# Patient Record
Sex: MALE | Race: WHITE | HISPANIC OR LATINO
[De-identification: names, ages, dates, MRNs, and addresses within clinical notes are randomized per-mention and may not be internally consistent; named-entity substitution may affect disease eponyms.]

---

## 2017-04-26 ENCOUNTER — APPOINTMENT (OUTPATIENT)
Dept: RADIOLOGY | Facility: HOSPITAL | Age: 82
End: 2017-04-26

## 2017-04-26 ENCOUNTER — OUTPATIENT (OUTPATIENT)
Dept: OUTPATIENT SERVICES | Facility: HOSPITAL | Age: 82
LOS: 1 days | Discharge: HOME | End: 2017-04-26
Payer: MEDICARE

## 2017-04-26 DIAGNOSIS — Z98.89 OTHER SPECIFIED POSTPROCEDURAL STATES: Chronic | ICD-10-CM

## 2017-04-26 DIAGNOSIS — R06.02 SHORTNESS OF BREATH: ICD-10-CM

## 2017-04-26 PROCEDURE — 71020: CPT | Mod: 26

## 2017-08-07 ENCOUNTER — APPOINTMENT (OUTPATIENT)
Dept: RADIOLOGY | Facility: HOSPITAL | Age: 82
End: 2017-08-07

## 2017-08-07 ENCOUNTER — OUTPATIENT (OUTPATIENT)
Dept: OUTPATIENT SERVICES | Facility: HOSPITAL | Age: 82
LOS: 1 days | Discharge: HOME | End: 2017-08-07
Payer: MEDICARE

## 2017-08-07 DIAGNOSIS — R06.02 SHORTNESS OF BREATH: ICD-10-CM

## 2017-08-07 DIAGNOSIS — Z98.89 OTHER SPECIFIED POSTPROCEDURAL STATES: Chronic | ICD-10-CM

## 2017-08-07 PROCEDURE — 71020: CPT | Mod: 26

## 2019-04-15 ENCOUNTER — MEDICATION RENEWAL (OUTPATIENT)
Age: 84
End: 2019-04-15

## 2019-05-13 ENCOUNTER — MEDICATION RENEWAL (OUTPATIENT)
Age: 84
End: 2019-05-13

## 2019-05-14 ENCOUNTER — MEDICATION RENEWAL (OUTPATIENT)
Age: 84
End: 2019-05-14

## 2019-05-14 ENCOUNTER — APPOINTMENT (OUTPATIENT)
Dept: INTERNAL MEDICINE | Facility: CLINIC | Age: 84
End: 2019-05-14
Payer: MEDICARE

## 2019-05-14 ENCOUNTER — RECORD ABSTRACTING (OUTPATIENT)
Age: 84
End: 2019-05-14

## 2019-05-14 VITALS
HEART RATE: 76 BPM | DIASTOLIC BLOOD PRESSURE: 73 MMHG | HEIGHT: 66 IN | WEIGHT: 133 LBS | SYSTOLIC BLOOD PRESSURE: 109 MMHG | BODY MASS INDEX: 21.38 KG/M2

## 2019-05-14 DIAGNOSIS — E11.9 TYPE 2 DIABETES MELLITUS W/OUT COMPLICATIONS: ICD-10-CM

## 2019-05-14 DIAGNOSIS — R09.89 OTHER SPECIFIED SYMPTOMS AND SIGNS INVOLVING THE CIRCULATORY AND RESPIRATORY SYSTEMS: ICD-10-CM

## 2019-05-14 DIAGNOSIS — H91.90 UNSPECIFIED HEARING LOSS, UNSPECIFIED EAR: ICD-10-CM

## 2019-05-14 DIAGNOSIS — Z86.69 PERSONAL HISTORY OF OTHER DISEASES OF THE NERVOUS SYSTEM AND SENSE ORGANS: ICD-10-CM

## 2019-05-14 DIAGNOSIS — J06.9 ACUTE UPPER RESPIRATORY INFECTION, UNSPECIFIED: ICD-10-CM

## 2019-05-14 DIAGNOSIS — M25.569 PAIN IN UNSPECIFIED KNEE: ICD-10-CM

## 2019-05-14 DIAGNOSIS — R06.02 SHORTNESS OF BREATH: ICD-10-CM

## 2019-05-14 DIAGNOSIS — R53.83 OTHER FATIGUE: ICD-10-CM

## 2019-05-14 DIAGNOSIS — K21.9 GASTRO-ESOPHAGEAL REFLUX DISEASE W/OUT ESOPHAGITIS: ICD-10-CM

## 2019-05-14 DIAGNOSIS — J18.9 PNEUMONIA, UNSPECIFIED ORGANISM: ICD-10-CM

## 2019-05-14 DIAGNOSIS — I25.10 ATHEROSCLEROTIC HEART DISEASE OF NATIVE CORONARY ARTERY W/OUT ANGINA PECTORIS: ICD-10-CM

## 2019-05-14 DIAGNOSIS — Z86.73 PERSONAL HISTORY OF TRANSIENT ISCHEMIC ATTACK (TIA), AND CEREBRAL INFARCTION W/OUT RESIDUAL DEFICITS: ICD-10-CM

## 2019-05-14 DIAGNOSIS — H92.09 OTALGIA, UNSPECIFIED EAR: ICD-10-CM

## 2019-05-14 DIAGNOSIS — Z86.19 PERSONAL HISTORY OF OTHER INFECTIOUS AND PARASITIC DISEASES: ICD-10-CM

## 2019-05-14 DIAGNOSIS — N52.9 MALE ERECTILE DYSFUNCTION, UNSPECIFIED: ICD-10-CM

## 2019-05-14 DIAGNOSIS — Z87.01 PERSONAL HISTORY OF PNEUMONIA (RECURRENT): ICD-10-CM

## 2019-05-14 DIAGNOSIS — Z87.898 PERSONAL HISTORY OF OTHER SPECIFIED CONDITIONS: ICD-10-CM

## 2019-05-14 DIAGNOSIS — Z87.828 PERSONAL HISTORY OF OTHER (HEALED) PHYSICAL INJURY AND TRAUMA: ICD-10-CM

## 2019-05-14 PROCEDURE — 99214 OFFICE O/P EST MOD 30 MIN: CPT

## 2019-05-14 RX ORDER — NEOMYCIN SULFATE, POLYMYXIN B SULFATE, HYDROCORTISONE 3.5; 10000; 1 MG/ML; [USP'U]/ML; MG/ML
1 SOLUTION/ DROPS AURICULAR (OTIC) 4 TIMES DAILY
Refills: 0 | Status: DISCONTINUED | COMMUNITY
End: 2019-05-14

## 2019-05-14 RX ORDER — BUMETANIDE 1 MG/1
1 TABLET ORAL DAILY
Refills: 0 | Status: DISCONTINUED | COMMUNITY
End: 2019-05-14

## 2019-05-14 RX ORDER — APIXABAN 5 MG/1
5 TABLET, FILM COATED ORAL
Qty: 180 | Refills: 2 | Status: DISCONTINUED | COMMUNITY
End: 2019-05-14

## 2019-05-14 NOTE — PHYSICAL EXAM
[No Acute Distress] : no acute distress [Well Nourished] : well nourished [Well Developed] : well developed [Well-Appearing] : well-appearing [Normal Sclera/Conjunctiva] : normal sclera/conjunctiva [PERRL] : pupils equal round and reactive to light [EOMI] : extraocular movements intact [Normal Outer Ear/Nose] : the outer ears and nose were normal in appearance [Normal Oropharynx] : the oropharynx was normal [No JVD] : no jugular venous distention [Supple] : supple [No Lymphadenopathy] : no lymphadenopathy [No Respiratory Distress] : no respiratory distress  [Clear to Auscultation] : lungs were clear to auscultation bilaterally [No Accessory Muscle Use] : no accessory muscle use [Normal Rate] : normal rate  [Regular Rhythm] : with a regular rhythm [Normal S1, S2] : normal S1 and S2 [No Murmur] : no murmur heard [No Carotid Bruits] : no carotid bruits [No Abdominal Bruit] : a ~M bruit was not heard ~T in the abdomen [Pedal Pulses Present] : the pedal pulses are present [No Varicosities] : no varicosities [No Edema] : there was no peripheral edema [No Extremity Clubbing/Cyanosis] : no extremity clubbing/cyanosis [Soft] : abdomen soft [No Palpable Aorta] : no palpable aorta [Non-distended] : non-distended [Non Tender] : non-tender [No Masses] : no abdominal mass palpated [No HSM] : no HSM [Normal Bowel Sounds] : normal bowel sounds [Normal Posterior Cervical Nodes] : no posterior cervical lymphadenopathy [Normal Anterior Cervical Nodes] : no anterior cervical lymphadenopathy [No CVA Tenderness] : no CVA  tenderness [No Spinal Tenderness] : no spinal tenderness [No Joint Swelling] : no joint swelling [Grossly Normal Strength/Tone] : grossly normal strength/tone [No Rash] : no rash [Coordination Grossly Intact] : coordination grossly intact [Normal Gait] : normal gait [No Focal Deficits] : no focal deficits [Deep Tendon Reflexes (DTR)] : deep tendon reflexes were 2+ and symmetric [Normal Insight/Judgement] : insight and judgment were intact [Normal Affect] : the affect was normal

## 2019-05-14 NOTE — HISTORY OF PRESENT ILLNESS
[de-identified] : for f/u dm cad [FreeTextEntry1] : f/u  dm   remains on  metformin  he says his sob is generally alona and he is not sign the pulmicort though it times still occurs  says  3 per week diuretic keepng any  fluid retention under control  remains on oral rx for dm

## 2019-05-14 NOTE — REVIEW OF SYSTEMS
[FreeTextEntry6] : as above [Negative] : Heme/Lymph [FreeTextEntry8] : occ loss opf urine [de-identified] : occ sundowning as per daughter

## 2019-06-03 PROBLEM — N52.9 MALE ERECTILE DISORDER: Status: ACTIVE | Noted: 2019-05-14

## 2019-06-05 ENCOUNTER — APPOINTMENT (OUTPATIENT)
Dept: INTERNAL MEDICINE | Facility: CLINIC | Age: 84
End: 2019-06-05
Payer: MEDICARE

## 2019-06-05 VITALS
BODY MASS INDEX: 22.02 KG/M2 | SYSTOLIC BLOOD PRESSURE: 136 MMHG | DIASTOLIC BLOOD PRESSURE: 74 MMHG | HEART RATE: 78 BPM | WEIGHT: 137 LBS | HEIGHT: 66 IN

## 2019-06-05 DIAGNOSIS — H91.93 UNSPECIFIED HEARING LOSS, BILATERAL: ICD-10-CM

## 2019-06-05 DIAGNOSIS — H61.20 IMPACTED CERUMEN, UNSPECIFIED EAR: ICD-10-CM

## 2019-06-05 DIAGNOSIS — H61.23 IMPACTED CERUMEN, BILATERAL: ICD-10-CM

## 2019-06-05 PROCEDURE — 99213 OFFICE O/P EST LOW 20 MIN: CPT | Mod: 25

## 2019-06-05 PROCEDURE — 69210 REMOVE IMPACTED EAR WAX UNI: CPT

## 2019-06-05 NOTE — PHYSICAL EXAM
[No Acute Distress] : no acute distress [Well Nourished] : well nourished [Well Developed] : well developed [Normal Sclera/Conjunctiva] : normal sclera/conjunctiva [Normal Outer Ear/Nose] : the outer ears and nose were normal in appearance [No JVD] : no jugular venous distention [Normal Oropharynx] : the oropharynx was normal [Supple] : supple [No Respiratory Distress] : no respiratory distress  [Clear to Auscultation] : lungs were clear to auscultation bilaterally [No Accessory Muscle Use] : no accessory muscle use [de-identified] : cerumin impaction bilaterally

## 2019-06-30 ENCOUNTER — EMERGENCY (EMERGENCY)
Facility: HOSPITAL | Age: 84
LOS: 1 days | Discharge: HOME | End: 2019-06-30
Attending: EMERGENCY MEDICINE
Payer: MEDICARE

## 2019-06-30 VITALS
HEIGHT: 66 IN | SYSTOLIC BLOOD PRESSURE: 136 MMHG | TEMPERATURE: 98 F | RESPIRATION RATE: 18 BRPM | HEART RATE: 85 BPM | OXYGEN SATURATION: 96 % | DIASTOLIC BLOOD PRESSURE: 70 MMHG | WEIGHT: 164.91 LBS

## 2019-06-30 VITALS
SYSTOLIC BLOOD PRESSURE: 136 MMHG | DIASTOLIC BLOOD PRESSURE: 86 MMHG | TEMPERATURE: 98 F | RESPIRATION RATE: 17 BRPM | HEART RATE: 96 BPM | OXYGEN SATURATION: 96 %

## 2019-06-30 DIAGNOSIS — Z98.89 OTHER SPECIFIED POSTPROCEDURAL STATES: Chronic | ICD-10-CM

## 2019-06-30 PROCEDURE — 12013 RPR F/E/E/N/L/M 2.6-5.0 CM: CPT

## 2019-06-30 PROCEDURE — 72125 CT NECK SPINE W/O DYE: CPT | Mod: 26

## 2019-06-30 PROCEDURE — 72125 CT NECK SPINE W/O DYE: CPT

## 2019-06-30 PROCEDURE — 71046 X-RAY EXAM CHEST 2 VIEWS: CPT

## 2019-06-30 PROCEDURE — 72170 X-RAY EXAM OF PELVIS: CPT | Mod: 26

## 2019-06-30 PROCEDURE — 90471 IMMUNIZATION ADMIN: CPT

## 2019-06-30 PROCEDURE — 70450 CT HEAD/BRAIN W/O DYE: CPT | Mod: 26

## 2019-06-30 PROCEDURE — 99284 EMERGENCY DEPT VISIT MOD MDM: CPT | Mod: 25

## 2019-06-30 PROCEDURE — 71046 X-RAY EXAM CHEST 2 VIEWS: CPT | Mod: 26

## 2019-06-30 PROCEDURE — 90715 TDAP VACCINE 7 YRS/> IM: CPT

## 2019-06-30 PROCEDURE — 99284 EMERGENCY DEPT VISIT MOD MDM: CPT | Mod: 25,GC

## 2019-06-30 PROCEDURE — 72170 X-RAY EXAM OF PELVIS: CPT

## 2019-06-30 PROCEDURE — 70450 CT HEAD/BRAIN W/O DYE: CPT

## 2019-06-30 RX ORDER — ACETAMINOPHEN 500 MG
650 TABLET ORAL ONCE
Refills: 0 | Status: COMPLETED | OUTPATIENT
Start: 2019-06-30 | End: 2019-06-30

## 2019-06-30 RX ORDER — ACETAMINOPHEN 500 MG
2 TABLET ORAL
Qty: 30 | Refills: 0
Start: 2019-06-30

## 2019-06-30 RX ORDER — TETANUS TOXOID, REDUCED DIPHTHERIA TOXOID AND ACELLULAR PERTUSSIS VACCINE, ADSORBED 5; 2.5; 8; 8; 2.5 [IU]/.5ML; [IU]/.5ML; UG/.5ML; UG/.5ML; UG/.5ML
0.5 SUSPENSION INTRAMUSCULAR ONCE
Refills: 0 | Status: COMPLETED | OUTPATIENT
Start: 2019-06-30 | End: 2019-06-30

## 2019-06-30 RX ADMIN — TETANUS TOXOID, REDUCED DIPHTHERIA TOXOID AND ACELLULAR PERTUSSIS VACCINE, ADSORBED 0.5 MILLILITER(S): 5; 2.5; 8; 8; 2.5 SUSPENSION INTRAMUSCULAR at 18:38

## 2019-06-30 RX ADMIN — Medication 650 MILLIGRAM(S): at 16:48

## 2019-06-30 NOTE — ED PROVIDER NOTE - PHYSICAL EXAMINATION
GEN: Well appearing, well nourished, in no apparent distress.  HEAD: R forehead lac   HEENT: PERRL, EOMI, no nystagmus, no facial droop, Airway patent, uvula midline, MMM, neck supple, no LAD, no JVD, no raccoon sign, no andrew sign   LUNG: CTAB, no adventitious sounds, no retractions, no nasal flaring  CV: RRR, no murmurs,   Abd: soft, NTND, no rebound or guarding, BS+ in all quadrants, no CVAT  MSK: WWP, Pulses 2+ in extremities, No edema, no visible deformities, strength 5/5 in all extremities, no midspine TTP  Neuro:  CN II-XII in tact. AAOx3, Ambulatory with stable gait. sensations in tact in all extremities, neg romberg, neg pronator drift, neg finger to nose,   Skin: Warm and dry, no evidence of rash  Psych: normal mood and affect

## 2019-06-30 NOTE — ED PROVIDER NOTE - PROGRESS NOTE DETAILS
ED sign out, eval for fall, pending  imaging and close reassessments and likely d/c w/ family if all wnl and continued close outpt fu -- Geoffrey Carter MD Dr Lemus: CT and xrays without bleeding or fractures. pt was able to eat and drink, stand and walk without issues.

## 2019-06-30 NOTE — ED PROVIDER NOTE - CLINICAL SUMMARY MEDICAL DECISION MAKING FREE TEXT BOX
Mechanical fall in eldely male on aspirin, check ct head, ct cspine, cxr, xray pelvis to r/o bleed and fractures. Tylenol for pain and reassess.

## 2019-06-30 NOTE — ED PROVIDER NOTE - OBJECTIVE STATEMENT
89M PMH HTN, HLD, DM, pacemaker, TAVR, on aspirin presents with trip and fall this afternoon in front of his [facility] living home. Pt hit his head and has a lac R forehead. Also has R hip pain. Patient denies LOC, chest pain, SOB, f/c, cough, abd pain, N/V/D/C, weakness, HA, dizziness, urinary symptoms, extremity pain or swelling or other complaints.

## 2019-06-30 NOTE — ED PROVIDER NOTE - PMH
BPH (benign prostatic hypertrophy)    DM (diabetes mellitus)    HLD (hyperlipidemia)    HTN (hypertension)    Insomnia    RBBB

## 2019-06-30 NOTE — ED ADULT NURSE NOTE - NSIMPLEMENTINTERV_GEN_ALL_ED
Implemented All Fall with Harm Risk Interventions:  [place] to call system. Call bell, personal items and telephone within reach. Instruct patient to call for assistance. Room bathroom lighting operational. Non-slip footwear when patient is off stretcher. Physically safe environment: no spills, clutter or unnecessary equipment. Stretcher in lowest position, wheels locked, appropriate side rails in place. Provide visual cue, wrist band, yellow gown, etc. Monitor gait and stability. Monitor for mental status changes and reorient to person, place, and time. Review medications for side effects contributing to fall risk. Reinforce activity limits and safety measures with patient and family. Provide visual clues: red socks.

## 2019-06-30 NOTE — ED PROVIDER NOTE - NS ED ROS FT
Constitutional: no fevers, no chills.  Eyes: no visual changes.  Ears: no ear drainage, no ear pain.  Nose: no nasal congestion.  Mouth/Throat: no sore throat.  Cardiovascular: no chest pain.  Respiratory: no shortness of breath, no wheezing, no cough  Gastrointestinal: no nausea, no vomiting, no diarrhea, no abdominal pain.  MSK: no flank pain, no back pain. +r hip pain and R forehead laceration.   Genitourinary: no dysuria, no hematuria.  Skin: no rashes.  Neuro: no headache,   Psychiatric: no known mental health issues.

## 2019-06-30 NOTE — ED PROVIDER NOTE - NSFOLLOWUPINSTRUCTIONS_ED_ALL_ED_FT
1) You were seen in the ER for fall. The patient has been informed of all concerning signs and symptoms to return to Emergency Department, the necessity to follow up with PMD/Clinic/follow up provided within 2-3 days was explained, and the patient reports understanding of above with capacity and insight. You can look at the discharge papers for some examples of specific signs and symptoms to look out for.   2) PLEASE CALL DR SCHNEIDER'S OFFICE TOMORROW MORNING TO TELL HIM YOU WERE IN THE HOSPITAL. Please go to Dr Schneider's office or any emergency or urgent care in about 7 DAYS to remove stitches.   3) Take tylenol 650 mg by mouth every 8 hours as needed for pain.

## 2019-06-30 NOTE — ED PROVIDER NOTE - ATTENDING CONTRIBUTION TO CARE
I have seen and evaluated this patient with the resident.   I agree with the findings  unless other wise stated.  I have made appropriate changes in documentations where needed, After my face to face bedside evaluation, I am further  notinM PMH HTN, HLD, DM, pacemaker, TAVR, on aspirin presents with trip and fall this afternoon in front of his [facility] living home. Pt hit his head and has a lac R forehead. Also has R hip pain. Patient denies LOC, chest pain, SOB, f/c, cough, abd pain, N/V/D/C, weakness, HA, dizziness, urinary symptoms, extremity pain or swelling or other complaints. Pt alert No distress A laceration right forehead irregular no active bleeding detailed neuro exam non focal OA+ ambulatory CT head and cx spines no acute traumatic complication PMD f/u --Juares

## 2019-07-10 ENCOUNTER — TRANSCRIPTION ENCOUNTER (OUTPATIENT)
Age: 84
End: 2019-07-10

## 2019-08-12 ENCOUNTER — RX RENEWAL (OUTPATIENT)
Age: 84
End: 2019-08-12

## 2019-08-26 ENCOUNTER — EMERGENCY (EMERGENCY)
Facility: HOSPITAL | Age: 84
LOS: 1 days | Discharge: HOME | End: 2019-08-26
Payer: MEDICARE

## 2019-08-26 VITALS
OXYGEN SATURATION: 95 % | HEART RATE: 80 BPM | WEIGHT: 145.06 LBS | HEIGHT: 68 IN | RESPIRATION RATE: 16 BRPM | SYSTOLIC BLOOD PRESSURE: 147 MMHG | DIASTOLIC BLOOD PRESSURE: 100 MMHG

## 2019-08-26 DIAGNOSIS — Z98.89 OTHER SPECIFIED POSTPROCEDURAL STATES: Chronic | ICD-10-CM

## 2019-08-26 LAB
ALBUMIN SERPL ELPH-MCNC: 4.7 G/DL — SIGNIFICANT CHANGE UP (ref 3.3–5)
ALP SERPL-CCNC: 60 U/L — SIGNIFICANT CHANGE UP (ref 40–120)
ALT FLD-CCNC: 39 U/L — SIGNIFICANT CHANGE UP (ref 10–45)
ANION GAP SERPL CALC-SCNC: 13 MMOL/L — SIGNIFICANT CHANGE UP (ref 5–17)
APPEARANCE UR: CLEAR — SIGNIFICANT CHANGE UP
APTT BLD: 30.8 SEC — SIGNIFICANT CHANGE UP (ref 27.5–36.3)
AST SERPL-CCNC: 67 U/L — HIGH (ref 10–40)
BACTERIA # UR AUTO: NEGATIVE — SIGNIFICANT CHANGE UP
BASOPHILS # BLD AUTO: 0 K/UL — SIGNIFICANT CHANGE UP (ref 0–0.2)
BASOPHILS NFR BLD AUTO: 0.5 % — SIGNIFICANT CHANGE UP (ref 0–2)
BILIRUB SERPL-MCNC: 1.4 MG/DL — HIGH (ref 0.2–1.2)
BILIRUB UR-MCNC: NEGATIVE — SIGNIFICANT CHANGE UP
BUN SERPL-MCNC: 13 MG/DL — SIGNIFICANT CHANGE UP (ref 7–23)
CALCIUM SERPL-MCNC: 9.5 MG/DL — SIGNIFICANT CHANGE UP (ref 8.4–10.5)
CHLORIDE SERPL-SCNC: 97 MMOL/L — SIGNIFICANT CHANGE UP (ref 96–108)
CO2 SERPL-SCNC: 22 MMOL/L — SIGNIFICANT CHANGE UP (ref 22–31)
COLOR SPEC: SIGNIFICANT CHANGE UP
CREAT SERPL-MCNC: 0.71 MG/DL — SIGNIFICANT CHANGE UP (ref 0.5–1.3)
DIFF PNL FLD: ABNORMAL
EOSINOPHIL # BLD AUTO: 0.1 K/UL — SIGNIFICANT CHANGE UP (ref 0–0.5)
EOSINOPHIL NFR BLD AUTO: 1 % — SIGNIFICANT CHANGE UP (ref 0–6)
EPI CELLS # UR: 0 /HPF — SIGNIFICANT CHANGE UP
GLUCOSE SERPL-MCNC: 156 MG/DL — HIGH (ref 70–99)
GLUCOSE UR QL: NEGATIVE — SIGNIFICANT CHANGE UP
HCT VFR BLD CALC: 47.8 % — SIGNIFICANT CHANGE UP (ref 39–50)
HGB BLD-MCNC: 15.1 G/DL — SIGNIFICANT CHANGE UP (ref 13–17)
HYALINE CASTS # UR AUTO: 0 /LPF — SIGNIFICANT CHANGE UP (ref 0–2)
INR BLD: 1.14 RATIO — SIGNIFICANT CHANGE UP (ref 0.88–1.16)
KETONES UR-MCNC: NEGATIVE — SIGNIFICANT CHANGE UP
LEUKOCYTE ESTERASE UR-ACNC: NEGATIVE — SIGNIFICANT CHANGE UP
LYMPHOCYTES # BLD AUTO: 0.7 K/UL — LOW (ref 1–3.3)
LYMPHOCYTES # BLD AUTO: 11.3 % — LOW (ref 13–44)
MCHC RBC-ENTMCNC: 29.6 PG — SIGNIFICANT CHANGE UP (ref 27–34)
MCHC RBC-ENTMCNC: 31.6 GM/DL — LOW (ref 32–36)
MCV RBC AUTO: 93.9 FL — SIGNIFICANT CHANGE UP (ref 80–100)
MONOCYTES # BLD AUTO: 0.7 K/UL — SIGNIFICANT CHANGE UP (ref 0–0.9)
MONOCYTES NFR BLD AUTO: 11.5 % — SIGNIFICANT CHANGE UP (ref 2–14)
NEUTROPHILS # BLD AUTO: 4.4 K/UL — SIGNIFICANT CHANGE UP (ref 1.8–7.4)
NEUTROPHILS NFR BLD AUTO: 75.7 % — SIGNIFICANT CHANGE UP (ref 43–77)
NITRITE UR-MCNC: NEGATIVE — SIGNIFICANT CHANGE UP
PH UR: 6 — SIGNIFICANT CHANGE UP (ref 5–8)
PLATELET # BLD AUTO: 128 K/UL — LOW (ref 150–400)
POTASSIUM SERPL-MCNC: 5.8 MMOL/L — HIGH (ref 3.5–5.3)
POTASSIUM SERPL-SCNC: 5.8 MMOL/L — HIGH (ref 3.5–5.3)
PROT SERPL-MCNC: 7.7 G/DL — SIGNIFICANT CHANGE UP (ref 6–8.3)
PROT UR-MCNC: SIGNIFICANT CHANGE UP
PROTHROM AB SERPL-ACNC: 13.2 SEC — HIGH (ref 10–12.9)
RBC # BLD: 5.1 M/UL — SIGNIFICANT CHANGE UP (ref 4.2–5.8)
RBC # FLD: 12.4 % — SIGNIFICANT CHANGE UP (ref 10.3–14.5)
RBC CASTS # UR COMP ASSIST: 4 /HPF — SIGNIFICANT CHANGE UP (ref 0–4)
SODIUM SERPL-SCNC: 132 MMOL/L — LOW (ref 135–145)
SP GR SPEC: 1.01 — SIGNIFICANT CHANGE UP (ref 1.01–1.02)
UROBILINOGEN FLD QL: NEGATIVE — SIGNIFICANT CHANGE UP
WBC # BLD: 5.9 K/UL — SIGNIFICANT CHANGE UP (ref 3.8–10.5)
WBC # FLD AUTO: 5.9 K/UL — SIGNIFICANT CHANGE UP (ref 3.8–10.5)
WBC UR QL: 0 /HPF — SIGNIFICANT CHANGE UP (ref 0–5)

## 2019-08-26 PROCEDURE — 85027 COMPLETE CBC AUTOMATED: CPT

## 2019-08-26 PROCEDURE — 85730 THROMBOPLASTIN TIME PARTIAL: CPT

## 2019-08-26 PROCEDURE — 93005 ELECTROCARDIOGRAM TRACING: CPT

## 2019-08-26 PROCEDURE — 70450 CT HEAD/BRAIN W/O DYE: CPT

## 2019-08-26 PROCEDURE — 99284 EMERGENCY DEPT VISIT MOD MDM: CPT | Mod: 25

## 2019-08-26 PROCEDURE — 93010 ELECTROCARDIOGRAM REPORT: CPT

## 2019-08-26 PROCEDURE — 99284 EMERGENCY DEPT VISIT MOD MDM: CPT | Mod: GC

## 2019-08-26 PROCEDURE — 85610 PROTHROMBIN TIME: CPT

## 2019-08-26 PROCEDURE — 80053 COMPREHEN METABOLIC PANEL: CPT

## 2019-08-26 PROCEDURE — 81001 URINALYSIS AUTO W/SCOPE: CPT

## 2019-08-26 PROCEDURE — 70450 CT HEAD/BRAIN W/O DYE: CPT | Mod: 26

## 2019-08-26 NOTE — ED PROVIDER NOTE - CLINICAL SUMMARY MEDICAL DECISION MAKING FREE TEXT BOX
Impression:  strong suspicion for mechanical fall in elderly male with good social support, clinically well at this time.  labs, UA, and CT are unremarkable.   results d/w family, who understand indications to come back to the ED.  Plan:  d/c home, f/u PMD, return precautions.

## 2019-08-26 NOTE — ED PROVIDER NOTE - PMH
BPH (benign prostatic hypertrophy)    DM (diabetes mellitus)    HLD (hyperlipidemia)    HTN (hypertension)    Insomnia    RBBB BPH (benign prostatic hypertrophy)    Chronic atrial fibrillation    DM (diabetes mellitus)    HLD (hyperlipidemia)    HTN (hypertension)    Insomnia    RBBB

## 2019-08-26 NOTE — ED PROVIDER NOTE - NEURO NEGATIVE STATEMENT, MLM
no loss of consciousness, no gait abnormality, no headache, no sensory deficits, and no weakness.  Chronically unsteady on his feet.

## 2019-08-26 NOTE — ED PROVIDER NOTE - OBJECTIVE STATEMENT
89yo m c/o unwitnessed fall.  Pt was at home alone when he fell forward striking his forehead and left shin. Unsure of duration of downtime. Denies LOC, CP, palpitation, dizziness.   Denies fever This is 2nd fall within last few months. Uses walker to ambulate. 89yo m c/o unwitnessed fall.  Pt was at home alone when he fell forward striking his forehead and left shin. Unsure of duration of downtime. Denies LOC, CP, palpitation, dizziness, fevers,  This is 2nd fall within last few months. Uses walker to ambulate. 89yo m c/o unwitnessed fall.  Pt was at home alone when he fell forward striking his forehead and left shin.  Denies LOC, CP, palpitation, dizziness, fevers,  This is 2nd fall within last few months. Uses walker to ambulate.

## 2019-08-26 NOTE — ED ADULT NURSE NOTE - OBJECTIVE STATEMENT
Pt presents to ED following a fall, AXOX2, oriented to person and place, disoriented to time, pt is unreliable historian, family at bedside aiding with history, pt denies pain, pt is [place] and does not have hearing aids, pt lives at assisted living, staff at facility reported an unwitnessed fall, pt struck head but denies LOC, pt struck left shin causing abrasion, no active bleeding at this time, pt not sure how long he was down, pt has hx of falls, one occurring earlier this month, uses walker at baseline. Pt breathing unlabored, symmetrical, no shortness of breath, no chest pain, no n/v/d, no fever or chills, no urine sx, pt has rash to upper trunk, being treated outpt for 1  month with topical creams, partial relief.

## 2019-08-26 NOTE — ED PROVIDER NOTE - NSFOLLOWUPINSTRUCTIONS_ED_ALL_ED_FT
1. FOLLOW UP WITH YOUR PRIMARY MD.  2. YOU MAY TAKE IBUPROFEN (MOTRIN, ADVIL) OR ACETAMINOPHEN (TYLENOL) AS NEEDED, AS DIRECTED ON PACKAGING FOR PAIN OR FEVER.  3.  RETURN TO THE ER FOR ANY WORSENING SYMPTOMS OR CONCERNS (FEVER, CHILLS, WORSENING, PAIN, INABILITY TO EAT/DRINK, ETC...)

## 2019-08-26 NOTE — ED PROVIDER NOTE - ATTENDING CONTRIBUTION TO CARE
MD Arthur:  patient seen and evaluated with the resident.  I was present for key portions of the History & Physical, and I agree with the Impression & Plan.  MD Arthur:  89 yo M, c/o fall at home.  2nd fall in last 2 mos.  Patient reports that he lost his balance and fell forward.  No LOC.  No neck pain.  No back pain.  No Cp/SOB.  NO abd pain.  Better/worse: no modifiers.    VS: wnl. elderly M, NAD, no frontal hematoma, PERRL, EOMI, neck supple, CTA B, RRR, Abd: s/nd/nt, Ext: no edema.    Impression:  strong suspicion for mechanical fall in elderly male with good social support, clinically well at this time.  labs, UA, and CT are unremarkable.   results d/w family, who understand indications to come back to the ED.  Plan:  d/c home, f/u PMD, return precautions. MD Arthur:  patient seen and evaluated with the resident.  I was present for key portions of the History & Physical, and I agree with the Impression & Plan.  MD Arthur:  89 yo M, c/o fall at home.  2nd fall in last 2 mos.  Patient reports that he lost his balance and fell forward.  No LOC.  No neck pain.  No back pain.  No Cp/SOB.  NO abd pain.  Better/worse: no modifiers.    VS: wnl. elderly M, NAD, no frontal hematoma, PERRL, EOMI, neck supple, CTA B, RRR, Abd: s/nd/nt, Ext: no edema.    Impression:  ECG = afib (old). no RVR.    Impression:  strong suspicion for mechanical fall in elderly male with good social support, clinically well at this time.  labs, UA, and CT are unremarkable.   results d/w family, who understand indications to come back to the ED.  Plan:  d/c home, f/u PMD, return precautions. MD Arthur:  patient seen and evaluated with the resident.  I was present for key portions of the History & Physical, and I agree with the Impression & Plan.  MD Arthur:  89 yo M, c/o fall at home.  2nd fall in last 2 mos.  Patient reports that he lost his balance and fell forward.  No LOC.  No neck pain.  No back pain.  No Cp/SOB.  NO abd pain.  Better/worse: no modifiers.    VS: wnl. elderly M, NAD, no frontal hematoma, PERRL, EOMI, neck supple, CTA B, RRR, Abd: s/nd/nt, Ext: no edema.    ECG = afib (old). no RVR.    Impression:  strong suspicion for mechanical fall in elderly male with good social support, clinically well at this time.  No AC, given fall risk.  labs, UA, and CT are unremarkable.   results d/w family, who understand indications to come back to the ED.  Plan:  d/c home, f/u PMD, return precautions.

## 2019-08-27 VITALS
SYSTOLIC BLOOD PRESSURE: 129 MMHG | TEMPERATURE: 98 F | OXYGEN SATURATION: 98 % | DIASTOLIC BLOOD PRESSURE: 81 MMHG | RESPIRATION RATE: 16 BRPM | HEART RATE: 73 BPM

## 2019-09-03 PROBLEM — I48.2 CHRONIC ATRIAL FIBRILLATION: Chronic | Status: ACTIVE | Noted: 2019-08-26

## 2019-09-09 ENCOUNTER — APPOINTMENT (OUTPATIENT)
Dept: NEUROLOGY | Facility: CLINIC | Age: 84
End: 2019-09-09
Payer: MEDICARE

## 2019-09-09 VITALS
WEIGHT: 145 LBS | SYSTOLIC BLOOD PRESSURE: 131 MMHG | DIASTOLIC BLOOD PRESSURE: 76 MMHG | HEART RATE: 85 BPM | HEIGHT: 66 IN | BODY MASS INDEX: 23.3 KG/M2

## 2019-09-09 PROCEDURE — 99215 OFFICE O/P EST HI 40 MIN: CPT

## 2019-09-09 PROCEDURE — 99204 OFFICE O/P NEW MOD 45 MIN: CPT

## 2019-09-09 RX ORDER — PRAVASTATIN SODIUM 20 MG/1
20 TABLET ORAL DAILY
Qty: 90 | Refills: 1 | Status: DISCONTINUED | OUTPATIENT
Start: 2019-05-14 | End: 2019-09-09

## 2019-09-09 RX ORDER — SPIRONOLACTONE 25 MG/1
25 TABLET ORAL DAILY
Qty: 45 | Refills: 0 | Status: DISCONTINUED | OUTPATIENT
Start: 2019-05-14 | End: 2019-09-09

## 2019-09-09 RX ORDER — METOPROLOL TARTRATE 25 MG/1
25 TABLET, FILM COATED ORAL TWICE DAILY
Qty: 180 | Refills: 1 | Status: DISCONTINUED | COMMUNITY
Start: 2019-05-14 | End: 2019-09-09

## 2019-09-09 RX ORDER — PRAVASTATIN SODIUM 20 MG/1
20 TABLET ORAL DAILY
Qty: 90 | Refills: 1 | Status: DISCONTINUED | COMMUNITY
Start: 2019-05-14 | End: 2019-09-09

## 2019-09-09 RX ORDER — BUMETANIDE 0.5 MG/1
0.5 TABLET ORAL DAILY
Qty: 90 | Refills: 0 | Status: DISCONTINUED | OUTPATIENT
Start: 2019-05-14 | End: 2019-09-09

## 2019-09-09 RX ORDER — METOPROLOL TARTRATE 25 MG/1
25 TABLET, FILM COATED ORAL TWICE DAILY
Qty: 180 | Refills: 0 | Status: DISCONTINUED | COMMUNITY
Start: 2019-05-14 | End: 2019-09-09

## 2019-09-09 NOTE — DISCUSSION/SUMMARY
[FreeTextEntry1] : 89 M who is here for initial consultation of falls. He currently has pt via his assisted living. He should continue working on his balance and judging distances for turns. His daughter inquired about a cognitive testing MPR? I will have him see my cognitive neurology colleagues for an opinion. \par \par He is not able to obtain mri b/c of pacemaker.\par \par Patient was advised to continue to monitor for neurologic symptoms and to notify my office or go to the nearest emergency room if there are any changes. Neurologic issues were discussed with the patient. All questions were answered to complete satisfaction. Education was provided to the patient during this encounter.\par Side effects of the above medications were discussed in detail including but not limited to applicable black box warning and teratogenicity as appropriate. \par Patient was advised to bring previous records to my office. \par \par

## 2019-09-09 NOTE — CONSULT LETTER
[Dear  ___] : Dear  [unfilled], [FreeTextEntry1] : \par \par Thank you very much for allowing me to participate in the care of your patient. Please don't hesitate to contact me with any questions or concerns. \par \par Sincerely,\par Skyler Caceres MD\par Neurology\par [facility]\par [address]\[place] [address]\par Tel: [phone]\par Email: [email or web address]\par \par

## 2019-09-09 NOTE — REASON FOR VISIT
[Initial Evaluation] : an initial evaluation [Family Member] : family member [FreeTextEntry1] : Cognitive decline

## 2019-09-09 NOTE — ASSESSMENT
[FreeTextEntry1] : Assessment:\par 89yo RH WM, with vascular risk factors, here with progressive cognitive decline over a few months. \par ADL and IADL are poor and he needs constant supervision.\par MMSE and SPMSQ show moderate dementia. \par Gait issues are from a combination of brain vascular disease (might have had a R IC stroke), arthritis and spinal stenosis.\par \par Diagnostic Impression:\par Vascular dementia\par Gait instability - multifactorial.\par \par Plan:\par -Aricept 5mg\par -Zoloft 25mg\par -encourage social and physical activity\par -B vitamins, TFT, RPR\par -basic inflammatory labs\par -Lyme serology.\par -continue with PT. \par \par A thorough discussion was entertained with the patient/caregiver regarding the use of psychoactive medications, their possible benefits and AE profile, including the risk of cardiovascular complications.\par We discussed the benefits of being active, physically and mentally, and the need to to establish a routine in this respect.\par Driving abilities and firearms possession and use were discussed, in relation to progression of the cognitive decline, and the need to assess them periodically.\par Patient/caregiver understand and agree with the plan.\par

## 2019-09-09 NOTE — PHYSICAL EXAM
[General Appearance - Alert] : alert [General Appearance - In No Acute Distress] : in no acute distress [Affect] : the affect was normal [Impaired Insight] : insight and judgment were intact [Person] : oriented to person [Place] : oriented to place [Time] : oriented to time [Concentration Intact] : normal concentrating ability [Visual Intact] : visual attention was ~T not ~L decreased [Naming Objects] : no difficulty naming common objects [Writing A Sentence] : no difficulty writing a sentence [Repeating Phrases] : no difficulty repeating a phrase [Fluency] : fluency intact [Comprehension] : comprehension intact [Reading] : reading intact [Past History] : adequate knowledge of personal past history [Total Score ___ / 30] : the patient achieved a score of [unfilled] /30 [Date / Time ___ / 5] : date / time [unfilled] / 5 [Place ___ / 5] : place [unfilled] / 5 [Registration ___ / 3] : registration [unfilled] / 3 [Serial Sevens ___/5] : serial sevens [unfilled] / 5 [Naming 2 Objects ___ / 2] : naming two objects [unfilled] / 2 [Repeating a Sentence ___ / 1] : repeating a sentence [unfilled] / 1 [Writing a Sentence ___ / 1] : write sentence [unfilled] / 1 [3-stage Verbal Command ___ / 3] : three-stage verbal command [unfilled] / 3 [Written Command ___ / 1] : written command [unfilled] / 1 [Copy a Design ___ / 1] : copy a design [unfilled] / 1 [Recall ___ / 3] : recall [unfilled] / 3 [Cranial Nerves Optic (II)] : visual acuity intact bilaterally,  visual fields full to confrontation, pupils equal round and reactive to light [Cranial Nerves Oculomotor (III)] : extraocular motion intact [Cranial Nerves Trigeminal (V)] : facial sensation intact symmetrically [Cranial Nerves Facial (VII)] : face symmetrical [Cranial Nerves Vestibulocochlear (VIII)] : hearing was intact bilaterally [Cranial Nerves Glossopharyngeal (IX)] : tongue and palate midline [Cranial Nerves Accessory (XI - Cranial And Spinal)] : head turning and shoulder shrug symmetric [Cranial Nerves Hypoglossal (XII)] : there was no tongue deviation with protrusion [Motor Strength] : muscle strength was normal in all four extremities [Involuntary Movements] : no involuntary movements were seen [No Muscle Atrophy] : normal bulk in all four extremities [Motor Handedness Right-Handed] : the patient is right hand dominant [Sensation Tactile Decrease] : light touch was intact [Sensation Pain / Temperature Decrease] : pain and temperature was intact [Proprioception] : proprioception was intact [Sensation Vibration Decrease] : vibration was intact [Balance] : balance was intact [Limited Balance] : the patient's balance was impaired [2+] : Ankle jerk left 2+ [Sclera] : the sclera and conjunctiva were normal [PERRL With Normal Accommodation] : pupils were equal in size, round, reactive to light, with normal accommodation [Extraocular Movements] : extraocular movements were intact [Optic Disc Abnormality] : the optic disc were normal in size and color [No JERMAINE] : no internuclear ophthalmoplegia [No APD] : no afferent pupillary defect [Full Visual Field] : full visual field [Outer Ear] : the ears and nose were normal in appearance [Oropharynx] : the oropharynx was normal [Neck Appearance] : the appearance of the neck was normal [Neck Cervical Mass (___cm)] : no neck mass was observed [Jugular Venous Distention Increased] : there was no jugular-venous distention [Thyroid Diffuse Enlargement] : the thyroid was not enlarged [Thyroid Nodule] : there were no palpable thyroid nodules [Auscultation Breath Sounds / Voice Sounds] : lungs were clear to auscultation bilaterally [Heart Rate And Rhythm] : heart rate was normal and rhythm regular [Heart Sounds] : normal S1 and S2 [Heart Sounds Gallop] : no gallops [Murmurs] : no murmurs [Heart Sounds Pericardial Friction Rub] : no pericardial rub [Arterial Pulses Carotid] : carotid pulses were normal with no bruits [Full Pulse] : the pedal pulses are present [Edema] : there was no peripheral edema [Bowel Sounds] : normal bowel sounds [Abdomen Soft] : soft [Abdomen Tenderness] : non-tender [Abdomen Mass (___ Cm)] : no abdominal mass palpated [No CVA Tenderness] : no ~M costovertebral angle tenderness [No Spinal Tenderness] : no spinal tenderness [Nail Clubbing] : no clubbing  or cyanosis of the fingernails [Motor Tone] : muscle strength and tone were normal [Musculoskeletal - Swelling] : no joint swelling seen [Skin Color & Pigmentation] : normal skin color and pigmentation [] : no rash [Skin Turgor] : normal skin turgor [Motor Strength Upper Extremities Bilaterally] : strength was normal in both upper extremities [Romberg's Sign] : Romberg's sign was negtive [Motor Strength Lower Extremities Bilaterally] : strength was normal in both lower extremities [Allodynia] : no ~T allodynia present [Hyperesthesia] : no hyperesthesia [Dysesthesia] : no dysesthesia [Past-pointing] : there was no past-pointing [Tremor] : no tremor present [Coordination - Dysmetria Impaired Finger-to-Nose Bilateral] : not present [Dysdiadochokinesia Bilaterally] : not present [Coordination - Dysmetria Impaired Heel-to-Shin Bilateral] : not present [Plantar Reflex Left Only] : normal on the left [Plantar Reflex Right Only] : normal on the right [FreeTextEntry4] : Limited by [place].\par SPMSQ: 5.\par Mental Status Exam\par Presidents: 1/5\par Alternating Pattern: ok, limited\par Spiral: ok\par Clock: draws numbers BW; can read clocks well; 1/3.\par Repetition: ok\par R/L discrimination on self and examiner: ok\par Cross-line commands: difficulty, inverts sides\par Praxis:\par -Motor: ok\par -Dynamic/Luria: forgets pattern\par -Ideomotor/Imitation: ok\par -Ideational/writing/closing-in: ok\par -Dressing: ok. Needs \par  [FreeTextEntry6] : Satellites around LUE; no focal motor deficits; mild paratonia in UE.  [FreeTextEntry8] : cautioned gait, limps on L side (L knee pain and ? old stroke) [FreeTextEntry1] : PPM IN L CHEST

## 2019-09-09 NOTE — PHYSICAL EXAM
[General Appearance - Alert] : alert [Affect] : the affect was normal [Place] : oriented to place [Person] : oriented to person [Time] : disoriented to time [Cranial Nerves Optic (II)] : visual acuity intact bilaterally,  visual fields full to confrontation, pupils equal round and reactive to light [Current Events] : inadequate knowledge of current events [Short Term Intact] : short term memory impaired [Cranial Nerves Trigeminal (V)] : facial sensation intact symmetrically [Cranial Nerves Oculomotor (III)] : extraocular motion intact [Cranial Nerves Glossopharyngeal (IX)] : tongue and palate midline [Cranial Nerves Facial (VII)] : face symmetrical [Cranial Nerves Hypoglossal (XII)] : there was no tongue deviation with protrusion [Cranial Nerves Accessory (XI - Cranial And Spinal)] : head turning and shoulder shrug symmetric [Motor Tone] : muscle tone was normal in all four extremities [Motor Strength] : muscle strength was normal in all four extremities [Sensation Tactile Decrease] : light touch was intact [Coordination - Dysmetria Impaired Finger-to-Nose Bilateral] : not present [FreeTextEntry5] : hearing impaired [1+] : Patella left 1+ [Extraocular Movements] : extraocular movements were intact [FreeTextEntry8] : walks steadily with rolling walker. Difficulty with turns  [Full Pulse] : the pedal pulses are present [FreeTextEntry1] : see above [] : no rash

## 2019-09-09 NOTE — HISTORY OF PRESENT ILLNESS
[FreeTextEntry1] : 89 M who is here for initial consultation of recent falls. He states he will lose his balance and fall backward. His right eyebrow has a scar and he was asked if he sometimes falls forward. He declined. IN June pt moved into an assisted living facility ([facility]) because he was not able to manage on his own anymore. His girlfriend didn't want to take care of him anymore. He had HCT that doesn't show enlarged ventricles. He has no magnetic gait. Review of his medications only reveals possible medication side effects. He's on metoprolol ER 25mg once a day and another order for bid. He states he takes it only once a day. Daughter didn't have the [facility] medication list with her. She was interested in having him get tested for MPR?
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
## 2019-10-07 ENCOUNTER — INPATIENT (INPATIENT)
Facility: HOSPITAL | Age: 84
LOS: 2 days | Discharge: NURSING, REHAB OR LONG-TERM CARE | DRG: 643 | End: 2019-10-10
Attending: INTERNAL MEDICINE | Admitting: INTERNAL MEDICINE
Payer: MEDICARE

## 2019-10-07 VITALS
DIASTOLIC BLOOD PRESSURE: 104 MMHG | TEMPERATURE: 98 F | HEIGHT: 65 IN | OXYGEN SATURATION: 96 % | SYSTOLIC BLOOD PRESSURE: 174 MMHG | RESPIRATION RATE: 18 BRPM | WEIGHT: 145.06 LBS | HEART RATE: 79 BPM
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
## 2019-10-07 DIAGNOSIS — Z98.89 OTHER SPECIFIED POSTPROCEDURAL STATES: Chronic | ICD-10-CM

## 2019-10-07 LAB
ALBUMIN SERPL ELPH-MCNC: 4 G/DL — SIGNIFICANT CHANGE UP (ref 3.3–5)
ALP SERPL-CCNC: 56 U/L — SIGNIFICANT CHANGE UP (ref 40–120)
ALT FLD-CCNC: 29 U/L — SIGNIFICANT CHANGE UP (ref 10–45)
ANION GAP SERPL CALC-SCNC: 12 MMOL/L — SIGNIFICANT CHANGE UP (ref 5–17)
APPEARANCE UR: CLEAR — SIGNIFICANT CHANGE UP
APTT BLD: 28.3 SEC — SIGNIFICANT CHANGE UP (ref 27.5–36.3)
AST SERPL-CCNC: 39 U/L — SIGNIFICANT CHANGE UP (ref 10–40)
BACTERIA # UR AUTO: NEGATIVE — SIGNIFICANT CHANGE UP
BASOPHILS # BLD AUTO: 0.02 K/UL — SIGNIFICANT CHANGE UP (ref 0–0.2)
BASOPHILS NFR BLD AUTO: 0.4 % — SIGNIFICANT CHANGE UP (ref 0–2)
BILIRUB SERPL-MCNC: 0.7 MG/DL — SIGNIFICANT CHANGE UP (ref 0.2–1.2)
BILIRUB UR-MCNC: NEGATIVE — SIGNIFICANT CHANGE UP
BUN SERPL-MCNC: 14 MG/DL — SIGNIFICANT CHANGE UP (ref 7–23)
CALCIUM SERPL-MCNC: 8.3 MG/DL — LOW (ref 8.4–10.5)
CHLORIDE SERPL-SCNC: 95 MMOL/L — LOW (ref 96–108)
CO2 SERPL-SCNC: 20 MMOL/L — LOW (ref 22–31)
COLOR SPEC: SIGNIFICANT CHANGE UP
CREAT SERPL-MCNC: 0.66 MG/DL — SIGNIFICANT CHANGE UP (ref 0.5–1.3)
DIFF PNL FLD: ABNORMAL
EOSINOPHIL # BLD AUTO: 0.06 K/UL — SIGNIFICANT CHANGE UP (ref 0–0.5)
EOSINOPHIL NFR BLD AUTO: 1.3 % — SIGNIFICANT CHANGE UP (ref 0–6)
EPI CELLS # UR: 0 /HPF — SIGNIFICANT CHANGE UP
GLUCOSE BLDC GLUCOMTR-MCNC: 153 MG/DL — HIGH (ref 70–99)
GLUCOSE SERPL-MCNC: 119 MG/DL — HIGH (ref 70–99)
GLUCOSE UR QL: NEGATIVE — SIGNIFICANT CHANGE UP
HCT VFR BLD CALC: 40 % — SIGNIFICANT CHANGE UP (ref 39–50)
HGB BLD-MCNC: 13.6 G/DL — SIGNIFICANT CHANGE UP (ref 13–17)
HYALINE CASTS # UR AUTO: 0 /LPF — SIGNIFICANT CHANGE UP (ref 0–2)
IMM GRANULOCYTES NFR BLD AUTO: 0.4 % — SIGNIFICANT CHANGE UP (ref 0–1.5)
INR BLD: 1.04 RATIO — SIGNIFICANT CHANGE UP (ref 0.88–1.16)
KETONES UR-MCNC: NEGATIVE — SIGNIFICANT CHANGE UP
LEUKOCYTE ESTERASE UR-ACNC: NEGATIVE — SIGNIFICANT CHANGE UP
LYMPHOCYTES # BLD AUTO: 0.51 K/UL — LOW (ref 1–3.3)
LYMPHOCYTES # BLD AUTO: 11.2 % — LOW (ref 13–44)
MCHC RBC-ENTMCNC: 30 PG — SIGNIFICANT CHANGE UP (ref 27–34)
MCHC RBC-ENTMCNC: 34 GM/DL — SIGNIFICANT CHANGE UP (ref 32–36)
MCV RBC AUTO: 88.3 FL — SIGNIFICANT CHANGE UP (ref 80–100)
MONOCYTES # BLD AUTO: 0.55 K/UL — SIGNIFICANT CHANGE UP (ref 0–0.9)
MONOCYTES NFR BLD AUTO: 12.1 % — SIGNIFICANT CHANGE UP (ref 2–14)
NEUTROPHILS # BLD AUTO: 3.4 K/UL — SIGNIFICANT CHANGE UP (ref 1.8–7.4)
NEUTROPHILS NFR BLD AUTO: 74.6 % — SIGNIFICANT CHANGE UP (ref 43–77)
NITRITE UR-MCNC: NEGATIVE — SIGNIFICANT CHANGE UP
NRBC # BLD: 0 /100 WBCS — SIGNIFICANT CHANGE UP (ref 0–0)
PH UR: 6 — SIGNIFICANT CHANGE UP (ref 5–8)
PLATELET # BLD AUTO: 114 K/UL — LOW (ref 150–400)
POTASSIUM SERPL-MCNC: 4.4 MMOL/L — SIGNIFICANT CHANGE UP (ref 3.5–5.3)
POTASSIUM SERPL-SCNC: 4.4 MMOL/L — SIGNIFICANT CHANGE UP (ref 3.5–5.3)
PROT SERPL-MCNC: 6.8 G/DL — SIGNIFICANT CHANGE UP (ref 6–8.3)
PROT UR-MCNC: SIGNIFICANT CHANGE UP
PROTHROM AB SERPL-ACNC: 12 SEC — SIGNIFICANT CHANGE UP (ref 10–12.9)
RBC # BLD: 4.53 M/UL — SIGNIFICANT CHANGE UP (ref 4.2–5.8)
RBC # FLD: 13.3 % — SIGNIFICANT CHANGE UP (ref 10.3–14.5)
RBC CASTS # UR COMP ASSIST: 9 /HPF — HIGH (ref 0–4)
SODIUM SERPL-SCNC: 127 MMOL/L — LOW (ref 135–145)
SP GR SPEC: 1.02 — SIGNIFICANT CHANGE UP (ref 1.01–1.02)
TROPONIN T, HIGH SENSITIVITY RESULT: 72 NG/L — HIGH (ref 0–51)
TROPONIN T, HIGH SENSITIVITY RESULT: 82 NG/L — HIGH (ref 0–51)
UROBILINOGEN FLD QL: NEGATIVE — SIGNIFICANT CHANGE UP
WBC # BLD: 4.56 K/UL — SIGNIFICANT CHANGE UP (ref 3.8–10.5)
WBC # FLD AUTO: 4.56 K/UL — SIGNIFICANT CHANGE UP (ref 3.8–10.5)
WBC UR QL: 1 /HPF — SIGNIFICANT CHANGE UP (ref 0–5)

## 2019-10-07 PROCEDURE — 99285 EMERGENCY DEPT VISIT HI MDM: CPT | Mod: GC

## 2019-10-07 PROCEDURE — 76377 3D RENDER W/INTRP POSTPROCES: CPT | Mod: 26

## 2019-10-07 PROCEDURE — 72170 X-RAY EXAM OF PELVIS: CPT | Mod: 26

## 2019-10-07 PROCEDURE — 73030 X-RAY EXAM OF SHOULDER: CPT | Mod: 26,LT

## 2019-10-07 PROCEDURE — 72192 CT PELVIS W/O DYE: CPT | Mod: 26

## 2019-10-07 PROCEDURE — 72125 CT NECK SPINE W/O DYE: CPT | Mod: 26

## 2019-10-07 PROCEDURE — 70450 CT HEAD/BRAIN W/O DYE: CPT | Mod: 26

## 2019-10-07 PROCEDURE — 71045 X-RAY EXAM CHEST 1 VIEW: CPT | Mod: 26

## 2019-10-07 RX ORDER — GLUCAGON INJECTION, SOLUTION 0.5 MG/.1ML
1 INJECTION, SOLUTION SUBCUTANEOUS ONCE
Refills: 0 | Status: DISCONTINUED | OUTPATIENT
Start: 2019-10-07 | End: 2019-10-10

## 2019-10-07 RX ORDER — SODIUM CHLORIDE 9 MG/ML
1000 INJECTION INTRAMUSCULAR; INTRAVENOUS; SUBCUTANEOUS
Refills: 0 | Status: DISCONTINUED | OUTPATIENT
Start: 2019-10-07 | End: 2019-10-08

## 2019-10-07 RX ORDER — TETANUS TOXOID, REDUCED DIPHTHERIA TOXOID AND ACELLULAR PERTUSSIS VACCINE, ADSORBED 5; 2.5; 8; 8; 2.5 [IU]/.5ML; [IU]/.5ML; UG/.5ML; UG/.5ML; UG/.5ML
0.5 SUSPENSION INTRAMUSCULAR ONCE
Refills: 0 | Status: COMPLETED | OUTPATIENT
Start: 2019-10-07 | End: 2019-10-07

## 2019-10-07 RX ORDER — INSULIN LISPRO 100/ML
VIAL (ML) SUBCUTANEOUS
Refills: 0 | Status: DISCONTINUED | OUTPATIENT
Start: 2019-10-07 | End: 2019-10-10

## 2019-10-07 RX ORDER — DEXTROSE 50 % IN WATER 50 %
12.5 SYRINGE (ML) INTRAVENOUS ONCE
Refills: 0 | Status: DISCONTINUED | OUTPATIENT
Start: 2019-10-07 | End: 2019-10-10

## 2019-10-07 RX ORDER — DONEPEZIL HYDROCHLORIDE 10 MG/1
5 TABLET, FILM COATED ORAL AT BEDTIME
Refills: 0 | Status: DISCONTINUED | OUTPATIENT
Start: 2019-10-07 | End: 2019-10-10

## 2019-10-07 RX ORDER — ATORVASTATIN CALCIUM 80 MG/1
10 TABLET, FILM COATED ORAL AT BEDTIME
Refills: 0 | Status: DISCONTINUED | OUTPATIENT
Start: 2019-10-07 | End: 2019-10-10

## 2019-10-07 RX ORDER — DEXTROSE 50 % IN WATER 50 %
25 SYRINGE (ML) INTRAVENOUS ONCE
Refills: 0 | Status: DISCONTINUED | OUTPATIENT
Start: 2019-10-07 | End: 2019-10-10

## 2019-10-07 RX ORDER — ACETAMINOPHEN 500 MG
650 TABLET ORAL EVERY 6 HOURS
Refills: 0 | Status: DISCONTINUED | OUTPATIENT
Start: 2019-10-07 | End: 2019-10-10

## 2019-10-07 RX ORDER — METOPROLOL TARTRATE 50 MG
25 TABLET ORAL DAILY
Refills: 0 | Status: DISCONTINUED | OUTPATIENT
Start: 2019-10-07 | End: 2019-10-10

## 2019-10-07 RX ORDER — DEXTROSE 50 % IN WATER 50 %
15 SYRINGE (ML) INTRAVENOUS ONCE
Refills: 0 | Status: DISCONTINUED | OUTPATIENT
Start: 2019-10-07 | End: 2019-10-10

## 2019-10-07 RX ORDER — ENOXAPARIN SODIUM 100 MG/ML
30 INJECTION SUBCUTANEOUS DAILY
Refills: 0 | Status: DISCONTINUED | OUTPATIENT
Start: 2019-10-07 | End: 2019-10-10

## 2019-10-07 RX ORDER — SODIUM CHLORIDE 9 MG/ML
1000 INJECTION, SOLUTION INTRAVENOUS
Refills: 0 | Status: DISCONTINUED | OUTPATIENT
Start: 2019-10-07 | End: 2019-10-10

## 2019-10-07 RX ORDER — SERTRALINE 25 MG/1
25 TABLET, FILM COATED ORAL DAILY
Refills: 0 | Status: DISCONTINUED | OUTPATIENT
Start: 2019-10-07 | End: 2019-10-07

## 2019-10-07 RX ORDER — ASPIRIN/CALCIUM CARB/MAGNESIUM 324 MG
81 TABLET ORAL DAILY
Refills: 0 | Status: DISCONTINUED | OUTPATIENT
Start: 2019-10-07 | End: 2019-10-10

## 2019-10-07 RX ORDER — FINASTERIDE 5 MG/1
5 TABLET, FILM COATED ORAL DAILY
Refills: 0 | Status: DISCONTINUED | OUTPATIENT
Start: 2019-10-07 | End: 2019-10-10

## 2019-10-07 RX ADMIN — FINASTERIDE 5 MILLIGRAM(S): 5 TABLET, FILM COATED ORAL at 17:16

## 2019-10-07 RX ADMIN — Medication 81 MILLIGRAM(S): at 17:16

## 2019-10-07 RX ADMIN — TETANUS TOXOID, REDUCED DIPHTHERIA TOXOID AND ACELLULAR PERTUSSIS VACCINE, ADSORBED 0.5 MILLILITER(S): 5; 2.5; 8; 8; 2.5 SUSPENSION INTRAMUSCULAR at 06:56

## 2019-10-07 RX ADMIN — SODIUM CHLORIDE 50 MILLILITER(S): 9 INJECTION INTRAMUSCULAR; INTRAVENOUS; SUBCUTANEOUS at 18:05

## 2019-10-07 RX ADMIN — Medication 1: at 18:06

## 2019-10-07 RX ADMIN — DONEPEZIL HYDROCHLORIDE 5 MILLIGRAM(S): 10 TABLET, FILM COATED ORAL at 21:29

## 2019-10-07 RX ADMIN — ATORVASTATIN CALCIUM 10 MILLIGRAM(S): 80 TABLET, FILM COATED ORAL at 21:29

## 2019-10-07 RX ADMIN — Medication 25 MILLIGRAM(S): at 17:16

## 2019-10-07 NOTE — ED ADULT NURSE NOTE - OBJECTIVE STATEMENT
89 yr old male arrived to the ED via EMS from the [facility] s/p fall out of bed. per pt he got out of bed and hit the ground. pt does not think he felt unwell or dizzy prior to falling but states "he stood up and next thing, boom". pt on ASA. HX of dementia and DM. upon assessment pt is A&ox2, aware of self, president and location -unaware of time. speech is clear. lungs clear andria, respirations even and unlabored. abd is soft, non tender. pt is c/o mild headache. no hematoma or wound noted to the back of the head. small abrasion noted on L shoulder. pt has full ROM of all extremities and denies pain on flexion and extension. pt denies fever, chills, nausea, vomiting, chest pain or sob. 20G placed in left forearm. red socks applied to pt 89 yr old male arrived to the ED via EMS from the [facility] s/p fall out of bed. per pt he got out of bed and hit the ground. pt does not think he felt unwell or dizzy prior to falling but states "he stood up and next thing, boom". pt on ASA. HX of dementia and DM. upon assessment pt is A&ox2, aware of self, president and location -unaware of time. speech is clear. lungs clear andria, respirations even and unlabored. abd is soft, non tender. pt is c/o mild headache. no hematoma or wound noted to the back of the head. small abrasion noted on L shoulder. pt has full ROM of all extremities and denies pain on flexion and extension. pt denies fever, chills, nausea, vomiting, chest pain or sob. 20G placed in left forearm. red socks applied to pt. per EMS C-spine cleared at scene

## 2019-10-07 NOTE — ED PROVIDER NOTE - CLINICAL SUMMARY MEDICAL DECISION MAKING FREE TEXT BOX
89 yo M on aspirin presents after mechanical fall 4 hours ago, unwitnessed. Vitals WNL. No obvious fractures on physical. Will get head ct and xrays to evaluate for fractures. Will get ekg and troponin. Will reevaluate.
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
## 2019-10-07 NOTE — ED PROVIDER NOTE - ATTENDING CONTRIBUTION TO CARE
MD Grove:  patient seen and evaluated personally.   I agree with the History & Physical,  Impression & Plan other than what was detailed in my note.  MD Grove    89 y/o on asa, fall. demented currently at baseline axox1, answers questions approp. denies any pain or discomfort. in for medical workup given unwitnessed. ct scan head/c spine. appears to have chronic shoulder deformity (mild) on left side w/ n ottp. plan to x ray. no other visible/palpable signs of trauma

## 2019-10-07 NOTE — ED ADULT NURSE NOTE - NSIMPLEMENTINTERV_GEN_ALL_ED
Implemented All Universal Safety Interventions:  [place] to call system. Call bell, personal items and telephone within reach. Instruct patient to call for assistance. Room bathroom lighting operational. Non-slip footwear when patient is off stretcher. Physically safe environment: no spills, clutter or unnecessary equipment. Stretcher in lowest position, wheels locked, appropriate side rails in place.

## 2019-10-07 NOTE — ED PROVIDER NOTE - PMH
BPH (benign prostatic hypertrophy)    Chronic atrial fibrillation    DM (diabetes mellitus)    HLD (hyperlipidemia)    HTN (hypertension)    Insomnia    RBBB

## 2019-10-07 NOTE — H&P ADULT - ASSESSMENT
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 Not sure how ptn would benefit from Imaging his brain further w MRI, would opt to maximize medical managment and DC back to [facility] w outptn PT at the facility. Son is at bedside and in agreement. neuro eval pending. cont outptn meds, check HA1C, check TSH, DVT ppx w sc Lovenox 89 yo male presents post a mecahnical fall w pelvic sprain, no Fx on CT of pelvis, ptn has Afib, rate controlled, ptn appears to have progressive vascular dementia he is on aspirin, not on full AC 2/2 frequent falls. Not sure how ptn would benefit from Imaging his brain further w MRI, would opt to maximize medical management and DC back to [facility] w outptn PT at the facility. Son is at bedside and in agreement. neuro eval pending. cont outptn meds, check HA1C, check TSH, Noted to have hyponatremia, possible SIADH 2/2 ZOLOFT, which was started on 9/19, but could also be 2/2 BUMEX, which is now held. check orthostatics and check urine lytes in am, gentle hydration w NS at 50 cc/hr   DVT ppx w sc Lovenox

## 2019-10-07 NOTE — ED PROVIDER NOTE - OBJECTIVE STATEMENT
89 yo M on aspirin presents after mechanical fall 4 hours ago, unwitnessed. Reports standing form bed when suddenly lost control and fell to the ground hitting the posterior aspect of his head. Denies bleeding or bruising. Denies neck pain. Denies chest pain or SOB prior to fall.
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
## 2019-10-07 NOTE — H&P ADULT - NSHPPHYSICALEXAM_GEN_ALL_CORE
Vital Signs Last 24 Hrs  T(F): 97.4 (10-07-19 @ 10:30)  HR: 89 (10-07-19 @ 10:30) (70 - 89)  BP: 157/84 (10-07-19 @ 10:30) (128/71 - 174/104)  RR: 18 (10-07-19 @ 10:30) (15 - 18)  SpO2: 96% (10-07-19 @ 10:30) (95% - 99%)    GENERAL: NAD, well-developed  HEAD:  Atraumatic, Normocephalic  EYES: EOMI, PERRLA, conjunctiva and sclera clear  NECK: Supple, No JVD  CHEST/LUNG: Clear to auscultation bilaterally; No wheeze  HEART: Regular rate and rhythm; No murmurs, rubs, or gallops  ABDOMEN: Soft, Nontender, Nondistended; Bowel sounds present  EXTREMITIES:  2+ Peripheral Pulses, No clubbing, cyanosis, or edema  PSYCH: AAOx3  NEUROLOGY: non-focal  SKIN: No rashes or lesions

## 2019-10-07 NOTE — ED PROVIDER NOTE - PROGRESS NOTE DETAILS
Liam: patient endorsed to me in sign-out. XR pelvis showing widening of pubic symphysis. Asked ortho resident if further imaging was necessary, and he recommended a CT of pelvis. Order placed. Tong: troponin 82-->72 (delta trop < 20%). EKG unchanged from last. Patient denies chest pain or shortness of breath. Low concern for ACS at this time. Awaiting CT pelvis. Liam: patient's PMD Dr. Trevor Schneider. Per son in the room, patient has been falling a lot (3 falls over past few months) with increased reliance on walker. CT pelvis negative for fracture. Left message with answering service of Dr. Schneider [phone]. Liam: patient's PMD Dr. Trevor Schneider. Per son in the room, patient has been falling a lot (3 falls over past few months) with increased reliance on walker. CT pelvis negative for fracture. Spoke with PMD Dr. Schneider [phone] who is recommending admission to Dr. Christel Mercado. Liam: patient admitted to [facility]. Stable for the floors.

## 2019-10-07 NOTE — ED PROVIDER NOTE - PHYSICAL EXAMINATION
Gen: AAOx3, non-toxic  Head: NCAT. Occipital TTP  HEENT: EOMI, oral mucosa moist, normal conjunctiva  Lung: CTAB, no respiratory distress, no wheezes/rhonchi/rales B/L, speaking in full sentences  CV: RRR, no murmurs, rubs or gallops  Abd: soft, NTND, no guarding, no CVA tenderness  MSK: no visible deformities. Full ROM  Neuro: No focal sensory or motor deficits, normal CN exam   Skin: Warm, well perfused, no rash. left shoulder abrasion.   Psych: normal affect.

## 2019-10-07 NOTE — ED PROVIDER NOTE - NS ED ROS FT
GENERAL: No fever or chills  EYES: no change in vision  HEENT: see hpi  CARDIAC: no chest pain or palpiations   PULMONARY: no cough or SOB  GI: no abdominal pain, nausea, vomiting, diarrhea, or constipation   : No changes in urination  SKIN: no rashes  NEURO: no headache, numbness, or weakness.  MSK: No joint pain

## 2019-10-08 LAB
ANION GAP SERPL CALC-SCNC: 9 MMOL/L — SIGNIFICANT CHANGE UP (ref 5–17)
BUN SERPL-MCNC: 14 MG/DL — SIGNIFICANT CHANGE UP (ref 7–23)
CALCIUM SERPL-MCNC: 8.4 MG/DL — SIGNIFICANT CHANGE UP (ref 8.4–10.5)
CHLORIDE SERPL-SCNC: 94 MMOL/L — LOW (ref 96–108)
CHLORIDE UR-SCNC: 147 MMOL/L — SIGNIFICANT CHANGE UP
CHOLEST SERPL-MCNC: 151 MG/DL — SIGNIFICANT CHANGE UP (ref 10–199)
CO2 SERPL-SCNC: 23 MMOL/L — SIGNIFICANT CHANGE UP (ref 22–31)
CREAT ?TM UR-MCNC: 54 MG/DL — SIGNIFICANT CHANGE UP
CREAT SERPL-MCNC: 0.78 MG/DL — SIGNIFICANT CHANGE UP (ref 0.5–1.3)
CULTURE RESULTS: SIGNIFICANT CHANGE UP
FOLATE SERPL-MCNC: 17.6 NG/ML — SIGNIFICANT CHANGE UP
GLUCOSE BLDC GLUCOMTR-MCNC: 102 MG/DL — HIGH (ref 70–99)
GLUCOSE BLDC GLUCOMTR-MCNC: 105 MG/DL — HIGH (ref 70–99)
GLUCOSE BLDC GLUCOMTR-MCNC: 106 MG/DL — HIGH (ref 70–99)
GLUCOSE SERPL-MCNC: 113 MG/DL — HIGH (ref 70–99)
HBA1C BLD-MCNC: 6.6 % — HIGH (ref 4–5.6)
HDLC SERPL-MCNC: 66 MG/DL — SIGNIFICANT CHANGE UP
LIPID PNL WITH DIRECT LDL SERPL: 75 MG/DL — SIGNIFICANT CHANGE UP
OSMOLALITY UR: 557 MOSM/KG — SIGNIFICANT CHANGE UP (ref 50–1200)
POTASSIUM SERPL-MCNC: 4.1 MMOL/L — SIGNIFICANT CHANGE UP (ref 3.5–5.3)
POTASSIUM SERPL-SCNC: 4.1 MMOL/L — SIGNIFICANT CHANGE UP (ref 3.5–5.3)
POTASSIUM UR-SCNC: 30 MMOL/L — SIGNIFICANT CHANGE UP
SODIUM SERPL-SCNC: 126 MMOL/L — LOW (ref 135–145)
SODIUM UR-SCNC: 159 MMOL/L — SIGNIFICANT CHANGE UP
SPECIMEN SOURCE: SIGNIFICANT CHANGE UP
TOTAL CHOLESTEROL/HDL RATIO MEASUREMENT: 2.3 RATIO — LOW (ref 3.4–9.6)
TRIGL SERPL-MCNC: 49 MG/DL — SIGNIFICANT CHANGE UP (ref 10–149)
TSH SERPL-MCNC: 2.8 UIU/ML — SIGNIFICANT CHANGE UP (ref 0.27–4.2)
VIT B12 SERPL-MCNC: 695 PG/ML — SIGNIFICANT CHANGE UP (ref 232–1245)

## 2019-10-08 RX ORDER — QUETIAPINE FUMARATE 200 MG/1
12.5 TABLET, FILM COATED ORAL AT BEDTIME
Refills: 0 | Status: DISCONTINUED | OUTPATIENT
Start: 2019-10-08 | End: 2019-10-10

## 2019-10-08 RX ORDER — MIRTAZAPINE 45 MG/1
7.5 TABLET, ORALLY DISINTEGRATING ORAL AT BEDTIME
Refills: 0 | Status: DISCONTINUED | OUTPATIENT
Start: 2019-10-08 | End: 2019-10-10

## 2019-10-08 RX ORDER — SODIUM CHLORIDE 9 MG/ML
1 INJECTION INTRAMUSCULAR; INTRAVENOUS; SUBCUTANEOUS
Refills: 0 | Status: DISCONTINUED | OUTPATIENT
Start: 2019-10-08 | End: 2019-10-09

## 2019-10-08 RX ORDER — THIAMINE MONONITRATE (VIT B1) 100 MG
100 TABLET ORAL DAILY
Refills: 0 | Status: DISCONTINUED | OUTPATIENT
Start: 2019-10-08 | End: 2019-10-10

## 2019-10-08 RX ADMIN — Medication 650 MILLIGRAM(S): at 13:10

## 2019-10-08 RX ADMIN — Medication 81 MILLIGRAM(S): at 12:05

## 2019-10-08 RX ADMIN — QUETIAPINE FUMARATE 12.5 MILLIGRAM(S): 200 TABLET, FILM COATED ORAL at 21:47

## 2019-10-08 RX ADMIN — Medication 25 MILLIGRAM(S): at 05:10

## 2019-10-08 RX ADMIN — Medication 100 MILLIGRAM(S): at 17:11

## 2019-10-08 RX ADMIN — FINASTERIDE 5 MILLIGRAM(S): 5 TABLET, FILM COATED ORAL at 12:05

## 2019-10-08 RX ADMIN — ENOXAPARIN SODIUM 30 MILLIGRAM(S): 100 INJECTION SUBCUTANEOUS at 12:05

## 2019-10-08 RX ADMIN — SODIUM CHLORIDE 1 GRAM(S): 9 INJECTION INTRAMUSCULAR; INTRAVENOUS; SUBCUTANEOUS at 17:11

## 2019-10-08 RX ADMIN — DONEPEZIL HYDROCHLORIDE 5 MILLIGRAM(S): 10 TABLET, FILM COATED ORAL at 21:47

## 2019-10-08 RX ADMIN — Medication 650 MILLIGRAM(S): at 13:40

## 2019-10-08 RX ADMIN — ATORVASTATIN CALCIUM 10 MILLIGRAM(S): 80 TABLET, FILM COATED ORAL at 21:47

## 2019-10-08 RX ADMIN — MIRTAZAPINE 7.5 MILLIGRAM(S): 45 TABLET, ORALLY DISINTEGRATING ORAL at 21:47

## 2019-10-08 NOTE — CONSULT NOTE ADULT - SUBJECTIVE AND OBJECTIVE BOX
HPI:  Mr. Julien is a 89 year-old man with history of multiple medical issues including hypertension, type 2 diabetes mellitus, atrial fibrillation, BPH, and dementia. He presented yesterday to the [facility] ER s/p mechanical fall. He was noted on admission to have hyponatremia with a serum sodium of 127meq/L; therefore a renal consultation was requested. Of note, he was taking Torsemide at home.    PAST MEDICAL & SURGICAL HISTORY:  Dementia  Chronic atrial fibrillation  RBBB  Insomnia  BPH (benign prostatic hypertrophy)  DM (diabetes mellitus)  HLD (hyperlipidemia)  HTN (hypertension)  S/P knee surgery  S/P shoulder surgery    Allergies  No Known Allergies    SOCIAL HISTORY:  Denies ETOh,Smoking,     FAMILY HISTORY:  Family history of diabetes mellitus    REVIEW OF SYSTEMS:  CONSTITUTIONAL: No weakness, fevers or chills  EYES/ENT: No visual changes;  No vertigo or throat pain   NECK: No pain or stiffness  RESPIRATORY: No cough, wheezing, hemoptysis; No shortness of breath  CARDIOVASCULAR: No chest pain or palpitations  GASTROINTESTINAL: No abdominal or epigastric pain. No nausea, vomiting, or hematemesis; No diarrhea or constipation. No melena or hematochezia.  GENITOURINARY: No dysuria, frequency or hematuria  NEUROLOGICAL: No numbness or weakness  SKIN: No itching, burning, rashes, or lesions   All other review of systems is negative unless indicated above.    VITAL:  T(C): , Max: 36.9 (10-07-19 @ 16:16)  T(F): , Max: 98.4 (10-07-19 @ 16:16)  HR: 80 (10-08-19 @ 12:08)  BP: 160/95 (10-08-19 @ 05:02)  RR: 20 (10-08-19 @ 12:08)  SpO2: 93% (10-08-19 @ 05:02)    PHYSICAL EXAM:  Constitutional: NAD, Alert  HEENT: NCAT, MMM  Neck: Supple, No JVD  Respiratory: CTA-b/l  Cardiovascular: RRR s1s2, no m/r/g  Gastrointestinal: BS+, soft, NT/ND  Extremities: No peripheral edema b/l  Neurological: no focal deficits; strength grossly intact  Back: no CVAT b/l  Skin: No rashes, no nevi    LABS:                        13.6   4.56  )-----------( 114      ( 07 Oct 2019 07:17 )             40.0     Na(126)/K(4.1)/Cl(94)/HCO3(23)/BUN(14)/Cr(0.78)Glu(113)/Ca(8.4)/Mg(--)/PO4(--)    10-08 @ 06:39  Na(127)/K(4.4)/Cl(95)/HCO3(20)/BUN(14)/Cr(0.66)Glu(119)/Ca(8.3)/Mg(--)/PO4(--)    10-07 @ 07:17    Urinalysis Basic - ( 07 Oct 2019 09:28 )  Color: Light Yellow / Appearance: Clear / S.017 / pH: x  Gluc: x / Ketone: Negative  / Bili: Negative / Urobili: Negative   Blood: x / Protein: Trace / Nitrite: Negative   Leuk Esterase: Negative / RBC: 9 /hpf / WBC 1 /HPF   Sq Epi: x / Non Sq Epi: 0 /hpf / Bacteria: Negative  Osmolality, Random Urine: 557 mosm/Kg (10-08 @ 09:41)  Sodium, Random Urine: 159 mmol/L (10-08 @ 07:22)  Creatinine, Random Urine: 54 mg/dL (10-08 @ 07:22)  Chloride, Random Urine: 147 mmol/L (10-08 @ 07:22)  Potassium, Random Urine: 30 mmol/L (10-08 @ 07:22)    TSH 2.80    IMAGING:  < from: CT 3D Reconstruct w/ Workstation (10.07.19 @ 10:54) >  IMPRESSION: Nonspecific widening of the pubic symphysis with associated   moderate-sized joint effusion. No acute fracture is demonstrated.  Mild bilateral hip arthrosis with suspected small to moderate-sized right   hip joint effusion.      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 SSRI-induced?      RECOMMEND:  (1)Psych f/u - can we try discontinuing the SSRI?  (2)1.2L free water restriction  (3)NaCl 1gm po bid  (4)No diuretics for now  (5)BMP daily  (6)Uric acid level with am labs  (7)repeat urine lytes+osm with am labs    Thank you for involving [facility] in this patient's care.    With warm regards,    Anders Engle MD   [facility]  [phone] HPI:  Mr. Julien is a 89 year-old man with history of multiple medical issues including hypertension, type 2 diabetes mellitus, atrial fibrillation, BPH, and dementia. He presented yesterday to the [facility] ER s/p mechanical fall. He was noted on admission to have hyponatremia with a serum sodium of 127meq/L; therefore a renal consultation was requested. Of note, he was taking Bumex at home.    Daughter is at bedside and provides history on the patient's behalf. She does not recall hearing in the past that the patient had hyponatremia. He does not eat or drink much. He was admitted fairly recently for decompensated CHF; it was during that admission that he was placed on Bumex. She states that he was only recently placed on Zoloft. She shares that he has been having hallucinations.    PAST MEDICAL & SURGICAL HISTORY:  Dementia  Chronic atrial fibrillation  CHF  Insomnia  BPH (benign prostatic hypertrophy)  DM (diabetes mellitus)  HLD (hyperlipidemia)  HTN (hypertension)  S/P knee surgery  S/P shoulder surgery    Allergies  No Known Allergies    SOCIAL HISTORY:  Denies ETOh,Smoking,     FAMILY HISTORY:  Family history of diabetes mellitus    REVIEW OF SYSTEMS:  Unable to obtain from patient due to dementia/delirium (not answering simple questions appropriately)  VITAL:  T(C): , Max: 36.9 (10-07-19 @ 16:16)  T(F): , Max: 98.4 (10-07-19 @ 16:16)  HR: 80 (10-08-19 @ 12:08)  BP: 160/95 (10-08-19 @ 05:02)  RR: 20 (10-08-19 @ 12:08)  SpO2: 93% (10-08-19 @ 05:02)    PHYSICAL EXAM:  Constitutional: frail, lethargic, confused  HEENT: NCAT, DMM  Neck: Supple, (+) JVD  Respiratory: CTA-b/l  Cardiovascular: Irreg s1s2  Gastrointestinal: BS+, soft, NT/ND  Extremities: 1+ b/l LE edema  Neurological: tone WNL  Back: no CVAT b/l  Skin: No rashes, no nevi    LABS:                        13.6   4.56  )-----------( 114      ( 07 Oct 2019 07:17 )             40.0     Na(126)/K(4.1)/Cl(94)/HCO3(23)/BUN(14)/Cr(0.78)Glu(113)/Ca(8.4)/Mg(--)/PO4(--)    10-08 @ 06:39  Na(127)/K(4.4)/Cl(95)/HCO3(20)/BUN(14)/Cr(0.66)Glu(119)/Ca(8.3)/Mg(--)/PO4(--)    10-07 @ 07:17    Urinalysis Basic - ( 07 Oct 2019 09:28 )  Color: Light Yellow / Appearance: Clear / S.017 / pH: x  Gluc: x / Ketone: Negative  / Bili: Negative / Urobili: Negative   Blood: x / Protein: Trace / Nitrite: Negative   Leuk Esterase: Negative / RBC: 9 /hpf / WBC 1 /HPF   Sq Epi: x / Non Sq Epi: 0 /hpf / Bacteria: Negative  Osmolality, Random Urine: 557 mosm/Kg (10-08 @ 09:41)  Sodium, Random Urine: 159 mmol/L (10-08 @ 07:22)  Creatinine, Random Urine: 54 mg/dL (10-08 @ 07:22)  Chloride, Random Urine: 147 mmol/L (10-08 @ 07:22)  Potassium, Random Urine: 30 mmol/L (10-08 @ 07:22)    TSH 2.80    IMAGING:  < from: CT 3D Reconstruct w/ Workstation (10.07.19 @ 10:54) >  IMPRESSION: Nonspecific widening of the pubic symphysis with associated   moderate-sized joint effusion. No acute fracture is demonstrated.  Mild bilateral hip arthrosis with suspected small to moderate-sized right   hip joint effusion.      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 SSRI-induced?      RECOMMEND:  (1)Psych f/u - can we try discontinuing the SSRI?  (2)1.2L free water restriction  (3)NaCl 1gm po bid  (4)No diuretics for now  (5)BMP daily  (6)Uric acid level with am labs; serum osm as well  (7)repeat urine lytes+osm with am labs    Thank you for involving [facility] in this patient's care.    With warm regards,    Anders Engle MD   [facility]  [phone] HPI:  Mr. Julien is a 89 year-old man with history of multiple medical issues including hypertension, type 2 diabetes mellitus, atrial fibrillation, BPH, and dementia. He presented yesterday to the [facility] ER s/p mechanical fall. He was noted on admission to have hyponatremia with a serum sodium of 127meq/L; therefore a renal consultation was requested. Of note, he was taking Bumex at home.    Daughter is at bedside and provides history on the patient's behalf. She does not recall hearing in the past that the patient had hyponatremia. He does not eat or drink much. He was admitted fairly recently for decompensated CHF; it was during that admission that he was placed on Bumex. She states that he was only recently placed on Zoloft. She shares that he has been having hallucinations.    PAST MEDICAL & SURGICAL HISTORY:  Dementia  Chronic atrial fibrillation  CHF  Insomnia  BPH (benign prostatic hypertrophy)  DM (diabetes mellitus)  HLD (hyperlipidemia)  HTN (hypertension)  S/P knee surgery  S/P shoulder surgery    Allergies  No Known Allergies    SOCIAL HISTORY:  Denies ETOh,Smoking,     FAMILY HISTORY:  Family history of diabetes mellitus    REVIEW OF SYSTEMS:  Unable to obtain from patient due to dementia/delirium (not answering simple questions appropriately)  VITAL:  T(C): , Max: 36.9 (10-07-19 @ 16:16)  T(F): , Max: 98.4 (10-07-19 @ 16:16)  HR: 80 (10-08-19 @ 12:08)  BP: 160/95 (10-08-19 @ 05:02)  RR: 20 (10-08-19 @ 12:08)  SpO2: 93% (10-08-19 @ 05:02)    PHYSICAL EXAM:  Constitutional: frail, lethargic, confused  HEENT: NCAT, DMM  Neck: Supple, (+) JVD  Respiratory: CTA-b/l  Cardiovascular: Irreg s1s2  Gastrointestinal: BS+, soft, NT/ND  Extremities: 1+ b/l LE edema  Neurological: tone WNL  Back: no CVAT b/l  Skin: No rashes, no nevi    LABS:                        13.6   4.56  )-----------( 114      ( 07 Oct 2019 07:17 )             40.0     Na(126)/K(4.1)/Cl(94)/HCO3(23)/BUN(14)/Cr(0.78)Glu(113)/Ca(8.4)/Mg(--)/PO4(--)    10-08 @ 06:39  Na(127)/K(4.4)/Cl(95)/HCO3(20)/BUN(14)/Cr(0.66)Glu(119)/Ca(8.3)/Mg(--)/PO4(--)    10-07 @ 07:17    Urinalysis Basic - ( 07 Oct 2019 09:28 )  Color: Light Yellow / Appearance: Clear / S.017 / pH: x  Gluc: x / Ketone: Negative  / Bili: Negative / Urobili: Negative   Blood: x / Protein: Trace / Nitrite: Negative   Leuk Esterase: Negative / RBC: 9 /hpf / WBC 1 /HPF   Sq Epi: x / Non Sq Epi: 0 /hpf / Bacteria: Negative  Osmolality, Random Urine: 557 mosm/Kg (10-08 @ 09:41)  Sodium, Random Urine: 159 mmol/L (10-08 @ 07:22)  Creatinine, Random Urine: 54 mg/dL (10-08 @ 07:22)  Chloride, Random Urine: 147 mmol/L (10-08 @ 07:22)  Potassium, Random Urine: 30 mmol/L (10-08 @ 07:22)    TSH 2.80    IMAGING:  < from: CT 3D Reconstruct w/ Workstation (10.07.19 @ 10:54) >  IMPRESSION: Nonspecific widening of the pubic symphysis with associated   moderate-sized joint effusion. No acute fracture is demonstrated.  Mild bilateral hip arthrosis with suspected small to moderate-sized right   hip joint effusion.      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 SSRI-induced?      RECOMMEND:  (1)D/C SSRI  (2)1.2L free water restriction  (3)NaCl 1gm po bid  (4)No diuretics for now  (5)BMP daily  (6)Uric acid level with am labs; serum osm as well  (7)repeat urine lytes+osm with am labs    Thank you for involving [facility] in this patient's care.    With warm regards,    Anders Engle MD   [facility]  [phone]

## 2019-10-08 NOTE — PROGRESS NOTE ADULT - SUBJECTIVE AND OBJECTIVE BOX
Patient is a 89y old  Male who presents with a chief complaint of frequent falls, altered mental status (08 Oct 2019 14:11)      SUBJECTIVE / OVERNIGHT EVENTS: children at bedside, eating well, hallucinating visually and auditory, not agitated, not paranoid, Bumex and Zoloft on hold, will need to resume BUMEX when cleared by renal    MEDICATIONS  (STANDING):  aspirin enteric coated 81 milliGRAM(s) Oral daily  atorvastatin 10 milliGRAM(s) Oral at bedtime  dextrose 5%. 1000 milliLiter(s) (50 mL/Hr) IV Continuous <Continuous>  dextrose 50% Injectable 12.5 Gram(s) IV Push once  dextrose 50% Injectable 25 Gram(s) IV Push once  dextrose 50% Injectable 25 Gram(s) IV Push once  donepezil 5 milliGRAM(s) Oral at bedtime  enoxaparin Injectable 30 milliGRAM(s) SubCutaneous daily  finasteride 5 milliGRAM(s) Oral daily  insulin lispro (HumaLOG) corrective regimen sliding scale   SubCutaneous three times a day before meals  metoprolol succinate ER 25 milliGRAM(s) Oral daily  QUEtiapine 12.5 milliGRAM(s) Oral at bedtime  sodium chloride 1 Gram(s) Oral two times a day  thiamine 100 milliGRAM(s) Oral daily    MEDICATIONS  (PRN):  acetaminophen    Suspension .. 650 milliGRAM(s) Oral every 6 hours PRN Temp greater or equal to 38C (100.4F), Mild Pain (1 - 3)  dextrose 40% Gel 15 Gram(s) Oral once PRN Blood Glucose LESS THAN 70 milliGRAM(s)/deciliter  glucagon  Injectable 1 milliGRAM(s) IntraMuscular once PRN Glucose LESS THAN 70 milligrams/deciliter      Vital Signs Last 24 Hrs  T(F): 98.2 (10-08-19 @ 12:08), Max: 98.4 (10-07-19 @ 16:16)  HR: 80 (10-08-19 @ 12:08) (74 - 89)  BP: 160/95 (10-08-19 @ 05:02) (128/71 - 160/95)  RR: 20 (10-08-19 @ 12:08) (18 - 20)  SpO2: 93% (10-08-19 @ 05:02) (93% - 99%)  Telemetry:   CAPILLARY BLOOD GLUCOSE      POCT Blood Glucose.: 106 mg/dL (08 Oct 2019 12:31)  POCT Blood Glucose.: 105 mg/dL (08 Oct 2019 07:56)  POCT Blood Glucose.: 153 mg/dL (07 Oct 2019 17:18)    I&O's Summary    07 Oct 2019 07:  -  08 Oct 2019 07:00  --------------------------------------------------------  IN: 640 mL / OUT: 350 mL / NET: 290 mL    08 Oct 2019 07:  -  08 Oct 2019 15:04  --------------------------------------------------------  IN: 240 mL / OUT: 0 mL / NET: 240 mL        PHYSICAL EXAM:  GENERAL: NAD, well-developed  HEAD:  Atraumatic, Normocephalic  EYES: EOMI, PERRLA, conjunctiva and sclera clear  NECK: Supple, No JVD  CHEST/LUNG: Clear to auscultation bilaterally; No wheeze  HEART: Regular rate and rhythm; No murmurs, rubs, or gallops  ABDOMEN: Soft, Nontender, Nondistended; Bowel sounds present  EXTREMITIES:  2+ Peripheral Pulses, No clubbing, cyanosis, or edema  PSYCH: AAOx3  NEUROLOGY: non-focal  SKIN: No rashes or lesions    LABS:                        13.6   4.56  )-----------( 114      ( 07 Oct 2019 07:17 )             40.0     10-08    126<L>  |  94<L>  |  14  ----------------------------<  113<H>  4.1   |  23  |  0.78    Ca    8.4      08 Oct 2019 06:39    TPro  6.8  /  Alb  4.0  /  TBili  0.7  /  DBili  x   /  AST  39  /  ALT  29  /  AlkPhos  56  10-07    PT/INR - ( 07 Oct 2019 07:17 )   PT: 12.0 sec;   INR: 1.04 ratio         PTT - ( 07 Oct 2019 07:17 )  PTT:28.3 sec      Urinalysis Basic - ( 07 Oct 2019 09:28 )    Color: Light Yellow / Appearance: Clear / S.017 / pH: x  Gluc: x / Ketone: Negative  / Bili: Negative / Urobili: Negative   Blood: x / Protein: Trace / Nitrite: Negative   Leuk Esterase: Negative / RBC: 9 /hpf / WBC 1 /HPF   Sq Epi: x / Non Sq Epi: 0 /hpf / Bacteria: Negative        RADIOLOGY & ADDITIONAL TESTS:    Imaging Personally Reviewed:    Consultant(s) Notes Reviewed:      Care Discussed with Consultants/Other Providers:

## 2019-10-08 NOTE — PROGRESS NOTE ADULT - ASSESSMENT
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   Not sure how ptn would benefit from Imaging his brain further w MRI, would opt to maximize medical management and DC back to [facility] w outptn PT at the facility.   Son is at bedside and in agreement. neuro eval appreciated  BUMEX and ZOLOFT on hold, ptn is depressed, has dementia w psychotic features, will start low dose Seroquel at hs and Remeron  Ha1C and TSH in range  Hyponatremia ongoing, zoloft and Bumex on hold, , possible SIADH 2/2 ZOLOFT, which was started on 9/19, but could also be 2/2 BUMEX, which is now held. check orthostatics and re-check urine lytes in am, pt appears euvolemic  DVT ppx w sc Lovenox
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
## 2019-10-09 LAB
ANION GAP SERPL CALC-SCNC: 13 MMOL/L — SIGNIFICANT CHANGE UP (ref 5–17)
BUN SERPL-MCNC: 18 MG/DL — SIGNIFICANT CHANGE UP (ref 7–23)
CALCIUM SERPL-MCNC: 8.8 MG/DL — SIGNIFICANT CHANGE UP (ref 8.4–10.5)
CHLORIDE SERPL-SCNC: 97 MMOL/L — SIGNIFICANT CHANGE UP (ref 96–108)
CHLORIDE UR-SCNC: <35 MMOL/L — SIGNIFICANT CHANGE UP
CO2 SERPL-SCNC: 22 MMOL/L — SIGNIFICANT CHANGE UP (ref 22–31)
CREAT SERPL-MCNC: 0.97 MG/DL — SIGNIFICANT CHANGE UP (ref 0.5–1.3)
GLUCOSE BLDC GLUCOMTR-MCNC: 105 MG/DL — HIGH (ref 70–99)
GLUCOSE BLDC GLUCOMTR-MCNC: 113 MG/DL — HIGH (ref 70–99)
GLUCOSE BLDC GLUCOMTR-MCNC: 153 MG/DL — HIGH (ref 70–99)
GLUCOSE SERPL-MCNC: 108 MG/DL — HIGH (ref 70–99)
HCT VFR BLD CALC: 42.5 % — SIGNIFICANT CHANGE UP (ref 39–50)
HGB BLD-MCNC: 14 G/DL — SIGNIFICANT CHANGE UP (ref 13–17)
MCHC RBC-ENTMCNC: 29.7 PG — SIGNIFICANT CHANGE UP (ref 27–34)
MCHC RBC-ENTMCNC: 32.9 GM/DL — SIGNIFICANT CHANGE UP (ref 32–36)
MCV RBC AUTO: 90 FL — SIGNIFICANT CHANGE UP (ref 80–100)
OSMOLALITY SERPL: 280 MOSMOL/KG — SIGNIFICANT CHANGE UP (ref 280–301)
OSMOLALITY UR: 402 MOSM/KG — SIGNIFICANT CHANGE UP (ref 50–1200)
PLATELET # BLD AUTO: 119 K/UL — LOW (ref 150–400)
POTASSIUM SERPL-MCNC: 4.2 MMOL/L — SIGNIFICANT CHANGE UP (ref 3.5–5.3)
POTASSIUM SERPL-SCNC: 4.2 MMOL/L — SIGNIFICANT CHANGE UP (ref 3.5–5.3)
POTASSIUM UR-SCNC: 20 MMOL/L — SIGNIFICANT CHANGE UP
RBC # BLD: 4.72 M/UL — SIGNIFICANT CHANGE UP (ref 4.2–5.8)
RBC # FLD: 13.3 % — SIGNIFICANT CHANGE UP (ref 10.3–14.5)
SODIUM SERPL-SCNC: 132 MMOL/L — LOW (ref 135–145)
SODIUM UR-SCNC: 27 MMOL/L — SIGNIFICANT CHANGE UP
URATE SERPL-MCNC: 4.2 MG/DL — SIGNIFICANT CHANGE UP (ref 3.4–8.8)
WBC # BLD: 3.8 K/UL — SIGNIFICANT CHANGE UP (ref 3.8–10.5)
WBC # FLD AUTO: 3.8 K/UL — SIGNIFICANT CHANGE UP (ref 3.8–10.5)

## 2019-10-09 RX ORDER — BUMETANIDE 0.25 MG/ML
0.5 INJECTION INTRAMUSCULAR; INTRAVENOUS
Refills: 0 | Status: DISCONTINUED | OUTPATIENT
Start: 2019-10-09 | End: 2019-10-10

## 2019-10-09 RX ORDER — SODIUM CHLORIDE 9 MG/ML
1 INJECTION INTRAMUSCULAR; INTRAVENOUS; SUBCUTANEOUS DAILY
Refills: 0 | Status: DISCONTINUED | OUTPATIENT
Start: 2019-10-09 | End: 2019-10-10

## 2019-10-09 RX ADMIN — Medication 81 MILLIGRAM(S): at 11:21

## 2019-10-09 RX ADMIN — Medication 1: at 12:02

## 2019-10-09 RX ADMIN — SODIUM CHLORIDE 1 GRAM(S): 9 INJECTION INTRAMUSCULAR; INTRAVENOUS; SUBCUTANEOUS at 06:01

## 2019-10-09 RX ADMIN — Medication 100 MILLIGRAM(S): at 11:21

## 2019-10-09 RX ADMIN — ENOXAPARIN SODIUM 30 MILLIGRAM(S): 100 INJECTION SUBCUTANEOUS at 11:21

## 2019-10-09 RX ADMIN — ATORVASTATIN CALCIUM 10 MILLIGRAM(S): 80 TABLET, FILM COATED ORAL at 21:41

## 2019-10-09 RX ADMIN — Medication 1 DROP(S): at 08:16

## 2019-10-09 RX ADMIN — FINASTERIDE 5 MILLIGRAM(S): 5 TABLET, FILM COATED ORAL at 11:21

## 2019-10-09 RX ADMIN — MIRTAZAPINE 7.5 MILLIGRAM(S): 45 TABLET, ORALLY DISINTEGRATING ORAL at 21:40

## 2019-10-09 RX ADMIN — Medication 25 MILLIGRAM(S): at 06:01

## 2019-10-09 RX ADMIN — QUETIAPINE FUMARATE 12.5 MILLIGRAM(S): 200 TABLET, FILM COATED ORAL at 21:41

## 2019-10-09 RX ADMIN — DONEPEZIL HYDROCHLORIDE 5 MILLIGRAM(S): 10 TABLET, FILM COATED ORAL at 21:41

## 2019-10-09 NOTE — PROGRESS NOTE ADULT - SUBJECTIVE AND OBJECTIVE BOX
Patient is a 89y old  Male who presents with a chief complaint of frequent falls, altered mental status (09 Oct 2019 13:44)      SUBJECTIVE / OVERNIGHT EVENTS: less confused and less hallucinating today, using the urinal, no longer incontinent as per nursing staff    MEDICATIONS  (STANDING):  aspirin enteric coated 81 milliGRAM(s) Oral daily  atorvastatin 10 milliGRAM(s) Oral at bedtime  dextrose 5%. 1000 milliLiter(s) (50 mL/Hr) IV Continuous <Continuous>  dextrose 50% Injectable 12.5 Gram(s) IV Push once  dextrose 50% Injectable 25 Gram(s) IV Push once  dextrose 50% Injectable 25 Gram(s) IV Push once  donepezil 5 milliGRAM(s) Oral at bedtime  enoxaparin Injectable 30 milliGRAM(s) SubCutaneous daily  finasteride 5 milliGRAM(s) Oral daily  insulin lispro (HumaLOG) corrective regimen sliding scale   SubCutaneous three times a day before meals  metoprolol succinate ER 25 milliGRAM(s) Oral daily  mirtazapine 7.5 milliGRAM(s) Oral at bedtime  QUEtiapine 12.5 milliGRAM(s) Oral at bedtime  sodium chloride 1 Gram(s) Oral daily  thiamine 100 milliGRAM(s) Oral daily    MEDICATIONS  (PRN):  acetaminophen    Suspension .. 650 milliGRAM(s) Oral every 6 hours PRN Temp greater or equal to 38C (100.4F), Mild Pain (1 - 3)  dextrose 40% Gel 15 Gram(s) Oral once PRN Blood Glucose LESS THAN 70 milliGRAM(s)/deciliter  glucagon  Injectable 1 milliGRAM(s) IntraMuscular once PRN Glucose LESS THAN 70 milligrams/deciliter      Vital Signs Last 24 Hrs  T(F): 97.8 (10-09-19 @ 14:20), Max: 98.6 (10-08-19 @ 21:04)  HR: 78 (10-09-19 @ 14:20) (71 - 81)  BP: 112/66 (10-09-19 @ 14:20) (112/66 - 165/90)  RR: 20 (10-09-19 @ 14:20) (18 - 20)  SpO2: 96% (10-09-19 @ 14:20) (94% - 96%)  Telemetry:   CAPILLARY BLOOD GLUCOSE      POCT Blood Glucose.: 113 mg/dL (09 Oct 2019 17:06)  POCT Blood Glucose.: 153 mg/dL (09 Oct 2019 11:54)  POCT Blood Glucose.: 105 mg/dL (09 Oct 2019 08:42)    I&O's Summary    08 Oct 2019 07:01  -  09 Oct 2019 07:00  --------------------------------------------------------  IN: 1120 mL / OUT: 600 mL / NET: 520 mL    09 Oct 2019 07:01  -  09 Oct 2019 20:45  --------------------------------------------------------  IN: 0 mL / OUT: 950 mL / NET: -950 mL        PHYSICAL EXAM:  GENERAL: NAD, well-developed  HEAD:  Atraumatic, Normocephalic  EYES: EOMI, PERRLA, conjunctiva and sclera clear  NECK: Supple, No JVD  CHEST/LUNG: Clear to auscultation bilaterally; No wheeze  HEART: Regular rate and rhythm; No murmurs, rubs, or gallops  ABDOMEN: Soft, Nontender, Nondistended; Bowel sounds present  EXTREMITIES:  2+ Peripheral Pulses, No clubbing, cyanosis, or edema  PSYCH: AAOx3  NEUROLOGY: non-focal  SKIN: No rashes or lesions    LABS:                        14.0   3.80  )-----------( 119      ( 09 Oct 2019 09:46 )             42.5     10-09    132<L>  |  97  |  18  ----------------------------<  108<H>  4.2   |  22  |  0.97    Ca    8.8      09 Oct 2019 07:18                RADIOLOGY & ADDITIONAL TESTS:    Imaging Personally Reviewed:    Consultant(s) Notes Reviewed:      Care Discussed with Consultants/Other Providers:
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
## 2019-10-09 NOTE — PHYSICAL THERAPY INITIAL EVALUATION ADULT - LIVES WITH, PROFILE
Pt resides in [facility], prior to admission required assist for functional mobility & ADL's.  Has rollator at home for ambulation./alone

## 2019-10-09 NOTE — PHYSICAL THERAPY INITIAL EVALUATION ADULT - CRITERIA FOR SKILLED THERAPEUTIC INTERVENTIONS
rehab potential/risk reduction/prevention/therapy frequency/anticipated discharge recommendation/impairments found/functional limitations in following categories/predicted duration of therapy intervention

## 2019-10-09 NOTE — PHYSICAL THERAPY INITIAL EVALUATION ADULT - TRANSFER SAFETY CONCERNS NOTED: SIT/STAND, REHAB EVAL
decreased weight-shifting ability/decreased balance during turns/decreased safety awareness/losing balance/decreased step length

## 2019-10-09 NOTE — PHYSICAL THERAPY INITIAL EVALUATION ADULT - PERTINENT HX OF CURRENT PROBLEM, REHAB EVAL
89 yo M w h/o atrial Fibrillation, HTN, BPH, DM2, depression, Dementia on aspirin presents after mechanical fall with pelvic sprain, CT plevis-no fx. Reports standing from bed when suddenly lost control and fell to the ground hitting the posterior aspect of his head. Denies bleeding or bruising. Denies neck pain. Denies chest pain or SOB prior to fall.

## 2019-10-09 NOTE — PROGRESS NOTE ADULT - SUBJECTIVE AND OBJECTIVE BOX
No pain, no shortness of breath      VITAL:  T(C): , Max: 37 (10-08-19 @ 21:04)  T(F): , Max: 98.6 (10-08-19 @ 21:04)  HR: 71 (10-09-19 @ 05:35)  BP: 165/90 (10-09-19 @ 05:35)  RR: 18 (10-09-19 @ 05:35)  SpO2: 96% (10-09-19 @ 05:35)      PHYSICAL EXAM:  Constitutional: frail, lethargic, confused  HEENT: NCAT, DMM  Neck: Supple, (+) JVD  Respiratory: CTA-b/l  Cardiovascular: Irreg s1s2  Gastrointestinal: BS+, soft, NT/ND  Extremities: 1+ b/l LE edema  Neurological: tone WNL  Back: no CVAT b/l  Skin: No rashes, no nevi      LABS:                        14.0   3.80  )-----------( 119      ( 09 Oct 2019 09:46 )             42.5     Na(132)/K(4.2)/Cl(97)/HCO3(22)/BUN(18)/Cr(0.97)Glu(108)/Ca(8.8)/Mg(--)/PO4(--)    10-09 @ 07:18  Na(126)/K(4.1)/Cl(94)/HCO3(23)/BUN(14)/Cr(0.78)Glu(113)/Ca(8.4)/Mg(--)/PO4(--)    10-08 @ 06:39  Na(127)/K(4.4)/Cl(95)/HCO3(20)/BUN(14)/Cr(0.66)Glu(119)/Ca(8.3)/Mg(--)/PO4(--)    10-07 @ 07:17      Osmolality, Random Urine: 402 mosm/Kg (10-09 @ 09:26)  Sodium, Random Urine: 27 mmol/L (10-09 @ 07:40)  Chloride, Random Urine: <35 mmol/L (10-09 @ 07:40)  Potassium, Random Urine: 20 mmol/L (10-09 @ 07:40)  Osmolality, Random Urine: 557 mosm/Kg (10-08 @ 09:41)  Sodium, Random Urine: 159 mmol/L (10-08 @ 07:22)  Creatinine, Random Urine: 54 mg/dL (10-08 @ 07:22)  Chloride, Random Urine: 147 mmol/L (10-08 @ 07:22)  Potassium, Random Urine: 30 mmol/L (10-08 @ 07:22)    Serum osm 280  Serum uric 4.2      IMPRESSION: 89M w/ HTN, DM2, AFib, BPH, and dementia, 10/7/19 a/w fall, c/b appreciation of hyponatremia    (1)Renal - stable/intact function    (2)Hyponatremia - urine studies from this a.m. are not as suggestive of SIADH as the studies from 10/8; the urine sodium on 10/9 is substantially lower. The studies from 10/8 may have been more suggestive of SIADH due to the  persistence of the effect of previous doses of Bumex on his system. 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 The numbers are better today. The salt tabs and fluid restriction are likely helping here. There has not been enough time for the discontinuation of the SSRI to play a role here.      RECOMMEND:  (1)NaCl tabs as ordered  (2)Fluid limitation as ordered  (3)I would not be averse to reintroducing the SSRI  (4)Workup of fall per primary team      Anders Engle MD  [facility]  [phone] Complains about being limited with his milk intake with breakfast.      VITAL:  T(C): , Max: 37 (10-08-19 @ 21:04)  T(F): , Max: 98.6 (10-08-19 @ 21:04)  HR: 71 (10-09-19 @ 05:35)  BP: 165/90 (10-09-19 @ 05:35)  RR: 18 (10-09-19 @ 05:35)  SpO2: 96% (10-09-19 @ 05:35)      PHYSICAL EXAM:  Constitutional: pleasantly demented; more alert than yesterday  HEENT: NCAT, DMM  Neck: Supple, (+) JVD  Respiratory: CTA-b/l  Cardiovascular: Irreg s1s2  Gastrointestinal: BS+, soft, NT/ND  Extremities: 1+ b/l LE edema  Neurological: tone WNL  Back: no CVAT b/l  Skin: No rashes, no nevi      LABS:                        14.0   3.80  )-----------( 119      ( 09 Oct 2019 09:46 )             42.5     Na(132)/K(4.2)/Cl(97)/HCO3(22)/BUN(18)/Cr(0.97)Glu(108)/Ca(8.8)/Mg(--)/PO4(--)    10-09 @ 07:18  Na(126)/K(4.1)/Cl(94)/HCO3(23)/BUN(14)/Cr(0.78)Glu(113)/Ca(8.4)/Mg(--)/PO4(--)    10-08 @ 06:39  Na(127)/K(4.4)/Cl(95)/HCO3(20)/BUN(14)/Cr(0.66)Glu(119)/Ca(8.3)/Mg(--)/PO4(--)    10-07 @ 07:17      Osmolality, Random Urine: 402 mosm/Kg (10-09 @ 09:26)  Sodium, Random Urine: 27 mmol/L (10-09 @ 07:40)  Chloride, Random Urine: <35 mmol/L (10-09 @ 07:40)  Potassium, Random Urine: 20 mmol/L (10-09 @ 07:40)  Osmolality, Random Urine: 557 mosm/Kg (10-08 @ 09:41)  Sodium, Random Urine: 159 mmol/L (10-08 @ 07:22)  Creatinine, Random Urine: 54 mg/dL (10-08 @ 07:22)  Chloride, Random Urine: 147 mmol/L (10-08 @ 07:22)  Potassium, Random Urine: 30 mmol/L (10-08 @ 07:22)    Serum osm 280  Serum uric 4.2      IMPRESSION: 89M w/ HTN, DM2, AFib, BPH, and dementia, 10/7/19 a/w fall, c/b appreciation of hyponatremia    (1)Renal - stable/intact function    (2)Hyponatremia - urine studies from this a.m. are not as suggestive of SIADH as the studies from 10/8; the urine sodium on 10/9 is substantially lower. The studies from 10/8 may have been more suggestive of SIADH due to the  persistence of the effect of previous doses of Bumex on his system. 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 The numbers are better today. The salt tabs and fluid restriction are likely helping here. There has not been enough time for the discontinuation of the SSRI to play a role here.      RECOMMEND:  (1)Reduce NaCl to 1gm qd  (2)1.5L limitation in all fluid besides milk; he does not need to limit milk, however (milk has a relatively high osmolarity) - discussed with patient and daughter  (3)I would not be averse to reintroducing the SSRI  (4)Workup of fall per primary team      Anders Engle MD  [facility]  [phone] Complains about being limited with his milk intake with breakfast.      VITAL:  T(C): , Max: 37 (10-08-19 @ 21:04)  T(F): , Max: 98.6 (10-08-19 @ 21:04)  HR: 71 (10-09-19 @ 05:35)  BP: 165/90 (10-09-19 @ 05:35)  RR: 18 (10-09-19 @ 05:35)  SpO2: 96% (10-09-19 @ 05:35)      PHYSICAL EXAM:  Constitutional: pleasantly demented; more alert than yesterday  HEENT: NCAT, DMM  Neck: Supple, (+) JVD  Respiratory: CTA-b/l  Cardiovascular: Irreg s1s2  Gastrointestinal: BS+, soft, NT/ND  Extremities: 1+ b/l LE edema  Neurological: tone WNL  Back: no CVAT b/l  Skin: No rashes, no nevi      LABS:                        14.0   3.80  )-----------( 119      ( 09 Oct 2019 09:46 )             42.5     Na(132)/K(4.2)/Cl(97)/HCO3(22)/BUN(18)/Cr(0.97)Glu(108)/Ca(8.8)/Mg(--)/PO4(--)    10-09 @ 07:18  Na(126)/K(4.1)/Cl(94)/HCO3(23)/BUN(14)/Cr(0.78)Glu(113)/Ca(8.4)/Mg(--)/PO4(--)    10-08 @ 06:39  Na(127)/K(4.4)/Cl(95)/HCO3(20)/BUN(14)/Cr(0.66)Glu(119)/Ca(8.3)/Mg(--)/PO4(--)    10-07 @ 07:17      Osmolality, Random Urine: 402 mosm/Kg (10-09 @ 09:26)  Sodium, Random Urine: 27 mmol/L (10-09 @ 07:40)  Chloride, Random Urine: <35 mmol/L (10-09 @ 07:40)  Potassium, Random Urine: 20 mmol/L (10-09 @ 07:40)  Osmolality, Random Urine: 557 mosm/Kg (10-08 @ 09:41)  Sodium, Random Urine: 159 mmol/L (10-08 @ 07:22)  Creatinine, Random Urine: 54 mg/dL (10-08 @ 07:22)  Chloride, Random Urine: 147 mmol/L (10-08 @ 07:22)  Potassium, Random Urine: 30 mmol/L (10-08 @ 07:22)    Serum osm 280  Serum uric 4.2      IMPRESSION: 89M w/ HTN, DM2, AFib, BPH, and dementia, 10/7/19 a/w fall, c/b appreciation of hyponatremia    (1)Renal - stable/intact function    (2)Hyponatremia - urine studies from this a.m. are not as suggestive of SIADH as the studies from 10/8; the urine sodium on 10/9 is substantially lower. The studies from 10/8 may have been more suggestive of SIADH due to the  persistence of the effect of previous doses of Bumex on his system. 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 The numbers are better today. The salt tabs and fluid restriction are likely helping here. There has not been enough time for the discontinuation of the SSRI to play a role here.      RECOMMEND:  (1)Reduce NaCl to 1gm qd  (2)1.2L limitation in all fluid besides milk; he does not need to limit milk, however (milk has a relatively high osmolarity) - discussed with patient and daughter  (3)I would not be averse to reintroducing the SSRI  (4)Workup of fall per primary team      Anders Engle MD  [facility]  [phone]

## 2019-10-09 NOTE — PHYSICAL THERAPY INITIAL EVALUATION ADULT - GAIT DEVIATIONS NOTED, PT EVAL
decreased step length/decreased camryn/forward flexed trunk/decreased stride length/decreased weight-shifting ability

## 2019-10-09 NOTE — PROGRESS NOTE ADULT - ASSESSMENT
89 yo male presents post a mecahnical fall w pelvic sprain, no Fx on CT of pelvis, ptn has Afib, rate controlled, ptn appears to have progressive vascular dementia he is on aspirin, not on full AC 2/2 frequent falls.   Not sure how ptn would benefit from Imaging his brain further w MRI, would opt to maximize medical management and DC back to [facility] w outptn PT at the facility.   Son is at bedside and in agreement. neuro eval appreciated  BUMEX and ZOLOFT on hold, ptn is depressed, has dementia w psychotic features, doing well on low dose Seroquel at hs and Remeron  Ha1C and TSH in range  Hyponatremia improved, on fluid restriction and on NACL tabs, will resume Bumex , possible SIADH 2/2 ZOLOFT, hopefully will respond favorably to REMERON,  pt appears euvolemic but has h/o diastolic CHF  DVT ppx w sc Lovenox

## 2019-10-10 ENCOUNTER — TRANSCRIPTION ENCOUNTER (OUTPATIENT)
Age: 84
End: 2019-10-10

## 2019-10-10 VITALS
OXYGEN SATURATION: 96 % | SYSTOLIC BLOOD PRESSURE: 128 MMHG | RESPIRATION RATE: 18 BRPM | DIASTOLIC BLOOD PRESSURE: 86 MMHG | HEART RATE: 77 BPM | TEMPERATURE: 97 F

## 2019-10-10 LAB
ANION GAP SERPL CALC-SCNC: 12 MMOL/L — SIGNIFICANT CHANGE UP (ref 5–17)
BUN SERPL-MCNC: 19 MG/DL — SIGNIFICANT CHANGE UP (ref 7–23)
CALCIUM SERPL-MCNC: 8.9 MG/DL — SIGNIFICANT CHANGE UP (ref 8.4–10.5)
CHLORIDE SERPL-SCNC: 100 MMOL/L — SIGNIFICANT CHANGE UP (ref 96–108)
CO2 SERPL-SCNC: 24 MMOL/L — SIGNIFICANT CHANGE UP (ref 22–31)
CREAT SERPL-MCNC: 0.94 MG/DL — SIGNIFICANT CHANGE UP (ref 0.5–1.3)
GLUCOSE BLDC GLUCOMTR-MCNC: 131 MG/DL — HIGH (ref 70–99)
GLUCOSE BLDC GLUCOMTR-MCNC: 164 MG/DL — HIGH (ref 70–99)
GLUCOSE SERPL-MCNC: 111 MG/DL — HIGH (ref 70–99)
POTASSIUM SERPL-MCNC: 4.2 MMOL/L — SIGNIFICANT CHANGE UP (ref 3.5–5.3)
POTASSIUM SERPL-SCNC: 4.2 MMOL/L — SIGNIFICANT CHANGE UP (ref 3.5–5.3)
SODIUM SERPL-SCNC: 136 MMOL/L — SIGNIFICANT CHANGE UP (ref 135–145)

## 2019-10-10 RX ORDER — MIRTAZAPINE 45 MG/1
1 TABLET, ORALLY DISINTEGRATING ORAL
Qty: 30 | Refills: 0 | DISCHARGE
Start: 2019-10-10

## 2019-10-10 RX ORDER — MIRTAZAPINE 45 MG/1
1 TABLET, ORALLY DISINTEGRATING ORAL
Qty: 30 | Refills: 0
Start: 2019-10-10

## 2019-10-10 RX ORDER — SERTRALINE 25 MG/1
1 TABLET, FILM COATED ORAL
Qty: 0 | Refills: 0 | DISCHARGE

## 2019-10-10 RX ORDER — QUETIAPINE FUMARATE 200 MG/1
0.5 TABLET, FILM COATED ORAL
Qty: 0 | Refills: 0 | DISCHARGE
Start: 2019-10-10

## 2019-10-10 RX ORDER — QUETIAPINE FUMARATE 200 MG/1
0.5 TABLET, FILM COATED ORAL
Qty: 15 | Refills: 0
Start: 2019-10-10

## 2019-10-10 RX ORDER — INFLUENZA VIRUS VACCINE 15; 15; 15; 15 UG/.5ML; UG/.5ML; UG/.5ML; UG/.5ML
0.5 SUSPENSION INTRAMUSCULAR ONCE
Refills: 0 | Status: COMPLETED | OUTPATIENT
Start: 2019-10-10 | End: 2019-10-10

## 2019-10-10 RX ORDER — THIAMINE MONONITRATE (VIT B1) 100 MG
1 TABLET ORAL
Qty: 30 | Refills: 0
Start: 2019-10-10 | End: 2019-11-08

## 2019-10-10 RX ORDER — THIAMINE MONONITRATE (VIT B1) 100 MG
1 TABLET ORAL
Qty: 30 | Refills: 0
Start: 2019-10-10

## 2019-10-10 RX ADMIN — FINASTERIDE 5 MILLIGRAM(S): 5 TABLET, FILM COATED ORAL at 12:29

## 2019-10-10 RX ADMIN — Medication 25 MILLIGRAM(S): at 05:20

## 2019-10-10 RX ADMIN — Medication 100 MILLIGRAM(S): at 12:29

## 2019-10-10 RX ADMIN — INFLUENZA VIRUS VACCINE 0.5 MILLILITER(S): 15; 15; 15; 15 SUSPENSION INTRAMUSCULAR at 12:36

## 2019-10-10 RX ADMIN — Medication 1: at 12:38

## 2019-10-10 RX ADMIN — Medication 81 MILLIGRAM(S): at 12:30

## 2019-10-10 RX ADMIN — ENOXAPARIN SODIUM 30 MILLIGRAM(S): 100 INJECTION SUBCUTANEOUS at 12:29

## 2019-10-10 NOTE — PROGRESS NOTE ADULT - SUBJECTIVE AND OBJECTIVE BOX
Patient is a 89y old  Male who presents with a chief complaint of frequent falls, altered mental status (10 Oct 2019 11:59)      SUBJECTIVE / OVERNIGHT EVENTS: awake, alert, calm, no hallucinations in good spirits    MEDICATIONS  (STANDING):  aspirin enteric coated 81 milliGRAM(s) Oral daily  atorvastatin 10 milliGRAM(s) Oral at bedtime  buMETAnide 0.5 milliGRAM(s) Oral <User Schedule>  dextrose 5%. 1000 milliLiter(s) (50 mL/Hr) IV Continuous <Continuous>  dextrose 50% Injectable 12.5 Gram(s) IV Push once  dextrose 50% Injectable 25 Gram(s) IV Push once  dextrose 50% Injectable 25 Gram(s) IV Push once  donepezil 5 milliGRAM(s) Oral at bedtime  enoxaparin Injectable 30 milliGRAM(s) SubCutaneous daily  finasteride 5 milliGRAM(s) Oral daily  insulin lispro (HumaLOG) corrective regimen sliding scale   SubCutaneous three times a day before meals  metoprolol succinate ER 25 milliGRAM(s) Oral daily  mirtazapine 7.5 milliGRAM(s) Oral at bedtime  QUEtiapine 12.5 milliGRAM(s) Oral at bedtime  thiamine 100 milliGRAM(s) Oral daily    MEDICATIONS  (PRN):  acetaminophen    Suspension .. 650 milliGRAM(s) Oral every 6 hours PRN Temp greater or equal to 38C (100.4F), Mild Pain (1 - 3)  dextrose 40% Gel 15 Gram(s) Oral once PRN Blood Glucose LESS THAN 70 milliGRAM(s)/deciliter  glucagon  Injectable 1 milliGRAM(s) IntraMuscular once PRN Glucose LESS THAN 70 milligrams/deciliter      Vital Signs Last 24 Hrs  T(F): 97.3 (10-10-19 @ 13:38), Max: 98.2 (10-09-19 @ 21:38)  HR: 77 (10-10-19 @ 13:38) (70 - 79)  BP: 128/86 (10-10-19 @ 13:38) (119/68 - 128/86)  RR: 18 (10-10-19 @ 13:38) (18 - 18)  SpO2: 96% (10-10-19 @ 13:38) (94% - 96%)  Telemetry:   CAPILLARY BLOOD GLUCOSE      POCT Blood Glucose.: 164 mg/dL (10 Oct 2019 12:34)  POCT Blood Glucose.: 131 mg/dL (10 Oct 2019 09:04)  POCT Blood Glucose.: 113 mg/dL (09 Oct 2019 17:06)    I&O's Summary    09 Oct 2019 07:01  -  10 Oct 2019 07:00  --------------------------------------------------------  IN: 120 mL / OUT: 950 mL / NET: -830 mL        PHYSICAL EXAM:  GENERAL: NAD, well-developed  HEAD:  Atraumatic, Normocephalic  EYES: EOMI, PERRLA, conjunctiva and sclera clear  NECK: Supple, No JVD  CHEST/LUNG: Clear to auscultation bilaterally; No wheeze  HEART: Regular rate and rhythm; No murmurs, rubs, or gallops  ABDOMEN: Soft, Nontender, Nondistended; Bowel sounds present  EXTREMITIES:  2+ Peripheral Pulses, No clubbing, cyanosis, or edema  PSYCH: AAOx3  NEUROLOGY: non-focal  SKIN: No rashes or lesions    LABS:                        14.0   3.80  )-----------( 119      ( 09 Oct 2019 09:46 )             42.5     10-10    136  |  100  |  19  ----------------------------<  111<H>  4.2   |  24  |  0.94    Ca    8.9      10 Oct 2019 05:58                RADIOLOGY & ADDITIONAL TESTS:    Imaging Personally Reviewed:    Consultant(s) Notes Reviewed:      Care Discussed with Consultants/Other Providers:

## 2019-10-10 NOTE — DISCHARGE NOTE PROVIDER - CARE PROVIDER_API CALL
Michael Lawton)  Internal Medicine; Pulmonary Disease  [address], [address]rd [place] [address]  Phone: [phone]  Fax: [phone]  Follow Up Time: 1 week    Anders Engle)  Internal Medicine; Nephrology  [address]  [place] [address]  Phone: [phone]  Fax: [phone]  Follow Up Time:     Nissenbaum, Michael A (MD)  Neurology  [address]  [place] [address]  Phone: [phone]  Fax: [phone]  Follow Up Time:

## 2019-10-10 NOTE — DISCHARGE NOTE PROVIDER - NSDCCPCAREPLAN_GEN_ALL_CORE_FT
PRINCIPAL DISCHARGE DIAGNOSIS  Diagnosis: Fall  Assessment and Plan of Treatment: no Fx; f/u at assissted living      SECONDARY DISCHARGE DIAGNOSES  Diagnosis: Dementia  Assessment and Plan of Treatment: take prescribed medication; f/u with PCP PRINCIPAL DISCHARGE DIAGNOSIS  Diagnosis: Fall  Assessment and Plan of Treatment: no Fx; f/u at assissted living      SECONDARY DISCHARGE DIAGNOSES  Diagnosis: Dementia  Assessment and Plan of Treatment: take prescribed medication; f/u with PCP    Diagnosis: Diabetes  Assessment and Plan of Treatment: monitor BS; take prescribed medication    Diagnosis: Hyponatremia  Assessment and Plan of Treatment: resolved - f/u with renal    Diagnosis: Dementia  Assessment and Plan of Treatment: take prescribed medication; f/u with PCP PRINCIPAL DISCHARGE DIAGNOSIS  Diagnosis: Fall  Assessment and Plan of Treatment: no Fx; f/u at assissted living      SECONDARY DISCHARGE DIAGNOSES  Diagnosis: Dementia  Assessment and Plan of Treatment: take prescribed medication; f/u with PCP    Diagnosis: Diabetes  Assessment and Plan of Treatment: monitor BS; take prescribed medication    Diagnosis: Hyponatremia  Assessment and Plan of Treatment: resolved - f/u with renal; repeat BMP 1 week    Diagnosis: Dementia  Assessment and Plan of Treatment: take prescribed medication; f/u with PCP

## 2019-10-10 NOTE — PROGRESS NOTE ADULT - ASSESSMENT
89 yo male presents post a mecahnical fall w pelvic sprain, no Fx on CT of pelvis, ptn has Afib, rate controlled, ptn appears to have progressive vascular dementia he is on aspirin, not on full AC 2/2 frequent falls.   Not sure how ptn would benefit from Imaging his brain further w MRI, would opt to maximize medical management and DC back to [facility] w outptn PT at the facility.   Son is at bedside and in agreement. neuro eval appreciated  BUMEX restarted ,off ZOLOFT, doesnt appear depressed, improved on Remeron,  has dementia w psychotic features, which seem to have resolved on Seroquel thor  Ha1C and TSH in range  Hyponatremia improved, on fluid restriction and on NACL tabs, cont  Bumex , possible SIADH 2/2 ZOLOFT,  pt appears euvolemic but has h/o diastolic CHF  DVT ppx w sc Lovenox  DC to Assisted Living today

## 2019-10-10 NOTE — PROGRESS NOTE ADULT - SUBJECTIVE AND OBJECTIVE BOX
No pain, no shortness of breath      VITAL:  T(C): , Max: 36.8 (10-09-19 @ 21:38)  T(F): , Max: 98.2 (10-09-19 @ 21:38)  HR: 79 (10-10-19 @ 04:41)  BP: 127/89 (10-10-19 @ 04:41)  RR: 18 (10-10-19 @ 04:41)  SpO2: 94% (10-10-19 @ 04:41)      PHYSICAL EXAM:  Constitutional: pleasantly demented; more alert than yesterday  HEENT: NCAT, DMM  Neck: Supple, (+) JVD  Respiratory: CTA-b/l  Cardiovascular: Irreg s1s2  Gastrointestinal: BS+, soft, NT/ND  Extremities: 1+ b/l LE edema  Neurological: tone WNL  Back: no CVAT b/l  Skin: No rashes, no nevi      LABS:                        14.0   3.80  )-----------( 119      ( 09 Oct 2019 09:46 )             42.5     Na(136)/K(4.2)/Cl(100)/HCO3(24)/BUN(19)/Cr(0.94)Glu(111)/Ca(8.9)/Mg(--)/PO4(--)    10-10 @ 05:58  Na(132)/K(4.2)/Cl(97)/HCO3(22)/BUN(18)/Cr(0.97)Glu(108)/Ca(8.8)/Mg(--)/PO4(--)    10-09 @ 07:18  Na(126)/K(4.1)/Cl(94)/HCO3(23)/BUN(14)/Cr(0.78)Glu(113)/Ca(8.4)/Mg(--)/PO4(--)    10-08 @ 06:39  Na(--)/K(4.5)/Cl(--)/HCO3(--)/BUN(--)/Cr(--)Glu(--)/Ca(--)/Mg(--)/PO4(--)    10-07 @ 09:28      Osmolality, Random Urine: 402 mosm/Kg (10-09 @ 09:26)  Sodium, Random Urine: 27 mmol/L (10-09 @ 07:40)  Chloride, Random Urine: <35 mmol/L (10-09 @ 07:40)  Potassium, Random Urine: 20 mmol/L (10-09 @ 07:40)  Osmolality, Random Urine: 557 mosm/Kg (10-08 @ 09:41)      IMPRESSION: 89M w/ HTN, DM2, AFib, BPH, and dementia, 10/7/19 a/w fall, c/b appreciation of hyponatremia    (1)Renal - stable/intact function    (2)Hyponatremia - much improved - we can try stopping the salt tabs at this point      RECOMMEND:  (1)D/C NaCl tabs  (2)TIW Bumex as ordered  (3)Continue 1.2L limitation in all fluid besides milk  (4)If for discharge, would recheck BMP within 1 week; could f/u at my office on a prn basis      Anders Engle MD  [facility]  [phone] No pain, no shortness of breath      VITAL:  T(C): , Max: 36.8 (10-09-19 @ 21:38)  T(F): , Max: 98.2 (10-09-19 @ 21:38)  HR: 79 (10-10-19 @ 04:41)  BP: 127/89 (10-10-19 @ 04:41)  RR: 18 (10-10-19 @ 04:41)  SpO2: 94% (10-10-19 @ 04:41)      PHYSICAL EXAM:  Constitutional: pleasantly demented  HEENT: NCAT, DMM  Neck: Supple, (+) JVD  Respiratory: CTA-b/l  Cardiovascular: Irreg s1s2  Gastrointestinal: BS+, soft, NT/ND  Extremities: 1+ b/l LE edema  Neurological: tone WNL  Back: no CVAT b/l  Skin: No rashes, no nevi      LABS:                        14.0   3.80  )-----------( 119      ( 09 Oct 2019 09:46 )             42.5     Na(136)/K(4.2)/Cl(100)/HCO3(24)/BUN(19)/Cr(0.94)Glu(111)/Ca(8.9)/Mg(--)/PO4(--)    10-10 @ 05:58  Na(132)/K(4.2)/Cl(97)/HCO3(22)/BUN(18)/Cr(0.97)Glu(108)/Ca(8.8)/Mg(--)/PO4(--)    10-09 @ 07:18  Na(126)/K(4.1)/Cl(94)/HCO3(23)/BUN(14)/Cr(0.78)Glu(113)/Ca(8.4)/Mg(--)/PO4(--)    10-08 @ 06:39  Na(--)/K(4.5)/Cl(--)/HCO3(--)/BUN(--)/Cr(--)Glu(--)/Ca(--)/Mg(--)/PO4(--)    10-07 @ 09:28      Osmolality, Random Urine: 402 mosm/Kg (10-09 @ 09:26)  Sodium, Random Urine: 27 mmol/L (10-09 @ 07:40)  Chloride, Random Urine: <35 mmol/L (10-09 @ 07:40)  Potassium, Random Urine: 20 mmol/L (10-09 @ 07:40)  Osmolality, Random Urine: 557 mosm/Kg (10-08 @ 09:41)      IMPRESSION: 89M w/ HTN, DM2, AFib, BPH, and dementia, 10/7/19 a/w fall, c/b appreciation of hyponatremia    (1)Renal - stable/intact function    (2)Hyponatremia - much improved - we can try stopping the salt tabs at this point      RECOMMEND:  (1)D/C NaCl tabs  (2)TIW Bumex as ordered  (3)Continue 1.2L limitation in all fluid besides milk  (4)If for discharge, would recheck BMP within 1 week; could f/u at my office on a prn basis      Anders Engle MD  [facility]  [phone]

## 2019-10-10 NOTE — DISCHARGE NOTE NURSING/CASE MANAGEMENT/SOCIAL WORK - NSDCVIVACCINE_GEN_ALL_CORE_FT
Influenza , 2019/10/10 12:36 , Ashley Figueroa (RN)  Tdap , 2019/6/30 18:38 , Edith Almaraz (RN)  Tdap , 2019/10/7 06:56 , Negar Burns (RN)

## 2019-10-10 NOTE — DISCHARGE NOTE PROVIDER - HOSPITAL COURSE
Patient is a 89y old  Male who presents with a chief complaint of frequent falls, altered mental status Patient is a 89y old  Male who presents with a chief complaint of frequent falls, altered mental status; diagnosed with pelvic sprain - CT neg for fracture; followed by renal for hyponatremia - resolved Patient is a 89y old  Male who presents with a chief complaint of frequent falls, altered mental status; diagnosed with pelvic sprain - CT neg for fracture; followed by renal for hyponatremia - medications adjusted - resolved; followed by neurology - EEG recommended - gabby done as OP; stable for d/c to assisted living as per attending
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
## 2019-10-10 NOTE — DISCHARGE NOTE PROVIDER - PROVIDER TOKENS
PROVIDER:[TOKEN:[[id number]],FOLLOWUP:[1 week]],PROVIDER:[TOKEN:[[id number]]],PROVIDER:[TOKEN:[[id number]]]

## 2019-10-10 NOTE — DISCHARGE NOTE NURSING/CASE MANAGEMENT/SOCIAL WORK - PATIENT PORTAL LINK FT
You can access the FollowMyHealth Patient Portal offered by [facility] by registering at the following website: [email or web address]. By joining [facility]’s FollowMyHealth portal, you will also be able to view your health information using other applications (apps) compatible with our system.

## 2019-10-10 NOTE — PROGRESS NOTE ADULT - REASON FOR ADMISSION
frequent falls, altered mental status

## 2019-11-12 PROCEDURE — 81001 URINALYSIS AUTO W/SCOPE: CPT

## 2019-11-12 PROCEDURE — 90715 TDAP VACCINE 7 YRS/> IM: CPT

## 2019-11-12 PROCEDURE — 82570 ASSAY OF URINE CREATININE: CPT

## 2019-11-12 PROCEDURE — 83935 ASSAY OF URINE OSMOLALITY: CPT

## 2019-11-12 PROCEDURE — 80053 COMPREHEN METABOLIC PANEL: CPT

## 2019-11-12 PROCEDURE — 85027 COMPLETE CBC AUTOMATED: CPT

## 2019-11-12 PROCEDURE — 83036 HEMOGLOBIN GLYCOSYLATED A1C: CPT

## 2019-11-12 PROCEDURE — 80061 LIPID PANEL: CPT

## 2019-11-12 PROCEDURE — 80048 BASIC METABOLIC PNL TOTAL CA: CPT

## 2019-11-12 PROCEDURE — 82607 VITAMIN B-12: CPT

## 2019-11-12 PROCEDURE — 72170 X-RAY EXAM OF PELVIS: CPT

## 2019-11-12 PROCEDURE — 84300 ASSAY OF URINE SODIUM: CPT

## 2019-11-12 PROCEDURE — 90471 IMMUNIZATION ADMIN: CPT

## 2019-11-12 PROCEDURE — 84133 ASSAY OF URINE POTASSIUM: CPT

## 2019-11-12 PROCEDURE — 85610 PROTHROMBIN TIME: CPT

## 2019-11-12 PROCEDURE — 90686 IIV4 VACC NO PRSV 0.5 ML IM: CPT

## 2019-11-12 PROCEDURE — 76377 3D RENDER W/INTRP POSTPROCES: CPT

## 2019-11-12 PROCEDURE — 82436 ASSAY OF URINE CHLORIDE: CPT

## 2019-11-12 PROCEDURE — 83930 ASSAY OF BLOOD OSMOLALITY: CPT

## 2019-11-12 PROCEDURE — 84443 ASSAY THYROID STIM HORMONE: CPT

## 2019-11-12 PROCEDURE — 84132 ASSAY OF SERUM POTASSIUM: CPT

## 2019-11-12 PROCEDURE — 97162 PT EVAL MOD COMPLEX 30 MIN: CPT

## 2019-11-12 PROCEDURE — 84550 ASSAY OF BLOOD/URIC ACID: CPT

## 2019-11-12 PROCEDURE — 85730 THROMBOPLASTIN TIME PARTIAL: CPT

## 2019-11-12 PROCEDURE — 87086 URINE CULTURE/COLONY COUNT: CPT

## 2019-11-12 PROCEDURE — 70450 CT HEAD/BRAIN W/O DYE: CPT

## 2019-11-12 PROCEDURE — 82746 ASSAY OF FOLIC ACID SERUM: CPT

## 2019-11-12 PROCEDURE — 72192 CT PELVIS W/O DYE: CPT

## 2019-11-12 PROCEDURE — 72125 CT NECK SPINE W/O DYE: CPT

## 2019-11-12 PROCEDURE — 93005 ELECTROCARDIOGRAM TRACING: CPT

## 2019-11-12 PROCEDURE — 99285 EMERGENCY DEPT VISIT HI MDM: CPT | Mod: 25

## 2019-11-12 PROCEDURE — 84484 ASSAY OF TROPONIN QUANT: CPT

## 2019-11-12 PROCEDURE — 82962 GLUCOSE BLOOD TEST: CPT

## 2019-11-12 PROCEDURE — 71045 X-RAY EXAM CHEST 1 VIEW: CPT

## 2019-11-12 PROCEDURE — 73030 X-RAY EXAM OF SHOULDER: CPT

## 2019-11-29 ENCOUNTER — APPOINTMENT (OUTPATIENT)
Dept: NEUROLOGY | Facility: CLINIC | Age: 84
End: 2019-11-29
Payer: MEDICARE

## 2019-11-29 VITALS — SYSTOLIC BLOOD PRESSURE: 127 MMHG | DIASTOLIC BLOOD PRESSURE: 78 MMHG | HEART RATE: 75 BPM

## 2019-11-29 DIAGNOSIS — G47.00 INSOMNIA, UNSPECIFIED: ICD-10-CM

## 2019-11-29 DIAGNOSIS — R41.89 OTHER SYMPTOMS AND SIGNS INVOLVING COGNITIVE FUNCTIONS AND AWARENESS: ICD-10-CM

## 2019-11-29 DIAGNOSIS — R45.1 RESTLESSNESS AND AGITATION: ICD-10-CM

## 2019-11-29 PROBLEM — R26.81 UNSTEADINESS ON FEET: Chronic | Status: ACTIVE | Noted: 2019-10-07

## 2019-11-29 PROBLEM — F03.90 UNSPECIFIED DEMENTIA WITHOUT BEHAVIORAL DISTURBANCE: Chronic | Status: ACTIVE | Noted: 2019-10-07

## 2019-11-29 PROCEDURE — 99214 OFFICE O/P EST MOD 30 MIN: CPT

## 2019-11-29 RX ORDER — DONEPEZIL HYDROCHLORIDE 5 MG/1
5 TABLET ORAL
Qty: 30 | Refills: 2 | Status: DISCONTINUED | COMMUNITY
Start: 2019-09-09 | End: 2019-11-29

## 2019-11-29 RX ORDER — SPIRONOLACTONE 25 MG/1
25 TABLET ORAL DAILY
Qty: 45 | Refills: 3 | Status: DISCONTINUED | COMMUNITY
Start: 2019-05-14 | End: 2019-11-29

## 2019-11-29 RX ORDER — SERTRALINE 25 MG/1
25 TABLET, FILM COATED ORAL DAILY
Qty: 30 | Refills: 2 | Status: DISCONTINUED | COMMUNITY
Start: 2019-09-09 | End: 2019-11-29

## 2019-11-29 NOTE — ASSESSMENT
[FreeTextEntry1] : Assessment:\par 89yo RH WM, with vascular risk factors, here with progressive cognitive decline over a few months. \par ADL and IADL are poor and he needs constant supervision.\par MMSE and SPMSQ show moderate dementia. \par Gait issues are from a combination of brain vascular disease (might have had a R IC stroke), arthritis and spinal stenosis.\par Off Zoloft, no objections. \par Seems well controlled on Quetiapine and Mirtazapine.\par \par Diagnostic Impression:\par Vascular dementia\par Gait instability - multifactorial.\par \par Plan:\par -Continue with Seroquel 25mg0.5 tabs QHS and Mirtazapine-if pt too somnolent during the day, can dc Mirtazapine and continue with Seroquel alone\par -encourage social and physical activity\par -continue with PT\par -can be off Aricept, which can lead to diarrhea, though atypical at low dose. Would follow closely to figure out what exactly he means by bowel issues. He has been taking metformin for many years, unlikely to sudden be responsible for bowel changes. If deemed possible, I would try to resume Aricept 5mg and raise to 10mg daily, if his bowel issues cease.\par \par A thorough discussion was entertained with the patient/caregiver regarding the use of psychoactive medications, their possible benefits and AE profile, including the risk of cardiovascular complications.\par We discussed the benefits of being active, physically and mentally, and the need to to establish a routine in this respect.\par Driving abilities and firearms possession and use were discussed, in relation to progression of the cognitive decline, and the need to assess them periodically.\par Patient/caregiver understand and agree with the plan.\par
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
## 2019-11-29 NOTE — PHYSICAL EXAM
[Affect] : the affect was normal [Impaired Insight] : insight and judgment were intact [General Appearance - In No Acute Distress] : in no acute distress [General Appearance - Alert] : alert [Person] : oriented to person [Concentration Intact] : normal concentrating ability [Time] : oriented to time [Place] : oriented to place [Visual Intact] : visual attention was ~T not ~L decreased [Writing A Sentence] : no difficulty writing a sentence [Naming Objects] : no difficulty naming common objects [Repeating Phrases] : no difficulty repeating a phrase [Comprehension] : comprehension intact [Fluency] : fluency intact [Reading] : reading intact [Date / Time ___ / 5] : date / time [unfilled] / 5 [Past History] : adequate knowledge of personal past history [Total Score ___ / 30] : the patient achieved a score of [unfilled] /30 [Place ___ / 5] : place [unfilled] / 5 [Registration ___ / 3] : registration [unfilled] / 3 [Serial Sevens ___/5] : serial sevens [unfilled] / 5 [Naming 2 Objects ___ / 2] : naming two objects [unfilled] / 2 [Repeating a Sentence ___ / 1] : repeating a sentence [unfilled] / 1 [Writing a Sentence ___ / 1] : write sentence [unfilled] / 1 [3-stage Verbal Command ___ / 3] : three-stage verbal command [unfilled] / 3 [Written Command ___ / 1] : written command [unfilled] / 1 [Copy a Design ___ / 1] : copy a design [unfilled] / 1 [Cranial Nerves Trigeminal (V)] : facial sensation intact symmetrically [Cranial Nerves Optic (II)] : visual acuity intact bilaterally,  visual fields full to confrontation, pupils equal round and reactive to light [Recall ___ / 3] : recall [unfilled] / 3 [Cranial Nerves Oculomotor (III)] : extraocular motion intact [Cranial Nerves Glossopharyngeal (IX)] : tongue and palate midline [Cranial Nerves Accessory (XI - Cranial And Spinal)] : head turning and shoulder shrug symmetric [Cranial Nerves Vestibulocochlear (VIII)] : hearing was intact bilaterally [Cranial Nerves Facial (VII)] : face symmetrical [Cranial Nerves Hypoglossal (XII)] : there was no tongue deviation with protrusion [Motor Strength] : muscle strength was normal in all four extremities [Involuntary Movements] : no involuntary movements were seen [Motor Handedness Right-Handed] : the patient is right hand dominant [No Muscle Atrophy] : normal bulk in all four extremities [Proprioception] : proprioception was intact [Sensation Vibration Decrease] : vibration was intact [Sensation Pain / Temperature Decrease] : pain and temperature was intact [Sensation Tactile Decrease] : light touch was intact [Balance] : balance was intact [Limited Balance] : the patient's balance was impaired [2+] : Ankle jerk right 2+ [Sclera] : the sclera and conjunctiva were normal [Extraocular Movements] : extraocular movements were intact [PERRL With Normal Accommodation] : pupils were equal in size, round, reactive to light, with normal accommodation [Full Visual Field] : full visual field [Optic Disc Abnormality] : the optic disc were normal in size and color [No APD] : no afferent pupillary defect [No JERMAINE] : no internuclear ophthalmoplegia [Outer Ear] : the ears and nose were normal in appearance [Oropharynx] : the oropharynx was normal [Thyroid Diffuse Enlargement] : the thyroid was not enlarged [Neck Appearance] : the appearance of the neck was normal [Thyroid Nodule] : there were no palpable thyroid nodules [Neck Cervical Mass (___cm)] : no neck mass was observed [Jugular Venous Distention Increased] : there was no jugular-venous distention [Auscultation Breath Sounds / Voice Sounds] : lungs were clear to auscultation bilaterally [Heart Rate And Rhythm] : heart rate was normal and rhythm regular [Heart Sounds Pericardial Friction Rub] : no pericardial rub [Heart Sounds] : normal S1 and S2 [Heart Sounds Gallop] : no gallops [Murmurs] : no murmurs [Full Pulse] : the pedal pulses are present [Arterial Pulses Carotid] : carotid pulses were normal with no bruits [Edema] : there was no peripheral edema [Bowel Sounds] : normal bowel sounds [Abdomen Soft] : soft [Abdomen Tenderness] : non-tender [No CVA Tenderness] : no ~M costovertebral angle tenderness [Abdomen Mass (___ Cm)] : no abdominal mass palpated [No Spinal Tenderness] : no spinal tenderness [Nail Clubbing] : no clubbing  or cyanosis of the fingernails [Musculoskeletal - Swelling] : no joint swelling seen [Motor Tone] : muscle strength and tone were normal [Skin Color & Pigmentation] : normal skin color and pigmentation [Skin Turgor] : normal skin turgor [] : no rash [Allodynia] : no ~T allodynia present [Romberg's Sign] : Romberg's sign was negtive [Motor Strength Lower Extremities Bilaterally] : strength was normal in both lower extremities [Motor Strength Upper Extremities Bilaterally] : strength was normal in both upper extremities [Past-pointing] : there was no past-pointing [Dysesthesia] : no dysesthesia [Hyperesthesia] : no hyperesthesia [Tremor] : no tremor present [Plantar Reflex Right Only] : normal on the right [Coordination - Dysmetria Impaired Finger-to-Nose Bilateral] : not present [Coordination - Dysmetria Impaired Heel-to-Shin Bilateral] : not present [Dysdiadochokinesia Bilaterally] : not present [FreeTextEntry4] : Limited by [place].\par SPMSQ: 5.\par Mental Status Exam\par Presidents: 1/5\par Alternating Pattern: ok, limited\par Spiral: ok\par Clock: draws numbers BW; can read clocks well; 1/3.\par Repetition: ok\par R/L discrimination on self and examiner: ok\par Cross-line commands: difficulty, inverts sides\par Praxis:\par -Motor: ok\par -Dynamic/Luria: forgets pattern\par -Ideomotor/Imitation: ok\par -Ideational/writing/closing-in: ok\par -Dressing: ok. Needs \par  [Plantar Reflex Left Only] : normal on the left [FreeTextEntry6] : Satellites around LUE; no focal motor deficits; mild paratonia in UE.  [FreeTextEntry8] : cautioned gait, limps on L side (L knee pain and ? old stroke) [FreeTextEntry1] : PPM IN L CHEST

## 2019-11-29 NOTE — REASON FOR VISIT
[Procedure: _________] : a [unfilled] procedure visit [Family Member] : family member [FreeTextEntry1] : Vascular dementia

## 2019-11-29 NOTE — DATA REVIEWED
[de-identified] : EXAM: CT BRAIN \par \par \par PROCEDURE DATE: 08/26/2019 \par \par \par \par \par INTERPRETATION: CLINICAL INFORMATION: Fall with head injury \par \par TECHNIQUE: Noncontrast axial CT images were acquired through the head. \par Two-dimensional sagittal and coronal reformats were generated. \par \par COMPARISON STUDY: CT head 6/30/2019 \par \par FINDINGS: \par \par There is no acute intra-axial or extra-axial hemorrhage. There is no mass \par effect or shift of the midline. The basal cisterns are not effaced. There is \par cerebral and cerebellar volume loss with prominence of the ventricles, \par sulci, and cerebellar folia. There are extensive chronic small vessel \par ischemic changes in the frontoparietal white matter. There are small old \par bilateral frontal cortical infarcts, right basal ganglia infarcts, and left \par cerebellar infarcts. There are atherosclerotic calcifications of the \par intracranial carotid arteries. \par \par There is no significant scalp soft tissue swelling or scalp hematoma. The \par skull base and bony calvarium are intact. The visualized paranasal sinuses \par and tympanic/mastoid cavities are clear. There is evidence of bilateral lens \par surgery. \par \par \par IMPRESSION: \par \par No acute intracranial hemorrhage, mass effect, or acute osseous fracture. \par \par Chronic ischemic changes, as described. \par \par \par EXAM: CT CERVICAL SPINE \par \par EXAM: CT BRAIN \par \par \par PROCEDURE DATE: 06/30/2019 \par \par \par \par \par INTERPRETATION: Noncontrast CT of the brain and cervical spine \par \par CLINICAL INDICATION: Head trauma, cervical spine trauma, status post fall \par \par TECHNIQUE: Axial CT scanning of the brain and cervical spine were obtained \par without the administration of intravenous contrast. Images were reformatted \par in the sagittal and coronal planes to evaluate osseous alignment. \par \par COMPARISON: CT brain 8/14/2014. \par \par FINDINGS: \par \par Brain CT: \par \par There is no hydrocephalus, mass effect, vasogenic edema, midline shift, or \par intracranial hemorrhage. There is no CT evidence of acute territorial \par infarct. \par \par There is no displaced calvarial fracture. Right supraorbital and \par extracalvarial frontal soft tissue swelling/hematoma. Bilateral maxillary \par sinus mucosal thickening. Mastoid air cells clear. \par \par Cervical spine CT: \par \par No acute fracture or traumatic subluxation. No prevertebral soft tissue \par swelling. Multilevel disc space narrowing. Grade 1 anterolisthesis of C7 on \par T1 which is degenerative in nature. Alignment craniocervical junction \par unremarkable. \par \par There are multilevel degenerative changes characterized by disc osteophyte \par complexes and facet and uncinate hypertrophy. This results in mild \par multilevel spinal canal stenosis and multilevel neural foraminal narrowing. \par \par Visualized lung apices clear. \par \par IMPRESSION: \par \par Brain CT: \par \par No acute intracranial hemorrhage or mass effect. No displaced calvarial \par fracture. Right supraorbital and extracalvarial frontal soft tissue \par swelling/hematoma. \par \par Cervical spine CT: \par \par No acute fracture or traumatic subluxation. No prevertebral soft tissue \par swelling. Degenerative changes. \par \par \par \par \par \par \par \par \par \par DIANE PETERSON M.D., ATTENDING RADIOLOGIST \par This document has been electronically signed. Jun 30 2019 6:01PM \par \par \par EXAM: CT BRAIN \par \par EXAM: CT CERVICAL SPINE \par \par \par PROCEDURE DATE: 10/07/2019 \par \par \par \par \par INTERPRETATION: CLINICAL INFORMATION: Unwitnessed fall. Head trauma. \par \par TECHNIQUE: Noncontrast CT scan of the head and cervical spine was performed. \par Axial images with coronal and sagittal reformatted series were obtained. \par \par COMPARISON: CT head 8/26/2019, CT cervical spine 6/30/2019. \par \par HEAD: \par \par Redemonstration enlarged ventricles and sulci consistent with volume loss \par unchanged from prior with small 5 mm bifrontal subdural hygromas. There is \par redemonstration of extensive nonspecific white matter decreased attenuation, \par likely extensive microvascular disease with multiple areas of remote lacunar \par infarction throughout the centrum semiovale, lentiform nuclei, thalami, \par midbrain and martha as well as small areas remote lacunar infarcts, with \par insufflation gliosis again noted in the left cerebellar hemisphere, \par unchanged from prior. There is no new acute intra or extra hemorrhage or \par midline shift. Basal cisterns remain patent. There is a partially empty \par sella. There is unchanged marked atherosclerotic vascular calcification of \par the carotid siphons and dolichoectatic posterior circulation, correlate with \par underlying hypertension. \par \par There are absent orbital lenses with previous cataract surgery. Paranasal \par sinuses remain unremarkable with minimal mucosal thickening, there is \par decreased pneumatization with sclerosis and opacification of the mastoid \par tips unchanged calvarium remains intact. \par \par CERVICAL SPINE: \par \par Lateral masses of C1 remain intact on C2. Unchanged mild lower cervical \par dextrocurvature, 2 mm anterolisthesis of C7 on T1, multilevel disc space \par narrowing, osteophytosis, and facet hypertrophic changes again causing \par multilevel canal and foraminal narrowing without significant interval change \par from prior. Canal contents are suboptimally evaluated on CT. MRI is more \par sensitive for soft tissue, cord or ligamentous injury and may be obtained \par for improved assessment if there is persistent cervical spine pain as \par clinically warranted. There is no significant new prevertebral soft tissue \par swelling. \par \par Unchanged carotid bulb atherosclerotic calcification and a right vocal cord \par palsy with medial deviated right true cord, ipsilateral enlargement of right \par piriform sinus and right laryngeal ventricle. 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 Unchanged right larger than left \par pleural effusions, please see dedicated reports of the chest for chest \par findings, partially seen right-sided venous catheter with tip below the edge \par of study. \par \par IMPRESSION: \par \par HEAD CT: \par \par Unchanged volume loss and microvascular disease without hemorrhage or \par midline shift. If symptoms persist consider follow-up head CT or MR if no \par contraindications. \par \par CERVICAL SPINE CT: \par \par No new obvious acute fracture pr dislocation, no prevertebral soft tissue \par swelling, redemonstration of multilevel degenerative change, MR is more \par sensitive for soft tissue tissue, disc or ligament injury and may be \par obtained if persistent cervical spine pain as clinically warranted. \par \par Right vocal cord palsy, bilateral right larger left pleural effusion, please \par see chest reports for thoracic findings. \par \par \par \par \par \par \par \par CATRACHITA COPPOLA M.D., RADIOLOGY RESIDENT \par This document has been electronically signed. \par JASSON VALLES M.D., ATTENDING RADIOLOGIST \par This document has been electronically signed. Oct 7 2019 11:41AM

## 2020-01-01 NOTE — DISCHARGE NOTE PROVIDER - NSDCCONDITION_GEN_ALL_CORE
"Patient is a 3 day old male infant, delivered at Gestational Age: 39w0d Vaginal, Spontaneous at 2:15 PM on 2019.      is currently being fed Breast milk and formula. I am aware that mother's feeding choice upon admission was the following:    Information for the patient's mother:  Bety Lentz [[id number]]   Breast milk feeding with formula supplementation  There are no medical contraindications to exclusive breast milk feeding, and the benefits of exclusively breastfeeding were discussed/reinforced with the mother. Feeding Tolerance: Tolerates well (20 0510)    Urine Occurrence: 1 (19 1445)  Stool Occurrence: 1 (19 2130)    Hearing Exam:  [place] Hearing Test Machine: Auditory Brainstem Response (Algo) (19 1600)  [place] Hearing Test Results: Pass R;Pass L (19 1600)    Congenital Heart Disease Screening:  Congenital Heart Disease Screening Result: Normal (19 1600)    State screen done, results pending. Immunizations   Most Recent Immunizations   Administered Date(s) Administered   â¢ Hep B, adolescent or pediatric 2019   Pended Date(s) Pended   â¢ Hepatitis B Immune Globulin 2019       Transcutaneous Bilirubin Result:  6.6 (19 1600)  Hours of age-Transcutaneous Biliribin: 24 Hrs    PHYSICAL EXAM    VITALS:    Visit Vitals  Pulse 148   Temp 98.9 Â°F (37.2 Â°C) (Axillary)   Resp 40   Ht 19.5"" (49.5 cm) Comment: Filed from Delivery Summary   Wt 2.97 kg Comment: 6-9   HC 33.5 cm (13.19\"") Comment: Filed from Delivery Summary   BMI 12.11 kg/mÂ²       Weight change since birth:  -5%    General:  Patient is an alert, vigorous male with appropriate behavior. He is in no acute distress. Skin:  His skin is warm with normal turgor. The color of the skin is pink. There is no rash. There are no bruises or other signs of injury. No significant jaundice. Head:  The head is atraumatic and normocephalic.   The anterior fontanel is open and " flat; the posterior fontanel is open. Eyes:  The conjunctivae appear normal with neither icterus nor subconjunctival hemorrhage. Red reflexes are seen bilaterally. Ears:  Pinnae and external ear canals normal.  Nose:  There is no nasal flaring, nares patent bilaterally. Throat:  The oropharynx is normal.  There is no cleft of the palate. Neck:  Clavicles without crepitus. Trunk & Thorax: There are no lesions on the trunk; no sacral dimples. There are no retractions. Lungs: The lung fields are clear to auscultation. Heart:  The precordium is quiet. The heart rhythm is grossly regular. S1 and S2 are normal.  There are no murmurs. The femoral pulses are normal.  Abdomen: The umbilical cord stump is normal.  There is not an umbilical hernia. The abdomen is flat and soft. Genitalia:  normal male genitalia with bilateral descended testes   Rectal:  Anus patent. Extremities:  Moving all 4 extremities. The hips are stable. Neurologic:  He displays normal tone throughout. He is not jittery and he has normal  reflexes. Long reflex present. Lab:  Cord Blood:  No results found  No results found for: BILIRUBIN      ASSESSMENT:  Well 2 day old male infant. Normal growth and development. Procedures:  Circumcision    Consults:  None. PLAN:  Routine  care. Follow up: With pediatrician in 2 days. Instructions:  Discharge teaching done on sleep position, fever, feedings and jaundice. Baby will be discharged home with mom.          Signed by:  Ann-Marie Duggan MD   2020 Stable

## 2020-05-04 NOTE — DATA REVIEWED
Confirmed that Dr Garcia will follow for home care while Dr Kyle will follow for IV.    [de-identified] : EXAM: CT BRAIN \par \par \par PROCEDURE DATE: 08/26/2019 \par \par \par \par \par INTERPRETATION: CLINICAL INFORMATION: Fall with head injury \par \par TECHNIQUE: Noncontrast axial CT images were acquired through the head. \par Two-dimensional sagittal and coronal reformats were generated. \par \par COMPARISON STUDY: CT head 6/30/2019 \par \par FINDINGS: \par \par There is no acute intra-axial or extra-axial hemorrhage. There is no mass \par effect or shift of the midline. The basal cisterns are not effaced. There is \par cerebral and cerebellar volume loss with prominence of the ventricles, \par sulci, and cerebellar folia. There are extensive chronic small vessel \par ischemic changes in the frontoparietal white matter. There are small old \par bilateral frontal cortical infarcts, right basal ganglia infarcts, and left \par cerebellar infarcts. There are atherosclerotic calcifications of the \par intracranial carotid arteries. \par \par There is no significant scalp soft tissue swelling or scalp hematoma. The \par skull base and bony calvarium are intact. The visualized paranasal sinuses \par and tympanic/mastoid cavities are clear. There is evidence of bilateral lens \par surgery. \par \par \par IMPRESSION: \par \par No acute intracranial hemorrhage, mass effect, or acute osseous fracture. \par \par Chronic ischemic changes, as described. \par \par \par EXAM: CT CERVICAL SPINE \par \par EXAM: CT BRAIN \par \par \par PROCEDURE DATE: 06/30/2019 \par \par \par \par \par INTERPRETATION: Noncontrast CT of the brain and cervical spine \par \par CLINICAL INDICATION: Head trauma, cervical spine trauma, status post fall \par \par TECHNIQUE: Axial CT scanning of the brain and cervical spine were obtained \par without the administration of intravenous contrast. Images were reformatted \par in the sagittal and coronal planes to evaluate osseous alignment. \par \par COMPARISON: CT brain 8/14/2014. \par \par FINDINGS: \par \par Brain CT: \par \par There is no hydrocephalus, mass effect, vasogenic edema, midline shift, or \par intracranial hemorrhage. There is no CT evidence of acute territorial \par infarct. \par \par There is no displaced calvarial fracture. Right supraorbital and \par extracalvarial frontal soft tissue swelling/hematoma. Bilateral maxillary \par sinus mucosal thickening. Mastoid air cells clear. \par \par Cervical spine CT: \par \par No acute fracture or traumatic subluxation. No prevertebral soft tissue \par swelling. Multilevel disc space narrowing. Grade 1 anterolisthesis of C7 on \par T1 which is degenerative in nature. Alignment craniocervical junction \par unremarkable. \par \par There are multilevel degenerative changes characterized by disc osteophyte \par complexes and facet and uncinate hypertrophy. This results in mild \par multilevel spinal canal stenosis and multilevel neural foraminal narrowing. \par \par Visualized lung apices clear. \par \par IMPRESSION: \par \par Brain CT: \par \par No acute intracranial hemorrhage or mass effect. No displaced calvarial \par fracture. Right supraorbital and extracalvarial frontal soft tissue \par swelling/hematoma. \par \par Cervical spine CT: \par \par No acute fracture or traumatic subluxation. No prevertebral soft tissue \par swelling. Degenerative changes. \par \par \par \par \par \par \par \par \par \par DIANE PETERSON M.D., ATTENDING RADIOLOGIST \par This document has been electronically signed. Jun 30 2019 6:01PM

## 2020-07-07 ENCOUNTER — APPOINTMENT (OUTPATIENT)
Dept: NEUROLOGY | Facility: CLINIC | Age: 85
End: 2020-07-07
Payer: MEDICARE

## 2020-07-07 PROCEDURE — 99443: CPT | Mod: 95

## 2020-07-07 NOTE — DATA REVIEWED
[de-identified] : EXAM: CT BRAIN \par \par \par PROCEDURE DATE: 08/26/2019 \par \par \par \par \par INTERPRETATION: CLINICAL INFORMATION: Fall with head injury \par \par TECHNIQUE: Noncontrast axial CT images were acquired through the head. \par Two-dimensional sagittal and coronal reformats were generated. \par \par COMPARISON STUDY: CT head 6/30/2019 \par \par FINDINGS: \par \par There is no acute intra-axial or extra-axial hemorrhage. There is no mass \par effect or shift of the midline. The basal cisterns are not effaced. There is \par cerebral and cerebellar volume loss with prominence of the ventricles, \par sulci, and cerebellar folia. There are extensive chronic small vessel \par ischemic changes in the frontoparietal white matter. There are small old \par bilateral frontal cortical infarcts, right basal ganglia infarcts, and left \par cerebellar infarcts. There are atherosclerotic calcifications of the \par intracranial carotid arteries. \par \par There is no significant scalp soft tissue swelling or scalp hematoma. The \par skull base and bony calvarium are intact. The visualized paranasal sinuses \par and tympanic/mastoid cavities are clear. There is evidence of bilateral lens \par surgery. \par \par \par IMPRESSION: \par \par No acute intracranial hemorrhage, mass effect, or acute osseous fracture. \par \par Chronic ischemic changes, as described. \par \par \par EXAM: CT CERVICAL SPINE \par \par EXAM: CT BRAIN \par \par \par PROCEDURE DATE: 06/30/2019 \par \par \par \par \par INTERPRETATION: Noncontrast CT of the brain and cervical spine \par \par CLINICAL INDICATION: Head trauma, cervical spine trauma, status post fall \par \par TECHNIQUE: Axial CT scanning of the brain and cervical spine were obtained \par without the administration of intravenous contrast. Images were reformatted \par in the sagittal and coronal planes to evaluate osseous alignment. \par \par COMPARISON: CT brain 8/14/2014. \par \par FINDINGS: \par \par Brain CT: \par \par There is no hydrocephalus, mass effect, vasogenic edema, midline shift, or \par intracranial hemorrhage. There is no CT evidence of acute territorial \par infarct. \par \par There is no displaced calvarial fracture. Right supraorbital and \par extracalvarial frontal soft tissue swelling/hematoma. Bilateral maxillary \par sinus mucosal thickening. Mastoid air cells clear. \par \par Cervical spine CT: \par \par No acute fracture or traumatic subluxation. No prevertebral soft tissue \par swelling. Multilevel disc space narrowing. Grade 1 anterolisthesis of C7 on \par T1 which is degenerative in nature. Alignment craniocervical junction \par unremarkable. \par \par There are multilevel degenerative changes characterized by disc osteophyte \par complexes and facet and uncinate hypertrophy. This results in mild \par multilevel spinal canal stenosis and multilevel neural foraminal narrowing. \par \par Visualized lung apices clear. \par \par IMPRESSION: \par \par Brain CT: \par \par No acute intracranial hemorrhage or mass effect. No displaced calvarial \par fracture. Right supraorbital and extracalvarial frontal soft tissue \par swelling/hematoma. \par \par Cervical spine CT: \par \par No acute fracture or traumatic subluxation. No prevertebral soft tissue \par swelling. Degenerative changes. \par \par \par \par \par \par \par \par \par \par DIANE PETERSON M.D., ATTENDING RADIOLOGIST \par This document has been electronically signed. Jun 30 2019 6:01PM \par \par \par EXAM: CT BRAIN \par \par EXAM: CT CERVICAL SPINE \par \par \par PROCEDURE DATE: 10/07/2019 \par \par \par \par \par INTERPRETATION: CLINICAL INFORMATION: Unwitnessed fall. Head trauma. \par \par TECHNIQUE: Noncontrast CT scan of the head and cervical spine was performed. \par Axial images with coronal and sagittal reformatted series were obtained. \par \par COMPARISON: CT head 8/26/2019, CT cervical spine 6/30/2019. \par \par HEAD: \par \par Redemonstration enlarged ventricles and sulci consistent with volume loss \par unchanged from prior with small 5 mm bifrontal subdural hygromas. There is \par redemonstration of extensive nonspecific white matter decreased attenuation, \par likely extensive microvascular disease with multiple areas of remote lacunar \par infarction throughout the centrum semiovale, lentiform nuclei, thalami, \par midbrain and martha as well as small areas remote lacunar infarcts, with \par insufflation gliosis again noted in the left cerebellar hemisphere, \par unchanged from prior. There is no new acute intra or extra hemorrhage or \par midline shift. Basal cisterns remain patent. There is a partially empty \par sella. There is unchanged marked atherosclerotic vascular calcification of \par the carotid siphons and dolichoectatic posterior circulation, correlate with \par underlying hypertension. \par \par There are absent orbital lenses with previous cataract surgery. Paranasal \par sinuses remain unremarkable with minimal mucosal thickening, there is \par decreased pneumatization with sclerosis and opacification of the mastoid \par tips unchanged calvarium remains intact. \par \par CERVICAL SPINE: \par \par Lateral masses of C1 remain intact on C2. Unchanged mild lower cervical \par dextrocurvature, 2 mm anterolisthesis of C7 on T1, multilevel disc space \par narrowing, osteophytosis, and facet hypertrophic changes again causing \par multilevel canal and foraminal narrowing without significant interval change \par from prior. Canal contents are suboptimally evaluated on CT. MRI is more \par sensitive for soft tissue, cord or ligamentous injury and may be obtained \par for improved assessment if there is persistent cervical spine pain as \par clinically warranted. There is no significant new prevertebral soft tissue \par swelling. \par \par Unchanged carotid bulb atherosclerotic calcification and a right vocal cord \par palsy with medial deviated right true cord, ipsilateral enlargement of right \par piriform sinus and right laryngeal ventricle. 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 Unchanged right larger than left \par pleural effusions, please see dedicated reports of the chest for chest \par findings, partially seen right-sided venous catheter with tip below the edge \par of study. \par \par IMPRESSION: \par \par HEAD CT: \par \par Unchanged volume loss and microvascular disease without hemorrhage or \par midline shift. If symptoms persist consider follow-up head CT or MR if no \par contraindications. \par \par CERVICAL SPINE CT: \par \par No new obvious acute fracture pr dislocation, no prevertebral soft tissue \par swelling, redemonstration of multilevel degenerative change, MR is more \par sensitive for soft tissue tissue, disc or ligament injury and may be \par obtained if persistent cervical spine pain as clinically warranted. \par \par Right vocal cord palsy, bilateral right larger left pleural effusion, please \par see chest reports for thoracic findings. \par \par \par \par \par \par \par \par CATRACHITA COPPOLA M.D., RADIOLOGY RESIDENT \par This document has been electronically signed. \par JASSON VALLES M.D., ATTENDING RADIOLOGIST \par This document has been electronically signed. Oct 7 2019 11:41AM

## 2020-07-07 NOTE — HISTORY OF PRESENT ILLNESS
[Other:____] : [unfilled] [Other Location: e.g. School (Enter Location, City,State)___] : at [unfilled], at the time of the visit. [Other Location: e.g. Home (Enter Location, City,State)___] : at [unfilled] [FreeTextEntry1] : HPI-Interval Hx [id number]: AWT nor Doximity possible, connection poor on their end. Will do telephone visit.\par Speaking with his RN.\par Since 11/2019, STM and awareness have declined. \par His ADL are minimal, needs attention for all tasks, and needs to be guided in all.\par IADL poor. \par \par Sleep: ok, no issues. \par Appetite: intact, no changes.\par Motor: ok, stable, deconditioned, restarting some activity.\par \par He tends to forget his meals, and asks for food again, may get a bit upset when he is told he ate already, but calms down if given a little snack.\par \par \par \par HPI-Interval Hx [id number]:\par pt recently in the [facility] [place] for fall and AMS. Hyponatremia felt to be possibly related to Zoloft (SIADH-sic!?), but rather due to spironolactone, also DC. Now better. Changed to mirtazapine and Seroquel. Low dose.\par Sundowning is an issues, and has been better controlled by Seroquel.\par Pt has c/o lose bowel movements, unclear if related to Aricept. Metformin was dc.\par Rest stable.\par \par PPM, unclear if MRI compatible.\par HPI: 89yo RH WM, with HT, DM, HLD, CAD, AFib, s/p Stents, Watchman and TAVR procedures, not on AC anymore due to GI bleeding, PPM, Ao stenosis, [place], arthrosis/spinal stenosis, here for concerns of cognitive changes with memory loss. \par \par PMH:\par Pt was seen this am by Dr. Caceres for repeated falls. There was also a concern for cognitive decline and STM loss, and pt was referred to me.\par Over the last 3 months, the family has noted more STM issues, forgetting times, dates, if he ate or not. \par He tends to be anxious about events and being alone.\par Pt has been a resident of the [facility] for several months now, due to be very lonely at home.\par He has used a cane and then a walker for several years, due to ongoing LE weakness, joint pain, spinal stenosis, and deconditioning.\par \par Sleep: per daughter, he has insomnia for a long time, lately this has improved, in part due to more regular pattern at the [facility], and they keep him active in the day.\par \par Appetite: intact, well fed at [facility], he has gained a bit of weight since he is there.\par \par Motor symptoms: limited gait, as above. \par \par B/B: he has had a few accidents.\par \par Psychiatric symptoms: might still be partly depressed due to loss of his wife 9y ago.\par \par ADL: limited, can eat by himself, rest needs assistance\par IADL: poor; can use the phone with help\par CDR: 2.0.\par \par Professional status: retired; owned a gas station.\par \par PCP and other physicians:\par -PCP: Lenora\par -Neuro: Nicki\par -Cardio: Jennie.\par \par Workup done: \par \par

## 2020-07-07 NOTE — PHYSICAL EXAM
[General Appearance - Alert] : alert [General Appearance - In No Acute Distress] : in no acute distress [Impaired Insight] : insight and judgment were intact [Affect] : the affect was normal [Person] : oriented to person [Concentration Intact] : normal concentrating ability [Naming Objects] : no difficulty naming common objects [Visual Intact] : visual attention was ~T not ~L decreased [Fluency] : fluency intact [Comprehension] : comprehension intact [Past History] : adequate knowledge of personal past history [Reading] : reading intact [Repeating a Sentence ___ / 1] : repeating a sentence [unfilled] / 1 [Motor Handedness Right-Handed] : the patient is right hand dominant [Mood] : the mood was normal [Place] : oriented to place [Registration Intact] : recent registration memory intact [Remote Intact] : remote memory intact [Repeating Phrases] : no difficulty repeating a phrase [Current Events] : adequate knowledge of current events [Vocabulary] : adequate range of vocabulary [Total Score ___ / 30] : the patient achieved a score of [unfilled] /30 [Date / Time ___ / 5] : date / time [unfilled] / 5 [Place ___ / 5] : place [unfilled] / 5 [Serial Sevens ___/5] : serial sevens [unfilled] / 5 [Registration ___ / 3] : registration [unfilled] / 3 [Naming 2 Objects ___ / 2] : naming two objects [unfilled] / 2 [Recall ___ / 3] : recall [unfilled] / 3 [FreeTextEntry1] : Exam limited, I was only able to speak with patient. [Time] : disoriented to time [Short Term Intact] : short term memory impaired [Span Intact] : the attention span was decreased [Motor Strength Upper Extremities Bilaterally] : strength was normal in both upper extremities [Motor Strength Lower Extremities Bilaterally] : strength was normal in both lower extremities [Romberg's Sign] : Romberg's sign was negtive [Allodynia] : no ~T allodynia present [Dysesthesia] : no dysesthesia [Hyperesthesia] : no hyperesthesia [Dysdiadochokinesia Bilaterally] : not present [Coordination - Dysmetria Impaired Finger-to-Nose Bilateral] : not present [Coordination - Dysmetria Impaired Heel-to-Shin Bilateral] : not present [Plantar Reflex Right Only] : normal on the right [Plantar Reflex Left Only] : normal on the left [FreeTextEntry4] : Limited by [place].\par Mental Status Exam\par Presidents: 1/5\par Repetition: ok.

## 2020-07-07 NOTE — ASSESSMENT
[FreeTextEntry1] : Assessment:\par 89yo RH WM, with vascular risk factors, with progressive cognitive decline.\par ADL and IADL are poor and he needs constant supervision. in AL now.\par MMSE and SPMSQ show moderate dementia. \par Gait issues are from a combination of brain vascular disease (might have had a R IC stroke), arthritis and spinal stenosis.\par Seems well controlled on Quetiapine and Mirtazapine.\par May be deconditioned, due to restrictions of movements.\par Sleep and appetite are stable.\par \par Diagnostic Impression:\par -Vascular dementia\par -Gait instability - multifactorial.\par \par Plan:\par -Continue with Seroquel 25mg 0.5 tabs QHS and Mirtazapine QHS-if pt too somnolent during the day, can dc Mirtazapine and continue with Seroquel alone\par -encourage social and physical activity\par -continue with PT when possible\par -Aricept 5mg and raise to 10mg daily if well tolerated. \par \par RTC in person in 2 months.\par \par FAX: [phone].\par \par A thorough discussion was entertained with the patient/caregiver regarding the use of psychoactive medications, their possible benefits and AE profile, including the risk of cardiovascular complications.\par We discussed the benefits of being active, physically and mentally, and the need to to establish a routine in this respect.\par Driving abilities and firearms possession and use were discussed, in relation to progression of the cognitive decline, and the need to assess them periodically.\par Patient/caregiver understand and agree with the plan.\par

## 2020-12-21 PROBLEM — Z86.69 HISTORY OF ACUTE OTITIS MEDIA: Status: RESOLVED | Noted: 2019-05-14 | Resolved: 2020-12-21

## 2020-12-21 PROBLEM — J06.9 URTI (ACUTE UPPER RESPIRATORY INFECTION): Status: RESOLVED | Noted: 2019-05-14 | Resolved: 2020-12-21

## 2021-03-10 NOTE — PHYSICAL THERAPY INITIAL EVALUATION ADULT - BALANCE DISTURBANCE, SYSTEM IMPAIRMENT CONTRIBUTE, REHAB EVAL
Patient Instructions   Problem List Items Addressed This Visit        Cardiac and Vasculature    Benign essential hypertension - Primary    Overview     3/9/2021 Monserrat Reina MD    Start losartan 50 mg tablet daily.  Continue hydrochlorothiazide daily.    She will check blood pressures at home at different times of the day and record the values to bring to the next appointment.    Decrease salt in the diet.  Avoid sodas.  Increase regular exercise.         Relevant Medications    hydroCHLOROthiazide (HYDRODIURIL) 25 MG tablet    losartan (COZAAR) 50 MG tablet       Genitourinary and Reproductive     Breast changes, fibrocystic, right (Chronic)    Overview     3/10/2021 Monserrat Reina MD    Mild right greater than left breast tenderness.  Exam reveals fibrocystic breast changes that are rather superficial on the right upper outer quadrant.    Patient was reassured.  Patient was advised to avoid excessive caffeine and chocolate.            Musculoskeletal and Injuries    Cervicalgia (Chronic)    Overview     3/9/2021 Monserrat Reina MD    Past C-spine x-ray revealed degenerative disc disease at C5-6.    Patient was advised on doing neck stretches and exercises throughout the day.  Practice good posture.  Use moist heat to relax tight muscles.    She has a therapeutic massage scheduled.    Consider referral to chiropractor and/or physical therapy.         Arthralgia of multiple sites    Overview     3/9/2021 Monserrat Reina MD    Extensive work-up by previous doctor was unrevealing.    Additional labs done here were all negative, including  rheumatoid factor, MONTRELL, Lyme disease titers, sed rate except for positive Parvovirus B19 IgG (normal IgM).  This would be a possible etiology of her diffuse pain which is now improving.    Fibromyalgia would also be a possible etiology although it usually presents more with diffuse muscular pain than joint pain.    May continue Flexeril and ibuprofen as needed.  Take  "ibuprofen with food to prevent GI upset.    We discussed increasing regular exercise and physical activity.  We recommended decreasing inflammatory foods like sugars, glutens, dairy, processed foods.            Neuro    Coccydynia    Overview     3/10/2021 Monserrat Reina MD    Acute tailbone pain that resolved after rest and ibuprofen.            Symptoms and Signs    Lymphadenopathy    Overview     Lymphadenopathy noted on exam and on ultrasound December 2020.  Benign appearance.  CTA of the neck and chest revealed no lymphadenopathy January 2021.      No lymphadenopathy on exam currently.            Other    Overweight with body mass index (BMI) of 29 to 29.9 in adult    Overview     3/10/2021 Monserrat Reina MD    Decrease sugars and fats and snack foods in the diet.  Decrease other inflammatory foods including dairy, gluten, and processed foods.  Eat smaller portions at mealtime.    Increase regular exercise and physical activity.    Weigh once a week  to monitor progress.                  Hypertension, Adult  High blood pressure (hypertension) is when the force of blood pumping through the arteries is too strong. The arteries are the blood vessels that carry blood from the heart throughout the body. Hypertension forces the heart to work harder to pump blood and may cause arteries to become narrow or stiff. Untreated or uncontrolled hypertension can cause a heart attack, heart failure, a stroke, kidney disease, and other problems.  A blood pressure reading consists of a higher number over a lower number. Ideally, your blood pressure should be below 120/80. The first (\"top\") number is called the systolic pressure. It is a measure of the pressure in your arteries as your heart beats. The second (\"bottom\") number is called the diastolic pressure. It is a measure of the pressure in your arteries as the heart relaxes.  What are the causes?  The exact cause of this condition is not known. There are some " conditions that result in or are related to high blood pressure.  What increases the risk?  Some risk factors for high blood pressure are under your control. The following factors may make you more likely to develop this condition:  · Smoking.  · Having type 2 diabetes mellitus, high cholesterol, or both.  · Not getting enough exercise or physical activity.  · Being overweight.  · Having too much fat, sugar, calories, or salt (sodium) in your diet.  · Drinking too much alcohol.  Some risk factors for high blood pressure may be difficult or impossible to change. Some of these factors include:  · Having chronic kidney disease.  · Having a family history of high blood pressure.  · Age. Risk increases with age.  · Race. You may be at higher risk if you are .  · Gender. Men are at higher risk than women before age 45. After age 65, women are at higher risk than men.  · Having obstructive sleep apnea.  · Stress.  What are the signs or symptoms?  High blood pressure may not cause symptoms. Very high blood pressure (hypertensive crisis) may cause:  · Headache.  · Anxiety.  · Shortness of breath.  · Nosebleed.  · Nausea and vomiting.  · Vision changes.  · Severe chest pain.  · Seizures.  How is this diagnosed?  This condition is diagnosed by measuring your blood pressure while you are seated, with your arm resting on a flat surface, your legs uncrossed, and your feet flat on the floor. The cuff of the blood pressure monitor will be placed directly against the skin of your upper arm at the level of your heart. It should be measured at least twice using the same arm. Certain conditions can cause a difference in blood pressure between your right and left arms.  Certain factors can cause blood pressure readings to be lower or higher than normal for a short period of time:  · When your blood pressure is higher when you are in a health care provider's office than when you are at home, this is called white coat  hypertension. Most people with this condition do not need medicines.  · When your blood pressure is higher at home than when you are in a health care provider's office, this is called masked hypertension. Most people with this condition may need medicines to control blood pressure.  If you have a high blood pressure reading during one visit or you have normal blood pressure with other risk factors, you may be asked to:  · Return on a different day to have your blood pressure checked again.  · Monitor your blood pressure at home for 1 week or longer.  If you are diagnosed with hypertension, you may have other blood or imaging tests to help your health care provider understand your overall risk for other conditions.  How is this treated?  This condition is treated by making healthy lifestyle changes, such as eating healthy foods, exercising more, and reducing your alcohol intake. Your health care provider may prescribe medicine if lifestyle changes are not enough to get your blood pressure under control, and if:  · Your systolic blood pressure is above 130.  · Your diastolic blood pressure is above 80.  Your personal target blood pressure may vary depending on your medical conditions, your age, and other factors.  Follow these instructions at home:  Eating and drinking    · Eat a diet that is high in fiber and potassium, and low in sodium, added sugar, and fat. An example eating plan is called the DASH (Dietary Approaches to Stop Hypertension) diet. To eat this way:  ? Eat plenty of fresh fruits and vegetables. Try to fill one half of your plate at each meal with fruits and vegetables.  ? Eat whole grains, such as whole-wheat pasta, brown rice, or whole-grain bread. Fill about one fourth of your plate with whole grains.  ? Eat or drink low-fat dairy products, such as skim milk or low-fat yogurt.  ? Avoid fatty cuts of meat, processed or cured meats, and poultry with skin. Fill about one fourth of your plate with lean  proteins, such as fish, chicken without skin, beans, eggs, or tofu.  ? Avoid pre-made and processed foods. These tend to be higher in sodium, added sugar, and fat.  · Reduce your daily sodium intake. Most people with hypertension should eat less than 1,500 mg of sodium a day.  · Do not drink alcohol if:  ? Your health care provider tells you not to drink.  ? You are pregnant, may be pregnant, or are planning to become pregnant.  · If you drink alcohol:  ? Limit how much you use to:  § 0-1 drink a day for women.  § 0-2 drinks a day for men.  ? Be aware of how much alcohol is in your drink. In the [place] one drink equals one 12 oz bottle of beer (355 mL), one 5 oz glass of wine (148 mL), or one 1½ oz glass of hard liquor (44 mL).  Lifestyle    · Work with your health care provider to maintain a healthy body weight or to lose weight. Ask what an ideal weight is for you.  · Get at least 30 minutes of exercise most days of the week. Activities may include walking, swimming, or biking.  · Include exercise to strengthen your muscles (resistance exercise), such as Pilates or lifting weights, as part of your weekly exercise routine. Try to do these types of exercises for 30 minutes at least 3 days a week.  · Do not use any products that contain nicotine or tobacco, such as cigarettes, e-cigarettes, and chewing tobacco. If you need help quitting, ask your health care provider.  · Monitor your blood pressure at home as told by your health care provider.  · Keep all follow-up visits as told by your health care provider. This is important.  Medicines  · Take over-the-counter and prescription medicines only as told by your health care provider. Follow directions carefully. Blood pressure medicines must be taken as prescribed.  · Do not skip doses of blood pressure medicine. Doing this puts you at risk for problems and can make the medicine less effective.  · Ask your health care provider about side effects or reactions to  medicines that you should watch for.  Contact a health care provider if you:  · Think you are having a reaction to a medicine you are taking.  · Have headaches that keep coming back (recurring).  · Feel dizzy.  · Have swelling in your ankles.  · Have trouble with your vision.  Get help right away if you:  · Develop a severe headache or confusion.  · Have unusual weakness or numbness.  · Feel faint.  · Have severe pain in your chest or abdomen.  · Vomit repeatedly.  · Have trouble breathing.  Summary  · Hypertension is when the force of blood pumping through your arteries is too strong. If this condition is not controlled, it may put you at risk for serious complications.  · Your personal target blood pressure may vary depending on your medical conditions, your age, and other factors. For most people, a normal blood pressure is less than 120/80.  · Hypertension is treated with lifestyle changes, medicines, or a combination of both. Lifestyle changes include losing weight, eating a healthy, low-sodium diet, exercising more, and limiting alcohol.  This information is not intended to replace advice given to you by your health care provider. Make sure you discuss any questions you have with your health care provider.  Document Revised: 08/28/2019 Document Reviewed: 08/28/2019  Elsevier Patient Education © 2020 [employer]     cognitive/musculoskeletal

## 2021-06-14 ENCOUNTER — EMERGENCY (EMERGENCY)
Facility: HOSPITAL | Age: 86
LOS: 1 days | Discharge: HOME | End: 2021-06-14
Attending: EMERGENCY MEDICINE
Payer: MEDICARE

## 2021-06-14 VITALS
HEART RATE: 72 BPM | WEIGHT: 162.04 LBS | DIASTOLIC BLOOD PRESSURE: 71 MMHG | SYSTOLIC BLOOD PRESSURE: 109 MMHG | HEIGHT: 65 IN | RESPIRATION RATE: 18 BRPM | TEMPERATURE: 98 F | OXYGEN SATURATION: 94 %

## 2021-06-14 VITALS
OXYGEN SATURATION: 98 % | DIASTOLIC BLOOD PRESSURE: 79 MMHG | HEART RATE: 80 BPM | SYSTOLIC BLOOD PRESSURE: 100 MMHG | RESPIRATION RATE: 18 BRPM

## 2021-06-14 DIAGNOSIS — Z98.89 OTHER SPECIFIED POSTPROCEDURAL STATES: Chronic | ICD-10-CM

## 2021-06-14 LAB
ALBUMIN SERPL ELPH-MCNC: 4 G/DL — SIGNIFICANT CHANGE UP (ref 3.3–5)
ALP SERPL-CCNC: 66 U/L — SIGNIFICANT CHANGE UP (ref 40–120)
ALT FLD-CCNC: 21 U/L — SIGNIFICANT CHANGE UP (ref 10–45)
ANION GAP SERPL CALC-SCNC: 13 MMOL/L — SIGNIFICANT CHANGE UP (ref 5–17)
AST SERPL-CCNC: 29 U/L — SIGNIFICANT CHANGE UP (ref 10–40)
BASOPHILS # BLD AUTO: 0.02 K/UL — SIGNIFICANT CHANGE UP (ref 0–0.2)
BASOPHILS NFR BLD AUTO: 0.3 % — SIGNIFICANT CHANGE UP (ref 0–2)
BILIRUB SERPL-MCNC: 0.6 MG/DL — SIGNIFICANT CHANGE UP (ref 0.2–1.2)
BUN SERPL-MCNC: 16 MG/DL — SIGNIFICANT CHANGE UP (ref 7–23)
CALCIUM SERPL-MCNC: 9.1 MG/DL — SIGNIFICANT CHANGE UP (ref 8.4–10.5)
CHLORIDE SERPL-SCNC: 103 MMOL/L — SIGNIFICANT CHANGE UP (ref 96–108)
CO2 SERPL-SCNC: 21 MMOL/L — LOW (ref 22–31)
CREAT SERPL-MCNC: 1.16 MG/DL — SIGNIFICANT CHANGE UP (ref 0.5–1.3)
EOSINOPHIL # BLD AUTO: 0.1 K/UL — SIGNIFICANT CHANGE UP (ref 0–0.5)
EOSINOPHIL NFR BLD AUTO: 1.5 % — SIGNIFICANT CHANGE UP (ref 0–6)
GLUCOSE SERPL-MCNC: 116 MG/DL — HIGH (ref 70–99)
HCT VFR BLD CALC: 39.8 % — SIGNIFICANT CHANGE UP (ref 39–50)
HGB BLD-MCNC: 13 G/DL — SIGNIFICANT CHANGE UP (ref 13–17)
IMM GRANULOCYTES NFR BLD AUTO: 0.8 % — SIGNIFICANT CHANGE UP (ref 0–1.5)
LYMPHOCYTES # BLD AUTO: 0.52 K/UL — LOW (ref 1–3.3)
LYMPHOCYTES # BLD AUTO: 8 % — LOW (ref 13–44)
MCHC RBC-ENTMCNC: 30.2 PG — SIGNIFICANT CHANGE UP (ref 27–34)
MCHC RBC-ENTMCNC: 32.7 GM/DL — SIGNIFICANT CHANGE UP (ref 32–36)
MCV RBC AUTO: 92.3 FL — SIGNIFICANT CHANGE UP (ref 80–100)
MONOCYTES # BLD AUTO: 0.35 K/UL — SIGNIFICANT CHANGE UP (ref 0–0.9)
MONOCYTES NFR BLD AUTO: 5.4 % — SIGNIFICANT CHANGE UP (ref 2–14)
NEUTROPHILS # BLD AUTO: 5.45 K/UL — SIGNIFICANT CHANGE UP (ref 1.8–7.4)
NEUTROPHILS NFR BLD AUTO: 84 % — HIGH (ref 43–77)
NRBC # BLD: 0 /100 WBCS — SIGNIFICANT CHANGE UP (ref 0–0)
PLATELET # BLD AUTO: 125 K/UL — LOW (ref 150–400)
POTASSIUM SERPL-MCNC: 4.1 MMOL/L — SIGNIFICANT CHANGE UP (ref 3.5–5.3)
POTASSIUM SERPL-SCNC: 4.1 MMOL/L — SIGNIFICANT CHANGE UP (ref 3.5–5.3)
PROT SERPL-MCNC: 6.9 G/DL — SIGNIFICANT CHANGE UP (ref 6–8.3)
RBC # BLD: 4.31 M/UL — SIGNIFICANT CHANGE UP (ref 4.2–5.8)
RBC # FLD: 15.9 % — HIGH (ref 10.3–14.5)
SODIUM SERPL-SCNC: 137 MMOL/L — SIGNIFICANT CHANGE UP (ref 135–145)
WBC # BLD: 6.49 K/UL — SIGNIFICANT CHANGE UP (ref 3.8–10.5)
WBC # FLD AUTO: 6.49 K/UL — SIGNIFICANT CHANGE UP (ref 3.8–10.5)

## 2021-06-14 PROCEDURE — 90471 IMMUNIZATION ADMIN: CPT

## 2021-06-14 PROCEDURE — 80053 COMPREHEN METABOLIC PANEL: CPT

## 2021-06-14 PROCEDURE — 85025 COMPLETE CBC W/AUTO DIFF WBC: CPT

## 2021-06-14 PROCEDURE — 83735 ASSAY OF MAGNESIUM: CPT

## 2021-06-14 PROCEDURE — 84100 ASSAY OF PHOSPHORUS: CPT

## 2021-06-14 PROCEDURE — 70450 CT HEAD/BRAIN W/O DYE: CPT | Mod: 26,MA

## 2021-06-14 PROCEDURE — 12001 RPR S/N/AX/GEN/TRNK 2.5CM/<: CPT

## 2021-06-14 PROCEDURE — 90715 TDAP VACCINE 7 YRS/> IM: CPT

## 2021-06-14 PROCEDURE — 93005 ELECTROCARDIOGRAM TRACING: CPT | Mod: XU

## 2021-06-14 PROCEDURE — 99284 EMERGENCY DEPT VISIT MOD MDM: CPT | Mod: 25

## 2021-06-14 PROCEDURE — 71045 X-RAY EXAM CHEST 1 VIEW: CPT | Mod: 26

## 2021-06-14 PROCEDURE — 99284 EMERGENCY DEPT VISIT MOD MDM: CPT | Mod: GC

## 2021-06-14 PROCEDURE — 70450 CT HEAD/BRAIN W/O DYE: CPT

## 2021-06-14 PROCEDURE — 71045 X-RAY EXAM CHEST 1 VIEW: CPT

## 2021-06-14 RX ORDER — TETANUS TOXOID, REDUCED DIPHTHERIA TOXOID AND ACELLULAR PERTUSSIS VACCINE, ADSORBED 5; 2.5; 8; 8; 2.5 [IU]/.5ML; [IU]/.5ML; UG/.5ML; UG/.5ML; UG/.5ML
0.5 SUSPENSION INTRAMUSCULAR ONCE
Refills: 0 | Status: COMPLETED | OUTPATIENT
Start: 2021-06-14 | End: 2021-06-14

## 2021-06-14 RX ADMIN — TETANUS TOXOID, REDUCED DIPHTHERIA TOXOID AND ACELLULAR PERTUSSIS VACCINE, ADSORBED 0.5 MILLILITER(S): 5; 2.5; 8; 8; 2.5 SUSPENSION INTRAMUSCULAR at 20:02

## 2021-06-14 NOTE — ED ADULT NURSE NOTE - OBJECTIVE STATEMENT
89 yo M with hx of  HTN, DM, HLD, Afib, Dementia, is a&ox4 BIBA from [facility] s/p mechanical fall unwitnessed, pt reports slipping on the floor landing on the R side, small laceration noted to R ear and small skin tear to R forearm. Pt denies LOC, Neck or back pain, No other injuries noted. Pt ambulated to stretcher. VSS on arrival. BEN without difficulty.

## 2021-06-14 NOTE — ED PROVIDER NOTE - IV ALTEPASE ADMIN HIDDEN
HPI:  Right LE edema is continuing to improve.   Neg venous duplex study.   He has been going to [facility] physical therapy for overall strength and balance.   No cardiopulmonary.   No GI or  symptoms.   Blood sugars are stable. No hypos  Meds unchanged.   Pulm follow up 8/23    Patient's medications, allergies, past medical, surgical, social and family histories were reviewed and updated as noted in the chart.    Objective:  Physical Exam   Cardiovascular: Normal rate, regular rhythm, normal heart sounds and intact distal pulses.   No murmur heard.  Pulmonary/Chest: Breath sounds normal. He has no wheezes. He has no rales.   Musculoskeletal: He exhibits edema (  Decreasing right lower extremity edema.  No calf tenderness.).   Vitals reviewed.      Assessment/Plan:  Problem List Items Addressed This Visit     Benign essential hypertension     Acceptable blood pressure control.  No medication changes necessary at this time.         Chronic obstructive airway disease (CMS/HCC)     Symptoms are well controlled.  Pulmonary Med follow-up upcoming.           Gait instability     Continue physical therapy at [facility].         Type 2 diabetes mellitus (CMS/HCC) - Primary     Blood sugars are overall stable.  Recheck A1c in 3 months.         Edema of right lower extremity     Improving.  No DVT.                 Keaton Randall,    show

## 2021-06-14 NOTE — ED PROVIDER NOTE - NSFOLLOWUPINSTRUCTIONS_ED_ALL_ED_FT
You were seen in the Emergency Department for: ear laceration    For pain/fever, you may take Tylenol (acetaminophen) 650 mg every 6 hours    Please apply Bacitracin or Neosporin 2-3 times a day and keep it covered with gauze dressing.    Please follow up with your primary physician in 5-7 days to have your sutures removed.     If you do not have a primary physician or specialist of your needs, please call [phone] to find one convenient for you. At this number you will be able to locate a provider who accepts your insurance, as well as locate the right specialist for your needs.    You should return to the Emergency Department if you feel any new/worsening/persistent symptoms including but not limited to: chest pain, difficulty breathing, loss of consciousness, bleeding, uncontrolled pain, numbness/weakness of a body part

## 2021-06-14 NOTE — ED ADULT NURSE NOTE - PMH
BPH (benign prostatic hypertrophy)    Chronic atrial fibrillation    Dementia    DM (diabetes mellitus)    HLD (hyperlipidemia)    HTN (hypertension)    Insomnia    RBBB    Unsteady gait

## 2021-06-14 NOTE — ED PROVIDER NOTE - NS ED ROS FT
Gen: No fever, no weight loss  CV: No chest pain, no palpitations  HEENT: No sore throat, no hoarseness  Skin: No rash, no color changes  Resp: No SOB, no cough  GI: No constipation, no diarrhea  Msk: No back pain, no LE swelling  : No dysuria, no increased frequency  Neuro: No LOC, no weakness

## 2021-06-14 NOTE — ED ADULT NURSE NOTE - NSIMPLEMENTINTERV_GEN_ALL_ED
Implemented All Fall with Harm Risk Interventions:  [place] to call system. Call bell, personal items and telephone within reach. Instruct patient to call for assistance. Room bathroom lighting operational. Non-slip footwear when patient is off stretcher. Physically safe environment: no spills, clutter or unnecessary equipment. Stretcher in lowest position, wheels locked, appropriate side rails in place. Provide visual cue, wrist band, yellow gown, etc. Monitor gait and stability. Monitor for mental status changes and reorient to person, place, and time. Review medications for side effects contributing to fall risk. Reinforce activity limits and safety measures with patient and family. Provide visual clues: red socks.

## 2021-06-14 NOTE — ED PROVIDER NOTE - OBJECTIVE STATEMENT
89 year old male with history of Afib (?not on AC due to frequency falls), HTN, BPH, DM2, dementia, BIBEMS from [facility] for R ear pain s/p fall. States that he was trying to sit on the bed when he slipped and hit the dresser with his ear, denies hitting his head/LOC. Also complains of mild R arm pain, denies difficulty in moving arm, chest pain, palpitations, N/V, SOB, f/c, dysuria, difficulty ambulating.

## 2021-06-14 NOTE — ED ADULT NURSE NOTE - DISTAL EXTREMITY CAPILLARY REFILL
Impression: Nonexudative macular degeneration, early dry stage, bilateral Plan: Dry Age Related Macular Degeneration - Patient educated regarding findings. Recommend patient continue AREDS2 formula multiple vitamin daily. Observation is all that is indicated at this time. 
RTC in 1 year for CE and MAC OCT 2 seconds or less

## 2021-06-14 NOTE — ED PROCEDURE NOTE - CPROC ED INFORMED CONSENT1
Benefits, risks, and possible complications of procedure explained to patient/caregiver who verbalized understanding and gave verbal consent.
normal...

## 2021-06-14 NOTE — ED PROVIDER NOTE - PHYSICAL EXAMINATION
Gen - NAD; well appearing; A+Ox3   HEENT - NCAT, EOMI, PERRL, ~1cm laceration to R helix, no active bleeding  Neck - supple, no c-spine tenderness, FROM  Resp - CTAB, symmetric breath sounds  CV -  RRR  Abd - soft, NT, ND; no guarding or rebound  Back - no midline tenderness  MSK - 5/5 strength and FROM b/l UE and LE  Extrem - 3+ distal pulses b/L UE and LE  Skin - no rash or bruising, warm and well perfused  Neuro - no focal motor or sensation deficits

## 2021-06-14 NOTE — ED PROVIDER NOTE - PATIENT PORTAL LINK FT
You can access the FollowMyHealth Patient Portal offered by [facility] by registering at the following website: [email or web address]. By joining [employer]’s FollowMyHealth portal, you will also be able to view your health information using other applications (apps) compatible with our system.

## 2021-06-14 NOTE — ED PROVIDER NOTE - CLINICAL SUMMARY MEDICAL DECISION MAKING FREE TEXT BOX
89 year old male with history of Afib (?not on AC due to frequency falls), HTN, BPH, DM2, dementia, BIBEMS from [facility] for R ear pain s/p fall. 89 year old male with history of Afib (?not on AC due to frequency falls), HTN, BPH, DM2, dementia, BIBEMS from [facility] for R ear pain s/p fall.    Ananda: 89 year old male with history of Afib with watchman device, on baby aspirin, was on edge of bed and fell forward and hit rigth ear on ground. history of dementia. history of hyponatremia in the past causing falls. will get labs, ct head, imaging, repair lacreation of right ear, reassess

## 2021-09-26 ENCOUNTER — INPATIENT (INPATIENT)
Facility: HOSPITAL | Age: 86
LOS: 4 days | Discharge: NURSING, REHAB OR LONG-TERM CARE | DRG: 871 | End: 2021-10-01
Attending: INTERNAL MEDICINE | Admitting: INTERNAL MEDICINE
Payer: MEDICARE

## 2021-09-26 VITALS
RESPIRATION RATE: 26 BRPM | WEIGHT: 139.99 LBS | SYSTOLIC BLOOD PRESSURE: 152 MMHG | TEMPERATURE: 98 F | HEART RATE: 105 BPM | HEIGHT: 65 IN | OXYGEN SATURATION: 91 % | DIASTOLIC BLOOD PRESSURE: 82 MMHG

## 2021-09-26 DIAGNOSIS — Z98.89 OTHER SPECIFIED POSTPROCEDURAL STATES: Chronic | ICD-10-CM

## 2021-09-26 DIAGNOSIS — I48.20 CHRONIC ATRIAL FIBRILLATION, UNSPECIFIED: ICD-10-CM

## 2021-09-26 DIAGNOSIS — I10 ESSENTIAL (PRIMARY) HYPERTENSION: ICD-10-CM

## 2021-09-26 DIAGNOSIS — J18.9 PNEUMONIA, UNSPECIFIED ORGANISM: ICD-10-CM

## 2021-09-26 DIAGNOSIS — F03.90 UNSPECIFIED DEMENTIA, UNSPECIFIED SEVERITY, WITHOUT BEHAVIORAL DISTURBANCE, PSYCHOTIC DISTURBANCE, MOOD DISTURBANCE, AND ANXIETY: ICD-10-CM

## 2021-09-26 DIAGNOSIS — E11.9 TYPE 2 DIABETES MELLITUS WITHOUT COMPLICATIONS: ICD-10-CM

## 2021-09-26 DIAGNOSIS — A41.9 SEPSIS, UNSPECIFIED ORGANISM: ICD-10-CM

## 2021-09-26 DIAGNOSIS — N40.0 BENIGN PROSTATIC HYPERPLASIA WITHOUT LOWER URINARY TRACT SYMPTOMS: ICD-10-CM

## 2021-09-26 DIAGNOSIS — J90 PLEURAL EFFUSION, NOT ELSEWHERE CLASSIFIED: ICD-10-CM

## 2021-09-26 LAB
ALBUMIN SERPL ELPH-MCNC: 4.3 G/DL — SIGNIFICANT CHANGE UP (ref 3.3–5)
ALP SERPL-CCNC: 114 U/L — SIGNIFICANT CHANGE UP (ref 40–120)
ALT FLD-CCNC: 19 U/L — SIGNIFICANT CHANGE UP (ref 10–45)
ANION GAP SERPL CALC-SCNC: 15 MMOL/L — SIGNIFICANT CHANGE UP (ref 5–17)
APPEARANCE UR: CLEAR — SIGNIFICANT CHANGE UP
APTT BLD: 30.5 SEC — SIGNIFICANT CHANGE UP (ref 27.5–35.5)
AST SERPL-CCNC: 23 U/L — SIGNIFICANT CHANGE UP (ref 10–40)
BACTERIA # UR AUTO: NEGATIVE — SIGNIFICANT CHANGE UP
BASE EXCESS BLDV CALC-SCNC: -1.4 MMOL/L — SIGNIFICANT CHANGE UP (ref -2–2)
BASOPHILS # BLD AUTO: 0.02 K/UL — SIGNIFICANT CHANGE UP (ref 0–0.2)
BASOPHILS NFR BLD AUTO: 0.2 % — SIGNIFICANT CHANGE UP (ref 0–2)
BILIRUB SERPL-MCNC: 1 MG/DL — SIGNIFICANT CHANGE UP (ref 0.2–1.2)
BILIRUB UR-MCNC: NEGATIVE — SIGNIFICANT CHANGE UP
BUN SERPL-MCNC: 16 MG/DL — SIGNIFICANT CHANGE UP (ref 7–23)
CA-I SERPL-SCNC: 1.19 MMOL/L — SIGNIFICANT CHANGE UP (ref 1.15–1.33)
CALCIUM SERPL-MCNC: 9.2 MG/DL — SIGNIFICANT CHANGE UP (ref 8.4–10.5)
CHLORIDE BLDV-SCNC: 100 MMOL/L — SIGNIFICANT CHANGE UP (ref 96–108)
CHLORIDE SERPL-SCNC: 101 MMOL/L — SIGNIFICANT CHANGE UP (ref 96–108)
CO2 BLDV-SCNC: 26 MMOL/L — SIGNIFICANT CHANGE UP (ref 22–26)
CO2 SERPL-SCNC: 19 MMOL/L — LOW (ref 22–31)
COLOR SPEC: YELLOW — SIGNIFICANT CHANGE UP
CREAT SERPL-MCNC: 0.77 MG/DL — SIGNIFICANT CHANGE UP (ref 0.5–1.3)
DIFF PNL FLD: ABNORMAL
EOSINOPHIL # BLD AUTO: 0.02 K/UL — SIGNIFICANT CHANGE UP (ref 0–0.5)
EOSINOPHIL NFR BLD AUTO: 0.2 % — SIGNIFICANT CHANGE UP (ref 0–6)
EPI CELLS # UR: 1 /HPF — SIGNIFICANT CHANGE UP
GAS PNL BLDV: 135 MMOL/L — LOW (ref 136–145)
GAS PNL BLDV: SIGNIFICANT CHANGE UP
GAS PNL BLDV: SIGNIFICANT CHANGE UP
GLUCOSE BLDC GLUCOMTR-MCNC: 159 MG/DL — HIGH (ref 70–99)
GLUCOSE BLDV-MCNC: 194 MG/DL — HIGH (ref 70–99)
GLUCOSE SERPL-MCNC: 192 MG/DL — HIGH (ref 70–99)
GLUCOSE UR QL: NEGATIVE — SIGNIFICANT CHANGE UP
HCO3 BLDV-SCNC: 25 MMOL/L — SIGNIFICANT CHANGE UP (ref 22–29)
HCT VFR BLD CALC: 44.4 % — SIGNIFICANT CHANGE UP (ref 39–50)
HCT VFR BLDA CALC: 43 % — SIGNIFICANT CHANGE UP (ref 39–51)
HGB BLD CALC-MCNC: 14.3 G/DL — SIGNIFICANT CHANGE UP (ref 12.6–17.4)
HGB BLD-MCNC: 14.1 G/DL — SIGNIFICANT CHANGE UP (ref 13–17)
HYALINE CASTS # UR AUTO: 1 /LPF — SIGNIFICANT CHANGE UP (ref 0–2)
IMM GRANULOCYTES NFR BLD AUTO: 0.4 % — SIGNIFICANT CHANGE UP (ref 0–1.5)
INR BLD: 1.19 RATIO — HIGH (ref 0.88–1.16)
KETONES UR-MCNC: NEGATIVE — SIGNIFICANT CHANGE UP
LACTATE BLDV-MCNC: 2.3 MMOL/L — HIGH (ref 0.7–2)
LEUKOCYTE ESTERASE UR-ACNC: NEGATIVE — SIGNIFICANT CHANGE UP
LYMPHOCYTES # BLD AUTO: 0.24 K/UL — LOW (ref 1–3.3)
LYMPHOCYTES # BLD AUTO: 2.1 % — LOW (ref 13–44)
MCHC RBC-ENTMCNC: 29.3 PG — SIGNIFICANT CHANGE UP (ref 27–34)
MCHC RBC-ENTMCNC: 31.8 GM/DL — LOW (ref 32–36)
MCV RBC AUTO: 92.1 FL — SIGNIFICANT CHANGE UP (ref 80–100)
MONOCYTES # BLD AUTO: 0.55 K/UL — SIGNIFICANT CHANGE UP (ref 0–0.9)
MONOCYTES NFR BLD AUTO: 4.9 % — SIGNIFICANT CHANGE UP (ref 2–14)
NEUTROPHILS # BLD AUTO: 10.33 K/UL — HIGH (ref 1.8–7.4)
NEUTROPHILS NFR BLD AUTO: 92.2 % — HIGH (ref 43–77)
NITRITE UR-MCNC: NEGATIVE — SIGNIFICANT CHANGE UP
NRBC # BLD: 0 /100 WBCS — SIGNIFICANT CHANGE UP (ref 0–0)
PCO2 BLDV: 46 MMHG — SIGNIFICANT CHANGE UP (ref 42–55)
PH BLDV: 7.34 — SIGNIFICANT CHANGE UP (ref 7.32–7.43)
PH UR: 5.5 — SIGNIFICANT CHANGE UP (ref 5–8)
PLATELET # BLD AUTO: 164 K/UL — SIGNIFICANT CHANGE UP (ref 150–400)
PO2 BLDV: 17 MMHG — LOW (ref 25–45)
POTASSIUM BLDV-SCNC: 4.6 MMOL/L — SIGNIFICANT CHANGE UP (ref 3.5–5.1)
POTASSIUM SERPL-MCNC: 4.6 MMOL/L — SIGNIFICANT CHANGE UP (ref 3.5–5.3)
POTASSIUM SERPL-SCNC: 4.6 MMOL/L — SIGNIFICANT CHANGE UP (ref 3.5–5.3)
PROT SERPL-MCNC: 7.9 G/DL — SIGNIFICANT CHANGE UP (ref 6–8.3)
PROT UR-MCNC: ABNORMAL
PROTHROM AB SERPL-ACNC: 14.2 SEC — HIGH (ref 10.6–13.6)
RAPID RVP RESULT: SIGNIFICANT CHANGE UP
RBC # BLD: 4.82 M/UL — SIGNIFICANT CHANGE UP (ref 4.2–5.8)
RBC # FLD: 13.9 % — SIGNIFICANT CHANGE UP (ref 10.3–14.5)
RBC CASTS # UR COMP ASSIST: 86 /HPF — HIGH (ref 0–4)
SAO2 % BLDV: 20.4 % — LOW (ref 67–88)
SARS-COV-2 RNA SPEC QL NAA+PROBE: SIGNIFICANT CHANGE UP
SODIUM SERPL-SCNC: 135 MMOL/L — SIGNIFICANT CHANGE UP (ref 135–145)
SP GR SPEC: 1.02 — SIGNIFICANT CHANGE UP (ref 1.01–1.02)
UROBILINOGEN FLD QL: NEGATIVE — SIGNIFICANT CHANGE UP
WBC # BLD: 11.21 K/UL — HIGH (ref 3.8–10.5)
WBC # FLD AUTO: 11.21 K/UL — HIGH (ref 3.8–10.5)
WBC UR QL: 1 /HPF — SIGNIFICANT CHANGE UP (ref 0–5)

## 2021-09-26 PROCEDURE — 71045 X-RAY EXAM CHEST 1 VIEW: CPT | Mod: 26

## 2021-09-26 PROCEDURE — 74177 CT ABD & PELVIS W/CONTRAST: CPT | Mod: 26,MA

## 2021-09-26 PROCEDURE — 71260 CT THORAX DX C+: CPT | Mod: 26,MA

## 2021-09-26 PROCEDURE — 99284 EMERGENCY DEPT VISIT MOD MDM: CPT | Mod: CS

## 2021-09-26 RX ORDER — FINASTERIDE 5 MG/1
5 TABLET, FILM COATED ORAL DAILY
Refills: 0 | Status: DISCONTINUED | OUTPATIENT
Start: 2021-09-26 | End: 2021-10-01

## 2021-09-26 RX ORDER — DEXTROSE 50 % IN WATER 50 %
15 SYRINGE (ML) INTRAVENOUS ONCE
Refills: 0 | Status: DISCONTINUED | OUTPATIENT
Start: 2021-09-26 | End: 2021-10-01

## 2021-09-26 RX ORDER — DEXTROSE 50 % IN WATER 50 %
25 SYRINGE (ML) INTRAVENOUS ONCE
Refills: 0 | Status: DISCONTINUED | OUTPATIENT
Start: 2021-09-26 | End: 2021-10-01

## 2021-09-26 RX ORDER — MIRTAZAPINE 45 MG/1
7.5 TABLET, ORALLY DISINTEGRATING ORAL DAILY
Refills: 0 | Status: DISCONTINUED | OUTPATIENT
Start: 2021-09-26 | End: 2021-10-01

## 2021-09-26 RX ORDER — SODIUM CHLORIDE 9 MG/ML
1000 INJECTION, SOLUTION INTRAVENOUS
Refills: 0 | Status: DISCONTINUED | OUTPATIENT
Start: 2021-09-26 | End: 2021-10-01

## 2021-09-26 RX ORDER — QUETIAPINE FUMARATE 200 MG/1
12.5 TABLET, FILM COATED ORAL AT BEDTIME
Refills: 0 | Status: DISCONTINUED | OUTPATIENT
Start: 2021-09-26 | End: 2021-10-01

## 2021-09-26 RX ORDER — BUMETANIDE 0.25 MG/ML
1 INJECTION INTRAMUSCULAR; INTRAVENOUS
Qty: 0 | Refills: 0 | DISCHARGE

## 2021-09-26 RX ORDER — DEXTROSE 50 % IN WATER 50 %
12.5 SYRINGE (ML) INTRAVENOUS ONCE
Refills: 0 | Status: DISCONTINUED | OUTPATIENT
Start: 2021-09-26 | End: 2021-10-01

## 2021-09-26 RX ORDER — AZITHROMYCIN 500 MG/1
500 TABLET, FILM COATED ORAL EVERY 24 HOURS
Refills: 0 | Status: DISCONTINUED | OUTPATIENT
Start: 2021-09-26 | End: 2021-09-29

## 2021-09-26 RX ORDER — DONEPEZIL HYDROCHLORIDE 10 MG/1
5 TABLET, FILM COATED ORAL DAILY
Refills: 0 | Status: DISCONTINUED | OUTPATIENT
Start: 2021-09-26 | End: 2021-10-01

## 2021-09-26 RX ORDER — ACETAMINOPHEN 500 MG
1000 TABLET ORAL ONCE
Refills: 0 | Status: COMPLETED | OUTPATIENT
Start: 2021-09-26 | End: 2021-09-26

## 2021-09-26 RX ORDER — ENOXAPARIN SODIUM 100 MG/ML
40 INJECTION SUBCUTANEOUS DAILY
Refills: 0 | Status: DISCONTINUED | OUTPATIENT
Start: 2021-09-26 | End: 2021-09-27

## 2021-09-26 RX ORDER — CEFTRIAXONE 500 MG/1
1000 INJECTION, POWDER, FOR SOLUTION INTRAMUSCULAR; INTRAVENOUS ONCE
Refills: 0 | Status: COMPLETED | OUTPATIENT
Start: 2021-09-26 | End: 2021-09-26

## 2021-09-26 RX ORDER — LANOLIN ALCOHOL/MO/W.PET/CERES
3 CREAM (GRAM) TOPICAL AT BEDTIME
Refills: 0 | Status: DISCONTINUED | OUTPATIENT
Start: 2021-09-26 | End: 2021-10-01

## 2021-09-26 RX ORDER — METOPROLOL TARTRATE 50 MG
25 TABLET ORAL DAILY
Refills: 0 | Status: DISCONTINUED | OUTPATIENT
Start: 2021-09-26 | End: 2021-10-01

## 2021-09-26 RX ORDER — THIAMINE MONONITRATE (VIT B1) 100 MG
100 TABLET ORAL DAILY
Refills: 0 | Status: DISCONTINUED | OUTPATIENT
Start: 2021-09-26 | End: 2021-10-01

## 2021-09-26 RX ORDER — CEFTRIAXONE 500 MG/1
1000 INJECTION, POWDER, FOR SOLUTION INTRAMUSCULAR; INTRAVENOUS EVERY 24 HOURS
Refills: 0 | Status: DISCONTINUED | OUTPATIENT
Start: 2021-09-26 | End: 2021-09-27

## 2021-09-26 RX ORDER — INSULIN LISPRO 100/ML
VIAL (ML) SUBCUTANEOUS AT BEDTIME
Refills: 0 | Status: DISCONTINUED | OUTPATIENT
Start: 2021-09-26 | End: 2021-10-01

## 2021-09-26 RX ORDER — BUMETANIDE 0.25 MG/ML
0.5 INJECTION INTRAMUSCULAR; INTRAVENOUS
Refills: 0 | Status: DISCONTINUED | OUTPATIENT
Start: 2021-09-26 | End: 2021-10-01

## 2021-09-26 RX ORDER — SODIUM CHLORIDE 9 MG/ML
500 INJECTION INTRAMUSCULAR; INTRAVENOUS; SUBCUTANEOUS ONCE
Refills: 0 | Status: COMPLETED | OUTPATIENT
Start: 2021-09-26 | End: 2021-09-26

## 2021-09-26 RX ORDER — GLUCAGON INJECTION, SOLUTION 0.5 MG/.1ML
1 INJECTION, SOLUTION SUBCUTANEOUS ONCE
Refills: 0 | Status: DISCONTINUED | OUTPATIENT
Start: 2021-09-26 | End: 2021-10-01

## 2021-09-26 RX ORDER — MEMANTINE HYDROCHLORIDE 10 MG/1
10 TABLET ORAL
Refills: 0 | Status: DISCONTINUED | OUTPATIENT
Start: 2021-09-26 | End: 2021-10-01

## 2021-09-26 RX ORDER — INSULIN LISPRO 100/ML
VIAL (ML) SUBCUTANEOUS
Refills: 0 | Status: DISCONTINUED | OUTPATIENT
Start: 2021-09-26 | End: 2021-10-01

## 2021-09-26 RX ORDER — ASPIRIN/CALCIUM CARB/MAGNESIUM 324 MG
81 TABLET ORAL DAILY
Refills: 0 | Status: DISCONTINUED | OUTPATIENT
Start: 2021-09-26 | End: 2021-09-29

## 2021-09-26 RX ORDER — AZITHROMYCIN 500 MG/1
500 TABLET, FILM COATED ORAL ONCE
Refills: 0 | Status: COMPLETED | OUTPATIENT
Start: 2021-09-26 | End: 2021-09-26

## 2021-09-26 RX ADMIN — Medication 400 MILLIGRAM(S): at 19:55

## 2021-09-26 RX ADMIN — AZITHROMYCIN 250 MILLIGRAM(S): 500 TABLET, FILM COATED ORAL at 21:05

## 2021-09-26 RX ADMIN — CEFTRIAXONE 100 MILLIGRAM(S): 500 INJECTION, POWDER, FOR SOLUTION INTRAMUSCULAR; INTRAVENOUS at 18:58

## 2021-09-26 RX ADMIN — SODIUM CHLORIDE 500 MILLILITER(S): 9 INJECTION INTRAMUSCULAR; INTRAVENOUS; SUBCUTANEOUS at 13:51

## 2021-09-26 NOTE — ED ADULT NURSE NOTE - OBJECTIVE STATEMENT
89 y.o. male coming in from the [facility] via EMS for SOB, chills, shaking, weakness, grinding teeth. staff at [facility] states that he was not feeling well this morning when he got up and called his daughter. when the daughter got there she said that he wasn't at his baseline so they sent him to the hospital. PMH of HTN, DM, dementia. A&Ox1 to person, lung sounds clear bilaterally, no lower extremity edema, skin is dry and intact, no abdominal tenderness on palpation, bowel sounds present.

## 2021-09-26 NOTE — ED PROVIDER NOTE - OBJECTIVE STATEMENT
89yo male pt with PMHx of Afib (no AC), HTN, BPH, DM2, Dementia was brought to ED by his daughter (Alexia) from [facility] ([phone]) c/o feeling not well, chills, shaking, and SOB this morning. Pt's poor historian (A&Ox1, hard hearing). As per the daughter, she got a phone call and was informed pt's not well. When she went to [facility] she noticed pt's generalized weak, shaking, shivering, grinding teeth, difficult ambulation with SOB. Pt normally walks with his walker but he was not able to ambulate today.

## 2021-09-26 NOTE — H&P ADULT - ASSESSMENT
89yo male pt with PMHx of Afib (no AC), HTN, BPH, DM2, Dementia was brought to ED by his daughter (Alexia) from [facility] ([phone]) c/o feeling not well, chills, shaking, and SOB this morning. Pt's poor historian (A&Ox1, hard hearing). As per the daughter, she got a phone call and was informed pt's not well. When she went to [facility] she noticed pt's generalized weak, shaking, shivering, grinding teeth, difficult ambulation with SOB. Pt normally walks with his walker but he was not able to ambulate today.

## 2021-09-26 NOTE — ED ADULT TRIAGE NOTE - CHIEF COMPLAINT QUOTE
SOB and weakness x1 week from [facility]. Vaccinated but does not know when or what vaccine was received.

## 2021-09-26 NOTE — ED PROVIDER NOTE - PHYSICAL EXAMINATION
NAD, A&Ox1, Tachycardia, SpO2-91with RA. BT- 99.2orally and 99.5 rectally. Diminished b/l lung sounds. ABD generalized tender. + Move all extremitatis 5/5strenghth.

## 2021-09-26 NOTE — ED ADULT NURSE NOTE - NSICDXPASTMEDICALHX_GEN_ALL_CORE_FT
PAST MEDICAL HISTORY:  BPH (benign prostatic hypertrophy)     Chronic atrial fibrillation     Dementia     DM (diabetes mellitus)     HLD (hyperlipidemia)     HTN (hypertension)     Insomnia     RBBB     Unsteady gait

## 2021-09-26 NOTE — H&P ADULT - PROBLEM SELECTOR PLAN 1
Present on admission as has Fever, tachycardia and Leucocytosis . S/P cultures and starting IV Abxs. hemodynamically stable.

## 2021-09-26 NOTE — ED ADULT NURSE REASSESSMENT NOTE - NS ED NURSE REASSESS COMMENT FT1
straight cath completed, 2 RNs present, sterile technique utilized, aseptic equipment, tolerated well, 250 mL drained immediately upon insertion, urine appeared clear and blood tinged straight cath completed, 2 RNs present, sterile technique utilized, aseptic equipment, tolerated well, 250 mL drained immediately upon insertion, urine appeared clear and blood tinged. ED attending and resident at bedside and witnessed hematuria

## 2021-09-26 NOTE — ED PROVIDER NOTE - PROGRESS NOTE DETAILS
Spoked to RN, Cassie, in [facility] and informed that pt c/o feeling not well this morning with shivering, fatigue with SOB. Pt's A&Ox1 (baseline) and denies fall or N/V/D. Straight cath completed by 2 RNs present; 250 mL drained immediately upon insertion, urine appeared clear beginning and blood tinged the end. Pt went to CT.

## 2021-09-26 NOTE — ED PROVIDER NOTE - CLINICAL SUMMARY MEDICAL DECISION MAKING FREE TEXT BOX
89yo male PMH Afib (no AC), HTN, BPH, DM2, Dementia was brought to ED by his daughter c/o feeling not well, chills, shaking, and SOB this morning. Pt hypoxic on arriva, improved with NC, patient in NAD, poor historian but able to speak in full sentences, diminished ;vishal sounds left base, heart irregular, abd with milkd suprapubic ttp.  concen for pna, louise; etiology, but intraabdominal process also possible given ttp.  plan for labs, ua, cxr CTs and admission.  - Winifred Brewer DO

## 2021-09-26 NOTE — ED ADULT NURSE REASSESSMENT NOTE - NS ED NURSE REASSESS COMMENT FT1
Received report from SAMANTHA Little. Pt is awake and alert, resting comfortably in stretcher, speaking in full coherent sentences. Pt denies CP, SOB, fevers, chills, N/V/D, HA, vision changes. Pt admitted, awaiting bed assignment. Pt remains on 1:1 for safety. Call bell within reach, comfort & safety provided.

## 2021-09-26 NOTE — ED ADULT NURSE REASSESSMENT NOTE - NS ED NURSE REASSESS COMMENT FT1
blood noticed on pt gown, diaper and abram. Admitting NP notified and requested to assess pt. Pt at baseline mental status, V/S stable at this time.

## 2021-09-26 NOTE — CHART NOTE - NSCHARTNOTEFT_GEN_A_CORE
Called to see patient due to bleeding noted in diaper. RN unsure if patient is bleeding rectally or from penis  Patient seen and evaluated at bedside, alert x1, poor historian, in no acute distress.  Full exam completed and patient noted with hematuria with tiny fragment of clots.    Vital Signs Last 24 Hrs  T(C): 37.2 (26 Sep 2021 23:20), Max: 38.1 (26 Sep 2021 18:51)  T(F): 98.9 (26 Sep 2021 23:20), Max: 100.5 (26 Sep 2021 18:51)  HR: 75 (26 Sep 2021 23:20) (75 - 105)  BP: 98/72 (26 Sep 2021 23:20) (98/72 - 152/82)  BP(mean): 80 (26 Sep 2021 23:20) (80 - 86)  RR: 20 (26 Sep 2021 23:20) (16 - 26)  SpO2: 96% (26 Sep 2021 23:20) (91% - 97%)    Assessment/Plan     89yo male pt with PMHx of Afib (no AC), HTN, BPH, DM2, Dementia was brought to ED by his daughter (Alexia) from [facility] ([phone]) c/o feeling not well, chills, shaking, and SOB this morning. Patient admitted  with Left lobe Pneumonia . started  on Azithromycin and ceftriaxone . Now presents with Hematuria    Impression: Hematuria   > Stat CBC  > Urinalysis  > Bladder scan now  > Urology Consult   > Monitor Vitals sign Q 4 hrs   > Cont Finasteride   > F/u with Attending in AM    Arnold Flores ANP-C  Department of Medicine  [id number] Called to see patient due to bleeding noted in diaper. RN unsure if patient is bleeding rectally or from penis  Patient seen and evaluated at bedside, alert x1, poor historian, in no acute distress.  Full exam completed and patient noted with hematuria with tiny fragment of clots.    Vital Signs Last 24 Hrs  T(C): 37.2 (26 Sep 2021 23:20), Max: 38.1 (26 Sep 2021 18:51)  T(F): 98.9 (26 Sep 2021 23:20), Max: 100.5 (26 Sep 2021 18:51)  HR: 75 (26 Sep 2021 23:20) (75 - 105)  BP: 98/72 (26 Sep 2021 23:20) (98/72 - 152/82)  BP(mean): 80 (26 Sep 2021 23:20) (80 - 86)  RR: 20 (26 Sep 2021 23:20) (16 - 26)  SpO2: 96% (26 Sep 2021 23:20) (91% - 97%)    Physical Exam  General: Alert x1 , in no acute distress   Respiratory    :  normal; airway patent; breath sounds equal; good air movement; respirations non-labored  Cardiovascular : regular rate and rhythm  no rub  no murmur  normal PMI  ·Gastrointestinal  : normal; soft; nontender; no distention; no masses palpable; bowel sounds normal  ·Extremities:	      Assessment/Plan     89yo male pt with PMHx of Afib (no AC), HTN, BPH, DM2, Dementia was brought to ED by his daughter (Alexia) from [facility] ([phone]) c/o feeling not well, chills, shaking, and SOB this morning. Patient admitted  with Left lobe Pneumonia . started  on Azithromycin and ceftriaxone . Now presents with Hematuria    Impression: Hematuria   > Stat CBC  > Urinalysis  > Bladder scan now  > Urology Consult   > Monitor Vitals sign Q 4 hrs   > Cont Finasteride   > F/u with Attending in [facility]  Department of Medicine  [id number] Called to see patient due to bleeding noted in diaper. RN unsure if patient is bleeding rectally or from penis  Patient seen and evaluated at bedside, alert x1, poor historian, in no acute distress.  Full exam completed and patient noted with hematuria with tiny fragment of clots.    Vital Signs Last 24 Hrs  T(C): 37.2 (26 Sep 2021 23:20), Max: 38.1 (26 Sep 2021 18:51)  T(F): 98.9 (26 Sep 2021 23:20), Max: 100.5 (26 Sep 2021 18:51)  HR: 75 (26 Sep 2021 23:20) (75 - 105)  BP: 98/72 (26 Sep 2021 23:20) (98/72 - 152/82)  BP(mean): 80 (26 Sep 2021 23:20) (80 - 86)  RR: 20 (26 Sep 2021 23:20) (16 - 26)  SpO2: 96% (26 Sep 2021 23:20) (91% - 97%)    Physical Exam  General:    Alert x1 , in no acute distress   Lungs:    airway patent; breath sounds equal; good air movement; respirations non-labored  Cardiac : regular rate and rhythm  no rub  no murmur   Abdomen :  soft; nontender; no distention; no masses palpable; bowel sounds normal  ·Extremities:   no clubbing; no cyanosis; no pedal edema    Assessment/Plan     89yo male pt with PMHx of Afib (no AC), HTN, BPH, DM2, Dementia was brought to ED by his daughter (Alexia) from [facility] ([phone]) c/o feeling not well, chills, shaking, and SOB this morning. Patient admitted  with Left lobe Pneumonia . started  on Azithromycin and ceftriaxone . Now presents with Hematuria    Impression: Hematuria   > Stat CBC  > Urinalysis  > Bladder scan now  > Urology Consult   > Monitor Vitals sign Q 4 hrs   > Cont Finasteride   > F/u with Attending in AM    Arnold Flores ANP-C  Department of Medicine  [id number] Called to see patient due to bleeding noted in diaper. RN unsure if patient is bleeding rectally or from penis  Patient seen and evaluated at bedside, alert x1, poor historian, in no acute distress.  Full exam completed and patient noted with hematuria with tiny fragment of clots.    Vital Signs Last 24 Hrs  T(C): 37.2 (26 Sep 2021 23:20), Max: 38.1 (26 Sep 2021 18:51)  T(F): 98.9 (26 Sep 2021 23:20), Max: 100.5 (26 Sep 2021 18:51)  HR: 75 (26 Sep 2021 23:20) (75 - 105)  BP: 98/72 (26 Sep 2021 23:20) (98/72 - 152/82)  BP(mean): 80 (26 Sep 2021 23:20) (80 - 86)  RR: 20 (26 Sep 2021 23:20) (16 - 26)  SpO2: 96% (26 Sep 2021 23:20) (91% - 97%)    Physical Exam  General:    Alert x1 , in no acute distress   Lungs:    airway patent; breath sounds equal; good air movement; respirations non-labored  Cardiac : regular rate and rhythm  no rub  no murmur   Abdomen :  soft; nontender; no distention; no masses palpable; bowel sounds normal  ·Extremities:   no clubbing; no cyanosis; no pedal edema    Assessment/Plan     89yo male pt with PMHx of Afib (no AC), HTN, BPH, DM2, Dementia was brought to ED by his daughter (Alexia) from [facility] ([phone]) c/o feeling not well, chills, shaking, and SOB this morning.   Patient admitted with Left lobe Pneumonia , started  on Azithromycin and ceftriaxone . Now presents with Hematuria    Impression: Hematuria   > Stat CBC  > Urinalysis  > Bladder scan now  > Urology Consult   > Monitor Vitals sign Q 4 hrs   > Cont Finasteride   > F/u with Attending in AM    Arnold Flores ANP-C  Department of Medicine  [id number] Called to see patient due to bleeding noted in diaper. RN unsure if patient is bleeding rectally or from penis  Patient seen and evaluated at bedside, alert x1, poor historian, in no acute distress.  Full exam completed and patient noted with hematuria with tiny fragment of clots.    Vital Signs Last 24 Hrs  T(C): 37.2 (26 Sep 2021 23:20), Max: 38.1 (26 Sep 2021 18:51)  T(F): 98.9 (26 Sep 2021 23:20), Max: 100.5 (26 Sep 2021 18:51)  HR: 75 (26 Sep 2021 23:20) (75 - 105)  BP: 98/72 (26 Sep 2021 23:20) (98/72 - 152/82)  BP(mean): 80 (26 Sep 2021 23:20) (80 - 86)  RR: 20 (26 Sep 2021 23:20) (16 - 26)  SpO2: 96% (26 Sep 2021 23:20) (91% - 97%)    Physical Exam  General:    Alert x1 , in no acute distress   Lungs:    airway patent; breath sounds equal; good air movement; respirations non-labored  Cardiac : regular rate and rhythm  no rub  no murmur   Abdomen :  soft; nontender; no distention; no masses palpable; bowel sounds normal  ·Extremities:   no clubbing; no cyanosis; no pedal edema    Assessment/Plan     89yo male pt with PMHx of Afib (no AC), HTN, BPH, DM2, Dementia was brought to ED by his daughter (Alexia) from [facility] ([phone]) c/o feeling not well, chills, shaking, and SOB this morning.   Patient admitted with Left lobe Pneumonia , started  on Azithromycin and ceftriaxone . Now presents with Hematuria    Impression: Hematuria   > Stat CBC  > Urinalysis, urine Culture   > Bladder scan now  > Urology Consult   > Monitor Vitals sign Q 4 hrs   > Encourage PO hydration  > Cont Finasteride   > F/u with Attending in AM    Arnold Flores ANP-C  Department of Medicine  [id number] Called to see patient due to bleeding noted in diaper. RN unsure if patient is bleeding rectally or from penis  Patient seen and evaluated at bedside, alert x1, poor historian, in no acute distress.  Full exam completed and patient noted with hematuria with tiny fragment of clots.    Vital Signs Last 24 Hrs  T(C): 37.2 (26 Sep 2021 23:20), Max: 38.1 (26 Sep 2021 18:51)  T(F): 98.9 (26 Sep 2021 23:20), Max: 100.5 (26 Sep 2021 18:51)  HR: 75 (26 Sep 2021 23:20) (75 - 105)  BP: 98/72 (26 Sep 2021 23:20) (98/72 - 152/82)  BP(mean): 80 (26 Sep 2021 23:20) (80 - 86)  RR: 20 (26 Sep 2021 23:20) (16 - 26)  SpO2: 96% (26 Sep 2021 23:20) (91% - 97%)    Physical Exam  General:    Alert x1 , in no acute distress   Lungs:    airway patent; breath sounds equal; good air movement; respirations non-labored  Cardiac : regular rate and rhythm  no rub  no murmur   Abdomen :  soft; nontender; no distention; no masses palpable; bowel sounds normal  ·Extremities:   no clubbing; no cyanosis; no pedal edema    Assessment/Plan     89yo male pt with PMHx of Afib (no AC), HTN, BPH, DM2, Dementia was brought to ED by his daughter (Alexia) from [facility] ([phone]) c/o feeling not well, chills, shaking, and SOB this morning.   Patient admitted with Left lobe Pneumonia , started  on Azithromycin and ceftriaxone . Now presents with Hematuria    Impression: Hematuria   > Stat CBC  > Urinalysis, urine Culture   > Bladder scan now  > Urology Consult now   > Monitor Vitals sign Q 4 hrs   > Encourage PO hydration  > Condom Catheter for strict output measurement   > Cont Finasteride   > F/u with Attending in AM    Arnold Flores ANP-C  Department of Medicine  [id number]

## 2021-09-26 NOTE — CONSULT NOTE ADULT - ASSESSMENT
89yMale with PMHx of Afib (no AC), HTN, BPH, DM2, Dementia was brought to ED by his daughter (Alexia) from [facility] ([phone]) c/o feeling not well, chills, shaking, and SOB this morning and admitted to medicine after found to have pneumonia. In ED: patient with a white count of 11.21, Crit 44.4, Cr 0.77. UA with large blood, RBC 86, color on UA yellow. Repeat UA at bedside red urine no clots, bladder scan done at most being 210cc.     - please place a condom cath to better monitor patient's I&O's and monitor urine color   - no CBI or collazo needed at this time since patient is adequately emptying his bladder   - encourage increased hydration to help clear urine   - follow up urine culture and repeat UA   - if patient starts to start retaining large amounts of urine, abdomen becomes distended or patient becomes uncomfortable or urine color worsens with clots please page urology at [phone]    FINAL PLAN PENDING  89yMale with PMHx of Afib (no AC), HTN, BPH, DM2, Dementia was brought to ED by his daughter (Alexia) from [facility] ([phone]) c/o feeling not well, chills, shaking, and SOB this morning and admitted to medicine after found to have pneumonia. In ED: patient with a white count of 11.21, Crit 44.4, Cr 0.77. UA with large blood, RBC 86, color on UA yellow. Repeat UA at bedside red urine no clots, bladder scan done at most being 210cc.     - please place a condom cath to better monitor patient's I&O's and monitor urine color   - no CBI or collazo needed at this time since patient is adequately emptying his bladder, recent   - encourage increased hydration to help clear urine   - follow up urine culture and repeat UA   - if patient starts to start retaining large amounts of urine, abdomen becomes distended or patient becomes uncomfortable or urine color worsens with clots please page urology at [phone]    case discussed with Dr. Laureano

## 2021-09-26 NOTE — H&P ADULT - HISTORY OF PRESENT ILLNESS
89yo male pt with PMHx of Afib (no AC), HTN, BPH, DM2, Dementia was brought to ED by his daughter (Alexia) from [facility] ([phone]) c/o feeling not well, chills, shaking, and SOB this morning. Pt's poor historian (A&Ox1, hard hearing). As per the daughter, she got a phone call and was informed pt's not well. When she went to [facility] she noticed pt's generalized weak, shaking, shivering, grinding teeth, difficult ambulation with SOB. Pt normally walks with his walker but he was not able to ambulate today.

## 2021-09-26 NOTE — CONSULT NOTE ADULT - SUBJECTIVE AND OBJECTIVE BOX
89yMale with PMHx of Afib (no AC), HTN, BPH, DM2, Dementia was brought to ED by his daughter (Alexia) from [facility] ([phone]) c/o feeling not well, chills, shaking, and SOB this morning and admitted to medicine after found to have pneumonia. Pt's poor historian (A&Ox1, hard hearing). Consulted for hematuria with clots.  Pt unable to provide history and attempted to call assisted living facility for any collateral on prior episodes of hematuria and they state nurse left and MA unable to access nurses notes. As per nurse patient was drenched in red urine and she noticed a few clots after which admitting team and came and straight catheterized patient to check if bleeding was in urine or rectum and removed catheter right away after getting return of blood. As per PCA, pt was extremely combative during attempt to straight catheterize.     In ED: patient with a white count of 11.21, Crit 44.4, Cr 0.77. UA with large blood, RBC 86, color on UA yellow.   Repeat UA at bedside red urine without clots, bladder scan done at most being 210cc.     PAST MEDICAL & SURGICAL HISTORY:  HTN (hypertension)    HLD (hyperlipidemia)    DM (diabetes mellitus)    BPH (benign prostatic hypertrophy)    Insomnia    RBBB    Chronic atrial fibrillation    Dementia    Unsteady gait    S/P shoulder surgery    S/P knee surgery        MEDICATIONS  (STANDING):  aspirin enteric coated 81 milliGRAM(s) Oral daily  azithromycin  IVPB 500 milliGRAM(s) IV Intermittent every 24 hours  buMETAnide 0.5 milliGRAM(s) Oral <User Schedule>  cefTRIAXone   IVPB 1000 milliGRAM(s) IV Intermittent every 24 hours  dextrose 40% Gel 15 Gram(s) Oral once  dextrose 5%. 1000 milliLiter(s) (50 mL/Hr) IV Continuous <Continuous>  dextrose 5%. 1000 milliLiter(s) (100 mL/Hr) IV Continuous <Continuous>  dextrose 50% Injectable 25 Gram(s) IV Push once  dextrose 50% Injectable 12.5 Gram(s) IV Push once  dextrose 50% Injectable 25 Gram(s) IV Push once  donepezil 5 milliGRAM(s) Oral daily  enoxaparin Injectable 40 milliGRAM(s) SubCutaneous daily  finasteride 5 milliGRAM(s) Oral daily  glucagon  Injectable 1 milliGRAM(s) IntraMuscular once  insulin lispro (ADMELOG) corrective regimen sliding scale   SubCutaneous three times a day before meals  insulin lispro (ADMELOG) corrective regimen sliding scale   SubCutaneous at bedtime  melatonin 3 milliGRAM(s) Oral at bedtime  memantine 10 milliGRAM(s) Oral two times a day  metoprolol succinate ER 25 milliGRAM(s) Oral daily  mirtazapine 7.5 milliGRAM(s) Oral daily  QUEtiapine 12.5 milliGRAM(s) Oral at bedtime  thiamine 100 milliGRAM(s) Oral daily    MEDICATIONS  (PRN):      FAMILY HISTORY:  Family history of diabetes mellitus        Allergies    No Known Allergies    Intolerances        SOCIAL HISTORY:    REVIEW OF SYSTEMS: Otherwise negative as stated in HPI    Physical Exam  Vital signs  T(C): 37.2 (21 @ 23:20), Max: 38.1 (21 @ 18:51)  HR: 75 (21 @ 23:20)  BP: 98/72 (21 @ 23:20)  SpO2: 96% (21 @ 23:20)  Wt(kg): --      Gen:  AWAKE ALERT NAD AXOX1    Pulm:  NO RESP DISTRESS    GI:  SOFT NT/ND,     : Incontinent voids, 20cc urine at bedside red urine without clots                             	      LABS:       @ 13:41    WBC 11.21 / Hct 44.4  / SCr 0.77         135  |  101  |  16  ----------------------------<  192<H>  4.6   |  19<L>  |  0.77    Ca    9.2      26 Sep 2021 13:41    TPro  7.9  /  Alb  4.3  /  TBili  1.0  /  DBili  x   /  AST  23  /  ALT  19  /  AlkPhos  114      PT/INR - ( 26 Sep 2021 13:41 )   PT: 14.2 sec;   INR: 1.19 ratio         PTT - ( 26 Sep 2021 13:41 )  PTT:30.5 sec  Urinalysis Basic - ( 26 Sep 2021 14:15 )    Color: Yellow / Appearance: Clear / S.025 / pH: x  Gluc: x / Ketone: Negative  / Bili: Negative / Urobili: Negative   Blood: x / Protein: 30 mg/dL / Nitrite: Negative   Leuk Esterase: Negative / RBC: 86 /hpf / WBC 1 /HPF   Sq Epi: x / Non Sq Epi: 1 /hpf / Bacteria: Negative        Urine Cx: pending   Blood Cx: pending     RADIOLOGY:  < from: CT Abdomen and Pelvis w/ IV Cont (21 @ 17:53) >    EXAM:  CT CHEST IC                          EXAM:  CT ABDOMEN AND PELVIS IC                            PROCEDURE DATE:  2021            INTERPRETATION:  CLINICAL INFORMATION: Abdominal pain, fever and chills, shortness of breath, fatigue.    COMPARISON: Pelvic CT dated 10/7/2019.    CONTRAST/COMPLICATIONS:  IV Contrast: Omnipaque 350  90 cc administered   10 cc discarded  Oral Contrast: NONE  Complications: None reported at time of study completion    PROCEDURE:  CT of the Chest, Abdomen and Pelvis was performed.  Sagittal and coronal reformats were performed.    FINDINGS:  CHEST:  LUNGS AND LARGE AIRWAYS: Patent central airways. Left upper lobe alveolar opacity. Bilateral lower lobe dependent atelectasis.  PLEURA: Bilateral pleural effusions, large on the left and small to moderate on the right.  VESSELS: Atheromatous calcifications of the thoracic aorta without aneurysmal dilatation. Coronary artery calcifications.  HEART: Heart size is normal. Transvenous pacemaker. Status postTAVR. Mitral annular calcification. Watchman device. No pericardial effusion.  MEDIASTINUM AND YONY: No lymphadenopathy.  CHEST WALL AND LOWER NECK: Mildly enlarged right supraclavicular lymph nodes.    ABDOMEN AND PELVIS:  LIVER: Within normal limits.  BILE DUCTS: Normal caliber.  GALLBLADDER: Small gallstones.  SPLEEN: 1 cm splenule.  PANCREAS: Within normal limits.  ADRENALS: Within normal limits.  KIDNEYS/URETERS: Within normal limits.    BLADDER: Incompletely distended. Mild mural thickening.  REPRODUCTIVE ORGANS: Enlarged prostate.    BOWEL: Colonic diverticulosis. No diverticulitis. No bowel obstruction. Small direct esophageal hiatal hernia. Appendix not visualized.  PERITONEUM: No ascites.  VESSELS: Atheromatous calcifications.  RETROPERITONEUM/LYMPH NODES: No lymphadenopathy.  ABDOMINAL WALL: Within normal limits.  BONES: Dextroscoliosis of thoracolumbar spine with accompanying degenerative changes.    IMPRESSION:  Left upper lobe pneumonia.    Bilateral pleural effusions.        --- End of Report ---            YENIFER HERNANDEZ MD; Attending Radiologist  This document has been electronically signed. Sep 26 2021  6:21PM    < end of copied text >     89yMale with PMHx of Afib (no AC), HTN, BPH, DM2, Dementia was brought to ED by his daughter (Alexia) from [facility] ([phone]) c/o feeling not well, chills, shaking, and SOB this morning and admitted to medicine after found to have pneumonia. Pt's poor historian (A&Ox1, hard hearing). Consulted for hematuria with clots.  Pt unable to provide history and attempted to call assisted living facility for any collateral on prior episodes of hematuria and they state nurse left and MA unable to access nurses notes. As per nurse patient was drenched in red urine and she noticed a few clots after which admitting team and came and straight catheterized patient to check if bleeding was in urine or rectum and removed catheter right away after getting return of blood. As per PCA, pt was extremely combative during attempt to straight catheterize. Urologist unknown.     In ED: patient with a white count of 11.21, Crit 44.4, Cr 0.77. UA with large blood, RBC 86, color on UA yellow.   Repeat UA at bedside red urine without clots, bladder scan done at most being 210cc.     PAST MEDICAL & SURGICAL HISTORY:  HTN (hypertension)    HLD (hyperlipidemia)    DM (diabetes mellitus)    BPH (benign prostatic hypertrophy)    Insomnia    RBBB    Chronic atrial fibrillation    Dementia    Unsteady gait    S/P shoulder surgery    S/P knee surgery        MEDICATIONS  (STANDING):  aspirin enteric coated 81 milliGRAM(s) Oral daily  azithromycin  IVPB 500 milliGRAM(s) IV Intermittent every 24 hours  buMETAnide 0.5 milliGRAM(s) Oral <User Schedule>  cefTRIAXone   IVPB 1000 milliGRAM(s) IV Intermittent every 24 hours  dextrose 40% Gel 15 Gram(s) Oral once  dextrose 5%. 1000 milliLiter(s) (50 mL/Hr) IV Continuous <Continuous>  dextrose 5%. 1000 milliLiter(s) (100 mL/Hr) IV Continuous <Continuous>  dextrose 50% Injectable 25 Gram(s) IV Push once  dextrose 50% Injectable 12.5 Gram(s) IV Push once  dextrose 50% Injectable 25 Gram(s) IV Push once  donepezil 5 milliGRAM(s) Oral daily  enoxaparin Injectable 40 milliGRAM(s) SubCutaneous daily  finasteride 5 milliGRAM(s) Oral daily  glucagon  Injectable 1 milliGRAM(s) IntraMuscular once  insulin lispro (ADMELOG) corrective regimen sliding scale   SubCutaneous three times a day before meals  insulin lispro (ADMELOG) corrective regimen sliding scale   SubCutaneous at bedtime  melatonin 3 milliGRAM(s) Oral at bedtime  memantine 10 milliGRAM(s) Oral two times a day  metoprolol succinate ER 25 milliGRAM(s) Oral daily  mirtazapine 7.5 milliGRAM(s) Oral daily  QUEtiapine 12.5 milliGRAM(s) Oral at bedtime  thiamine 100 milliGRAM(s) Oral daily    MEDICATIONS  (PRN):      FAMILY HISTORY:  Family history of diabetes mellitus        Allergies    No Known Allergies    Intolerances        SOCIAL HISTORY:    REVIEW OF SYSTEMS: Otherwise negative as stated in HPI    Physical Exam  Vital signs  T(C): 37.2 (21 @ 23:20), Max: 38.1 (21 @ 18:51)  HR: 75 (21 @ 23:20)  BP: 98/72 (21 @ 23:20)  SpO2: 96% (21 @ 23:20)  Wt(kg): --      Gen:  AWAKE ALERT NAD AXOX1    Pulm:  NO RESP DISTRESS    GI:  SOFT NT/ND,     : Incontinent voids, 20cc urine at bedside red urine without clots                             	      LABS:       @ 13:41    WBC 11.21 / Hct 44.4  / SCr 0.77         135  |  101  |  16  ----------------------------<  192<H>  4.6   |  19<L>  |  0.77    Ca    9.2      26 Sep 2021 13:41    TPro  7.9  /  Alb  4.3  /  TBili  1.0  /  DBili  x   /  AST  23  /  ALT  19  /  AlkPhos  114      PT/INR - ( 26 Sep 2021 13:41 )   PT: 14.2 sec;   INR: 1.19 ratio         PTT - ( 26 Sep 2021 13:41 )  PTT:30.5 sec  Urinalysis Basic - ( 26 Sep 2021 14:15 )    Color: Yellow / Appearance: Clear / S.025 / pH: x  Gluc: x / Ketone: Negative  / Bili: Negative / Urobili: Negative   Blood: x / Protein: 30 mg/dL / Nitrite: Negative   Leuk Esterase: Negative / RBC: 86 /hpf / WBC 1 /HPF   Sq Epi: x / Non Sq Epi: 1 /hpf / Bacteria: Negative        Urine Cx: pending   Blood Cx: pending     RADIOLOGY:  < from: CT Abdomen and Pelvis w/ IV Cont (21 @ 17:53) >    EXAM:  CT CHEST IC                          EXAM:  CT ABDOMEN AND PELVIS IC                            PROCEDURE DATE:  2021            INTERPRETATION:  CLINICAL INFORMATION: Abdominal pain, fever and chills, shortness of breath, fatigue.    COMPARISON: Pelvic CT dated 10/7/2019.    CONTRAST/COMPLICATIONS:  IV Contrast: Omnipaque 350  90 cc administered   10 cc discarded  Oral Contrast: NONE  Complications: None reported at time of study completion    PROCEDURE:  CT of the Chest, Abdomen and Pelvis was performed.  Sagittal and coronal reformats were performed.    FINDINGS:  CHEST:  LUNGS AND LARGE AIRWAYS: Patent central airways. Left upper lobe alveolar opacity. Bilateral lower lobe dependent atelectasis.  PLEURA: Bilateral pleural effusions, large on the left and small to moderate on the right.  VESSELS: Atheromatous calcifications of the thoracic aorta without aneurysmal dilatation. Coronary artery calcifications.  HEART: Heart size is normal. Transvenous pacemaker. Status postTAVR. Mitral annular calcification. Watchman device. No pericardial effusion.  MEDIASTINUM AND YONY: No lymphadenopathy.  CHEST WALL AND LOWER NECK: Mildly enlarged right supraclavicular lymph nodes.    ABDOMEN AND PELVIS:  LIVER: Within normal limits.  BILE DUCTS: Normal caliber.  GALLBLADDER: Small gallstones.  SPLEEN: 1 cm splenule.  PANCREAS: Within normal limits.  ADRENALS: Within normal limits.  KIDNEYS/URETERS: Within normal limits.    BLADDER: Incompletely distended. Mild mural thickening.  REPRODUCTIVE ORGANS: Enlarged prostate.    BOWEL: Colonic diverticulosis. No diverticulitis. No bowel obstruction. Small direct esophageal hiatal hernia. Appendix not visualized.  PERITONEUM: No ascites.  VESSELS: Atheromatous calcifications.  RETROPERITONEUM/LYMPH NODES: No lymphadenopathy.  ABDOMINAL WALL: Within normal limits.  BONES: Dextroscoliosis of thoracolumbar spine with accompanying degenerative changes.    IMPRESSION:  Left upper lobe pneumonia.    Bilateral pleural effusions.        --- End of Report ---            YENIFER HERNANDEZ MD; Attending Radiologist  This document has been electronically signed. Sep 26 2021  6:21PM    < end of copied text >     89yMale with PMHx of Afib (no AC), HTN, BPH, DM2, Dementia was brought to ED by his daughter (Alexia) from [facility] ([phone]) c/o feeling not well, chills, shaking, and SOB this morning and admitted to medicine after found to have pneumonia. Pt's poor historian (A&Ox1, hard hearing). Consulted for hematuria with clots.  Pt unable to provide history and attempted to call assisted living facility for any collateral on prior episodes of hematuria and they state nurse left and MA unable to access nurses notes. As per nurse patient was drenched in red urine and she noticed a few clots after which admitting team and came and straight catheterized patient to check if bleeding was in urine or rectum and removed catheter right away after getting return of blood. As per PCA, pt was extremely combative during attempt to straight catheterize. Urologist unknown. On aspirin and lovenox.     In ED: patient with a white count of 11.21, Crit 44.4, Cr 0.77. UA with large blood, RBC 86, color on UA yellow.   Repeat UA at bedside red urine without clots, bladder scan done at most being 210cc.     PAST MEDICAL & SURGICAL HISTORY:  HTN (hypertension)    HLD (hyperlipidemia)    DM (diabetes mellitus)    BPH (benign prostatic hypertrophy)    Insomnia    RBBB    Chronic atrial fibrillation    Dementia    Unsteady gait    S/P shoulder surgery    S/P knee surgery        MEDICATIONS  (STANDING):  aspirin enteric coated 81 milliGRAM(s) Oral daily  azithromycin  IVPB 500 milliGRAM(s) IV Intermittent every 24 hours  buMETAnide 0.5 milliGRAM(s) Oral <User Schedule>  cefTRIAXone   IVPB 1000 milliGRAM(s) IV Intermittent every 24 hours  dextrose 40% Gel 15 Gram(s) Oral once  dextrose 5%. 1000 milliLiter(s) (50 mL/Hr) IV Continuous <Continuous>  dextrose 5%. 1000 milliLiter(s) (100 mL/Hr) IV Continuous <Continuous>  dextrose 50% Injectable 25 Gram(s) IV Push once  dextrose 50% Injectable 12.5 Gram(s) IV Push once  dextrose 50% Injectable 25 Gram(s) IV Push once  donepezil 5 milliGRAM(s) Oral daily  enoxaparin Injectable 40 milliGRAM(s) SubCutaneous daily  finasteride 5 milliGRAM(s) Oral daily  glucagon  Injectable 1 milliGRAM(s) IntraMuscular once  insulin lispro (ADMELOG) corrective regimen sliding scale   SubCutaneous three times a day before meals  insulin lispro (ADMELOG) corrective regimen sliding scale   SubCutaneous at bedtime  melatonin 3 milliGRAM(s) Oral at bedtime  memantine 10 milliGRAM(s) Oral two times a day  metoprolol succinate ER 25 milliGRAM(s) Oral daily  mirtazapine 7.5 milliGRAM(s) Oral daily  QUEtiapine 12.5 milliGRAM(s) Oral at bedtime  thiamine 100 milliGRAM(s) Oral daily    MEDICATIONS  (PRN):      FAMILY HISTORY:  Family history of diabetes mellitus        Allergies    No Known Allergies    Intolerances        SOCIAL HISTORY:    REVIEW OF SYSTEMS: Otherwise negative as stated in HPI    Physical Exam  Vital signs  T(C): 37.2 (21 @ 23:20), Max: 38.1 (21 @ 18:51)  HR: 75 (21 @ 23:20)  BP: 98/72 (21 @ 23:20)  SpO2: 96% (21 @ 23:20)  Wt(kg): --      Gen:  AWAKE ALERT NAD AXOX1    Pulm:  NO RESP DISTRESS    GI:  SOFT NT/ND,     : Incontinent voids, 20cc urine at bedside red urine without clots                             	      LABS:       @ 13:41    WBC 11.21 / Hct 44.4  / SCr 0.77         135  |  101  |  16  ----------------------------<  192<H>  4.6   |  19<L>  |  0.77    Ca    9.2      26 Sep 2021 13:41    TPro  7.9  /  Alb  4.3  /  TBili  1.0  /  DBili  x   /  AST  23  /  ALT  19  /  AlkPhos  114      PT/INR - ( 26 Sep 2021 13:41 )   PT: 14.2 sec;   INR: 1.19 ratio         PTT - ( 26 Sep 2021 13:41 )  PTT:30.5 sec  Urinalysis Basic - ( 26 Sep 2021 14:15 )    Color: Yellow / Appearance: Clear / S.025 / pH: x  Gluc: x / Ketone: Negative  / Bili: Negative / Urobili: Negative   Blood: x / Protein: 30 mg/dL / Nitrite: Negative   Leuk Esterase: Negative / RBC: 86 /hpf / WBC 1 /HPF   Sq Epi: x / Non Sq Epi: 1 /hpf / Bacteria: Negative        Urine Cx: pending   Blood Cx: pending     RADIOLOGY:  < from: CT Abdomen and Pelvis w/ IV Cont (21 @ 17:53) >    EXAM:  CT CHEST IC                          EXAM:  CT ABDOMEN AND PELVIS IC                            PROCEDURE DATE:  2021            INTERPRETATION:  CLINICAL INFORMATION: Abdominal pain, fever and chills, shortness of breath, fatigue.    COMPARISON: Pelvic CT dated 10/7/2019.    CONTRAST/COMPLICATIONS:  IV Contrast: Omnipaque 350  90 cc administered   10 cc discarded  Oral Contrast: NONE  Complications: None reported at time of study completion    PROCEDURE:  CT of the Chest, Abdomen and Pelvis was performed.  Sagittal and coronal reformats were performed.    FINDINGS:  CHEST:  LUNGS AND LARGE AIRWAYS: Patent central airways. Left upper lobe alveolar opacity. Bilateral lower lobe dependent atelectasis.  PLEURA: Bilateral pleural effusions, large on the left and small to moderate on the right.  VESSELS: Atheromatous calcifications of the thoracic aorta without aneurysmal dilatation. Coronary artery calcifications.  HEART: Heart size is normal. Transvenous pacemaker. Status postTAVR. Mitral annular calcification. Watchman device. No pericardial effusion.  MEDIASTINUM AND YONY: No lymphadenopathy.  CHEST WALL AND LOWER NECK: Mildly enlarged right supraclavicular lymph nodes.    ABDOMEN AND PELVIS:  LIVER: Within normal limits.  BILE DUCTS: Normal caliber.  GALLBLADDER: Small gallstones.  SPLEEN: 1 cm splenule.  PANCREAS: Within normal limits.  ADRENALS: Within normal limits.  KIDNEYS/URETERS: Within normal limits.    BLADDER: Incompletely distended. Mild mural thickening.  REPRODUCTIVE ORGANS: Enlarged prostate.    BOWEL: Colonic diverticulosis. No diverticulitis. No bowel obstruction. Small direct esophageal hiatal hernia. Appendix not visualized.  PERITONEUM: No ascites.  VESSELS: Atheromatous calcifications.  RETROPERITONEUM/LYMPH NODES: No lymphadenopathy.  ABDOMINAL WALL: Within normal limits.  BONES: Dextroscoliosis of thoracolumbar spine with accompanying degenerative changes.    IMPRESSION:  Left upper lobe pneumonia.    Bilateral pleural effusions.        --- End of Report ---            YENIFER HERNANDEZ MD; Attending Radiologist  This document has been electronically signed. Sep 26 2021  6:21PM    < end of copied text >     89yMale with PMHx of Afib, HTN, BPH, DM2, Dementia was brought to ED by his daughter (Alexia) from [facility] ([phone]) c/o feeling not well, chills, shaking, and SOB this morning and admitted to medicine after found to have pneumonia. Pt's poor historian (A&Ox1, hard hearing). Consulted for hematuria with clots.  Pt unable to provide history and attempted to call assisted living facility for any collateral on prior episodes of hematuria and they state nurse left and MA unable to access nurses notes. As per nurse patient was drenched in red urine and she noticed a few clots after which admitting team and came and straight catheterized patient to check if bleeding was in urine or rectum and removed catheter right away after getting return of blood. As per PCA, pt was extremely combative during attempt to straight catheterize. Urologist unknown. On aspirin and lovenox.     In ED: patient with a white count of 11.21, Crit 44.4, Cr 0.77. UA with large blood, RBC 86, color on UA yellow.   Repeat UA at bedside red urine without clots, bladder scan done at most being 210cc.     PAST MEDICAL & SURGICAL HISTORY:  HTN (hypertension)    HLD (hyperlipidemia)    DM (diabetes mellitus)    BPH (benign prostatic hypertrophy)    Insomnia    RBBB    Chronic atrial fibrillation    Dementia    Unsteady gait    S/P shoulder surgery    S/P knee surgery        MEDICATIONS  (STANDING):  aspirin enteric coated 81 milliGRAM(s) Oral daily  azithromycin  IVPB 500 milliGRAM(s) IV Intermittent every 24 hours  buMETAnide 0.5 milliGRAM(s) Oral <User Schedule>  cefTRIAXone   IVPB 1000 milliGRAM(s) IV Intermittent every 24 hours  dextrose 40% Gel 15 Gram(s) Oral once  dextrose 5%. 1000 milliLiter(s) (50 mL/Hr) IV Continuous <Continuous>  dextrose 5%. 1000 milliLiter(s) (100 mL/Hr) IV Continuous <Continuous>  dextrose 50% Injectable 25 Gram(s) IV Push once  dextrose 50% Injectable 12.5 Gram(s) IV Push once  dextrose 50% Injectable 25 Gram(s) IV Push once  donepezil 5 milliGRAM(s) Oral daily  enoxaparin Injectable 40 milliGRAM(s) SubCutaneous daily  finasteride 5 milliGRAM(s) Oral daily  glucagon  Injectable 1 milliGRAM(s) IntraMuscular once  insulin lispro (ADMELOG) corrective regimen sliding scale   SubCutaneous three times a day before meals  insulin lispro (ADMELOG) corrective regimen sliding scale   SubCutaneous at bedtime  melatonin 3 milliGRAM(s) Oral at bedtime  memantine 10 milliGRAM(s) Oral two times a day  metoprolol succinate ER 25 milliGRAM(s) Oral daily  mirtazapine 7.5 milliGRAM(s) Oral daily  QUEtiapine 12.5 milliGRAM(s) Oral at bedtime  thiamine 100 milliGRAM(s) Oral daily    MEDICATIONS  (PRN):      FAMILY HISTORY:  Family history of diabetes mellitus        Allergies    No Known Allergies    Intolerances        SOCIAL HISTORY:    REVIEW OF SYSTEMS: Otherwise negative as stated in HPI    Physical Exam  Vital signs  T(C): 37.2 (21 @ 23:20), Max: 38.1 (21 @ 18:51)  HR: 75 (21 @ 23:20)  BP: 98/72 (21 @ 23:20)  SpO2: 96% (21 @ 23:20)  Wt(kg): --      Gen:  AWAKE ALERT NAD AXOX1    Pulm:  NO RESP DISTRESS    GI:  SOFT NT/ND,     : Incontinent voids, 20cc urine at bedside red urine without clots                             	      LABS:       @ 13:41    WBC 11.21 / Hct 44.4  / SCr 0.77         135  |  101  |  16  ----------------------------<  192<H>  4.6   |  19<L>  |  0.77    Ca    9.2      26 Sep 2021 13:41    TPro  7.9  /  Alb  4.3  /  TBili  1.0  /  DBili  x   /  AST  23  /  ALT  19  /  AlkPhos  114      PT/INR - ( 26 Sep 2021 13:41 )   PT: 14.2 sec;   INR: 1.19 ratio         PTT - ( 26 Sep 2021 13:41 )  PTT:30.5 sec  Urinalysis Basic - ( 26 Sep 2021 14:15 )    Color: Yellow / Appearance: Clear / S.025 / pH: x  Gluc: x / Ketone: Negative  / Bili: Negative / Urobili: Negative   Blood: x / Protein: 30 mg/dL / Nitrite: Negative   Leuk Esterase: Negative / RBC: 86 /hpf / WBC 1 /HPF   Sq Epi: x / Non Sq Epi: 1 /hpf / Bacteria: Negative        Urine Cx: pending   Blood Cx: pending     RADIOLOGY:  < from: CT Abdomen and Pelvis w/ IV Cont (21 @ 17:53) >    EXAM:  CT CHEST IC                          EXAM:  CT ABDOMEN AND PELVIS IC                            PROCEDURE DATE:  2021            INTERPRETATION:  CLINICAL INFORMATION: Abdominal pain, fever and chills, shortness of breath, fatigue.    COMPARISON: Pelvic CT dated 10/7/2019.    CONTRAST/COMPLICATIONS:  IV Contrast: Omnipaque 350  90 cc administered   10 cc discarded  Oral Contrast: NONE  Complications: None reported at time of study completion    PROCEDURE:  CT of the Chest, Abdomen and Pelvis was performed.  Sagittal and coronal reformats were performed.    FINDINGS:  CHEST:  LUNGS AND LARGE AIRWAYS: Patent central airways. Left upper lobe alveolar opacity. Bilateral lower lobe dependent atelectasis.  PLEURA: Bilateral pleural effusions, large on the left and small to moderate on the right.  VESSELS: Atheromatous calcifications of the thoracic aorta without aneurysmal dilatation. Coronary artery calcifications.  HEART: Heart size is normal. Transvenous pacemaker. Status postTAVR. Mitral annular calcification. Watchman device. No pericardial effusion.  MEDIASTINUM AND YONY: No lymphadenopathy.  CHEST WALL AND LOWER NECK: Mildly enlarged right supraclavicular lymph nodes.    ABDOMEN AND PELVIS:  LIVER: Within normal limits.  BILE DUCTS: Normal caliber.  GALLBLADDER: Small gallstones.  SPLEEN: 1 cm splenule.  PANCREAS: Within normal limits.  ADRENALS: Within normal limits.  KIDNEYS/URETERS: Within normal limits.    BLADDER: Incompletely distended. Mild mural thickening.  REPRODUCTIVE ORGANS: Enlarged prostate.    BOWEL: Colonic diverticulosis. No diverticulitis. No bowel obstruction. Small direct esophageal hiatal hernia. Appendix not visualized.  PERITONEUM: No ascites.  VESSELS: Atheromatous calcifications.  RETROPERITONEUM/LYMPH NODES: No lymphadenopathy.  ABDOMINAL WALL: Within normal limits.  BONES: Dextroscoliosis of thoracolumbar spine with accompanying degenerative changes.    IMPRESSION:  Left upper lobe pneumonia.    Bilateral pleural effusions.        --- End of Report ---            YENIFER HERNANDEZ MD; Attending Radiologist  This document has been electronically signed. Sep 26 2021  6:21PM    < end of copied text >

## 2021-09-27 DIAGNOSIS — R06.02 SHORTNESS OF BREATH: ICD-10-CM

## 2021-09-27 DIAGNOSIS — R31.9 HEMATURIA, UNSPECIFIED: ICD-10-CM

## 2021-09-27 LAB
A1C WITH ESTIMATED AVERAGE GLUCOSE RESULT: 6.6 % — HIGH (ref 4–5.6)
ANION GAP SERPL CALC-SCNC: 15 MMOL/L — SIGNIFICANT CHANGE UP (ref 5–17)
APPEARANCE UR: ABNORMAL
BACTERIA # UR AUTO: NEGATIVE — SIGNIFICANT CHANGE UP
BILIRUB UR-MCNC: NEGATIVE — SIGNIFICANT CHANGE UP
BUN SERPL-MCNC: 14 MG/DL — SIGNIFICANT CHANGE UP (ref 7–23)
CALCIUM SERPL-MCNC: 8.7 MG/DL — SIGNIFICANT CHANGE UP (ref 8.4–10.5)
CHLORIDE SERPL-SCNC: 101 MMOL/L — SIGNIFICANT CHANGE UP (ref 96–108)
CO2 SERPL-SCNC: 20 MMOL/L — LOW (ref 22–31)
COLOR SPEC: ABNORMAL
COVID-19 SPIKE DOMAIN AB INTERP: POSITIVE
COVID-19 SPIKE DOMAIN ANTIBODY RESULT: 60.9 U/ML — HIGH
CREAT SERPL-MCNC: 0.81 MG/DL — SIGNIFICANT CHANGE UP (ref 0.5–1.3)
CULTURE RESULTS: NO GROWTH — SIGNIFICANT CHANGE UP
DIFF PNL FLD: ABNORMAL
ESTIMATED AVERAGE GLUCOSE: 143 MG/DL — HIGH (ref 68–114)
GLUCOSE BLDC GLUCOMTR-MCNC: 103 MG/DL — HIGH (ref 70–99)
GLUCOSE BLDC GLUCOMTR-MCNC: 118 MG/DL — HIGH (ref 70–99)
GLUCOSE BLDC GLUCOMTR-MCNC: 147 MG/DL — HIGH (ref 70–99)
GLUCOSE BLDC GLUCOMTR-MCNC: 163 MG/DL — HIGH (ref 70–99)
GLUCOSE SERPL-MCNC: 122 MG/DL — HIGH (ref 70–99)
GLUCOSE UR QL: ABNORMAL
HCT VFR BLD CALC: 38 % — LOW (ref 39–50)
HCT VFR BLD CALC: 39.7 % — SIGNIFICANT CHANGE UP (ref 39–50)
HGB BLD-MCNC: 11.9 G/DL — LOW (ref 13–17)
HGB BLD-MCNC: 12.5 G/DL — LOW (ref 13–17)
KETONES UR-MCNC: NEGATIVE — SIGNIFICANT CHANGE UP
LEUKOCYTE ESTERASE UR-ACNC: NEGATIVE — SIGNIFICANT CHANGE UP
MCHC RBC-ENTMCNC: 28.7 PG — SIGNIFICANT CHANGE UP (ref 27–34)
MCHC RBC-ENTMCNC: 28.9 PG — SIGNIFICANT CHANGE UP (ref 27–34)
MCHC RBC-ENTMCNC: 31.3 GM/DL — LOW (ref 32–36)
MCHC RBC-ENTMCNC: 31.5 GM/DL — LOW (ref 32–36)
MCV RBC AUTO: 91.7 FL — SIGNIFICANT CHANGE UP (ref 80–100)
MCV RBC AUTO: 91.8 FL — SIGNIFICANT CHANGE UP (ref 80–100)
NITRITE UR-MCNC: NEGATIVE — SIGNIFICANT CHANGE UP
NRBC # BLD: 0 /100 WBCS — SIGNIFICANT CHANGE UP (ref 0–0)
NRBC # BLD: 0 /100 WBCS — SIGNIFICANT CHANGE UP (ref 0–0)
NT-PROBNP SERPL-SCNC: 8188 PG/ML — HIGH (ref 0–300)
PH UR: 8 — SIGNIFICANT CHANGE UP (ref 5–8)
PLATELET # BLD AUTO: 132 K/UL — LOW (ref 150–400)
PLATELET # BLD AUTO: 136 K/UL — LOW (ref 150–400)
POTASSIUM SERPL-MCNC: 4.8 MMOL/L — SIGNIFICANT CHANGE UP (ref 3.5–5.3)
POTASSIUM SERPL-SCNC: 4.8 MMOL/L — SIGNIFICANT CHANGE UP (ref 3.5–5.3)
PROCALCITONIN SERPL-MCNC: 0.03 NG/ML — SIGNIFICANT CHANGE UP (ref 0.02–0.1)
PROT UR-MCNC: >600
RBC # BLD: 4.14 M/UL — LOW (ref 4.2–5.8)
RBC # BLD: 4.33 M/UL — SIGNIFICANT CHANGE UP (ref 4.2–5.8)
RBC # FLD: 13.9 % — SIGNIFICANT CHANGE UP (ref 10.3–14.5)
RBC # FLD: 14.2 % — SIGNIFICANT CHANGE UP (ref 10.3–14.5)
RBC CASTS # UR COMP ASSIST: >50 /HPF — HIGH (ref 0–4)
SARS-COV-2 IGG+IGM SERPL QL IA: 60.9 U/ML — HIGH
SARS-COV-2 IGG+IGM SERPL QL IA: POSITIVE
SODIUM SERPL-SCNC: 136 MMOL/L — SIGNIFICANT CHANGE UP (ref 135–145)
SP GR SPEC: >1.05 (ref 1.01–1.02)
SPECIMEN SOURCE: SIGNIFICANT CHANGE UP
UROBILINOGEN FLD QL: NEGATIVE — SIGNIFICANT CHANGE UP
WBC # BLD: 7.42 K/UL — SIGNIFICANT CHANGE UP (ref 3.8–10.5)
WBC # BLD: 7.44 K/UL — SIGNIFICANT CHANGE UP (ref 3.8–10.5)
WBC # FLD AUTO: 7.42 K/UL — SIGNIFICANT CHANGE UP (ref 3.8–10.5)
WBC # FLD AUTO: 7.44 K/UL — SIGNIFICANT CHANGE UP (ref 3.8–10.5)
WBC UR QL: 61 /HPF — HIGH (ref 0–5)

## 2021-09-27 PROCEDURE — 99221 1ST HOSP IP/OBS SF/LOW 40: CPT

## 2021-09-27 PROCEDURE — 93306 TTE W/DOPPLER COMPLETE: CPT | Mod: 26

## 2021-09-27 RX ORDER — PIPERACILLIN AND TAZOBACTAM 4; .5 G/20ML; G/20ML
3.38 INJECTION, POWDER, LYOPHILIZED, FOR SOLUTION INTRAVENOUS ONCE
Refills: 0 | Status: COMPLETED | OUTPATIENT
Start: 2021-09-27 | End: 2021-09-27

## 2021-09-27 RX ORDER — PIPERACILLIN AND TAZOBACTAM 4; .5 G/20ML; G/20ML
3.38 INJECTION, POWDER, LYOPHILIZED, FOR SOLUTION INTRAVENOUS EVERY 8 HOURS
Refills: 0 | Status: DISCONTINUED | OUTPATIENT
Start: 2021-09-27 | End: 2021-10-01

## 2021-09-27 RX ADMIN — Medication 81 MILLIGRAM(S): at 12:33

## 2021-09-27 RX ADMIN — Medication 25 MILLIGRAM(S): at 05:39

## 2021-09-27 RX ADMIN — BUMETANIDE 0.5 MILLIGRAM(S): 0.25 INJECTION INTRAMUSCULAR; INTRAVENOUS at 11:13

## 2021-09-27 RX ADMIN — MEMANTINE HYDROCHLORIDE 10 MILLIGRAM(S): 10 TABLET ORAL at 17:12

## 2021-09-27 RX ADMIN — PIPERACILLIN AND TAZOBACTAM 25 GRAM(S): 4; .5 INJECTION, POWDER, LYOPHILIZED, FOR SOLUTION INTRAVENOUS at 21:37

## 2021-09-27 RX ADMIN — ENOXAPARIN SODIUM 40 MILLIGRAM(S): 100 INJECTION SUBCUTANEOUS at 12:33

## 2021-09-27 RX ADMIN — MIRTAZAPINE 7.5 MILLIGRAM(S): 45 TABLET, ORALLY DISINTEGRATING ORAL at 12:33

## 2021-09-27 RX ADMIN — PIPERACILLIN AND TAZOBACTAM 200 GRAM(S): 4; .5 INJECTION, POWDER, LYOPHILIZED, FOR SOLUTION INTRAVENOUS at 17:13

## 2021-09-27 RX ADMIN — Medication 3 MILLIGRAM(S): at 21:37

## 2021-09-27 RX ADMIN — FINASTERIDE 5 MILLIGRAM(S): 5 TABLET, FILM COATED ORAL at 12:33

## 2021-09-27 RX ADMIN — QUETIAPINE FUMARATE 12.5 MILLIGRAM(S): 200 TABLET, FILM COATED ORAL at 21:36

## 2021-09-27 RX ADMIN — MEMANTINE HYDROCHLORIDE 10 MILLIGRAM(S): 10 TABLET ORAL at 05:39

## 2021-09-27 RX ADMIN — Medication 100 MILLIGRAM(S): at 12:34

## 2021-09-27 RX ADMIN — DONEPEZIL HYDROCHLORIDE 5 MILLIGRAM(S): 10 TABLET, FILM COATED ORAL at 12:33

## 2021-09-27 NOTE — PROGRESS NOTE ADULT - ASSESSMENT
89yo male pt with PMHx of Afib (no AC), HTN, BPH, DM2, Dementia was brought to ED by his daughter (Alexia) from [facility] ([phone]) c/o feeling not well, chills, shaking, and SOB this morning. Pt's poor historian (A&Ox1, hard hearing). As per the daughter, she got a phone call and was informed pt's not well. When she went to [facility] she noticed pt's generalized weak, shaking, shivering, grinding teeth, difficult ambulation with SOB. Pt normally walks with his walker but he was not able to ambulate today.     Problem/Plan - 1:  ·  Problem: Sepsis.   ·  Plan: Present on admission as has Fever, tachycardia and Leucocytosis .   S/P cultures and starting IV Abxs.   Hemodynamically  stable.     Problem/Plan - 2:  ·  Problem: Pneumonia.   ·  Plan: IV ABxs . Pulmonary helping.      Problem/Plan - 3:  ·  Problem: Pleural effusion.   ·  Plan: Pulmonary helping .      Will check TTE also.     Problem/Plan - 4:  ·  Problem: Chronic atrial fibrillation.   ·  Plan: Not on AC . Cards following.      Problem/Plan - 5:  ·  Problem: HTN (hypertension).   ·  Plan: BP meds with hold parameters.     Problem/Plan - 6:  ·  Problem: BPH without urinary obstruction.   ·  Plan: On Finasteride.     Problem/Plan - 7:  ·  Problem: Diabetes mellitus.   ·  Plan: Holding PO meds and SSI for now.     Problem/Plan - 8:  ·  Problem: Dementia.   ·  Plan: supportive care.     Problem/Plan - 9:  ·  Problem: Hematuria .   ·  Plan: urology helping. Will hold Lovenox and put SCD for DVT prophylaxis.     Disposition : D/W pts son Simone in detail .

## 2021-09-27 NOTE — CONSULT NOTE ADULT - ASSESSMENT
89yo male pt with PMHx of Afib (no AC), HTN, BPH, DM2, Dementia was brought to ED by his daughter (Alexia) from [facility] ([phone]) c/o feeling not well, chills, shaking, and SOB this morning. Pt's poor historian (A&Ox1, hard hearing). As per the daughter, she got a phone call and was informed pt's not well. When she went to [facility] she noticed pt's generalized weak, shaking, shivering, grinding teeth, difficult ambulation with SOB. Pt normally walks with his walker but he was not able to ambulate today. (26 Sep 2021 19:55)    ER vs:  Tmax 100.5 (R), P 98, /92, on 3-4L NC.  WBC 11.2 --> 7.4.  Lact 2.3.  UA (-)nit/LE.  UCx (-).  RVP and covid pcr (-).  Cxr showed L pleural effusion.  CT chest shows JIMENA pna and b/l pleural effusions.  CTap shows enlarged prostate.      Pt on rocephin/azithro for CAP.  ID consult called for further abx management.       JIMENA pna:    - Pt with low grade fever, leukocytosis, elevated lactate, feeling weak.  Agree with azithro/rocephin for trt of CAP  - f/u LEg Ag  - f/u bcx  - WBC and temp curve improving, cont to monitor.    Hematuria:    - UA (-) nit/LE.  Ucx no growth, no evidence for UTI.  Pt with large blood.  s/p bladder scan of 210ml    - Urology f/u noted.  Cont to monitor UO    - f/u repeat UA following hydration.       Will follow,    Oxana Koch  [phone]

## 2021-09-27 NOTE — PROVIDER CONTACT NOTE (OTHER) - ACTION/TREATMENT ORDERED:
PA made aware. No interventions at this time. Pt had this episode in ED earlier as well. Will continue to monitor.

## 2021-09-27 NOTE — CONSULT NOTE ADULT - SUBJECTIVE AND OBJECTIVE BOX
Patient is a 89y old  Male who presents with a chief complaint of fever and  chills (27 Sep 2021 09:59)      HPI:    89yo male pt with PMHx of Afib (no AC), HTN, BPH, DM2, Dementia was brought to ED by his daughter (Alexia) from [facility] ([phone]) c/o feeling not well, chills, shaking, and SOB this morning. Pt's poor historian (A&Ox1, hard hearing). As per the daughter, she got a phone call and was informed pt's not well. When she went to [facility] she noticed pt's generalized weak, shaking, shivering, grinding teeth, difficult ambulation with SOB. Pt normally walks with his walker but he was not able to ambulate today. (26 Sep 2021 19:55)    ER vs:  Tmax 100.5 (R), P 98, /92, on 3-4L NC.  WBC 11.2 --> 7.4.  Lact 2.3.  UA (-)nit/LE.  UCx (-).  RVP and covid pcr (-).  Cxr showed L pleural effusion.  CT chest shows JIMENA pna and b/l pleural effusions.  CTap shows enlarged prostate.      Pt on rocephin/azithro for CAP.  ID consult called for further abx management.        REVIEW OF SYSTEMS:    CONSTITUTIONAL: No fever, weight loss, or fatigue  EYES: No eye pain, visual disturbances, or discharge  ENMT:  No sore throat  NECK: No pain or stiffness  RESPIRATORY: No cough, wheezing, chills or hemoptysis; No shortness of breath  CARDIOVASCULAR: No chest pain, palpitations, dizziness, or leg swelling  GASTROINTESTINAL: No abdominal or epigastric pain. No nausea, vomiting, or hematemesis; No diarrhea or constipation. No melena or hematochezia.  GENITOURINARY: No dysuria, frequency, hematuria, or incontinence  NEUROLOGICAL: No headaches, memory loss, loss of strength, numbness, or tremors  SKIN: No itching, burning, rashes, or lesions   LYMPH NODES: No enlarged glands  MUSCULOSKELETAL: No joint pain or swelling; No muscle, back, or extremity pain      PAST MEDICAL & SURGICAL HISTORY:  HTN (hypertension)    HLD (hyperlipidemia)    DM (diabetes mellitus)    BPH (benign prostatic hypertrophy)    Insomnia    RBBB    Chronic atrial fibrillation    Dementia    Unsteady gait    S/P shoulder surgery    S/P knee surgery        Allergies    No Known Allergies    Intolerances        FAMILY HISTORY:  Family history of diabetes mellitus        SOCIAL HISTORY:        MEDICATIONS  (STANDING):  aspirin enteric coated 81 milliGRAM(s) Oral daily  azithromycin  IVPB 500 milliGRAM(s) IV Intermittent every 24 hours  buMETAnide 0.5 milliGRAM(s) Oral <User Schedule>  cefTRIAXone   IVPB 1000 milliGRAM(s) IV Intermittent every 24 hours  dextrose 40% Gel 15 Gram(s) Oral once  dextrose 5%. 1000 milliLiter(s) (50 mL/Hr) IV Continuous <Continuous>  dextrose 5%. 1000 milliLiter(s) (100 mL/Hr) IV Continuous <Continuous>  dextrose 50% Injectable 25 Gram(s) IV Push once  dextrose 50% Injectable 12.5 Gram(s) IV Push once  dextrose 50% Injectable 25 Gram(s) IV Push once  donepezil 5 milliGRAM(s) Oral daily  enoxaparin Injectable 40 milliGRAM(s) SubCutaneous daily  finasteride 5 milliGRAM(s) Oral daily  glucagon  Injectable 1 milliGRAM(s) IntraMuscular once  insulin lispro (ADMELOG) corrective regimen sliding scale   SubCutaneous three times a day before meals  insulin lispro (ADMELOG) corrective regimen sliding scale   SubCutaneous at bedtime  melatonin 3 milliGRAM(s) Oral at bedtime  memantine 10 milliGRAM(s) Oral two times a day  metoprolol succinate ER 25 milliGRAM(s) Oral daily  mirtazapine 7.5 milliGRAM(s) Oral daily  QUEtiapine 12.5 milliGRAM(s) Oral at bedtime  thiamine 100 milliGRAM(s) Oral daily    MEDICATIONS  (PRN):      Vital Signs Last 24 Hrs  T(C): 36.7 (27 Sep 2021 13:48), Max: 38.1 (26 Sep 2021 18:51)  T(F): 98 (27 Sep 2021 13:48), Max: 100.5 (26 Sep 2021 18:51)  HR: 60 (27 Sep 2021 13:48) (60 - 105)  BP: 139/78 (27 Sep 2021 13:48) (98/72 - 139/92)  BP(mean): 80 (26 Sep 2021 23:20) (80 - 86)  RR: 18 (27 Sep 2021 13:48) (16 - 20)  SpO2: 99% (27 Sep 2021 13:48) (95% - 99%)    PHYSICAL EXAM:    GENERAL: NAD, well-groomed  HEAD:  Atraumatic, Normocephalic  EYES: EOMI, PERRLA, conjunctiva and sclera clear  ENMT: No tonsillar erythema, exudates, or enlargement; Moist mucous membranes  NECK: Supple, No JVD  CHEST/LUNG: Clear to percussion bilaterally; No rales, rhonchi, wheezing, or rubs  HEART: Regular rate and rhythm; No murmurs, rubs, or gallops  ABDOMEN: Soft, Nontender, Nondistended; Bowel sounds present  EXTREMITIES:  2+ Peripheral Pulses, No clubbing, cyanosis, or edema  LYMPH: No lymphadenopathy noted  SKIN: No rashes or lesions    LABS:  CBC Full  -  ( 27 Sep 2021 07:04 )  WBC Count : 7.42 K/uL  RBC Count : 4.14 M/uL  Hemoglobin : 11.9 g/dL  Hematocrit : 38.0 %  Platelet Count - Automated : 132 K/uL  Mean Cell Volume : 91.8 fl  Mean Cell Hemoglobin : 28.7 pg  Mean Cell Hemoglobin Concentration : 31.3 gm/dL  Auto Neutrophil # : x  Auto Lymphocyte # : x  Auto Monocyte # : x  Auto Eosinophil # : x  Auto Basophil # : x  Auto Neutrophil % : x  Auto Lymphocyte % : x  Auto Monocyte % : x  Auto Eosinophil % : x  Auto Basophil % : x      09-27    136  |  101  |  14  ----------------------------<  122<H>  4.8   |  20<L>  |  0.81    Ca    8.7      27 Sep 2021 07:04    TPro  7.9  /  Alb  4.3  /  TBili  1.0  /  DBili  x   /  AST  23  /  ALT  19  /  AlkPhos  114  09-26      LIVER FUNCTIONS - ( 26 Sep 2021 13:41 )  Alb: 4.3 g/dL / Pro: 7.9 g/dL / ALK PHOS: 114 U/L / ALT: 19 U/L / AST: 23 U/L / GGT: x                               MICROBIOLOGY:        Urinalysis Basic - ( 27 Sep 2021 00:22 )    Color: Red / Appearance: Turbid / SG: >1.050 / pH: x  Gluc: x / Ketone: Negative  / Bili: Negative / Urobili: Negative   Blood: x / Protein: >600 / Nitrite: Negative   Leuk Esterase: Negative / RBC: >50 /hpf / WBC 61 /HPF   Sq Epi: x / Non Sq Epi: x / Bacteria: Negative      Culture - Urine (09.26.21 @ 17:21)   Specimen Source: Clean Catch Clean Catch (Midstream)   Culture Results:   No growth               RADIOLOGY:      < from: CT Abdomen and Pelvis w/ IV Cont (09.26.21 @ 17:53) >    IMPRESSION:  Left upper lobe pneumonia.    Bilateral pleural effusions.    < end of copied text >          < from: CT Chest w/ IV Cont (09.26.21 @ 17:30) >    EXAM:  CT CHEST IC                          EXAM:  CT ABDOMEN AND PELVIS IC                            PROCEDURE DATE:  09/26/2021            INTERPRETATION:  CLINICAL INFORMATION: Abdominal pain, fever and chills, shortness of breath, fatigue.    COMPARISON: Pelvic CT dated 10/7/2019.    CONTRAST/COMPLICATIONS:  IV Contrast: Omnipaque 350  90 cc administered   10 cc discarded  Oral Contrast: NONE  Complications: None reported at time of study completion    PROCEDURE:  CT of the Chest, Abdomen and Pelvis was performed.  Sagittal and coronal reformats were performed.    FINDINGS:  CHEST:  LUNGS AND LARGE AIRWAYS: Patent central airways. Left upper lobe alveolar opacity. Bilateral lower lobe dependent atelectasis.  PLEURA: Bilateral pleural effusions, large on the left and small to moderate on the right.  VESSELS: Atheromatous calcifications of the thoracic aorta without aneurysmal dilatation. Coronary artery calcifications.  HEART: Heart size is normal. Transvenous pacemaker. Status postTAVR. Mitral annular calcification. Watchman device. No pericardial effusion.  MEDIASTINUM AND YONY: No lymphadenopathy.  CHEST WALL AND LOWER NECK: Mildly enlarged right supraclavicular lymph nodes.    ABDOMEN AND PELVIS:  LIVER: Within normal limits.  BILE DUCTS: Normal caliber.  GALLBLADDER: Small gallstones.  SPLEEN: 1 cm splenule.  PANCREAS: Within normal limits.  ADRENALS: Within normal limits.  KIDNEYS/URETERS: Within normal limits.    BLADDER: Incompletely distended. Mild mural thickening.  REPRODUCTIVE ORGANS: Enlarged prostate.    BOWEL: Colonic diverticulosis. No diverticulitis. No bowel obstruction. Small direct esophageal hiatal hernia. Appendix not visualized.  PERITONEUM: No ascites.  VESSELS: Atheromatous calcifications.  RETROPERITONEUM/LYMPH NODES: No lymphadenopathy.  ABDOMINAL WALL: Within normal limits.  BONES: Dextroscoliosis of thoracolumbar spine with accompanying degenerative changes.    IMPRESSION:  Left upper lobe pneumonia.    Bilateral pleural effusions.    < end of copied text >        < from: Xray Chest 1 View- PORTABLE-Urgent (09.26.21 @ 14:18) >    FINDINGS:    The heart size is not well evaluated in this projection. Right chest wall pacemaker. Status post TAVR.  Suboptimal image as a small portion of the right costovertebral angle is excluded from the image. Left pleural effusion. No pneumothorax.  No acute osseous abnormalities.    IMPRESSION:  Left pleural effusion.    < end of copied text >

## 2021-09-27 NOTE — CONSULT NOTE ADULT - PROBLEM SELECTOR RECOMMENDATION 9
CT chest with b/l pleural effusions, L>R  -Pro BNP >8000  -Keep O>I as tolerated  -Cards following  -F/u TTE Presented to ED from assisted living facility with SOB, weakness, fever, chills x1 day  -CT chest with b/l pl effusions L>R, associated atelectasis, cannot r/o underlying PNA but appears to be improving with Abx  -Mild leukocytosis on admission, since resolved  -Tmax last 24 hrs 100.5F, afebrile now - trend curve  -RVP, COVID negative  -UA negative  -PCT normal  -Check urine legionella  -Would continue with Azithromycin until legionella negative, d/c Ceftriaxone & start Zosyn 3.375g q8h

## 2021-09-27 NOTE — CONSULT NOTE ADULT - ATTENDING COMMENTS
gross hematuria, possibly related to catheterization (straight cath) earlier in the day  baseline incontinence    avoiding an indwelling catheter here is preferred given patient dementia  monitor urine color
agree w above

## 2021-09-27 NOTE — CONSULT NOTE ADULT - PROBLEM SELECTOR RECOMMENDATION 4
Presented to ED from assisted living facility with SOB, weakness, fever, chills x1 day  -CT chest with b/l pl effusions L>R, associated atelectasis, cannot r/o underlying PNA but appears to be improving with Abx  -Mild leukocytosis on admission, since resolved  -Tmax last 24 hrs 100.5F, afebrile now - trend curve  -RVP, COVID negative  -UA negative  -PCT normal  -C/w ABX   -Check urine legionella Per urology

## 2021-09-27 NOTE — CONSULT NOTE ADULT - ASSESSMENT
89 y/o M with PMH of afib (no AC), HTN, DM2, BPH, dementia. Brought to ED by daughter (Alexia) from [facility] with c/o chills, weakness, and worsening SOB x1 day. On triage, febrile (tmax 100.5F), tachycardic (105), with o2 sats 91% on RA. Mild leukocytosis, RVP negative. CT chest with b/l pleural effusion (L>R), and JIMENA opacity with concern for PNA, started on IV ABX. Pulmonary called to consult for further management. No acute distress, O2 sats 98% on 3LNC.

## 2021-09-27 NOTE — PROGRESS NOTE ADULT - ASSESSMENT
89yMale with PMHx of Afib (no AC), HTN, BPH, DM2, Dementia was brought to ED by his daughter (Alexia) from [facility] ([phone]) c/o feeling not well, chills, shaking, and SOB this morning and admitted to medicine after found to have pneumonia. In ED: patient with a white count of 11.21, Crit 44.4, Cr 0.77. UA with large blood, RBC 86, color on UA yellow. Repeat UA at bedside red urine no clots, bladder scan done at most being 210cc.     - please place a condom cath to better monitor patient's I&O's and monitor urine color  - no CBI or collazo needed at this time since patient is adequately emptying his bladder, recent   - encourage increased hydration to help clear urine   - follow up urine culture and repeat UA   - if patient starts to start retaining large amounts of urine, abdomen becomes distended or patient becomes uncomfortable or urine color worsens with clots please page urology at [phone]  - would avoid urethral catheterization as patient highly likely to remove or pull at catheter  - discussed with Dr. Laureano 89yMale with PMHx of Afib (no AC), HTN, BPH, DM2, Dementia was brought to ED by his daughter (Alexia) from [facility] ([phone]) c/o feeling not well, chills, shaking, and SOB this morning and admitted to medicine after found to have pneumonia. In ED: patient with a white count of 11.21, Crit 44.4, Cr 0.77. UA with large blood, RBC 86, color on UA yellow. Repeat UA at bedside red urine no clots, bladder scan done at most being 210cc.     - please place a condom cath to better monitor patient's I&O's and monitor urine color  - no CBI or collazo needed at this time since patient is adequately emptying his bladder, recent --but patient is comfortable  - encourage increased hydration to help clear urine   - follow up urine culture and repeat UA   - if patient starts to start retaining large amounts of urine, abdomen becomes distended or patient becomes uncomfortable or urine color worsens with clots please page urology at [phone]  - would avoid urethral catheterization as patient highly likely to remove or pull at catheter given baseline dementia  - discussed with Dr. Laureano

## 2021-09-27 NOTE — CHART NOTE - NSCHARTNOTEFT_GEN_A_CORE
Patient's urine color in the condom cath bag is dark red, and his latest PVR is 396.  Given the patient is without acute complaints of abdominal/suprapubic discomfort, will not place a collazo catheter at this time.  If the patient is complaining of discomfort due to urinary retention, notify urology -- will consider catheter placement at that time.

## 2021-09-27 NOTE — CONSULT NOTE ADULT - SUBJECTIVE AND OBJECTIVE BOX
PULMONARY CONSULT    HPI: 89 y/o M with PMH of afib (no AC), HTN, DM2, BPH, dementia. Brought to ED by daughter (Alexia) from [facility] with c/o chills, weakness, and worsening SOB x1 day. On triage, febrile (tmax 100.5F), tachycardic (105), with o2 sats 91% on RA. Mild leukocytosis, RVP negative. CT chest with b/l pleural effusion (L>R), and JIMENA opacity with concern for PNA, started on IV ABX. Pulmonary called to consult for further management.       PAST MEDICAL & SURGICAL HISTORY:  HTN (hypertension)  HLD (hyperlipidemia)  DM (diabetes mellitus)  BPH (benign prostatic hypertrophy)  Insomnia  RBBB  Chronic atrial fibrillation  Dementia  Unsteady gait  S/P shoulder surgery  S/P knee surgery    No Known Allergies    FAMILY HISTORY:  Family history of diabetes mellitus    Social history:     Review of Systems:  CONSTITUTIONAL: No fever, chills, or fatigue  EYES: No eye pain, visual disturbances, or discharge  ENMT:  No difficulty hearing, tinnitus, vertigo; No sinus or throat pain  NECK: No pain or stiffness  RESPIRATORY: Per above  CARDIOVASCULAR: No chest pain, palpitations, dizziness, or leg swelling  GASTROINTESTINAL: No abdominal or epigastric pain. No nausea, vomiting, or hematemesis; No diarrhea or constipation. No melena or hematochezia.  GENITOURINARY: No dysuria, frequency, hematuria, or incontinence  NEUROLOGICAL: No headaches, memory loss, loss of strength, numbness, or tremors  SKIN: No itching, burning, rashes, or lesions   MUSCULOSKELETAL: No joint pain or swelling; No muscle, back, or extremity pain  PSYCHIATRIC: No depression, anxiety, mood swings, or difficulty sleeping    Medications:  MEDICATIONS  (STANDING):  aspirin enteric coated 81 milliGRAM(s) Oral daily  azithromycin  IVPB 500 milliGRAM(s) IV Intermittent every 24 hours  buMETAnide 0.5 milliGRAM(s) Oral <User Schedule>  cefTRIAXone   IVPB 1000 milliGRAM(s) IV Intermittent every 24 hours  dextrose 40% Gel 15 Gram(s) Oral once  dextrose 5%. 1000 milliLiter(s) (50 mL/Hr) IV Continuous <Continuous>  dextrose 5%. 1000 milliLiter(s) (100 mL/Hr) IV Continuous <Continuous>  dextrose 50% Injectable 25 Gram(s) IV Push once  dextrose 50% Injectable 12.5 Gram(s) IV Push once  dextrose 50% Injectable 25 Gram(s) IV Push once  donepezil 5 milliGRAM(s) Oral daily  enoxaparin Injectable 40 milliGRAM(s) SubCutaneous daily  finasteride 5 milliGRAM(s) Oral daily  glucagon  Injectable 1 milliGRAM(s) IntraMuscular once  insulin lispro (ADMELOG) corrective regimen sliding scale   SubCutaneous three times a day before meals  insulin lispro (ADMELOG) corrective regimen sliding scale   SubCutaneous at bedtime  melatonin 3 milliGRAM(s) Oral at bedtime  memantine 10 milliGRAM(s) Oral two times a day  metoprolol succinate ER 25 milliGRAM(s) Oral daily  mirtazapine 7.5 milliGRAM(s) Oral daily  QUEtiapine 12.5 milliGRAM(s) Oral at bedtime  thiamine 100 milliGRAM(s) Oral daily    Vital Signs Last 24 Hrs  T(C): 36.7 (27 Sep 2021 03:54), Max: 38.1 (26 Sep 2021 18:51)  T(F): 98.1 (27 Sep 2021 03:54), Max: 100.5 (26 Sep 2021 18:51)  HR: 71 (27 Sep 2021 03:54) (71 - 105)  BP: 114/68 (27 Sep 2021 03:54) (98/72 - 152/82)  BP(mean): 80 (26 Sep 2021 23:20) (80 - 86)  RR: 19 (27 Sep 2021 03:54) (16 - 26)  SpO2: 96% (27 Sep 2021 03:54) (91% - 97%)    VBG pH 7.34 09-26 @ 13:39  VBG pCO2 46 09-26 @ 13:39  VBG O2 sat 20.4 09-26 @ 13:39  VBG lactate 2.3 09-26 @ 13:39      LABS:                        11.9   7.42  )-----------( 132      ( 27 Sep 2021 07:04 )             38.0     09-27    136  |  101  |  14  ----------------------------<  122<H>  4.8   |  20<L>  |  0.81    Ca    8.7      27 Sep 2021 07:04    TPro  7.9  /  Alb  4.3  /  TBili  1.0  /  DBili  x   /  AST  23  /  ALT  19  /  AlkPhos  114  09-26    CAPILLARY BLOOD GLUCOSE  POCT Blood Glucose.: 103 mg/dL (27 Sep 2021 09:03)    PT/INR - ( 26 Sep 2021 13:41 )   PT: 14.2 sec;   INR: 1.19 ratio    PTT - ( 26 Sep 2021 13:41 )  PTT:30.5 sec  Urinalysis Basic - ( 27 Sep 2021 00:22 )    Color: Red / Appearance: Turbid / SG: >1.050 / pH: x  Gluc: x / Ketone: Negative  / Bili: Negative / Urobili: Negative   Blood: x / Protein: >600 / Nitrite: Negative   Leuk Esterase: Negative / RBC: >50 /hpf / WBC 61 /HPF   Sq Epi: x / Non Sq Epi: x / Bacteria: Negative    Physical Examination:    General: No acute distress.      HEENT: Pupils equal, reactive to light.  Symmetric.    PULM:     CVS:     ABD: Soft, nondistended, nontender, normoactive bowel sounds, no masses    EXT: No edema, nontender    SKIN: Warm and well perfused, no rashes noted.    NEURO: Alert, oriented, interactive, nonfocal    RADIOLOGY REVIEWED  CT chest: < from: CT Chest w/ IV Cont (09.26.21 @ 17:30) >    FINDINGS:  CHEST:  LUNGS AND LARGE AIRWAYS: Patent central airways. Left upper lobe alveolar opacity. Bilateral lower lobe dependent atelectasis.  PLEURA: Bilateral pleural effusions, large on the left and small to moderate on the right.  VESSELS: Atheromatous calcifications of the thoracic aorta without aneurysmal dilatation. Coronary artery calcifications.  HEART: Heart size is normal. Transvenous pacemaker. Status postTAVR. Mitral annular calcification. Watchman device. No pericardial effusion.  MEDIASTINUM AND YONY: No lymphadenopathy.  CHEST WALL AND LOWER NECK: Mildly enlarged right supraclavicular lymph nodes.    ABDOMEN AND PELVIS:  LIVER: Within normal limits.  BILE DUCTS: Normal caliber.  GALLBLADDER: Small gallstones.  SPLEEN: 1 cm splenule.  PANCREAS: Within normal limits.  ADRENALS: Within normal limits.  KIDNEYS/URETERS: Within normal limits.    BLADDER: Incompletely distended. Mild mural thickening.  REPRODUCTIVE ORGANS: Enlarged prostate.    BOWEL: Colonic diverticulosis. No diverticulitis. No bowel obstruction. Small direct esophageal hiatal hernia. Appendix not visualized.  PERITONEUM: No ascites.  VESSELS: Atheromatous calcifications.  RETROPERITONEUM/LYMPH NODES: No lymphadenopathy.  ABDOMINAL WALL: Within normal limits.  BONES: Dextroscoliosis of thoracolumbar spine with accompanying degenerative changes.    IMPRESSION:  Left upper lobe pneumonia.    Bilateral pleural effusions.        < end of copied text >     PULMONARY CONSULT    HPI: 89 y/o M with PMH of afib (no AC), HTN, DM2, BPH, dementia. Brought to ED by daughter (Alexia) from [facility] with c/o chills, weakness, and worsening SOB x1 day. On triage, febrile (tmax 100.5F), tachycardic (105), with o2 sats 91% on RA. Mild leukocytosis, RVP negative. CT chest with b/l pleural effusion (L>R), and JIMENA opacity with concern for PNA, started on IV ABX. Pulmonary called to consult for further management. Pt A&O x1-2, alert & awake, denies any hx of smoking or lung disease, but noted to be a poor historian. At this time, in no acute distress. Denies CP, SOB, cough, sputum production. Notes that his only complaint is having difficult walking. Breathing comfortably on 3LNC, O2 sats 98%.    PAST MEDICAL & SURGICAL HISTORY:  HTN (hypertension)  HLD (hyperlipidemia)  DM (diabetes mellitus)  BPH (benign prostatic hypertrophy)  Insomnia  RBBB  Chronic atrial fibrillation  Dementia  Unsteady gait  S/P shoulder surgery  S/P knee surgery    No Known Allergies    FAMILY HISTORY:  Family history of diabetes mellitus    Social history: denies smoking, poor historian     Review of Systems: Per above but AVIVA fully; poor historian     Medications:  MEDICATIONS  (STANDING):  aspirin enteric coated 81 milliGRAM(s) Oral daily  azithromycin  IVPB 500 milliGRAM(s) IV Intermittent every 24 hours  buMETAnide 0.5 milliGRAM(s) Oral <User Schedule>  cefTRIAXone   IVPB 1000 milliGRAM(s) IV Intermittent every 24 hours  dextrose 40% Gel 15 Gram(s) Oral once  dextrose 5%. 1000 milliLiter(s) (50 mL/Hr) IV Continuous <Continuous>  dextrose 5%. 1000 milliLiter(s) (100 mL/Hr) IV Continuous <Continuous>  dextrose 50% Injectable 25 Gram(s) IV Push once  dextrose 50% Injectable 12.5 Gram(s) IV Push once  dextrose 50% Injectable 25 Gram(s) IV Push once  donepezil 5 milliGRAM(s) Oral daily  enoxaparin Injectable 40 milliGRAM(s) SubCutaneous daily  finasteride 5 milliGRAM(s) Oral daily  glucagon  Injectable 1 milliGRAM(s) IntraMuscular once  insulin lispro (ADMELOG) corrective regimen sliding scale   SubCutaneous three times a day before meals  insulin lispro (ADMELOG) corrective regimen sliding scale   SubCutaneous at bedtime  melatonin 3 milliGRAM(s) Oral at bedtime  memantine 10 milliGRAM(s) Oral two times a day  metoprolol succinate ER 25 milliGRAM(s) Oral daily  mirtazapine 7.5 milliGRAM(s) Oral daily  QUEtiapine 12.5 milliGRAM(s) Oral at bedtime  thiamine 100 milliGRAM(s) Oral daily    Vital Signs Last 24 Hrs  T(C): 36.7 (27 Sep 2021 03:54), Max: 38.1 (26 Sep 2021 18:51)  T(F): 98.1 (27 Sep 2021 03:54), Max: 100.5 (26 Sep 2021 18:51)  HR: 71 (27 Sep 2021 03:54) (71 - 105)  BP: 114/68 (27 Sep 2021 03:54) (98/72 - 152/82)  BP(mean): 80 (26 Sep 2021 23:20) (80 - 86)  RR: 19 (27 Sep 2021 03:54) (16 - 26)  SpO2: 96% (27 Sep 2021 03:54) (91% - 97%)    VBG pH 7.34 09-26 @ 13:39  VBG pCO2 46 09-26 @ 13:39  VBG O2 sat 20.4 09-26 @ 13:39  VBG lactate 2.3 09-26 @ 13:39    LABS:                        11.9   7.42  )-----------( 132      ( 27 Sep 2021 07:04 )             38.0     09-27    136  |  101  |  14  ----------------------------<  122<H>  4.8   |  20<L>  |  0.81    Ca    8.7      27 Sep 2021 07:04    TPro  7.9  /  Alb  4.3  /  TBili  1.0  /  DBili  x   /  AST  23  /  ALT  19  /  AlkPhos  114  09-26    CAPILLARY BLOOD GLUCOSE  POCT Blood Glucose.: 103 mg/dL (27 Sep 2021 09:03)    PT/INR - ( 26 Sep 2021 13:41 )   PT: 14.2 sec;   INR: 1.19 ratio    PTT - ( 26 Sep 2021 13:41 )  PTT:30.5 sec  Urinalysis Basic - ( 27 Sep 2021 00:22 )    Color: Red / Appearance: Turbid / SG: >1.050 / pH: x  Gluc: x / Ketone: Negative  / Bili: Negative / Urobili: Negative   Blood: x / Protein: >600 / Nitrite: Negative   Leuk Esterase: Negative / RBC: >50 /hpf / WBC 61 /HPF   Sq Epi: x / Non Sq Epi: x / Bacteria: Negative    Physical Examination:    General: No acute distress.      HEENT: Pupils equal, reactive to light.  Symmetric.    PULM: Decreased BS L    CVS: RRR    ABD: Soft, nondistended, nontender, normoactive bowel sounds, no masses    EXT: No edema, nontender    SKIN: Warm and well perfused, no rashes noted.    NEURO: Alert, interactive, A&O 1-2    RADIOLOGY REVIEWED  CT chest: < from: CT Chest w/ IV Cont (09.26.21 @ 17:30) >    FINDINGS:  CHEST:  LUNGS AND LARGE AIRWAYS: Patent central airways. Left upper lobe alveolar opacity. Bilateral lower lobe dependent atelectasis.  PLEURA: Bilateral pleural effusions, large on the left and small to moderate on the right.  VESSELS: Atheromatous calcifications of the thoracic aorta without aneurysmal dilatation. Coronary artery calcifications.  HEART: Heart size is normal. Transvenous pacemaker. Status postTAVR. Mitral annular calcification. Watchman device. No pericardial effusion.  MEDIASTINUM AND YONY: No lymphadenopathy.  CHEST WALL AND LOWER NECK: Mildly enlarged right supraclavicular lymph nodes.    ABDOMEN AND PELVIS:  LIVER: Within normal limits.  BILE DUCTS: Normal caliber.  GALLBLADDER: Small gallstones.  SPLEEN: 1 cm splenule.  PANCREAS: Within normal limits.  ADRENALS: Within normal limits.  KIDNEYS/URETERS: Within normal limits.    BLADDER: Incompletely distended. Mild mural thickening.  REPRODUCTIVE ORGANS: Enlarged prostate.    BOWEL: Colonic diverticulosis. No diverticulitis. No bowel obstruction. Small direct esophageal hiatal hernia. Appendix not visualized.  PERITONEUM: No ascites.  VESSELS: Atheromatous calcifications.  RETROPERITONEUM/LYMPH NODES: No lymphadenopathy.  ABDOMINAL WALL: Within normal limits.  BONES: Dextroscoliosis of thoracolumbar spine with accompanying degenerative changes.    IMPRESSION:  Left upper lobe pneumonia.    Bilateral pleural effusions.        < end of copied text >

## 2021-09-27 NOTE — CONSULT NOTE ADULT - PROBLEM SELECTOR RECOMMENDATION 3
Per urology Likely 2nd to b/l pleural effusions with possible underlying PNA  -O2 sats 91-92% on triage on RA  -Keep sats >90% with supplemental O2 (currently 3LNC)

## 2021-09-27 NOTE — CONSULT NOTE ADULT - SUBJECTIVE AND OBJECTIVE BOX
CHIEF COMPLAINT:Patient is a 89y old  Male who presents with a chief complaint of fever and  chills (26 Sep 2021 23:59)      HISTORY OF PRESENT ILLNESS:    89 male with history as below , long standing afib, BPH, DM II, Dementia, admitted with sob, weakness found to have PNA , pos hematuria   Due to pt's mental status, subjective information were not able to be obtained from the patient. History was obtained, to the extent possible, from review of the chart and collateral sources of information.     PAST MEDICAL & SURGICAL HISTORY:  HTN (hypertension)    HLD (hyperlipidemia)    DM (diabetes mellitus)    BPH (benign prostatic hypertrophy)    Insomnia    RBBB    Chronic atrial fibrillation    Dementia    Unsteady gait    S/P shoulder surgery    S/P knee surgery            MEDICATIONS:  aspirin enteric coated 81 milliGRAM(s) Oral daily  buMETAnide 0.5 milliGRAM(s) Oral <User Schedule>  enoxaparin Injectable 40 milliGRAM(s) SubCutaneous daily  metoprolol succinate ER 25 milliGRAM(s) Oral daily    azithromycin  IVPB 500 milliGRAM(s) IV Intermittent every 24 hours  cefTRIAXone   IVPB 1000 milliGRAM(s) IV Intermittent every 24 hours      donepezil 5 milliGRAM(s) Oral daily  melatonin 3 milliGRAM(s) Oral at bedtime  memantine 10 milliGRAM(s) Oral two times a day  mirtazapine 7.5 milliGRAM(s) Oral daily  QUEtiapine 12.5 milliGRAM(s) Oral at bedtime      dextrose 40% Gel 15 Gram(s) Oral once  dextrose 50% Injectable 25 Gram(s) IV Push once  dextrose 50% Injectable 12.5 Gram(s) IV Push once  dextrose 50% Injectable 25 Gram(s) IV Push once  finasteride 5 milliGRAM(s) Oral daily  glucagon  Injectable 1 milliGRAM(s) IntraMuscular once  insulin lispro (ADMELOG) corrective regimen sliding scale   SubCutaneous three times a day before meals  insulin lispro (ADMELOG) corrective regimen sliding scale   SubCutaneous at bedtime    dextrose 5%. 1000 milliLiter(s) IV Continuous <Continuous>  dextrose 5%. 1000 milliLiter(s) IV Continuous <Continuous>  thiamine 100 milliGRAM(s) Oral daily      FAMILY HISTORY:  Family history of diabetes mellitus        Non-contributory    SOCIAL HISTORY:    No tobacco, drugs or etoh    Allergies    No Known Allergies    Intolerances    	    REVIEW OF SYSTEMS:  as above  The rest of the 14 points ROS reviewed and except above they are unremarkable.        PHYSICAL EXAM:  T(C): 36.7 (09-27-21 @ 03:54), Max: 38.1 (09-26-21 @ 18:51)  HR: 71 (09-27-21 @ 03:54) (71 - 105)  BP: 114/68 (09-27-21 @ 03:54) (98/72 - 152/82)  RR: 19 (09-27-21 @ 03:54) (16 - 26)  SpO2: 96% (09-27-21 @ 03:54) (91% - 97%)  Wt(kg): --  I&O's Summary      JVP: Normal  Neck: supple  Lung: clear   CV: S1 S2 , Murmur:  Abd: soft  Ext: No edema  neuro: Awake   Psych: flat affect  Skin: normal     LABS/DATA:    TELEMETRY: 	    ECG:  	   	  CARDIAC MARKERS:                                      11.9   7.42  )-----------( 132      ( 27 Sep 2021 07:04 )             38.0     09-27    136  |  101  |  14  ----------------------------<  122<H>  4.8   |  20<L>  |  0.81    Ca    8.7      27 Sep 2021 07:04    TPro  7.9  /  Alb  4.3  /  TBili  1.0  /  DBili  x   /  AST  23  /  ALT  19  /  AlkPhos  114  09-26    proBNP:   Lipid Profile:   HgA1c:   TSH:       < from: CT Chest w/ IV Cont (09.26.21 @ 17:30) >  IMPRESSION:  Left upper lobe pneumonia.    Bilateral pleural effusions.        --- End of Report ---    < end of copied text >

## 2021-09-27 NOTE — CONSULT NOTE ADULT - PROBLEM SELECTOR RECOMMENDATION 2
Likely 2nd to b/l pleural effusions with possible underlying PNA  -O2 sats 91-92% on triage on RA  -Keep sats >90% with supplemental O2 (currently 3LNC) CT chest with b/l pleural effusions, L>R  -Pro BNP >8000  -Keep O>I as tolerated  -Cards following  -F/u TTE

## 2021-09-27 NOTE — CONSULT NOTE ADULT - ASSESSMENT
SOB  pos Pl effusion   Pod PNA  anbx  consider pulm eval   echo     long standing afib  HR stable  cont BB  off a/d due to hematuria , high bleed risk     HTN  cont current meds     DM II  Monitor finger stick. Insulin coverage. Diabetic education and Diabetic diet. Consider nutrition consultation.

## 2021-09-28 LAB
ANION GAP SERPL CALC-SCNC: 11 MMOL/L — SIGNIFICANT CHANGE UP (ref 5–17)
BLD GP AB SCN SERPL QL: NEGATIVE — SIGNIFICANT CHANGE UP
BUN SERPL-MCNC: 15 MG/DL — SIGNIFICANT CHANGE UP (ref 7–23)
CALCIUM SERPL-MCNC: 8.5 MG/DL — SIGNIFICANT CHANGE UP (ref 8.4–10.5)
CHLORIDE SERPL-SCNC: 103 MMOL/L — SIGNIFICANT CHANGE UP (ref 96–108)
CO2 SERPL-SCNC: 21 MMOL/L — LOW (ref 22–31)
CREAT SERPL-MCNC: 0.85 MG/DL — SIGNIFICANT CHANGE UP (ref 0.5–1.3)
GLUCOSE BLDC GLUCOMTR-MCNC: 104 MG/DL — HIGH (ref 70–99)
GLUCOSE BLDC GLUCOMTR-MCNC: 115 MG/DL — HIGH (ref 70–99)
GLUCOSE BLDC GLUCOMTR-MCNC: 140 MG/DL — HIGH (ref 70–99)
GLUCOSE BLDC GLUCOMTR-MCNC: 147 MG/DL — HIGH (ref 70–99)
GLUCOSE SERPL-MCNC: 120 MG/DL — HIGH (ref 70–99)
HCT VFR BLD CALC: 35.1 % — LOW (ref 39–50)
HGB BLD-MCNC: 11.6 G/DL — LOW (ref 13–17)
LEGIONELLA AG UR QL: NEGATIVE — SIGNIFICANT CHANGE UP
MCHC RBC-ENTMCNC: 30 PG — SIGNIFICANT CHANGE UP (ref 27–34)
MCHC RBC-ENTMCNC: 33 GM/DL — SIGNIFICANT CHANGE UP (ref 32–36)
MCV RBC AUTO: 90.7 FL — SIGNIFICANT CHANGE UP (ref 80–100)
NRBC # BLD: 0 /100 WBCS — SIGNIFICANT CHANGE UP (ref 0–0)
PLATELET # BLD AUTO: 124 K/UL — LOW (ref 150–400)
POTASSIUM SERPL-MCNC: 3.9 MMOL/L — SIGNIFICANT CHANGE UP (ref 3.5–5.3)
POTASSIUM SERPL-SCNC: 3.9 MMOL/L — SIGNIFICANT CHANGE UP (ref 3.5–5.3)
RBC # BLD: 3.87 M/UL — LOW (ref 4.2–5.8)
RBC # FLD: 13.9 % — SIGNIFICANT CHANGE UP (ref 10.3–14.5)
RH IG SCN BLD-IMP: POSITIVE — SIGNIFICANT CHANGE UP
SODIUM SERPL-SCNC: 135 MMOL/L — SIGNIFICANT CHANGE UP (ref 135–145)
WBC # BLD: 6.08 K/UL — SIGNIFICANT CHANGE UP (ref 3.8–10.5)
WBC # FLD AUTO: 6.08 K/UL — SIGNIFICANT CHANGE UP (ref 3.8–10.5)

## 2021-09-28 PROCEDURE — 99231 SBSQ HOSP IP/OBS SF/LOW 25: CPT

## 2021-09-28 RX ORDER — ACETAMINOPHEN 500 MG
650 TABLET ORAL ONCE
Refills: 0 | Status: COMPLETED | OUTPATIENT
Start: 2021-09-28 | End: 2021-09-28

## 2021-09-28 RX ADMIN — AZITHROMYCIN 250 MILLIGRAM(S): 500 TABLET, FILM COATED ORAL at 01:54

## 2021-09-28 RX ADMIN — Medication 3 MILLIGRAM(S): at 22:32

## 2021-09-28 RX ADMIN — PIPERACILLIN AND TAZOBACTAM 25 GRAM(S): 4; .5 INJECTION, POWDER, LYOPHILIZED, FOR SOLUTION INTRAVENOUS at 13:27

## 2021-09-28 RX ADMIN — Medication 81 MILLIGRAM(S): at 12:44

## 2021-09-28 RX ADMIN — MEMANTINE HYDROCHLORIDE 10 MILLIGRAM(S): 10 TABLET ORAL at 17:43

## 2021-09-28 RX ADMIN — MEMANTINE HYDROCHLORIDE 10 MILLIGRAM(S): 10 TABLET ORAL at 05:55

## 2021-09-28 RX ADMIN — DONEPEZIL HYDROCHLORIDE 5 MILLIGRAM(S): 10 TABLET, FILM COATED ORAL at 12:44

## 2021-09-28 RX ADMIN — QUETIAPINE FUMARATE 12.5 MILLIGRAM(S): 200 TABLET, FILM COATED ORAL at 22:31

## 2021-09-28 RX ADMIN — PIPERACILLIN AND TAZOBACTAM 25 GRAM(S): 4; .5 INJECTION, POWDER, LYOPHILIZED, FOR SOLUTION INTRAVENOUS at 22:32

## 2021-09-28 RX ADMIN — Medication 25 MILLIGRAM(S): at 05:55

## 2021-09-28 RX ADMIN — MIRTAZAPINE 7.5 MILLIGRAM(S): 45 TABLET, ORALLY DISINTEGRATING ORAL at 12:44

## 2021-09-28 RX ADMIN — PIPERACILLIN AND TAZOBACTAM 25 GRAM(S): 4; .5 INJECTION, POWDER, LYOPHILIZED, FOR SOLUTION INTRAVENOUS at 05:51

## 2021-09-28 RX ADMIN — FINASTERIDE 5 MILLIGRAM(S): 5 TABLET, FILM COATED ORAL at 12:45

## 2021-09-28 RX ADMIN — Medication 650 MILLIGRAM(S): at 22:47

## 2021-09-28 RX ADMIN — Medication 100 MILLIGRAM(S): at 12:44

## 2021-09-28 NOTE — PROGRESS NOTE ADULT - ASSESSMENT
SOB  pos Pl effusion   PNA  anbx  appreciate pulm eval   echo reviewed    acute diastolic CHF  due to underlying ? amyloid , and valvular heart disease   on diuretics     long standing afib  HR stable  cont BB  off a/d due to hematuria , high bleed risk     HTN  cont current meds     DM II  Monitor finger stick. Insulin coverage. Diabetic education and Diabetic diet. Consider nutrition consultation.

## 2021-09-28 NOTE — PROGRESS NOTE ADULT - ASSESSMENT
89yo male pt with PMHx of Afib (no AC), HTN, BPH, DM2, Dementia was brought to ED by his daughter (Alexia) from [facility] ([phone]) c/o feeling not well, chills, shaking, and SOB this morning. Pt's poor historian (A&Ox1, hard hearing). As per the daughter, she got a phone call and was informed pt's not well. When she went to [facility] she noticed pt's generalized weak, shaking, shivering, grinding teeth, difficult ambulation with SOB. Pt normally walks with his walker but he was not able to ambulate today. (26 Sep 2021 19:55)    ER vs:  Tmax 100.5 (R), P 98, /92, on 3-4L NC.  WBC 11.2 --> 7.4.  Lact 2.3.  UA (-)nit/LE.  UCx (-).  RVP and covid pcr (-).  Cxr showed L pleural effusion.  CT chest shows JIMENA pna and b/l pleural effusions.  CTap shows enlarged prostate.      Pt on rocephin/azithro for CAP.  ID consult called for further abx management.       JIMENA pna:    - Pt with low grade fever, leukocytosis, elevated lactate, feeling weak.    - Abx broadened to azithro/zosyn to cover for possible aspiration pna.  f/u S&S  - f/u LEg Ag  - f/u bcx  - WBC and temp curve improving, cont to monitor.    Hematuria:    - UA (-) nit/LE.  Ucx no growth, no evidence for UTI.  Pt with large blood.  s/p bladder scan of 210ml    - Urology f/u noted.  Cont to monitor UO, condom cath in place.     - f/u repeat UA following hydration.       Will follow,    Oxana Koch  [phone]

## 2021-09-28 NOTE — PROGRESS NOTE ADULT - ASSESSMENT
89yMale with PMHx of Afib (no AC), HTN, BPH, DM2, Dementia was brought to ED by his daughter (Alexia) from [facility] ([phone]) c/o feeling not well, chills, shaking, and SOB this morning and admitted to medicine after found to have pneumonia. In ED: patient with a white count of 11.21, Crit 44.4, Cr 0.77. UA with large blood, RBC 86, color on UA yellow. Urine color improved, no clots.     - please place a condom cath to better monitor patient's I&O's and monitor urine color  - no CBI or collazo needed at this time since patient is adequately emptying his bladder, recent --but patient is comfortable  - encourage increased hydration to help clear urine   - urine cx no growth  - if patient starts to start retaining large amounts of urine, abdomen becomes distended or patient becomes uncomfortable or urine color worsens with clots please page urology at [phone]  - avoid urethral catheterization as patient highly likely to remove or pull at catheter given baseline dementia  - no further urologic investigations or interventions warranted at this time, please do not hesitate to contact urology should any additional urologic concerns or questions arise; [phone] 89yMale with PMHx of Afib (no AC), HTN, BPH, DM2, Dementia was brought to ED by his daughter (Alexia) from [facility] ([phone]) c/o feeling not well, chills, shaking, and SOB this morning and admitted to medicine after found to have pneumonia. In ED: patient with a white count of 11.21, Crit 44.4, Cr 0.77. UA with large blood, RBC 86, color on UA yellow. Urine color improved, no clots.     - continue condom cath to better monitor patient's I&O's and monitor urine color  - no CBI or collazo needed at this time since patient is adequately emptying his bladder, will tolerate higher PVRs so long as patient is comfortable and urine color remains acceptable without clots  - encourage increased hydration to help clear urine   - urine cx no growth  - if patient starts to start retaining large amounts of urine, abdomen becomes distended or patient becomes uncomfortable or urine color worsens with clots please page urology at [phone]  - avoid urethral catheterization as patient highly likely to remove or pull at catheter given baseline dementia  - trend H/H, transfuse prn per primary team  - discussed with Dr. Laureano

## 2021-09-28 NOTE — PROGRESS NOTE ADULT - ASSESSMENT
89yo male pt with PMHx of Afib (no AC), HTN, BPH, DM2, Dementia was brought to ED by his daughter (Alexia) from [facility] ([phone]) c/o feeling not well, chills, shaking, and SOB this morning. Pt's poor historian (A&Ox1, hard hearing). As per the daughter, she got a phone call and was informed pt's not well. When she went to [facility] she noticed pt's generalized weak, shaking, shivering, grinding teeth, difficult ambulation with SOB. Pt normally walks with his walker but he was not able to ambulate today.     Problem/Plan - 1:  ·  Problem: Sepsis.   ·  Plan: Present on admission as has Fever, tachycardia and Leucocytosis .   S/P cultures and starting IV Abxs.   Hemodynamically  stable.     Problem/Plan - 2:  ·  Problem: Pneumonia.   ·  Plan: IV ABxs . Pulmonary helping.      Problem/Plan - 3:  ·  Problem: Pleural effusion.   ·  Plan: Pulmonary helping .      Will check TTE also.     Problem/Plan - 4:  ·  Problem: Chronic atrial fibrillation.   ·  Plan: Not on AC . Cards following.      Problem/Plan - 5:  ·  Problem: HTN (hypertension).   ·  Plan: BP meds with hold parameters.     Problem/Plan - 6:  ·  Problem: BPH without urinary obstruction.   ·  Plan: On Finasteride.     Problem/Plan - 7:  ·  Problem: Diabetes mellitus.   ·  Plan: Holding PO meds and SSI for now.     Problem/Plan - 8:  ·  Problem: Dementia.   ·  Plan: supportive care.     Problem/Plan - 9:  ·  Problem: Hematuria .   ·  Plan: urology helping. Will hold Lovenox and put SCD for DVT prophylaxis.     Disposition : D/W pts son Simone in detail .

## 2021-09-29 LAB
ANION GAP SERPL CALC-SCNC: 13 MMOL/L — SIGNIFICANT CHANGE UP (ref 5–17)
BUN SERPL-MCNC: 13 MG/DL — SIGNIFICANT CHANGE UP (ref 7–23)
CALCIUM SERPL-MCNC: 8.8 MG/DL — SIGNIFICANT CHANGE UP (ref 8.4–10.5)
CHLORIDE SERPL-SCNC: 103 MMOL/L — SIGNIFICANT CHANGE UP (ref 96–108)
CO2 SERPL-SCNC: 22 MMOL/L — SIGNIFICANT CHANGE UP (ref 22–31)
CREAT SERPL-MCNC: 0.86 MG/DL — SIGNIFICANT CHANGE UP (ref 0.5–1.3)
GLUCOSE BLDC GLUCOMTR-MCNC: 103 MG/DL — HIGH (ref 70–99)
GLUCOSE BLDC GLUCOMTR-MCNC: 137 MG/DL — HIGH (ref 70–99)
GLUCOSE BLDC GLUCOMTR-MCNC: 140 MG/DL — HIGH (ref 70–99)
GLUCOSE BLDC GLUCOMTR-MCNC: 146 MG/DL — HIGH (ref 70–99)
GLUCOSE SERPL-MCNC: 124 MG/DL — HIGH (ref 70–99)
HCT VFR BLD CALC: 35.8 % — LOW (ref 39–50)
HGB BLD-MCNC: 11.8 G/DL — LOW (ref 13–17)
MCHC RBC-ENTMCNC: 29.8 PG — SIGNIFICANT CHANGE UP (ref 27–34)
MCHC RBC-ENTMCNC: 33 GM/DL — SIGNIFICANT CHANGE UP (ref 32–36)
MCV RBC AUTO: 90.4 FL — SIGNIFICANT CHANGE UP (ref 80–100)
NRBC # BLD: 0 /100 WBCS — SIGNIFICANT CHANGE UP (ref 0–0)
PLATELET # BLD AUTO: 134 K/UL — LOW (ref 150–400)
POTASSIUM SERPL-MCNC: 3.9 MMOL/L — SIGNIFICANT CHANGE UP (ref 3.5–5.3)
POTASSIUM SERPL-SCNC: 3.9 MMOL/L — SIGNIFICANT CHANGE UP (ref 3.5–5.3)
RBC # BLD: 3.96 M/UL — LOW (ref 4.2–5.8)
RBC # FLD: 13.8 % — SIGNIFICANT CHANGE UP (ref 10.3–14.5)
SODIUM SERPL-SCNC: 138 MMOL/L — SIGNIFICANT CHANGE UP (ref 135–145)
WBC # BLD: 5.7 K/UL — SIGNIFICANT CHANGE UP (ref 3.8–10.5)
WBC # FLD AUTO: 5.7 K/UL — SIGNIFICANT CHANGE UP (ref 3.8–10.5)

## 2021-09-29 PROCEDURE — 99231 SBSQ HOSP IP/OBS SF/LOW 25: CPT

## 2021-09-29 RX ORDER — POLYETHYLENE GLYCOL 3350 17 G/17G
17 POWDER, FOR SOLUTION ORAL DAILY
Refills: 0 | Status: DISCONTINUED | OUTPATIENT
Start: 2021-09-29 | End: 2021-10-01

## 2021-09-29 RX ORDER — IPRATROPIUM/ALBUTEROL SULFATE 18-103MCG
3 AEROSOL WITH ADAPTER (GRAM) INHALATION EVERY 6 HOURS
Refills: 0 | Status: DISCONTINUED | OUTPATIENT
Start: 2021-09-29 | End: 2021-10-01

## 2021-09-29 RX ADMIN — PIPERACILLIN AND TAZOBACTAM 25 GRAM(S): 4; .5 INJECTION, POWDER, LYOPHILIZED, FOR SOLUTION INTRAVENOUS at 05:18

## 2021-09-29 RX ADMIN — Medication 3 MILLIGRAM(S): at 21:37

## 2021-09-29 RX ADMIN — AZITHROMYCIN 250 MILLIGRAM(S): 500 TABLET, FILM COATED ORAL at 02:47

## 2021-09-29 RX ADMIN — MEMANTINE HYDROCHLORIDE 10 MILLIGRAM(S): 10 TABLET ORAL at 17:33

## 2021-09-29 RX ADMIN — POLYETHYLENE GLYCOL 3350 17 GRAM(S): 17 POWDER, FOR SOLUTION ORAL at 12:50

## 2021-09-29 RX ADMIN — MEMANTINE HYDROCHLORIDE 10 MILLIGRAM(S): 10 TABLET ORAL at 05:18

## 2021-09-29 RX ADMIN — MIRTAZAPINE 7.5 MILLIGRAM(S): 45 TABLET, ORALLY DISINTEGRATING ORAL at 12:51

## 2021-09-29 RX ADMIN — FINASTERIDE 5 MILLIGRAM(S): 5 TABLET, FILM COATED ORAL at 12:51

## 2021-09-29 RX ADMIN — BUMETANIDE 0.5 MILLIGRAM(S): 0.25 INJECTION INTRAMUSCULAR; INTRAVENOUS at 11:30

## 2021-09-29 RX ADMIN — Medication 3 MILLILITER(S): at 12:52

## 2021-09-29 RX ADMIN — QUETIAPINE FUMARATE 12.5 MILLIGRAM(S): 200 TABLET, FILM COATED ORAL at 21:37

## 2021-09-29 RX ADMIN — Medication 100 MILLIGRAM(S): at 12:52

## 2021-09-29 RX ADMIN — Medication 650 MILLIGRAM(S): at 01:47

## 2021-09-29 RX ADMIN — DONEPEZIL HYDROCHLORIDE 5 MILLIGRAM(S): 10 TABLET, FILM COATED ORAL at 12:51

## 2021-09-29 RX ADMIN — PIPERACILLIN AND TAZOBACTAM 25 GRAM(S): 4; .5 INJECTION, POWDER, LYOPHILIZED, FOR SOLUTION INTRAVENOUS at 13:14

## 2021-09-29 RX ADMIN — Medication 25 MILLIGRAM(S): at 05:18

## 2021-09-29 RX ADMIN — Medication 3 MILLILITER(S): at 17:34

## 2021-09-29 RX ADMIN — PIPERACILLIN AND TAZOBACTAM 25 GRAM(S): 4; .5 INJECTION, POWDER, LYOPHILIZED, FOR SOLUTION INTRAVENOUS at 21:37

## 2021-09-29 NOTE — PROGRESS NOTE ADULT - ASSESSMENT
89yo male pt with PMHx of Afib (no AC), HTN, BPH, DM2, Dementia was brought to ED by his daughter (Alexia) from [facility] ([phone]) c/o feeling not well, chills, shaking, and SOB this morning. Pt's poor historian (A&Ox1, hard hearing). As per the daughter, she got a phone call and was informed pt's not well. When she went to [facility] she noticed pt's generalized weak, shaking, shivering, grinding teeth, difficult ambulation with SOB. Pt normally walks with his walker but he was not able to ambulate today.     Problem/Plan - 1:  ·  Problem: Sepsis.   ·  Plan: Present on admission as has Fever, tachycardia and Leucocytosis .   S/P cultures and starting IV Abxs.   Hemodynamically  stable.     Problem/Plan - 2:  ·  Problem: Pneumonia.   ·  Plan: IV ABxs . Pulmonary helping.      Problem/Plan - 3:  ·  Problem: Pleural effusion.   ·  Plan: Pulmonary helping .      Will check TTE also.     Problem/Plan - 4:  ·  Problem: Chronic atrial fibrillation.   ·  Plan: Not on AC . Cards following. Hold ASA as gross hematuria .      Problem/Plan - 5:  ·  Problem: HTN (hypertension).   ·  Plan: BP meds with hold parameters.     Problem/Plan - 6:  ·  Problem: BPH without urinary obstruction.   ·  Plan: On Finasteride.     Problem/Plan - 7:  ·  Problem: Diabetes mellitus.   ·  Plan: Holding PO meds and SSI for now.     Problem/Plan - 8:  ·  Problem: Dementia.   ·  Plan: supportive care.     Problem/Plan - 9:  ·  Problem: Hematuria .   ·  Plan: urology helping. Will hold Lovenox and ASA put SCD for DVT prophylaxis.     Disposition : D/W pts son daughter Alexia in great  detail .

## 2021-09-29 NOTE — SWALLOW BEDSIDE ASSESSMENT ADULT - ASR SWALLOW RECOMMEND DIAG
If MD/ team concerned for silent aspiration, can pursue MBS; however it is not expected to change clinical management based on presentation at bedside.

## 2021-09-29 NOTE — SWALLOW BEDSIDE ASSESSMENT ADULT - SWALLOW EVAL: DIAGNOSIS
Pt presents with a functional oropharyngeal swallow. 89 y/o M with PMH of afib (no AC), HTN, DM2, BPH, dementia. Brought to ED by daughter (Alexia) from [facility] with c/o chills, weakness, and worsening SOB x1 day, found to have a JIMENA PNA. Pt presents with a functional oropharyngeal swallow. Adequate oral prep, mastication, and bolus transfer observed across consistencies. Timely swallow trigger with palpable hyolaryngeal excursion. No overt signs of laryngeal penetration/aspiration.

## 2021-09-29 NOTE — PHYSICAL THERAPY INITIAL EVALUATION ADULT - PRECAUTIONS/LIMITATIONS, REHAB EVAL
Pt found to have PNA and bilateral pleural effusions. IMAGING: CT CHEAT and A/P: Left upper lobe pneumonia. Bilateral Pleural Effusions./fall precautions/hearing precautions

## 2021-09-29 NOTE — SWALLOW BEDSIDE ASSESSMENT ADULT - ASPIRATION PRECAUTIONS
Monitor for s/s aspiration/laryngeal penetration. If noted:  D/C p.o. intake, provide non-oral nutrition/hydration/meds, and contact this service @ [id number]

## 2021-09-29 NOTE — PROGRESS NOTE ADULT - ASSESSMENT
89yo male pt with PMHx of Afib (no AC), HTN, BPH, DM2, Dementia was brought to ED by his daughter (Alexia) from [facility] ([phone]) c/o feeling not well, chills, shaking, and SOB this morning. Pt's poor historian (A&Ox1, hard hearing). As per the daughter, she got a phone call and was informed pt's not well. When she went to [facility] she noticed pt's generalized weak, shaking, shivering, grinding teeth, difficult ambulation with SOB. Pt normally walks with his walker but he was not able to ambulate today. (26 Sep 2021 19:55)    ER vs:  Tmax 100.5 (R), P 98, /92, on 3-4L NC.  WBC 11.2 --> 7.4.  Lact 2.3.  UA (-)nit/LE.  UCx (-).  RVP and covid pcr (-).  Cxr showed L pleural effusion.  CT chest shows JIMENA pna and b/l pleural effusions.  CTap shows enlarged prostate.      Pt on rocephin/azithro for CAP.  ID consult called for further abx management.       JIMENA pna:    - Pt with low grade fever, leukocytosis, elevated lactate, feeling weak.    - Abx broadened zosyn to cover for possible aspiration pna.  f/u S&S:  recs appreciated.   - LEg Ag neg, azithro d/c'd on 9/29  - f/u bcx:  NGTD  - WBC and temp curve improving, cont to monitor.    Hematuria:    - UA (-) nit/LE.  Ucx no growth, no evidence for UTI.  Pt with large blood.  s/p bladder scan of 210ml    - Urology f/u noted.  Cont to monitor UO, condom cath in place.    * Recommend 7 day course of abx, can de-escalte to PO augmentin upon discharge      Will follow,    Oxana Koch  [phone]

## 2021-09-29 NOTE — PHYSICAL THERAPY INITIAL EVALUATION ADULT - PERTINENT HX OF CURRENT PROBLEM, REHAB EVAL
89yo male pt with PMHx of Afib (no AC), HTN, BPH, DM2, Dementia was brought to ED by his daughter (Alexia) from [facility] ([phone]) c/o feeling not well, chills, shaking, and SOB this morning. Pt's poor historian (A&Ox1, hard hearing). As per the daughter, she got a phone call and was informed pt's not well. When she went to [facility] she noticed pt's generalized weak, shaking, shivering, grinding teeth, difficult ambulation with SOB. Pt normally walks with RW.

## 2021-09-29 NOTE — SWALLOW BEDSIDE ASSESSMENT ADULT - COMMENTS
9/28: Per pulm -> ? aspiration component, will check speech & swallow. Per ID -> Abx broadened to azithro/zosyn to cover for possible aspiration pna.  f/u S&S    SWALLOW HISTORY: Pt is known to this service.     IMAGING: CT Chest 9 /26: IMPRESSION: Left upper lobe pneumonia.Bilateral pleural effusions.

## 2021-09-29 NOTE — PHYSICAL THERAPY INITIAL EVALUATION ADULT - ADDITIONAL COMMENTS
As per care coordination notes: Pt resides at [facility], requires assistance with ADLs and medication management. Additional services are provided for a private pay rate at [facility]. Pt owns walker, shower chair, and grab bars are in place. Ambulates with RW at baseline.

## 2021-09-29 NOTE — PROGRESS NOTE ADULT - ASSESSMENT
89 y/o M with PMH of afib (no AC), HTN, DM2, BPH, dementia. Brought to ED by daughter (Alexia) from [facility] with c/o chills, weakness, and worsening SOB x1 day. On triage, febrile (tmax 100.5F), tachycardic (105), with o2 sats 91% on RA. Mild leukocytosis, RVP negative. CT chest with b/l pleural effusion (L>R), and JIMENA opacity with concern for PNA, started on IV ABX.

## 2021-09-29 NOTE — PROGRESS NOTE ADULT - ASSESSMENT
SOB  pos Pl effusion   PNA  anbx  appreciate pulm eval   echo reviewed    acute diastolic CHF  due to underlying ? amyloid , and valvular heart disease   cont diuretics     long standing afib  HR stable  cont BB  off a/d due to hematuria , high bleed risk     HTN  cont current meds     DM II  Monitor finger stick. Insulin coverage. Diabetic education and Diabetic diet.     fu labs

## 2021-09-29 NOTE — SWALLOW BEDSIDE ASSESSMENT ADULT - SLP PERTINENT HISTORY OF CURRENT PROBLEM
89yo male pt with PMHx of Afib (no AC), HTN, BPH, DM2, Dementia was brought to ED by his daughter (Alexia) from [facility] ([phone]) c/o feeling not well, chills, shaking, and SOB this morning. Pt's poor historian (A&Ox1, hard hearing). As per the daughter, she got a phone call and was informed pt's not well. When she went to [facility] she noticed pt's generalized weak, shaking, shivering, grinding teeth, difficult ambulation with SOB. Pt normally walks with his walker but he was not able to ambulate today. Problem: Sepsis. Present on admission as has Fever, tachycardia and Leucocytosis . S/P cultures and starting IV Abxs. hemodynamically stable. Problem: Pneumonia. Plan: IV ABxs . Pulmonary consulted -> CT chest with b/l pl effusions L>R, associated atelectasis, cannot r/o underlying PNA but appears to be improving with Abx. ID consulted -> Agree with azithro/rocephin for trt of CAP.

## 2021-09-29 NOTE — PHYSICAL THERAPY INITIAL EVALUATION ADULT - STRENGTHENING, PT EVAL
GOAL: Pt will improve MMT grade in BLE.BUE 1 full grade in 4 weeks to increase overall functional mobility and strength.

## 2021-09-29 NOTE — SWALLOW BEDSIDE ASSESSMENT ADULT - SLP GENERAL OBSERVATIONS
Pt encountered in bed, awake, on 3L NC (weaned to 2L during evaluation by RACHAEL Hart from [facility]). +[place]. Oriented to self only, with confusion re date/place (pt stating, "I thought we were in [place]"). Able to follow 1-step directives. Vocal quality WFL for age/gender.

## 2021-09-30 DIAGNOSIS — Z71.89 OTHER SPECIFIED COUNSELING: ICD-10-CM

## 2021-09-30 DIAGNOSIS — Z51.5 ENCOUNTER FOR PALLIATIVE CARE: ICD-10-CM

## 2021-09-30 LAB
GLUCOSE BLDC GLUCOMTR-MCNC: 125 MG/DL — HIGH (ref 70–99)
GLUCOSE BLDC GLUCOMTR-MCNC: 125 MG/DL — HIGH (ref 70–99)
GLUCOSE BLDC GLUCOMTR-MCNC: 135 MG/DL — HIGH (ref 70–99)
GLUCOSE BLDC GLUCOMTR-MCNC: 138 MG/DL — HIGH (ref 70–99)
HCT VFR BLD CALC: 36.4 % — LOW (ref 39–50)
HGB BLD-MCNC: 11.6 G/DL — LOW (ref 13–17)
MCHC RBC-ENTMCNC: 29 PG — SIGNIFICANT CHANGE UP (ref 27–34)
MCHC RBC-ENTMCNC: 31.9 GM/DL — LOW (ref 32–36)
MCV RBC AUTO: 91 FL — SIGNIFICANT CHANGE UP (ref 80–100)
NRBC # BLD: 0 /100 WBCS — SIGNIFICANT CHANGE UP (ref 0–0)
PLATELET # BLD AUTO: 154 K/UL — SIGNIFICANT CHANGE UP (ref 150–400)
RBC # BLD: 4 M/UL — LOW (ref 4.2–5.8)
RBC # FLD: 13.9 % — SIGNIFICANT CHANGE UP (ref 10.3–14.5)
WBC # BLD: 5.77 K/UL — SIGNIFICANT CHANGE UP (ref 3.8–10.5)
WBC # FLD AUTO: 5.77 K/UL — SIGNIFICANT CHANGE UP (ref 3.8–10.5)

## 2021-09-30 PROCEDURE — 99231 SBSQ HOSP IP/OBS SF/LOW 25: CPT

## 2021-09-30 PROCEDURE — 99223 1ST HOSP IP/OBS HIGH 75: CPT

## 2021-09-30 RX ADMIN — QUETIAPINE FUMARATE 12.5 MILLIGRAM(S): 200 TABLET, FILM COATED ORAL at 22:25

## 2021-09-30 RX ADMIN — FINASTERIDE 5 MILLIGRAM(S): 5 TABLET, FILM COATED ORAL at 12:40

## 2021-09-30 RX ADMIN — MEMANTINE HYDROCHLORIDE 10 MILLIGRAM(S): 10 TABLET ORAL at 18:11

## 2021-09-30 RX ADMIN — Medication 3 MILLILITER(S): at 18:12

## 2021-09-30 RX ADMIN — MEMANTINE HYDROCHLORIDE 10 MILLIGRAM(S): 10 TABLET ORAL at 06:34

## 2021-09-30 RX ADMIN — Medication 3 MILLILITER(S): at 01:30

## 2021-09-30 RX ADMIN — PIPERACILLIN AND TAZOBACTAM 25 GRAM(S): 4; .5 INJECTION, POWDER, LYOPHILIZED, FOR SOLUTION INTRAVENOUS at 21:38

## 2021-09-30 RX ADMIN — Medication 25 MILLIGRAM(S): at 06:34

## 2021-09-30 RX ADMIN — MIRTAZAPINE 7.5 MILLIGRAM(S): 45 TABLET, ORALLY DISINTEGRATING ORAL at 12:41

## 2021-09-30 RX ADMIN — Medication 100 MILLIGRAM(S): at 12:40

## 2021-09-30 RX ADMIN — Medication 3 MILLIGRAM(S): at 21:38

## 2021-09-30 RX ADMIN — Medication 3 MILLILITER(S): at 11:44

## 2021-09-30 RX ADMIN — PIPERACILLIN AND TAZOBACTAM 25 GRAM(S): 4; .5 INJECTION, POWDER, LYOPHILIZED, FOR SOLUTION INTRAVENOUS at 14:53

## 2021-09-30 RX ADMIN — PIPERACILLIN AND TAZOBACTAM 25 GRAM(S): 4; .5 INJECTION, POWDER, LYOPHILIZED, FOR SOLUTION INTRAVENOUS at 06:34

## 2021-09-30 RX ADMIN — Medication 3 MILLILITER(S): at 06:34

## 2021-09-30 NOTE — PROGRESS NOTE ADULT - ASSESSMENT
89 y/o M with PMH of afib (no AC), HTN, DM2, BPH, dementia. Brought to ED by daughter (Alexia) from [facility] with c/o chills, weakness, and worsening SOB x1 day. On triage, febrile (tmax 100.5F), tachycardic (105), with o2 sats 91% on RA. Mild leukocytosis, RVP negative. CT chest with b/l pleural effusion (L>R), and JIMENA opacity with concern for PNA, started on IV ABX.

## 2021-09-30 NOTE — PROGRESS NOTE ADULT - ASSESSMENT
89yMale with PMHx of Afib (no AC), HTN, BPH, DM2, Dementia was brought to ED by his daughter (Alexia) from [facility] ([phone]) c/o feeling not well, chills, shaking, and SOB this morning and admitted to medicine after found to have pneumonia.     - urine color acceptable. low residual of 40. therefore no need for collazo catheter.      - no CBI or collazo needed at this time since patient is adequately emptying his bladder, will tolerate higher PVRs so long as patient is comfortable and urine color remains acceptable without clots  - encourage increased hydration to help clear urine   - urine cx no growth  - if patient starts to start retaining large amounts of urine, abdomen becomes distended or patient becomes uncomfortable or urine color worsens with clots please page urology at [phone]  - avoid urethral catheterization as patient highly likely to remove or pull at catheter given baseline dementia  - trend H/H, transfuse prn per primary team (Hct stable today)  - no further urologic investigations or interventions warranted at this time, please do not hesitate to contact urology should any additional urologic concerns or questions arise  - discussed with Dr. Laureano      Outpatient followup:  Dr. Chavez [facility]  [address] [place] [phone]

## 2021-09-30 NOTE — PROGRESS NOTE ADULT - ASSESSMENT
89yo male pt with PMHx of Afib (no AC), HTN, BPH, DM2, Dementia was brought to ED by his daughter (Alexia) from [facility] ([phone]) c/o feeling not well, chills, shaking, and SOB this morning. Pt's poor historian (A&Ox1, hard hearing). As per the daughter, she got a phone call and was informed pt's not well. When she went to [facility] she noticed pt's generalized weak, shaking, shivering, grinding teeth, difficult ambulation with SOB. Pt normally walks with his walker but he was not able to ambulate today. (26 Sep 2021 19:55)    ER vs:  Tmax 100.5 (R), P 98, /92, on 3-4L NC.  WBC 11.2 --> 7.4.  Lact 2.3.  UA (-)nit/LE.  UCx (-).  RVP and covid pcr (-).  Cxr showed L pleural effusion.  CT chest shows JIMENA pna and b/l pleural effusions.  CTap shows enlarged prostate.      Pt on rocephin/azithro for CAP.  ID consult called for further abx management.       JIMENA pna:    - Pt with low grade fever, leukocytosis, elevated lactate, feeling weak.    - Abx broadened zosyn to cover for possible aspiration pna.  f/u S&S:  recs appreciated.   - LEg Ag neg, azithro d/c'd on 9/29  - f/u bcx:  NGTD  - WBC and temp curve improving, cont to monitor.    Hematuria:    - UA (-) nit/LE.  Ucx no growth, no evidence for UTI.  Pt with large blood.  s/p bladder scan of 210ml    - Urology f/u noted.  Cont to monitor UO, condom cath in place.    - Pt with continued hematuria, occasional clots - f/u with Urology.    * Recommend 7 day course of abx, can de-escalte to PO augmentin upon discharge, through 10/3.      Will follow,    Oxana Koch  [phone]

## 2021-09-30 NOTE — CONSULT NOTE ADULT - PROBLEM SELECTOR RECOMMENDATION 5
Left VM with Alexia.  Goal is to discuss advance directives and ascertain HCP Spoke with patients daughter Alexia/ alternate HCP. (primary HCP brother Tony Julien, documentation to be emailed to this writer "today or latest tomorrow".    Alexia states that patient has a living will with advance directives stating DNR/DNI.  She agrees with MOLST document to be completed and be part of patients chart.  Additionally, family wish to continue any and all medical management hoping for some level of recovery.   Will continue to follow for above mentioned documentation, otherwise no further role for palliative at this time.  MOLST completed, DNR/DNI, placed in chart and sunrise updated accordingly

## 2021-09-30 NOTE — CONSULT NOTE ADULT - PROBLEM SELECTOR RECOMMENDATION 2
Pulmonary input appreciated, agree with possible aspiration risk  On Iv Zosyn  Afebrile, Tmax since admit 100. 5F  No leukocytosis

## 2021-09-30 NOTE — CONSULT NOTE ADULT - ASSESSMENT
89yo male pt with PMHx of Afib (no AC), HTN, BPH, DM2, Dementia was brought to ED by his daughter (Alexia) from [facility] ([phone]) c/o feeling not well, chills, shaking, and SOB this morning. Found to be septic 2/2 JIMENA Pna, bilateral pleural effusions,  palliative called for advance care planning/goals of care

## 2021-09-30 NOTE — PROGRESS NOTE ADULT - ASSESSMENT
SOB  pos Pl effusion   PNA  anbx  appreciate pulm eval   echo reviewed    acute diastolic CHF  due to underlying ? amyloid , and valvular heart disease   cont diuretics     long standing afib  HR stable  cont BB  off a/c due to hematuria , high bleed risk     HTN  cont current meds     DM II  Monitor finger stick. Insulin coverage. Diabetic education and Diabetic diet.

## 2021-09-30 NOTE — CONSULT NOTE ADULT - SUBJECTIVE AND OBJECTIVE BOX
HPI:  89yo male pt with PMHx of Afib (no AC), HTN, BPH, DM2, Dementia was brought to ED by his daughter (Cleve) from [facility] ([phone]) c/o feeling not well, chills, shaking, and SOB this morning. Pt's poor historian (A&Ox1, hard hearing). As per the daughter, she got a phone call and was informed pt's not well. When she went to [facility] she noticed pt's generalized weak, shaking, shivering, grinding teeth, difficult ambulation with SOB. Pt normally walks with his walker but he was not able to ambulate today. (26 Sep 2021 19:55)    PERTINENT PM/SXH:   HTN (hypertension)    HLD (hyperlipidemia)    DM (diabetes mellitus)    BPH (benign prostatic hypertrophy)    Insomnia    RBBB    Chronic atrial fibrillation    Dementia    Unsteady gait      S/P shoulder surgery    S/P knee surgery      FAMILY HISTORY:  Family history of diabetes mellitus      ITEMS NOT CHECKED ARE NOT PRESENT    SOCIAL HISTORY:   Significant other/partner[ ]  Children[x ]  Restorationist/Spirituality:Tenriism   Substance hx:  [ ]   Tobacco hx:  [ ]   Alcohol hx: [ ]   Home Opioid hx:  [ ] I-Stop Reference No:  Living Situation: [ ]Home  [ ]Long term care  [ ]Rehab [ X]Other  [facility]     ADVANCE DIRECTIVES:    DNR  MOLST  [ ]  Living Will  [ ]   DECISION MAKER(s):  [ ] Health Care Proxy(s)  [ X] Surrogate(s)  [ ] Guardian           Name(s): Phone Number(s):  CLEVE RDZ DAUGHTER/NEGRO RDZ SON  BASELINE (I)ADL(s) (prior to admission):  [place]: [ ]Total  [X ] Moderate [X ]Dependent    Allergies    No Known Allergies    Intolerances    MEDICATIONS  (STANDING):  albuterol/ipratropium for Nebulization 3 milliLiter(s) Nebulizer every 6 hours  buMETAnide 0.5 milliGRAM(s) Oral <User Schedule>  dextrose 40% Gel 15 Gram(s) Oral once  dextrose 5%. 1000 milliLiter(s) (50 mL/Hr) IV Continuous <Continuous>  dextrose 5%. 1000 milliLiter(s) (100 mL/Hr) IV Continuous <Continuous>  dextrose 50% Injectable 25 Gram(s) IV Push once  dextrose 50% Injectable 12.5 Gram(s) IV Push once  dextrose 50% Injectable 25 Gram(s) IV Push once  donepezil 5 milliGRAM(s) Oral daily  finasteride 5 milliGRAM(s) Oral daily  glucagon  Injectable 1 milliGRAM(s) IntraMuscular once  insulin lispro (ADMELOG) corrective regimen sliding scale   SubCutaneous three times a day before meals  insulin lispro (ADMELOG) corrective regimen sliding scale   SubCutaneous at bedtime  melatonin 3 milliGRAM(s) Oral at bedtime  memantine 10 milliGRAM(s) Oral two times a day  metoprolol succinate ER 25 milliGRAM(s) Oral daily  mirtazapine 7.5 milliGRAM(s) Oral daily  piperacillin/tazobactam IVPB.. 3.375 Gram(s) IV Intermittent every 8 hours  polyethylene glycol 3350 17 Gram(s) Oral daily  QUEtiapine 12.5 milliGRAM(s) Oral at bedtime  thiamine 100 milliGRAM(s) Oral daily    MEDICATIONS  (PRN):    PRESENT SYMPTOMS: [X ]Unable to obtain due to poor mentation   Source if other than patient:  [ ]Family   [ ]Team     Pain: [ ]yes [ ]no  QOL impact -   Location -                    Aggravating factors -  Quality -  Radiation -  Timing-  Severity (0-10 scale):  Minimal acceptable level (0-10 scale):     PAIN AD Score: 0    http://geriatrictoolkit.[place].[facility]/cog/painad.pdf (press ctrl +  left click to view)    Dyspnea:                           [ ]Mild [ ]Moderate [ ]Severe  Anxiety:                             [ ]Mild [ ]Moderate [ ]Severe  Fatigue:                             [X ]Mild [ ]Moderate [ ]Severe  Nausea:                             [ ]Mild [ ]Moderate [ ]Severe  Loss of appetite:              [X ]Mild [ ]Moderate [ ]Severe  Constipation:                    [ ]Mild [ ]Moderate [ ]Severe    Other Symptoms:  [X ]All other review of systems negative     Palliative Performance Status Version 2: 30        %    http://npcrc.org/files/news/palliative_performance_scale_ppsv2.pdf  PHYSICAL EXAM:  Vital Signs Last 24 Hrs  T(C): 36.7 (30 Sep 2021 06:00), Max: 37 (29 Sep 2021 21:02)  T(F): 98 (30 Sep 2021 06:00), Max: 98.6 (29 Sep 2021 21:02)  HR: 73 (30 Sep 2021 06:00) (73 - 85)  BP: 118/77 (30 Sep 2021 06:00) (109/72 - 130/79)  BP(mean): --  RR: 18 (30 Sep 2021 06:00) (18 - 20)  SpO2: 95% (30 Sep 2021 06:00) (94% - 95%) I&O's Summary    29 Sep 2021 07:01  -  30 Sep 2021 07:00  --------------------------------------------------------  IN: 795 mL / OUT: 400 mL / NET: 395 mL      GENERAL:  [ ]Alert  [ ]Oriented x   [X ]Lethargic  [ ]Cachexia  [ ]Unarousable  [X ]Verbal  [ ]Non-Verbal  Behavioral:   [ ] Anxiety  [ ] Delirium [ ] Agitation [ ] Other  HEENT:  [ ]Normal   [ X]Dry mouth   [ ]ET Tube/Trach  [ ]Oral lesions  PULMONARY:   [X ]Clear [ ]Tachypnea  [ ]Audible excessive secretions   [ ]Rhonchi        [ ]Right [ ]Left [ ]Bilateral  [ ]Crackles        [ ]Right [ ]Left [ ]Bilateral  [ ]Wheezing     [ ]Right [ ]Left [ ]Bilatera  [ ]Diminished breath sounds [ ]right [ ]left [ ]bilateral  CARDIOVASCULAR:    [ ]Regular [ X]Irregular [ ]Tachy  [ ]Zain [ ]Murmur [ ]Other  GASTROINTESTINAL:  [X ]Soft  [ ]Distended   [ ]+BS  [ ]Non tender [ ]Tender  [ ]PEG [ ]OGT/ NGT  Last BM:   GENITOURINARY:  [ ]Normal [X ] Incontinent   [ ]Oliguria/Anuria   [ ]Casey  MUSCULOSKELETAL:   [ ]Normal   [ X]Weakness  [ ]Bed/Wheelchair bound [ ]Edema  NEUROLOGIC:   [ ]No focal deficits  [ X]Cognitive impairment  [ ]Dysphagia [ ]Dysarthria [ ]Paresis [ ]Other   SKIN:   [ ]Normal    [ ]Rash  [ ]Pressure ulcer(s)       Present on admission [ ]y [ ]n    CRITICAL CARE:  [ ] Shock Present  [ ]Septic [ ]Cardiogenic [ ]Neurologic [ ]Hypovolemic  [ ]  Vasopressors [ ]  Inotropes   [ ]Respiratory failure present [ ]Mechanical ventilation [ ]Non-invasive ventilatory support [ ]High flow  [ ]Acute  [ ]Chronic [ ]Hypoxic  [ ]Hypercarbic [ ]Other  [ ]Other organ failure     LABS:                        11.6   5.77  )-----------( 154      ( 30 Sep 2021 07:03 )             36.4   09-29    138  |  103  |  13  ----------------------------<  124<H>  3.9   |  22  |  0.86    Ca    8.8      29 Sep 2021 07:11          RADIOLOGY & ADDITIONAL STUDIES:    < from: CT Abdomen and Pelvis w/ IV Cont (09.26.21 @ 17:53) >        INTERPRETATION:  CLINICAL INFORMATION: Abdominal pain, fever and chills, shortness of breath, fatigue.    COMPARISON: Pelvic CT dated 10/7/2019.    CONTRAST/COMPLICATIONS:  IV Contrast: Omnipaque 350  90 cc administered   10 cc discarded  Oral Contrast: NONE  Complications: None reported at time of study completion    PROCEDURE:  CT of the Chest, Abdomen and Pelvis was performed.  Sagittal and coronal reformats were performed.    FINDINGS:  CHEST:  LUNGS AND LARGE AIRWAYS: Patent central airways. Left upper lobe alveolar opacity. Bilateral lower lobe dependent atelectasis.  PLEURA: Bilateral pleural effusions, large on the left and small to moderate on the right.  VESSELS: Atheromatous calcifications of the thoracic aorta without aneurysmal dilatation. Coronary artery calcifications.  HEART: Heart size is normal. Transvenous pacemaker. Status postTAVR. Mitral annular calcification. Watchman device. No pericardial effusion.  MEDIASTINUM AND YONY: No lymphadenopathy.  CHEST WALL AND LOWER NECK: Mildly enlarged right supraclavicular lymph nodes.    ABDOMEN AND PELVIS:  LIVER: Within normal limits.  BILE DUCTS: Normal caliber.  GALLBLADDER: Small gallstones.  SPLEEN: 1 cm splenule.  PANCREAS: Within normal limits.  ADRENALS: Within normal limits.  KIDNEYS/URETERS: Within normal limits.    BLADDER: Incompletely distended. Mild mural thickening.  REPRODUCTIVE ORGANS: Enlarged prostate.    BOWEL: Colonic diverticulosis. No diverticulitis. No bowel obstruction. Small direct esophageal hiatal hernia. Appendix not visualized.  PERITONEUM: No ascites.  VESSELS: Atheromatous calcifications.  RETROPERITONEUM/LYMPH NODES: No lymphadenopathy.  ABDOMINAL WALL: Within normal limits.  BONES: Dextroscoliosis of thoracolumbar spine with accompanying degenerative changes.    IMPRESSION:  Left upper lobe pneumonia.    Bilateral pleural effusions.        < end of copied text >      PROTEIN CALORIE MALNUTRITION PRESENT: [ ]mild [ ]moderate [ ]severe [ ]underweight [ ]morbid obesity  https://www.andeal.org/vault/2440/web/files/ONC/Table_Clinical%20Characteristics%20to%20Document%20Malnutrition-White%20JV%20et%20al%202012.pdf    Height (cm): 165.1 (09-26-21 @ 12:36), 165.1 (06-14-21 @ 16:06)  Weight (kg): 63.5 (09-26-21 @ 12:36), 73.5 (06-14-21 @ 16:06)  BMI (kg/m2): 23.3 (09-26-21 @ 12:36), 27 (06-14-21 @ 16:06)    [ ]PPSV2 < or = to 30% [ ]significant weight loss  [X ]poor nutritional intake  [ ]anasarca      [ ]Artificial Nutrition      REFERRALS:   [ ]Chaplaincy  [ ]Hospice  [ ]Child Life  [ ]Social Work  [X ]Case management [ ]Holistic Therapy     Goals of Care Document:

## 2021-09-30 NOTE — PROGRESS NOTE ADULT - ASSESSMENT
89yo male pt with PMHx of Afib (no AC), HTN, BPH, DM2, Dementia was brought to ED by his daughter (Aleixa) from [facility] ([phone]) c/o feeling not well, chills, shaking, and SOB this morning. Pt's poor historian (A&Ox1, hard hearing). As per the daughter, she got a phone call and was informed pt's not well. When she went to [facility] she noticed pt's generalized weak, shaking, shivering, grinding teeth, difficult ambulation with SOB. Pt normally walks with his walker but he was not able to ambulate today.     Problem/Plan - 1:  ·  Problem: Sepsis.   ·  Plan: Resolved.  Present on admission as has Fever, tachycardia and Leucocytosis .   S/P cultures and starting IV Abxs.   Hemodynamically  stable.     Problem/Plan - 2:  ·  Problem: Pneumonia.   ·  Plan: IV ABxs and PO Augmentin outpt  . Pulmonary helping.      Problem/Plan - 3:  ·  Problem: Pleural effusion.   ·  Plan: Pulmonary helping .      Will check TTE also.     Problem/Plan - 4:  ·  Problem: Chronic atrial fibrillation.   ·  Plan: Not on AC . Cards following. Hold ASA as gross hematuria .      Problem/Plan - 5:  ·  Problem: HTN (hypertension).   ·  Plan: BP meds with hold parameters.     Problem/Plan - 6:  ·  Problem: BPH without urinary obstruction.   ·  Plan: On Finasteride.     Problem/Plan - 7:  ·  Problem: Diabetes mellitus.   ·  Plan: Holding PO meds and SSI for now.     Problem/Plan - 8:  ·  Problem: Dementia.   ·  Plan: supportive care.     Problem/Plan - 9:  ·  Problem: Hematuria .   ·  Plan: Urology helping. Will hold Lovenox and ASA put SCD for DVT prophylaxis. HH stable and resolved now.     Disposition : DC planning to [facility] .

## 2021-10-01 ENCOUNTER — TRANSCRIPTION ENCOUNTER (OUTPATIENT)
Age: 86
End: 2021-10-01

## 2021-10-01 VITALS
OXYGEN SATURATION: 94 % | TEMPERATURE: 98 F | HEART RATE: 87 BPM | RESPIRATION RATE: 20 BRPM | DIASTOLIC BLOOD PRESSURE: 62 MMHG | SYSTOLIC BLOOD PRESSURE: 102 MMHG

## 2021-10-01 LAB
CULTURE RESULTS: SIGNIFICANT CHANGE UP
CULTURE RESULTS: SIGNIFICANT CHANGE UP
GLUCOSE BLDC GLUCOMTR-MCNC: 127 MG/DL — HIGH (ref 70–99)
GLUCOSE BLDC GLUCOMTR-MCNC: 151 MG/DL — HIGH (ref 70–99)
GLUCOSE BLDC GLUCOMTR-MCNC: 152 MG/DL — HIGH (ref 70–99)
HCT VFR BLD CALC: 34.7 % — LOW (ref 39–50)
HGB BLD-MCNC: 11 G/DL — LOW (ref 13–17)
MCHC RBC-ENTMCNC: 29 PG — SIGNIFICANT CHANGE UP (ref 27–34)
MCHC RBC-ENTMCNC: 31.7 GM/DL — LOW (ref 32–36)
MCV RBC AUTO: 91.6 FL — SIGNIFICANT CHANGE UP (ref 80–100)
NRBC # BLD: 0 /100 WBCS — SIGNIFICANT CHANGE UP (ref 0–0)
PLATELET # BLD AUTO: 157 K/UL — SIGNIFICANT CHANGE UP (ref 150–400)
RBC # BLD: 3.79 M/UL — LOW (ref 4.2–5.8)
RBC # FLD: 14.1 % — SIGNIFICANT CHANGE UP (ref 10.3–14.5)
SARS-COV-2 RNA SPEC QL NAA+PROBE: SIGNIFICANT CHANGE UP
SPECIMEN SOURCE: SIGNIFICANT CHANGE UP
SPECIMEN SOURCE: SIGNIFICANT CHANGE UP
WBC # BLD: 5.74 K/UL — SIGNIFICANT CHANGE UP (ref 3.8–10.5)
WBC # FLD AUTO: 5.74 K/UL — SIGNIFICANT CHANGE UP (ref 3.8–10.5)

## 2021-10-01 RX ADMIN — Medication 100 MILLIGRAM(S): at 11:49

## 2021-10-01 RX ADMIN — DONEPEZIL HYDROCHLORIDE 5 MILLIGRAM(S): 10 TABLET, FILM COATED ORAL at 00:20

## 2021-10-01 RX ADMIN — Medication 3 MILLILITER(S): at 05:50

## 2021-10-01 RX ADMIN — Medication 25 MILLIGRAM(S): at 05:49

## 2021-10-01 RX ADMIN — Medication 2: at 12:59

## 2021-10-01 RX ADMIN — MIRTAZAPINE 7.5 MILLIGRAM(S): 45 TABLET, ORALLY DISINTEGRATING ORAL at 11:50

## 2021-10-01 RX ADMIN — Medication 3 MILLILITER(S): at 00:20

## 2021-10-01 RX ADMIN — MEMANTINE HYDROCHLORIDE 10 MILLIGRAM(S): 10 TABLET ORAL at 05:49

## 2021-10-01 RX ADMIN — Medication 2: at 17:43

## 2021-10-01 RX ADMIN — PIPERACILLIN AND TAZOBACTAM 25 GRAM(S): 4; .5 INJECTION, POWDER, LYOPHILIZED, FOR SOLUTION INTRAVENOUS at 17:43

## 2021-10-01 RX ADMIN — BUMETANIDE 0.5 MILLIGRAM(S): 0.25 INJECTION INTRAMUSCULAR; INTRAVENOUS at 08:51

## 2021-10-01 RX ADMIN — Medication 3 MILLILITER(S): at 11:48

## 2021-10-01 RX ADMIN — FINASTERIDE 5 MILLIGRAM(S): 5 TABLET, FILM COATED ORAL at 11:49

## 2021-10-01 RX ADMIN — DONEPEZIL HYDROCHLORIDE 5 MILLIGRAM(S): 10 TABLET, FILM COATED ORAL at 11:50

## 2021-10-01 RX ADMIN — MEMANTINE HYDROCHLORIDE 10 MILLIGRAM(S): 10 TABLET ORAL at 17:43

## 2021-10-01 RX ADMIN — PIPERACILLIN AND TAZOBACTAM 25 GRAM(S): 4; .5 INJECTION, POWDER, LYOPHILIZED, FOR SOLUTION INTRAVENOUS at 05:50

## 2021-10-01 NOTE — PROGRESS NOTE ADULT - PROVIDER SPECIALTY LIST ADULT
Cardiology
Internal Medicine
Cardiology
Infectious Disease
Pulmonology
Urology
Urology
Infectious Disease
Internal Medicine
Urology
Cardiology
Cardiology
Internal Medicine
Urology
Pulmonology

## 2021-10-01 NOTE — PROGRESS NOTE ADULT - SUBJECTIVE AND OBJECTIVE BOX
Infectious Diseases progress note:    Subjective:  NAD, afebrile.  Pt with hematuria/clots in collazo bag.  Resting comfortably    ROS:  CONSTITUTIONAL:  No fever, chills, rigors  CARDIOVASCULAR:  No chest pain or palpitations  RESPIRATORY:   No SOB, cough, dyspnea on exertion.  No wheezing  GASTROINTESTINAL:  No abd pain, N/V, diarrhea/constipation  EXTREMITIES:  No swelling or joint pain  GENITOURINARY:  No burning on urination, increased frequency or urgency.  No flank pain  NEUROLOGIC:  No HA, visual disturbances  SKIN: No rashes    Allergies    No Known Allergies    Intolerances        ANTIBIOTICS/RELEVANT:  antimicrobials  piperacillin/tazobactam IVPB.. 3.375 Gram(s) IV Intermittent every 8 hours    immunologic:    OTHER:  albuterol/ipratropium for Nebulization 3 milliLiter(s) Nebulizer every 6 hours  buMETAnide 0.5 milliGRAM(s) Oral <User Schedule>  dextrose 40% Gel 15 Gram(s) Oral once  dextrose 5%. 1000 milliLiter(s) IV Continuous <Continuous>  dextrose 5%. 1000 milliLiter(s) IV Continuous <Continuous>  dextrose 50% Injectable 25 Gram(s) IV Push once  dextrose 50% Injectable 12.5 Gram(s) IV Push once  dextrose 50% Injectable 25 Gram(s) IV Push once  donepezil 5 milliGRAM(s) Oral daily  finasteride 5 milliGRAM(s) Oral daily  glucagon  Injectable 1 milliGRAM(s) IntraMuscular once  insulin lispro (ADMELOG) corrective regimen sliding scale   SubCutaneous three times a day before meals  insulin lispro (ADMELOG) corrective regimen sliding scale   SubCutaneous at bedtime  melatonin 3 milliGRAM(s) Oral at bedtime  memantine 10 milliGRAM(s) Oral two times a day  metoprolol succinate ER 25 milliGRAM(s) Oral daily  mirtazapine 7.5 milliGRAM(s) Oral daily  polyethylene glycol 3350 17 Gram(s) Oral daily  QUEtiapine 12.5 milliGRAM(s) Oral at bedtime  thiamine 100 milliGRAM(s) Oral daily      Objective:  Vital Signs Last 24 Hrs  T(C): 36.7 (30 Sep 2021 12:57), Max: 37 (29 Sep 2021 21:02)  T(F): 98.1 (30 Sep 2021 12:57), Max: 98.6 (29 Sep 2021 21:02)  HR: 71 (30 Sep 2021 12:57) (71 - 85)  BP: 128/79 (30 Sep 2021 12:57) (109/72 - 130/79)  BP(mean): --  RR: 18 (30 Sep 2021 12:57) (18 - 20)  SpO2: 100% (30 Sep 2021 12:57) (94% - 100%)    PHYSICAL EXAM:  Constitutional:NAD  Eyes:KARINA, EOMI  Ear/Nose/Throat: no thrush, mucositis.  Moist mucous membranes	  Neck:no JVD, no lymphadenopathy, supple  Respiratory: CTA andria  Cardiovascular: S1S2 RRR, no murmurs  Gastrointestinal:soft, nontender,  nondistended (+) BS  Extremities:no e/e/c  Skin:  no rashes, open wounds or ulcerations  : condom cath in place with hematuria        LABS:                        11.6   5.77  )-----------( 154      ( 30 Sep 2021 07:03 )             36.4     09-29    138  |  103  |  13  ----------------------------<  124<H>  3.9   |  22  |  0.86    Ca    8.8      29 Sep 2021 07:11            Procalcitonin, Serum: 0.03 (09-27 @ 07:04)            Rapid RVP Result: [facility]          MICROBIOLOGY:    Culture - Blood (09.26.21 @ 17:24)   Specimen Source: .Blood Blood-Peripheral   Culture Results:   No growth to date.     Culture - Blood (09.26.21 @ 17:24)   Specimen Source: .Blood Blood-Peripheral   Culture Results:   No growth to date.     Culture - Urine (09.26.21 @ 17:21)   Specimen Source: Clean Catch Clean Catch (Midstream)   Culture Results:   No growth     RADIOLOGY & ADDITIONAL STUDIES:    < from: CT Abdomen and Pelvis w/ IV Cont (09.26.21 @ 17:53) >  EXAM:  CT CHEST IC                          EXAM:  CT ABDOMEN AND PELVIS IC                            PROCEDURE DATE:  09/26/2021            INTERPRETATION:  CLINICAL INFORMATION: Abdominal pain, fever and chills, shortness of breath, fatigue.    COMPARISON: Pelvic CT dated 10/7/2019.    CONTRAST/COMPLICATIONS:  IV Contrast: Omnipaque 350  90 cc administered   10 cc discarded  Oral Contrast: NONE  Complications: None reported at time of study completion    PROCEDURE:  CT of the Chest, Abdomen and Pelvis was performed.  Sagittal and coronal reformats were performed.    FINDINGS:  CHEST:  LUNGS AND LARGE AIRWAYS: Patent central airways. Left upper lobe alveolar opacity. Bilateral lower lobe dependent atelectasis.  PLEURA: Bilateral pleural effusions, large on the left and small to moderate on the right.  VESSELS: Atheromatous calcifications of the thoracic aorta without aneurysmal dilatation. Coronary artery calcifications.  HEART: Heart size is normal. Transvenous pacemaker. Status postTAVR. Mitral annular calcification. Watchman device. No pericardial effusion.  MEDIASTINUM AND YONY: No lymphadenopathy.  CHEST WALL AND LOWER NECK: Mildly enlarged right supraclavicular lymph nodes.    ABDOMEN AND PELVIS:  LIVER: Within normal limits.  BILE DUCTS: Normal caliber.  GALLBLADDER: Small gallstones.  SPLEEN: 1 cm splenule.  PANCREAS: Within normal limits.  ADRENALS: Within normal limits.  KIDNEYS/URETERS: Within normal limits.    BLADDER: Incompletely distended. Mild mural thickening.  REPRODUCTIVE ORGANS: Enlarged prostate.    BOWEL: Colonic diverticulosis. No diverticulitis. No bowel obstruction. Small direct esophageal hiatal hernia. Appendix not visualized.  PERITONEUM: No ascites.  VESSELS: Atheromatous calcifications.  RETROPERITONEUM/LYMPH NODES: No lymphadenopathy.  ABDOMINAL WALL: Within normal limits.  BONES: Dextroscoliosis of thoracolumbar spine with accompanying degenerative changes.    IMPRESSION:  Left upper lobe pneumonia.    Bilateral pleural effusions.    < end of copied text >      < from: CT Chest w/ IV Cont (09.26.21 @ 17:30) >    IMPRESSION:  Left upper lobe pneumonia.    Bilateral pleural effusions.    < end of copied text >  
DATE OF SERVICE: 09-28-21 @ 09:07    Subjective: Patient seen and examined. No new events except as noted.     SUBJECTIVE/ROS:        MEDICATIONS:  MEDICATIONS  (STANDING):  aspirin enteric coated 81 milliGRAM(s) Oral daily  azithromycin  IVPB 500 milliGRAM(s) IV Intermittent every 24 hours  buMETAnide 0.5 milliGRAM(s) Oral <User Schedule>  dextrose 40% Gel 15 Gram(s) Oral once  dextrose 5%. 1000 milliLiter(s) (50 mL/Hr) IV Continuous <Continuous>  dextrose 5%. 1000 milliLiter(s) (100 mL/Hr) IV Continuous <Continuous>  dextrose 50% Injectable 25 Gram(s) IV Push once  dextrose 50% Injectable 12.5 Gram(s) IV Push once  dextrose 50% Injectable 25 Gram(s) IV Push once  donepezil 5 milliGRAM(s) Oral daily  finasteride 5 milliGRAM(s) Oral daily  glucagon  Injectable 1 milliGRAM(s) IntraMuscular once  insulin lispro (ADMELOG) corrective regimen sliding scale   SubCutaneous three times a day before meals  insulin lispro (ADMELOG) corrective regimen sliding scale   SubCutaneous at bedtime  melatonin 3 milliGRAM(s) Oral at bedtime  memantine 10 milliGRAM(s) Oral two times a day  metoprolol succinate ER 25 milliGRAM(s) Oral daily  mirtazapine 7.5 milliGRAM(s) Oral daily  piperacillin/tazobactam IVPB.. 3.375 Gram(s) IV Intermittent every 8 hours  QUEtiapine 12.5 milliGRAM(s) Oral at bedtime  thiamine 100 milliGRAM(s) Oral daily      PHYSICAL EXAM:  T(C): 36.6 (09-28-21 @ 06:00), Max: 37.1 (09-27-21 @ 20:02)  HR: 70 (09-28-21 @ 06:00) (60 - 105)  BP: 110/71 (09-28-21 @ 06:00) (110/71 - 139/78)  RR: 20 (09-28-21 @ 06:00) (18 - 20)  SpO2: 95% (09-28-21 @ 06:00) (94% - 99%)  Wt(kg): --  I&O's Summary    27 Sep 2021 07:01  -  28 Sep 2021 07:00  --------------------------------------------------------  IN: 340 mL / OUT: 1390 mL / NET: -1050 mL            JVP: Normal  Neck: supple  Lung: clear   CV: S1 S2 , Murmur:  Abd: soft  Ext: No edema  neuro: Awake / alert  Psych: flat affect  Skin: normal``    LABS/DATA:    CARDIAC MARKERS:                                11.6   6.08  )-----------( 124      ( 28 Sep 2021 07:05 )             35.1     09-28    135  |  103  |  15  ----------------------------<  120<H>  3.9   |  21<L>  |  0.85    Ca    8.5      28 Sep 2021 07:02    TPro  7.9  /  Alb  4.3  /  TBili  1.0  /  DBili  x   /  AST  23  /  ALT  19  /  AlkPhos  114  09-26    proBNP:   Lipid Profile:   HgA1c:   TSH:     TELE:  EKG:        < from: Transthoracic Echocardiogram (09.27.21 @ 16:46) >  Conclusions:  1. Mitral annular calcification and calcified mitral  leaflets with decreased diastolic opening. Mild-moderate  mitral regurgitation. Mean transmitral valve gradient  equals 3 mm Hg, consistent with mild to moderate mitral  stenosis.  2. Transcatheter aortic valve replacement. Peak transaortic  valve gradient equals 8 mm Hg, mean transaortic valve  gradient equals 5 mm Hg, which is probably normal in the  presence of a transcatheter aortic valve replacement. No  aortic valve regurgitation seen.  3. Severe left atrial enlargement.  4. Severe concentric left ventricular hypertrophy  (thickening) with diffuse areas of myocardium demonstrating  bright enhancement, possibly suggestive of infiltrative  cardiomyopathy.  5. Mild global left ventricular systolic dysfunction.  6. Severe reversible diastolic dysfunction (Stage III).  7. Mild right atrial enlargement.  8. Normal right ventricular size with decreased right  ventricular systolic function.  9. Estimated pulmonary artery systolic pressure equals 39  mm Hg, assuming right atrial pressure equals 8 mm Hg,  consistent with borderline pulmonary pressures.  10. Trace to small pericardial effusion.  11. Bilateral pleural effusions.  ------------------------------------------------------------------------  Confirmed on  9/27/2021 - 18:33:45 by Fredo Mercer M.D.  ------------------------------------------------------------------------    < end of copied text >  
Date of Service  : 09-28-21 @ 10:44    INTERVAL HPI/OVERNIGHT EVENTS: Was eating breakfast . I feel fine.   Vital Signs Last 24 Hrs  T(C): 36.6 (28 Sep 2021 06:00), Max: 37.1 (27 Sep 2021 20:02)  T(F): 97.9 (28 Sep 2021 06:00), Max: 98.7 (27 Sep 2021 20:02)  HR: 70 (28 Sep 2021 06:00) (60 - 105)  BP: 110/71 (28 Sep 2021 06:00) (110/71 - 139/78)  BP(mean): --  RR: 20 (28 Sep 2021 06:00) (18 - 20)  SpO2: 95% (28 Sep 2021 06:00) (94% - 99%)  I&O's Summary    27 Sep 2021 07:01  -  28 Sep 2021 07:00  --------------------------------------------------------  IN: 340 mL / OUT: 1390 mL / NET: -1050 mL      MEDICATIONS  (STANDING):  aspirin enteric coated 81 milliGRAM(s) Oral daily  azithromycin  IVPB 500 milliGRAM(s) IV Intermittent every 24 hours  buMETAnide 0.5 milliGRAM(s) Oral <User Schedule>  dextrose 40% Gel 15 Gram(s) Oral once  dextrose 5%. 1000 milliLiter(s) (50 mL/Hr) IV Continuous <Continuous>  dextrose 5%. 1000 milliLiter(s) (100 mL/Hr) IV Continuous <Continuous>  dextrose 50% Injectable 25 Gram(s) IV Push once  dextrose 50% Injectable 12.5 Gram(s) IV Push once  dextrose 50% Injectable 25 Gram(s) IV Push once  donepezil 5 milliGRAM(s) Oral daily  finasteride 5 milliGRAM(s) Oral daily  glucagon  Injectable 1 milliGRAM(s) IntraMuscular once  insulin lispro (ADMELOG) corrective regimen sliding scale   SubCutaneous three times a day before meals  insulin lispro (ADMELOG) corrective regimen sliding scale   SubCutaneous at bedtime  melatonin 3 milliGRAM(s) Oral at bedtime  memantine 10 milliGRAM(s) Oral two times a day  metoprolol succinate ER 25 milliGRAM(s) Oral daily  mirtazapine 7.5 milliGRAM(s) Oral daily  piperacillin/tazobactam IVPB.. 3.375 Gram(s) IV Intermittent every 8 hours  QUEtiapine 12.5 milliGRAM(s) Oral at bedtime  thiamine 100 milliGRAM(s) Oral daily    MEDICATIONS  (PRN):    LABS:                        11.6   6.08  )-----------( 124      ( 28 Sep 2021 07:05 )             35.1     09-28    135  |  103  |  15  ----------------------------<  120<H>  3.9   |  21<L>  |  0.85    Ca    8.5      28 Sep 2021 07:02    TPro  7.9  /  Alb  4.3  /  TBili  1.0  /  DBili  x   /  AST  23  /  ALT  19  /  AlkPhos  114  09-26    PT/INR - ( 26 Sep 2021 13:41 )   PT: 14.2 sec;   INR: 1.19 ratio         PTT - ( 26 Sep 2021 13:41 )  PTT:30.5 sec  Urinalysis Basic - ( 27 Sep 2021 00:22 )    Color: Red / Appearance: Turbid / SG: >1.050 / pH: x  Gluc: x / Ketone: Negative  / Bili: Negative / Urobili: Negative   Blood: x / Protein: >600 / Nitrite: Negative   Leuk Esterase: Negative / RBC: >50 /hpf / WBC 61 /HPF   Sq Epi: x / Non Sq Epi: x / Bacteria: Negative      CAPILLARY BLOOD GLUCOSE      POCT Blood Glucose.: 104 mg/dL (28 Sep 2021 08:28)  POCT Blood Glucose.: 163 mg/dL (27 Sep 2021 21:47)  POCT Blood Glucose.: 147 mg/dL (27 Sep 2021 17:12)  POCT Blood Glucose.: 118 mg/dL (27 Sep 2021 12:32)        Urinalysis Basic - ( 27 Sep 2021 00:22 )    Color: Red / Appearance: Turbid / SG: >1.050 / pH: x  Gluc: x / Ketone: Negative  / Bili: Negative / Urobili: Negative   Blood: x / Protein: >600 / Nitrite: Negative   Leuk Esterase: Negative / RBC: >50 /hpf / WBC 61 /HPF   Sq Epi: x / Non Sq Epi: x / Bacteria: Negative            Consultant(s) Notes Reviewed:  [x ] YES  [ ] NO    PHYSICAL EXAM:  GENERAL: NAD, well-groomed, well-developed, not in any distress ,  HEAD:  Atraumatic, Normocephalic  EYES: EOMI, PERRLA, conjunctiva and sclera clear  ENMT: No tonsillar erythema, exudates, or enlargement; Moist mucous membranes, Good dentition, No lesions  NECK: Supple, No JVD, Normal thyroid  NERVOUS SYSTEM:  Alert & Oriented X1, No focal deficit   CHEST/LUNG: Good air entry bilateral with no  rales, rhonchi, wheezing, or rubs  HEART: Regular rate and rhythm; No murmurs, rubs, or gallops  ABDOMEN: Soft, Nontender, Nondistended; Bowel sounds present  EXTREMITIES:  2+ Peripheral Pulses, No clubbing, cyanosis, or edema  SKIN: No rashes or lesions    Care Discussed with Consultants/Other Providers [ x] YES  [ ] NO
Date of Service  : 09-30-21 @ 15:47    INTERVAL HPI/OVERNIGHT EVENTS: no new concerns . Seen and examined earlier . There was concern about hematuria but per Urology outpt follow up.   Vital Signs Last 24 Hrs  T(C): 36.7 (30 Sep 2021 12:57), Max: 37 (29 Sep 2021 21:02)  T(F): 98.1 (30 Sep 2021 12:57), Max: 98.6 (29 Sep 2021 21:02)  HR: 71 (30 Sep 2021 12:57) (71 - 85)  BP: 128/79 (30 Sep 2021 12:57) (109/72 - 130/79)  BP(mean): --  RR: 18 (30 Sep 2021 12:57) (18 - 20)  SpO2: 100% (30 Sep 2021 12:57) (94% - 100%)  I&O's Summary    29 Sep 2021 07:01  -  30 Sep 2021 07:00  --------------------------------------------------------  IN: 795 mL / OUT: 400 mL / NET: 395 mL      MEDICATIONS  (STANDING):  albuterol/ipratropium for Nebulization 3 milliLiter(s) Nebulizer every 6 hours  buMETAnide 0.5 milliGRAM(s) Oral <User Schedule>  dextrose 40% Gel 15 Gram(s) Oral once  dextrose 5%. 1000 milliLiter(s) (50 mL/Hr) IV Continuous <Continuous>  dextrose 5%. 1000 milliLiter(s) (100 mL/Hr) IV Continuous <Continuous>  dextrose 50% Injectable 25 Gram(s) IV Push once  dextrose 50% Injectable 12.5 Gram(s) IV Push once  dextrose 50% Injectable 25 Gram(s) IV Push once  donepezil 5 milliGRAM(s) Oral daily  finasteride 5 milliGRAM(s) Oral daily  glucagon  Injectable 1 milliGRAM(s) IntraMuscular once  insulin lispro (ADMELOG) corrective regimen sliding scale   SubCutaneous three times a day before meals  insulin lispro (ADMELOG) corrective regimen sliding scale   SubCutaneous at bedtime  melatonin 3 milliGRAM(s) Oral at bedtime  memantine 10 milliGRAM(s) Oral two times a day  metoprolol succinate ER 25 milliGRAM(s) Oral daily  mirtazapine 7.5 milliGRAM(s) Oral daily  piperacillin/tazobactam IVPB.. 3.375 Gram(s) IV Intermittent every 8 hours  polyethylene glycol 3350 17 Gram(s) Oral daily  QUEtiapine 12.5 milliGRAM(s) Oral at bedtime  thiamine 100 milliGRAM(s) Oral daily    MEDICATIONS  (PRN):    LABS:                        11.6   5.77  )-----------( 154      ( 30 Sep 2021 07:03 )             36.4     09-29    138  |  103  |  13  ----------------------------<  124<H>  3.9   |  22  |  0.86    Ca    8.8      29 Sep 2021 07:11          CAPILLARY BLOOD GLUCOSE      POCT Blood Glucose.: 125 mg/dL (30 Sep 2021 12:14)  POCT Blood Glucose.: 125 mg/dL (30 Sep 2021 08:38)  POCT Blood Glucose.: 137 mg/dL (29 Sep 2021 22:32)  POCT Blood Glucose.: 146 mg/dL (29 Sep 2021 17:11)              Consultant(s) Notes Reviewed:  [x ] YES  [ ] NO    PHYSICAL EXAM:  GENERAL: NAD, well-groomed, well-developed ,not in any distress ,  HEAD:  Atraumatic, Normocephalic  EYES: EOMI, PERRLA, conjunctiva and sclera clear  ENMT: No tonsillar erythema, exudates, or enlargement; Moist mucous membranes, Good dentition, No lesions  NECK: Supple, No JVD, Normal thyroid  NERVOUS SYSTEM:  Alert &  No focal deficit   CHEST/LUNG: Good air entry bilateral with no  rales, rhonchi, wheezing, or rubs  HEART: Regular rate and rhythm; No murmurs, rubs, or gallops  ABDOMEN: Soft, Nontender, Nondistended; Bowel sounds present  EXTREMITIES:  2+ Peripheral Pulses, No clubbing, cyanosis, or edema    Care Discussed with Consultants/Other Providers [ x] YES  [ ] NO
Infectious Diseases progress note:    Subjective: Events noted.  Afebrile, no leukocytosis.  Pt satting adequately on 3L NC.  Pt's abx changed to zosyn/azithro yesterday to cover for possible pna.  d/w Pulm    ROS:  CONSTITUTIONAL:  No fever, chills, rigors  CARDIOVASCULAR:  No chest pain or palpitations  RESPIRATORY:   No SOB, cough, dyspnea on exertion.  No wheezing  GASTROINTESTINAL:  No abd pain, N/V, diarrhea/constipation  EXTREMITIES:  No swelling or joint pain  GENITOURINARY:  No burning on urination, increased frequency or urgency.  No flank pain  NEUROLOGIC:  No HA, visual disturbances  SKIN: No rashes    Allergies    No Known Allergies    Intolerances        ANTIBIOTICS/RELEVANT:  antimicrobials  azithromycin  IVPB 500 milliGRAM(s) IV Intermittent every 24 hours  piperacillin/tazobactam IVPB.. 3.375 Gram(s) IV Intermittent every 8 hours    immunologic:    OTHER:  aspirin enteric coated 81 milliGRAM(s) Oral daily  buMETAnide 0.5 milliGRAM(s) Oral <User Schedule>  dextrose 40% Gel 15 Gram(s) Oral once  dextrose 5%. 1000 milliLiter(s) IV Continuous <Continuous>  dextrose 5%. 1000 milliLiter(s) IV Continuous <Continuous>  dextrose 50% Injectable 25 Gram(s) IV Push once  dextrose 50% Injectable 12.5 Gram(s) IV Push once  dextrose 50% Injectable 25 Gram(s) IV Push once  donepezil 5 milliGRAM(s) Oral daily  finasteride 5 milliGRAM(s) Oral daily  glucagon  Injectable 1 milliGRAM(s) IntraMuscular once  insulin lispro (ADMELOG) corrective regimen sliding scale   SubCutaneous three times a day before meals  insulin lispro (ADMELOG) corrective regimen sliding scale   SubCutaneous at bedtime  melatonin 3 milliGRAM(s) Oral at bedtime  memantine 10 milliGRAM(s) Oral two times a day  metoprolol succinate ER 25 milliGRAM(s) Oral daily  mirtazapine 7.5 milliGRAM(s) Oral daily  QUEtiapine 12.5 milliGRAM(s) Oral at bedtime  thiamine 100 milliGRAM(s) Oral daily      Objective:  Vital Signs Last 24 Hrs  T(C): 36.7 (28 Sep 2021 14:45), Max: 37.1 (27 Sep 2021 20:02)  T(F): 98.1 (28 Sep 2021 14:45), Max: 98.7 (27 Sep 2021 20:02)  HR: 77 (28 Sep 2021 14:45) (70 - 105)  BP: 127/89 (28 Sep 2021 14:45) (110/71 - 135/80)  BP(mean): --  RR: 20 (28 Sep 2021 14:45) (18 - 20)  SpO2: 96% (28 Sep 2021 14:45) (95% - 96%)    PHYSICAL EXAM:  Constitutional:NAD  Eyes:KARINA, EOMI  Ear/Nose/Throat: no thrush, mucositis.  Moist mucous membranes	  Neck:no JVD, no lymphadenopathy, supple  Respiratory: CTA andria  Cardiovascular: S1S2 RRR, no murmurs  Gastrointestinal:soft, nontender,  nondistended (+) BS  Extremities:no e/e/c  Skin:  no rashes, open wounds or ulcerations  : condom cath in place        LABS:                        11.6   6.08  )-----------( 124      ( 28 Sep 2021 07:05 )             35.1     09-28    135  |  103  |  15  ----------------------------<  120<H>  3.9   |  21<L>  |  0.85    Ca    8.5      28 Sep 2021 07:02        Urinalysis Basic - ( 27 Sep 2021 00:22 )    Color: Red / Appearance: Turbid / SG: >1.050 / pH: x  Gluc: x / Ketone: Negative  / Bili: Negative / Urobili: Negative   Blood: x / Protein: >600 / Nitrite: Negative   Leuk Esterase: Negative / RBC: >50 /hpf / WBC 61 /HPF   Sq Epi: x / Non Sq Epi: x / Bacteria: Negative        Procalcitonin, Serum: 0.03 (09-27 @ 07:04)            Rapid RVP Result: [facility]          MICROBIOLOGY:    Culture - Blood (09.26.21 @ 17:24)   Specimen Source: .Blood Blood-Peripheral   Culture Results:   No growth to date.     Culture - Blood (09.26.21 @ 17:24)   Specimen Source: .Blood Blood-Peripheral   Culture Results:   No growth to date.     Culture - Urine (09.26.21 @ 17:21)   Specimen Source: Clean Catch Clean Catch (Midstream)   Culture Results:   No growth       RADIOLOGY & ADDITIONAL STUDIES:    < from: CT Abdomen and Pelvis w/ IV Cont (09.26.21 @ 17:53) >    IMPRESSION:  Left upper lobe pneumonia.    Bilateral pleural effusions.      < end of copied text >  
Subjective  called to re-eval urine color after urination   diaper examined color is dilute raspberry tea with one clot passed  pt seen and examined. states does not have urge to urinate. otherwise well      Objective    Vital signs  T(F): , Max: 98.6 (09-29-21 @ 21:02)  HR: 71 (09-30-21 @ 12:57)  BP: 128/79 (09-30-21 @ 12:57)  SpO2: 100% (09-30-21 @ 12:57)  Wt(kg): --    Output     09-29 @ 07:01  -  09-30 @ 07:00  --------------------------------------------------------  IN: 795 mL / OUT: 400 mL / NET: 395 mL        Gen awake alert nad axox1  Abd soft ntnd     Back no cvat bl    nonpalp bladder; diaper dry     Labs      Culture - Urine (09.26.21 @ 17:21)    Specimen Source: Clean Catch Clean Catch (Midstream)    Culture Results:   No growth        Imaging  < from: CT Abdomen and Pelvis w/ IV Cont (09.26.21 @ 17:53) >  FINDINGS:  CHEST:  LUNGS AND LARGE AIRWAYS: Patent central airways. Left upper lobe alveolar opacity. Bilateral lower lobe dependent atelectasis.  PLEURA: Bilateral pleural effusions, large on the left and small to moderate on the right.  VESSELS: Atheromatous calcifications of the thoracic aorta without aneurysmal dilatation. Coronary artery calcifications.  HEART: Heart size is normal. Transvenous pacemaker. Status postTAVR. Mitral annular calcification. Watchman device. No pericardial effusion.  MEDIASTINUM AND YONY: No lymphadenopathy.  CHEST WALL AND LOWER NECK: Mildly enlarged right supraclavicular lymph nodes.    ABDOMEN AND PELVIS:  LIVER: Within normal limits.  BILE DUCTS: Normal caliber.  GALLBLADDER: Small gallstones.  SPLEEN: 1 cm splenule.  PANCREAS: Within normal limits.  ADRENALS: Within normal limits.  KIDNEYS/URETERS: Within normal limits.    BLADDER: Incompletely distended. Mild mural thickening.  REPRODUCTIVE ORGANS: Enlarged prostate.    BOWEL: Colonic diverticulosis. No diverticulitis. No bowel obstruction. Small direct esophageal hiatal hernia. Appendix not visualized.  PERITONEUM: No ascites.  VESSELS: Atheromatous calcifications.  RETROPERITONEUM/LYMPH NODES: No lymphadenopathy.  ABDOMINAL WALL: Within normal limits.  BONES: Dextroscoliosis of thoracolumbar spine with accompanying degenerative changes.    IMPRESSION:  Left upper lobe pneumonia.    Bilateral pleural effusions.          
DATE OF SERVICE: 09-29-21 @ 07:20    Subjective: Patient seen and examined. No new events except as noted.     SUBJECTIVE/ROS:  feels ok       MEDICATIONS:  MEDICATIONS  (STANDING):  aspirin enteric coated 81 milliGRAM(s) Oral daily  azithromycin  IVPB 500 milliGRAM(s) IV Intermittent every 24 hours  buMETAnide 0.5 milliGRAM(s) Oral <User Schedule>  dextrose 40% Gel 15 Gram(s) Oral once  dextrose 5%. 1000 milliLiter(s) (50 mL/Hr) IV Continuous <Continuous>  dextrose 5%. 1000 milliLiter(s) (100 mL/Hr) IV Continuous <Continuous>  dextrose 50% Injectable 25 Gram(s) IV Push once  dextrose 50% Injectable 12.5 Gram(s) IV Push once  dextrose 50% Injectable 25 Gram(s) IV Push once  donepezil 5 milliGRAM(s) Oral daily  finasteride 5 milliGRAM(s) Oral daily  glucagon  Injectable 1 milliGRAM(s) IntraMuscular once  insulin lispro (ADMELOG) corrective regimen sliding scale   SubCutaneous three times a day before meals  insulin lispro (ADMELOG) corrective regimen sliding scale   SubCutaneous at bedtime  melatonin 3 milliGRAM(s) Oral at bedtime  memantine 10 milliGRAM(s) Oral two times a day  metoprolol succinate ER 25 milliGRAM(s) Oral daily  mirtazapine 7.5 milliGRAM(s) Oral daily  piperacillin/tazobactam IVPB.. 3.375 Gram(s) IV Intermittent every 8 hours  QUEtiapine 12.5 milliGRAM(s) Oral at bedtime  thiamine 100 milliGRAM(s) Oral daily      PHYSICAL EXAM:  T(C): 36.4 (09-29-21 @ 05:23), Max: 36.8 (09-28-21 @ 20:33)  HR: 62 (09-29-21 @ 05:23) (62 - 80)  BP: 112/71 (09-29-21 @ 05:23) (112/71 - 135/80)  RR: 20 (09-29-21 @ 05:23) (18 - 20)  SpO2: 99% (09-29-21 @ 05:23) (95% - 99%)  Wt(kg): --  I&O's Summary    28 Sep 2021 07:01  -  29 Sep 2021 07:00  --------------------------------------------------------  IN: 480 mL / OUT: 1200 mL / NET: -720 mL            JVP: Normal  Neck: supple  Lung: clear   CV: S1 S2 , Murmur:  Abd: soft  Ext: No edema  neuro: Awake / alert  Psych: flat affect  Skin: normal``    LABS/DATA:    CARDIAC MARKERS:                                11.6   6.08  )-----------( 124      ( 28 Sep 2021 07:05 )             35.1     09-28    135  |  103  |  15  ----------------------------<  120<H>  3.9   |  21<L>  |  0.85    Ca    8.5      28 Sep 2021 07:02      proBNP:   Lipid Profile:   HgA1c:   TSH:     TELE:  EKG:        
DATE OF SERVICE: 09-30-21 @ 09:43    Subjective: Patient seen and examined. No new events except as noted.     SUBJECTIVE/ROS:  ROS limited     MEDICATIONS:  MEDICATIONS  (STANDING):  albuterol/ipratropium for Nebulization 3 milliLiter(s) Nebulizer every 6 hours  buMETAnide 0.5 milliGRAM(s) Oral <User Schedule>  dextrose 40% Gel 15 Gram(s) Oral once  dextrose 5%. 1000 milliLiter(s) (50 mL/Hr) IV Continuous <Continuous>  dextrose 5%. 1000 milliLiter(s) (100 mL/Hr) IV Continuous <Continuous>  dextrose 50% Injectable 25 Gram(s) IV Push once  dextrose 50% Injectable 12.5 Gram(s) IV Push once  dextrose 50% Injectable 25 Gram(s) IV Push once  donepezil 5 milliGRAM(s) Oral daily  finasteride 5 milliGRAM(s) Oral daily  glucagon  Injectable 1 milliGRAM(s) IntraMuscular once  insulin lispro (ADMELOG) corrective regimen sliding scale   SubCutaneous three times a day before meals  insulin lispro (ADMELOG) corrective regimen sliding scale   SubCutaneous at bedtime  melatonin 3 milliGRAM(s) Oral at bedtime  memantine 10 milliGRAM(s) Oral two times a day  metoprolol succinate ER 25 milliGRAM(s) Oral daily  mirtazapine 7.5 milliGRAM(s) Oral daily  piperacillin/tazobactam IVPB.. 3.375 Gram(s) IV Intermittent every 8 hours  polyethylene glycol 3350 17 Gram(s) Oral daily  QUEtiapine 12.5 milliGRAM(s) Oral at bedtime  thiamine 100 milliGRAM(s) Oral daily      PHYSICAL EXAM:  T(C): 36.7 (09-30-21 @ 06:00), Max: 37 (09-29-21 @ 21:02)  HR: 73 (09-30-21 @ 06:00) (73 - 85)  BP: 118/77 (09-30-21 @ 06:00) (109/72 - 130/79)  RR: 18 (09-30-21 @ 06:00) (18 - 20)  SpO2: 95% (09-30-21 @ 06:00) (94% - 95%)  Wt(kg): --  I&O's Summary    29 Sep 2021 07:01  -  30 Sep 2021 07:00  --------------------------------------------------------  IN: 795 mL / OUT: 400 mL / NET: 395 mL            JVP: Normal  Neck: supple  Lung: clear   CV: S1 S2 , Murmur:  Abd: soft  Ext: No edema  neuro: Awake   Psych: flat affect  Skin: normal``    LABS/DATA:    CARDIAC MARKERS:                                11.6   5.77  )-----------( 154      ( 30 Sep 2021 07:03 )             36.4     09-29    138  |  103  |  13  ----------------------------<  124<H>  3.9   |  22  |  0.86    Ca    8.8      29 Sep 2021 07:11      proBNP:   Lipid Profile:   HgA1c:   TSH:     TELE:  EKG:        
Subjective  Confused this AM. Denies pain. Denies difficulty urinating or sensation of full bladder.  H/H stable today (Hct 35.8 from 35.1)    Objective    Vital signs  T(F): , Max: 98.3 (09-28-21 @ 20:33)  HR: 62 (09-29-21 @ 05:23)  BP: 112/71 (09-29-21 @ 05:23)  SpO2: 99% (09-29-21 @ 05:23)  Wt(kg): --    Output     OUT:    Voided (mL): 1200 mL  Total OUT: 1200 mL    Total NET: -1200 mL          Gen: NAD  Abd: soft, nontender, nondistended  : condom cath in place, no urine to evaluate    Labs      09-29 @ 07:18    WBC 5.70  / Hct 35.8  / SCr --       09-29 @ 07:11    WBC --    / Hct --    / SCr 0.86         Culture - Blood (collected 09-26-21 @ 17:24)  Source: .Blood Blood-Peripheral  Preliminary Report (09-27-21 @ 18:01):    No growth to date.    Culture - Blood (collected 09-26-21 @ 17:24)  Source: .Blood Blood-Peripheral  Preliminary Report (09-27-21 @ 18:01):    No growth to date.    Culture - Urine (collected 09-26-21 @ 17:21)  Source: Clean Catch Clean Catch (Midstream)  Final Report (09-27-21 @ 13:43):    No growth  
Subjective  Denies pain. States he does not feel the urge to void.  No urine in condom cath to assess color.  Bladder scan at 4am 237cc.    Objective    Vital signs  T(F): , Max: 100.5 (09-26-21 @ 18:51)  HR: 71 (09-27-21 @ 03:54)  BP: 114/68 (09-27-21 @ 03:54)  SpO2: 96% (09-27-21 @ 03:54)  Wt(kg): --    Output       Gen: NAD  Abd: soft, nontender, nondistended  : condom cath in place, no urine output; no suprapubic TTP or fullness appreciated    Labs      09-27 @ 07:04    WBC 7.42  / Hct 38.0  / SCr 0.81     09-27 @ 00:22    WBC 7.44  / Hct 39.7  / SCr --       
DATE OF SERVICE: 10-01-21 @ 11:20    Subjective: Patient seen and examined. No new events except as noted.     SUBJECTIVE/ROS:        MEDICATIONS:  MEDICATIONS  (STANDING):  albuterol/ipratropium for Nebulization 3 milliLiter(s) Nebulizer every 6 hours  buMETAnide 0.5 milliGRAM(s) Oral <User Schedule>  dextrose 40% Gel 15 Gram(s) Oral once  dextrose 5%. 1000 milliLiter(s) (50 mL/Hr) IV Continuous <Continuous>  dextrose 5%. 1000 milliLiter(s) (100 mL/Hr) IV Continuous <Continuous>  dextrose 50% Injectable 25 Gram(s) IV Push once  dextrose 50% Injectable 12.5 Gram(s) IV Push once  dextrose 50% Injectable 25 Gram(s) IV Push once  donepezil 5 milliGRAM(s) Oral daily  finasteride 5 milliGRAM(s) Oral daily  glucagon  Injectable 1 milliGRAM(s) IntraMuscular once  insulin lispro (ADMELOG) corrective regimen sliding scale   SubCutaneous three times a day before meals  insulin lispro (ADMELOG) corrective regimen sliding scale   SubCutaneous at bedtime  melatonin 3 milliGRAM(s) Oral at bedtime  memantine 10 milliGRAM(s) Oral two times a day  metoprolol succinate ER 25 milliGRAM(s) Oral daily  mirtazapine 7.5 milliGRAM(s) Oral daily  piperacillin/tazobactam IVPB.. 3.375 Gram(s) IV Intermittent every 8 hours  polyethylene glycol 3350 17 Gram(s) Oral daily  QUEtiapine 12.5 milliGRAM(s) Oral at bedtime  thiamine 100 milliGRAM(s) Oral daily      PHYSICAL EXAM:  T(C): 36.3 (10-01-21 @ 05:47), Max: 37.1 (10-01-21 @ 00:05)  HR: 91 (10-01-21 @ 09:55) (71 - 91)  BP: 137/81 (10-01-21 @ 09:55) (110/74 - 141/60)  RR: 20 (10-01-21 @ 09:55) (18 - 20)  SpO2: 95% (10-01-21 @ 09:55) (94% - 100%)  Wt(kg): --  I&O's Summary    30 Sep 2021 07:01  -  01 Oct 2021 07:00  --------------------------------------------------------  IN: 1060 mL / OUT: 0 mL / NET: 1060 mL            JVP: Normal  Neck: supple  Lung: clear   CV: S1 S2 , Murmur:  Abd: soft  Ext: No edema  neuro: Awake / alert  Psych: flat affect  Skin: normal``    LABS/DATA:    CARDIAC MARKERS:                                11.0   5.74  )-----------( 157      ( 01 Oct 2021 06:44 )             34.7           proBNP:   Lipid Profile:   HgA1c:   TSH:     TELE:  EKG:        
Date of Service  : 09-27-21     INTERVAL HPI/OVERNIGHT EVENTS: Having hematuria   Vital Signs Last 24 Hrs  T(C): 36.8 (27 Sep 2021 15:00), Max: 38.1 (26 Sep 2021 18:51)  T(F): 98.3 (27 Sep 2021 15:00), Max: 100.5 (26 Sep 2021 18:51)  HR: 89 (27 Sep 2021 15:00) (60 - 105)  BP: 133/86 (27 Sep 2021 15:00) (98/72 - 139/92)  BP(mean): 80 (26 Sep 2021 23:20) (80 - 86)  RR: 18 (27 Sep 2021 15:00) (16 - 20)  SpO2: 94% (27 Sep 2021 15:00) (94% - 99%)  I&O's Summary    27 Sep 2021 07:01  -  27 Sep 2021 16:26  --------------------------------------------------------  IN: 0 mL / OUT: 250 mL / NET: -250 mL      MEDICATIONS  (STANDING):  aspirin enteric coated 81 milliGRAM(s) Oral daily  azithromycin  IVPB 500 milliGRAM(s) IV Intermittent every 24 hours  buMETAnide 0.5 milliGRAM(s) Oral <User Schedule>  dextrose 40% Gel 15 Gram(s) Oral once  dextrose 5%. 1000 milliLiter(s) (50 mL/Hr) IV Continuous <Continuous>  dextrose 5%. 1000 milliLiter(s) (100 mL/Hr) IV Continuous <Continuous>  dextrose 50% Injectable 25 Gram(s) IV Push once  dextrose 50% Injectable 12.5 Gram(s) IV Push once  dextrose 50% Injectable 25 Gram(s) IV Push once  donepezil 5 milliGRAM(s) Oral daily  enoxaparin Injectable 40 milliGRAM(s) SubCutaneous daily  finasteride 5 milliGRAM(s) Oral daily  glucagon  Injectable 1 milliGRAM(s) IntraMuscular once  insulin lispro (ADMELOG) corrective regimen sliding scale   SubCutaneous three times a day before meals  insulin lispro (ADMELOG) corrective regimen sliding scale   SubCutaneous at bedtime  melatonin 3 milliGRAM(s) Oral at bedtime  memantine 10 milliGRAM(s) Oral two times a day  metoprolol succinate ER 25 milliGRAM(s) Oral daily  mirtazapine 7.5 milliGRAM(s) Oral daily  piperacillin/tazobactam IVPB. 3.375 Gram(s) IV Intermittent once  piperacillin/tazobactam IVPB.. 3.375 Gram(s) IV Intermittent every 8 hours  QUEtiapine 12.5 milliGRAM(s) Oral at bedtime  thiamine 100 milliGRAM(s) Oral daily    MEDICATIONS  (PRN):    LABS:                        11.9   7.42  )-----------( 132      ( 27 Sep 2021 07:04 )             38.0     09-27    136  |  101  |  14  ----------------------------<  122<H>  4.8   |  20<L>  |  0.81    Ca    8.7      27 Sep 2021 07:04    TPro  7.9  /  Alb  4.3  /  TBili  1.0  /  DBili  x   /  AST  23  /  ALT  19  /  AlkPhos  114  09-26    PT/INR - ( 26 Sep 2021 13:41 )   PT: 14.2 sec;   INR: 1.19 ratio         PTT - ( 26 Sep 2021 13:41 )  PTT:30.5 sec  Urinalysis Basic - ( 27 Sep 2021 00:22 )    Color: Red / Appearance: Turbid / SG: >1.050 / pH: x  Gluc: x / Ketone: Negative  / Bili: Negative / Urobili: Negative   Blood: x / Protein: >600 / Nitrite: Negative   Leuk Esterase: Negative / RBC: >50 /hpf / WBC 61 /HPF   Sq Epi: x / Non Sq Epi: x / Bacteria: Negative      CAPILLARY BLOOD GLUCOSE      POCT Blood Glucose.: 118 mg/dL (27 Sep 2021 12:32)  POCT Blood Glucose.: 103 mg/dL (27 Sep 2021 09:03)  POCT Blood Glucose.: 159 mg/dL (26 Sep 2021 22:23)        Urinalysis Basic - ( 27 Sep 2021 00:22 )    Color: Red / Appearance: Turbid / SG: >1.050 / pH: x  Gluc: x / Ketone: Negative  / Bili: Negative / Urobili: Negative   Blood: x / Protein: >600 / Nitrite: Negative   Leuk Esterase: Negative / RBC: >50 /hpf / WBC 61 /HPF   Sq Epi: x / Non Sq Epi: x / Bacteria: Negative          Consultant(s) Notes Reviewed:  [x ] YES  [ ] NO    PHYSICAL EXAM:  GENERAL: NAD, well-groomed, well-developed, not in any distress ,  HEAD:  Atraumatic, Normocephalic  EYES: EOMI, PERRLA, conjunctiva and sclera clear  ENMT: No tonsillar erythema, exudates, or enlargement; Moist mucous membranes, Good dentition, No lesions  NECK: Supple, No JVD, Normal thyroid  NERVOUS SYSTEM:  Alert & , No focal deficit   CHEST/LUNG: Good air entry bilateral with no  rales, rhonchi, wheezing, or rubs  HEART: Regular rate and rhythm; No murmurs, rubs, or gallops  ABDOMEN: Soft, Nontender, Nondistended; Bowel sounds present  EXTREMITIES:  2+ Peripheral Pulses, No clubbing, cyanosis, or edema      Care Discussed with Consultants/Other Providers [ x] YES  [ ] NO
Date of Service  : 09-29-21 @ 11:22    INTERVAL HPI/OVERNIGHT EVENTS: Seen and examined . I am fine but thought I am in [place]   Vital Signs Last 24 Hrs  T(C): 36.4 (29 Sep 2021 05:23), Max: 36.8 (28 Sep 2021 20:33)  T(F): 97.6 (29 Sep 2021 05:23), Max: 98.3 (28 Sep 2021 20:33)  HR: 62 (29 Sep 2021 05:23) (62 - 77)  BP: 112/71 (29 Sep 2021 05:23) (112/71 - 127/89)  BP(mean): --  RR: 20 (29 Sep 2021 05:23) (18 - 20)  SpO2: 99% (29 Sep 2021 05:23) (96% - 99%)  I&O's Summary    28 Sep 2021 07:01  -  29 Sep 2021 07:00  --------------------------------------------------------  IN: 480 mL / OUT: 1200 mL / NET: -720 mL      MEDICATIONS  (STANDING):  albuterol/ipratropium for Nebulization 3 milliLiter(s) Nebulizer every 6 hours  buMETAnide 0.5 milliGRAM(s) Oral <User Schedule>  dextrose 40% Gel 15 Gram(s) Oral once  dextrose 5%. 1000 milliLiter(s) (50 mL/Hr) IV Continuous <Continuous>  dextrose 5%. 1000 milliLiter(s) (100 mL/Hr) IV Continuous <Continuous>  dextrose 50% Injectable 25 Gram(s) IV Push once  dextrose 50% Injectable 12.5 Gram(s) IV Push once  dextrose 50% Injectable 25 Gram(s) IV Push once  donepezil 5 milliGRAM(s) Oral daily  finasteride 5 milliGRAM(s) Oral daily  glucagon  Injectable 1 milliGRAM(s) IntraMuscular once  insulin lispro (ADMELOG) corrective regimen sliding scale   SubCutaneous three times a day before meals  insulin lispro (ADMELOG) corrective regimen sliding scale   SubCutaneous at bedtime  melatonin 3 milliGRAM(s) Oral at bedtime  memantine 10 milliGRAM(s) Oral two times a day  metoprolol succinate ER 25 milliGRAM(s) Oral daily  mirtazapine 7.5 milliGRAM(s) Oral daily  piperacillin/tazobactam IVPB.. 3.375 Gram(s) IV Intermittent every 8 hours  QUEtiapine 12.5 milliGRAM(s) Oral at bedtime  thiamine 100 milliGRAM(s) Oral daily    MEDICATIONS  (PRN):    LABS:                        11.8   5.70  )-----------( 134      ( 29 Sep 2021 07:18 )             35.8     09-29    138  |  103  |  13  ----------------------------<  124<H>  3.9   |  22  |  0.86    Ca    8.8      29 Sep 2021 07:11          CAPILLARY BLOOD GLUCOSE      POCT Blood Glucose.: 103 mg/dL (29 Sep 2021 08:00)  POCT Blood Glucose.: 115 mg/dL (28 Sep 2021 21:27)  POCT Blood Glucose.: 147 mg/dL (28 Sep 2021 17:14)  POCT Blood Glucose.: 140 mg/dL (28 Sep 2021 12:20)              Consultant(s) Notes Reviewed:  [x ] YES  [ ] NO    PHYSICAL EXAM:  GENERAL: NAD, well-groomed, well-developed,not in any distress ,  HEAD:  Atraumatic, Normocephalic  EYES: EOMI, PERRLA, conjunctiva and sclera clear  ENMT: No tonsillar erythema, exudates, or enlargement; Moist mucous membranes, Good dentition, No lesions  NECK: Supple, No JVD, Normal thyroid  NERVOUS SYSTEM:  Alert & Oriented X1, No focal deficit   CHEST/LUNG: Good air entry bilateral with no  rales, rhonchi, wheezing, or rubs  HEART: Regular rate and rhythm; No murmurs, rubs, or gallops  ABDOMEN: Soft, Nontender, Nondistended; Bowel sounds present  EXTREMITIES:  2+ Peripheral Pulses, No clubbing, cyanosis, or edema  [place] with blood     Care Discussed with Consultants/Other Providers [ x] YES  [ ] NO
Date of Service  : 10-01-21     INTERVAL HPI/OVERNIGHT EVENTS:   Vital Signs Last 24 Hrs  T(C): 36.4 (01 Oct 2021 11:51), Max: 37.1 (01 Oct 2021 00:05)  T(F): 97.5 (01 Oct 2021 11:51), Max: 98.8 (01 Oct 2021 00:05)  HR: 84 (01 Oct 2021 11:51) (74 - 91)  BP: 124/73 (01 Oct 2021 11:51) (110/74 - 141/60)  BP(mean): --  RR: 20 (01 Oct 2021 12:41) (18 - 20)  SpO2: 94% (01 Oct 2021 12:41) (94% - 99%)  I&O's Summary    30 Sep 2021 07:01  -  01 Oct 2021 07:00  --------------------------------------------------------  IN: 1060 mL / OUT: 0 mL / NET: 1060 mL      MEDICATIONS  (STANDING):  albuterol/ipratropium for Nebulization 3 milliLiter(s) Nebulizer every 6 hours  buMETAnide 0.5 milliGRAM(s) Oral <User Schedule>  dextrose 40% Gel 15 Gram(s) Oral once  dextrose 5%. 1000 milliLiter(s) (50 mL/Hr) IV Continuous <Continuous>  dextrose 5%. 1000 milliLiter(s) (100 mL/Hr) IV Continuous <Continuous>  dextrose 50% Injectable 25 Gram(s) IV Push once  dextrose 50% Injectable 12.5 Gram(s) IV Push once  dextrose 50% Injectable 25 Gram(s) IV Push once  donepezil 5 milliGRAM(s) Oral daily  finasteride 5 milliGRAM(s) Oral daily  glucagon  Injectable 1 milliGRAM(s) IntraMuscular once  insulin lispro (ADMELOG) corrective regimen sliding scale   SubCutaneous three times a day before meals  insulin lispro (ADMELOG) corrective regimen sliding scale   SubCutaneous at bedtime  melatonin 3 milliGRAM(s) Oral at bedtime  memantine 10 milliGRAM(s) Oral two times a day  metoprolol succinate ER 25 milliGRAM(s) Oral daily  mirtazapine 7.5 milliGRAM(s) Oral daily  piperacillin/tazobactam IVPB.. 3.375 Gram(s) IV Intermittent every 8 hours  polyethylene glycol 3350 17 Gram(s) Oral daily  QUEtiapine 12.5 milliGRAM(s) Oral at bedtime  thiamine 100 milliGRAM(s) Oral daily    MEDICATIONS  (PRN):    LABS:                        11.0   5.74  )-----------( 157      ( 01 Oct 2021 06:44 )             34.7               CAPILLARY BLOOD GLUCOSE      POCT Blood Glucose.: 152 mg/dL (01 Oct 2021 12:10)  POCT Blood Glucose.: 127 mg/dL (01 Oct 2021 07:51)  POCT Blood Glucose.: 135 mg/dL (30 Sep 2021 22:03)  POCT Blood Glucose.: 138 mg/dL (30 Sep 2021 17:16)              Consultant(s) Notes Reviewed:  [x ] YES  [ ] NO    PHYSICAL EXAM:  GENERAL: NAD, well-groomed, well-developed ,not in any distress ,  HEAD:  Atraumatic, Normocephalic  EYES: EOMI, PERRLA, conjunctiva and sclera clear  ENMT: No tonsillar erythema, exudates, or enlargement; Moist mucous membranes, Good dentition, No lesions  NECK: Supple, No JVD, Normal thyroid  NERVOUS SYSTEM:  Alert & Oriented X1, No focal deficit   CHEST/LUNG: Good air entry bilateral with no  rales, rhonchi, wheezing, or rubs  HEART: Regular rate and rhythm; No murmurs, rubs, or gallops  ABDOMEN: Soft, Nontender, Nondistended; Bowel sounds present  EXTREMITIES:  2+ Peripheral Pulses, No clubbing, cyanosis, or edema  SKIN: No rashes or lesions    Care Discussed with Consultants/Other Providers [ x] YES  [ ] NO
Infectious Diseases progress note:    Subjective: NAD, afebrile, resting comfortably.      ROS:  CONSTITUTIONAL:  No fever, chills, rigors  CARDIOVASCULAR:  No chest pain or palpitations  RESPIRATORY:   No SOB, cough, dyspnea on exertion.  No wheezing  GASTROINTESTINAL:  No abd pain, N/V, diarrhea/constipation  EXTREMITIES:  No swelling or joint pain  GENITOURINARY:  No burning on urination, increased frequency or urgency.  No flank pain  NEUROLOGIC:  No HA, visual disturbances  SKIN: No rashes    Allergies    No Known Allergies    Intolerances        ANTIBIOTICS/RELEVANT:  antimicrobials  piperacillin/tazobactam IVPB.. 3.375 Gram(s) IV Intermittent every 8 hours    immunologic:    OTHER:  albuterol/ipratropium for Nebulization 3 milliLiter(s) Nebulizer every 6 hours  buMETAnide 0.5 milliGRAM(s) Oral <User Schedule>  dextrose 40% Gel 15 Gram(s) Oral once  dextrose 5%. 1000 milliLiter(s) IV Continuous <Continuous>  dextrose 5%. 1000 milliLiter(s) IV Continuous <Continuous>  dextrose 50% Injectable 25 Gram(s) IV Push once  dextrose 50% Injectable 12.5 Gram(s) IV Push once  dextrose 50% Injectable 25 Gram(s) IV Push once  donepezil 5 milliGRAM(s) Oral daily  finasteride 5 milliGRAM(s) Oral daily  glucagon  Injectable 1 milliGRAM(s) IntraMuscular once  insulin lispro (ADMELOG) corrective regimen sliding scale   SubCutaneous three times a day before meals  insulin lispro (ADMELOG) corrective regimen sliding scale   SubCutaneous at bedtime  melatonin 3 milliGRAM(s) Oral at bedtime  memantine 10 milliGRAM(s) Oral two times a day  metoprolol succinate ER 25 milliGRAM(s) Oral daily  mirtazapine 7.5 milliGRAM(s) Oral daily  polyethylene glycol 3350 17 Gram(s) Oral daily  QUEtiapine 12.5 milliGRAM(s) Oral at bedtime  thiamine 100 milliGRAM(s) Oral daily      Objective:  Vital Signs Last 24 Hrs  T(C): 36.3 (29 Sep 2021 11:53), Max: 36.8 (28 Sep 2021 20:33)  T(F): 97.3 (29 Sep 2021 11:53), Max: 98.3 (28 Sep 2021 20:33)  HR: 78 (29 Sep 2021 11:53) (62 - 78)  BP: 112/75 (29 Sep 2021 11:53) (112/71 - 127/89)  BP(mean): --  RR: 20 (29 Sep 2021 11:53) (18 - 20)  SpO2: 95% (29 Sep 2021 11:53) (95% - 99%)    PHYSICAL EXAM:  Constitutional:NAD  Eyes:KARINA, EOMI  Ear/Nose/Throat: no thrush, mucositis.  Moist mucous membranes	  Neck:no JVD, no lymphadenopathy, supple  Respiratory: CTA andria  Cardiovascular: S1S2 RRR, no murmurs  Gastrointestinal:soft, nontender,  nondistended (+) BS  Extremities:no e/e/c  Skin:  no rashes, open wounds or ulcerations  : [place] collazo in place        LABS:                        11.8   5.70  )-----------( 134      ( 29 Sep 2021 07:18 )             35.8     09-29    138  |  103  |  13  ----------------------------<  124<H>  3.9   |  22  |  0.86    Ca    8.8      29 Sep 2021 07:11            Procalcitonin, Serum: 0.03 (09-27 @ 07:04)            Rapid RVP Result: [facility]          MICROBIOLOGY:    Legionella pneumophila Antigen, Urine (09.28.21 @ 15:11)   Legionella Antigen, Urine: Negative: Positive Testing method: Immunochromatographic Assay.   Presumptive detection of L. pneumophila serogroup 1 antigen in urine,   suggesting recent or current infection. Order “Culture –Legionella” as   recommended to confirm infection.   Negative Testing method: Immunochromatographic Assay.   L. pneumophila serogroup 1 antigen in urine NOT detected, suggesting NO   recent or current infection. Infection due to Legionella cannot be ruled   out: other serogroups and species may cause disease, antigen may not be   present in urine in early infection, or the level of antigens in urine   may be below the detection limit of the test. Order “Culture   –Legionella” is recommended for uncommon cases of suspected Legionella   pneumonia due to organisms other thanL. pneumophila serogroup 1.       RADIOLOGY & ADDITIONAL STUDIES:    
Infectious Diseases progress note:    Subjective: NAD, afebrile.  Events noted.      ROS:  CONSTITUTIONAL:  No fever, chills, rigors  CARDIOVASCULAR:  No chest pain or palpitations  RESPIRATORY:   No SOB, cough, dyspnea on exertion.  No wheezing  GASTROINTESTINAL:  No abd pain, N/V, diarrhea/constipation  EXTREMITIES:  No swelling or joint pain  GENITOURINARY:  No burning on urination, increased frequency or urgency.  No flank pain  NEUROLOGIC:  No HA, visual disturbances  SKIN: No rashes    Allergies    No Known Allergies    Intolerances        ANTIBIOTICS/RELEVANT:  antimicrobials  piperacillin/tazobactam IVPB.. 3.375 Gram(s) IV Intermittent every 8 hours    immunologic:    OTHER:  albuterol/ipratropium for Nebulization 3 milliLiter(s) Nebulizer every 6 hours  buMETAnide 0.5 milliGRAM(s) Oral <User Schedule>  dextrose 40% Gel 15 Gram(s) Oral once  dextrose 5%. 1000 milliLiter(s) IV Continuous <Continuous>  dextrose 5%. 1000 milliLiter(s) IV Continuous <Continuous>  dextrose 50% Injectable 25 Gram(s) IV Push once  dextrose 50% Injectable 12.5 Gram(s) IV Push once  dextrose 50% Injectable 25 Gram(s) IV Push once  donepezil 5 milliGRAM(s) Oral daily  finasteride 5 milliGRAM(s) Oral daily  glucagon  Injectable 1 milliGRAM(s) IntraMuscular once  insulin lispro (ADMELOG) corrective regimen sliding scale   SubCutaneous three times a day before meals  insulin lispro (ADMELOG) corrective regimen sliding scale   SubCutaneous at bedtime  melatonin 3 milliGRAM(s) Oral at bedtime  memantine 10 milliGRAM(s) Oral two times a day  metoprolol succinate ER 25 milliGRAM(s) Oral daily  mirtazapine 7.5 milliGRAM(s) Oral daily  polyethylene glycol 3350 17 Gram(s) Oral daily  QUEtiapine 12.5 milliGRAM(s) Oral at bedtime  thiamine 100 milliGRAM(s) Oral daily      Objective:  Vital Signs Last 24 Hrs  T(C): 36.4 (01 Oct 2021 11:51), Max: 37.1 (01 Oct 2021 00:05)  T(F): 97.5 (01 Oct 2021 11:51), Max: 98.8 (01 Oct 2021 00:05)  HR: 84 (01 Oct 2021 11:51) (74 - 91)  BP: 124/73 (01 Oct 2021 11:51) (110/74 - 139/87)  BP(mean): --  RR: 20 (01 Oct 2021 12:41) (18 - 20)  SpO2: 94% (01 Oct 2021 12:41) (94% - 99%)    PHYSICAL EXAM:    Constitutional:NAD  Eyes:KARINA, EOMI  Ear/Nose/Throat: no thrush, mucositis.  Moist mucous membranes	  Neck:no JVD, no lymphadenopathy, supple  Respiratory: CTA andria  Cardiovascular: S1S2 RRR, no murmurs  Gastrointestinal:soft, nontender,  nondistended (+) BS  Extremities:no e/e/c  Skin:  no rashes, open wounds or ulcerations  :  [place] cath in place        LABS:                        11.0   5.74  )-----------( 157      ( 01 Oct 2021 06:44 )             34.7                 Procalcitonin, Serum: 0.03 (09-27 @ 07:04)            Rapid RVP Result: [facility]          MICROBIOLOGY:    Culture - Blood (09.26.21 @ 17:24)   Specimen Source: .Blood Blood-Peripheral   Culture Results:   No growth to date.       RADIOLOGY & ADDITIONAL STUDIES:    
Subjective  Offers no complaints. Urine thin reddish tea colored.    Objective    Vital signs  T(F): , Max: 98.7 (09-27-21 @ 20:02)  HR: 70 (09-28-21 @ 06:00)  BP: 110/71 (09-28-21 @ 06:00)  SpO2: 95% (09-28-21 @ 06:00)  Wt(kg): --    Output     OUT:    Voided (mL): 1390 mL  Total OUT: 1390 mL    Total NET: -1390 mL          Gen: NAD  Abd: soft, nontender, nondistended  : condom cath in place, draining thin reddish tea color, no clots    Labs      09-27 @ 07:04    WBC 7.42  / Hct 38.0  / SCr 0.81     09-27 @ 00:22    WBC 7.44  / Hct 39.7  / SCr --           Culture - Blood (collected 09-26-21 @ 17:24)  Source: .Blood Blood-Peripheral  Preliminary Report (09-27-21 @ 18:01):    No growth to date.    Culture - Blood (collected 09-26-21 @ 17:24)  Source: .Blood Blood-Peripheral  Preliminary Report (09-27-21 @ 18:01):    No growth to date.    Culture - Urine (collected 09-26-21 @ 17:21)  Source: Clean Catch Clean Catch (Midstream)  Final Report (09-27-21 @ 13:43):    No growth
Follow-up Pulm Progress Note    No new respiratory events overnight.   Alert but confused  O2 sats 95% on 2LNC  Denies CP,SOB    Medications:  MEDICATIONS  (STANDING):  albuterol/ipratropium for Nebulization 3 milliLiter(s) Nebulizer every 6 hours  buMETAnide 0.5 milliGRAM(s) Oral <User Schedule>  dextrose 40% Gel 15 Gram(s) Oral once  dextrose 5%. 1000 milliLiter(s) (50 mL/Hr) IV Continuous <Continuous>  dextrose 5%. 1000 milliLiter(s) (100 mL/Hr) IV Continuous <Continuous>  dextrose 50% Injectable 25 Gram(s) IV Push once  dextrose 50% Injectable 12.5 Gram(s) IV Push once  dextrose 50% Injectable 25 Gram(s) IV Push once  donepezil 5 milliGRAM(s) Oral daily  finasteride 5 milliGRAM(s) Oral daily  glucagon  Injectable 1 milliGRAM(s) IntraMuscular once  insulin lispro (ADMELOG) corrective regimen sliding scale   SubCutaneous three times a day before meals  insulin lispro (ADMELOG) corrective regimen sliding scale   SubCutaneous at bedtime  melatonin 3 milliGRAM(s) Oral at bedtime  memantine 10 milliGRAM(s) Oral two times a day  metoprolol succinate ER 25 milliGRAM(s) Oral daily  mirtazapine 7.5 milliGRAM(s) Oral daily  piperacillin/tazobactam IVPB.. 3.375 Gram(s) IV Intermittent every 8 hours  polyethylene glycol 3350 17 Gram(s) Oral daily  QUEtiapine 12.5 milliGRAM(s) Oral at bedtime  thiamine 100 milliGRAM(s) Oral daily      Vital Signs Last 24 Hrs  T(C): 36.7 (30 Sep 2021 06:00), Max: 37 (29 Sep 2021 21:02)  T(F): 98 (30 Sep 2021 06:00), Max: 98.6 (29 Sep 2021 21:02)  HR: 73 (30 Sep 2021 06:00) (73 - 85)  BP: 118/77 (30 Sep 2021 06:00) (109/72 - 130/79)  BP(mean): --  RR: 18 (30 Sep 2021 06:00) (18 - 20)  SpO2: 95% (30 Sep 2021 06:00) (94% - 95%)    09-29 @ 07:01  -  09-30 @ 07:00  --------------------------------------------------------  IN: 795 mL / OUT: 400 mL / NET: 395 mL    LABS:                        11.6   5.77  )-----------( 154      ( 30 Sep 2021 07:03 )             36.4     09-29    138  |  103  |  13  ----------------------------<  124<H>  3.9   |  22  |  0.86    Ca    8.8      29 Sep 2021 07:11    CAPILLARY BLOOD GLUCOSE  POCT Blood Glucose.: 125 mg/dL (30 Sep 2021 08:38)    CULTURES:     Culture - Blood (collected 09-26-21 @ 17:24)  Source: .Blood Blood-Peripheral  Preliminary Report (09-27-21 @ 18:01):    No growth to date.    Culture - Blood (collected 09-26-21 @ 17:24)  Source: .Blood Blood-Peripheral  Preliminary Report (09-27-21 @ 18:01):    No growth to date.    Culture - Urine (collected 09-26-21 @ 17:21)  Source: Clean Catch Clean Catch (Midstream)  Final Report (09-27-21 @ 13:43):    No growth    Physical Examination:  PULM: Decreased BS L  CVS: RRR    RADIOLOGY REVIEWED    CT chest: < from: CT Chest w/ IV Cont (09.26.21 @ 17:30) >    FINDINGS:  CHEST:  LUNGS AND LARGE AIRWAYS: Patent central airways. Left upper lobe alveolar opacity. Bilateral lower lobe dependent atelectasis.  PLEURA: Bilateral pleural effusions, large on the left and small to moderate on the right.  VESSELS: Atheromatous calcifications of the thoracic aorta without aneurysmal dilatation. Coronary artery calcifications.  HEART: Heart size is normal. Transvenous pacemaker. Status postTAVR. Mitral annular calcification. Watchman device. No pericardial effusion.  MEDIASTINUM AND YONY: No lymphadenopathy.  CHEST WALL AND LOWER NECK: Mildly enlarged right supraclavicular lymph nodes.    ABDOMEN AND PELVIS:  LIVER: Within normal limits.  BILE DUCTS: Normal caliber.  GALLBLADDER: Small gallstones.  SPLEEN: 1 cm splenule.  PANCREAS: Within normal limits.  ADRENALS: Within normal limits.  KIDNEYS/URETERS: Within normal limits.    BLADDER: Incompletely distended. Mild mural thickening.  REPRODUCTIVE ORGANS: Enlarged prostate.    BOWEL: Colonic diverticulosis. No diverticulitis. No bowel obstruction. Small direct esophageal hiatal hernia. Appendix not visualized.  PERITONEUM: No ascites.  VESSELS: Atheromatous calcifications.  RETROPERITONEUM/LYMPH NODES: No lymphadenopathy.  ABDOMINAL WALL: Within normal limits.  BONES: Dextroscoliosis of thoracolumbar spine with accompanying degenerative changes.    IMPRESSION:  Left upper lobe pneumonia.    Bilateral pleural effusions.        < end of copied text >    
Follow-up Pulm Progress Note    Alert, confused  Sats 95% 3LNC  Denies SOB/CP  Speech therapist at bedside     Medications:  MEDICATIONS  (STANDING):  buMETAnide 0.5 milliGRAM(s) Oral <User Schedule>  dextrose 40% Gel 15 Gram(s) Oral once  dextrose 5%. 1000 milliLiter(s) (50 mL/Hr) IV Continuous <Continuous>  dextrose 5%. 1000 milliLiter(s) (100 mL/Hr) IV Continuous <Continuous>  dextrose 50% Injectable 25 Gram(s) IV Push once  dextrose 50% Injectable 12.5 Gram(s) IV Push once  dextrose 50% Injectable 25 Gram(s) IV Push once  donepezil 5 milliGRAM(s) Oral daily  finasteride 5 milliGRAM(s) Oral daily  glucagon  Injectable 1 milliGRAM(s) IntraMuscular once  insulin lispro (ADMELOG) corrective regimen sliding scale   SubCutaneous three times a day before meals  insulin lispro (ADMELOG) corrective regimen sliding scale   SubCutaneous at bedtime  melatonin 3 milliGRAM(s) Oral at bedtime  memantine 10 milliGRAM(s) Oral two times a day  metoprolol succinate ER 25 milliGRAM(s) Oral daily  mirtazapine 7.5 milliGRAM(s) Oral daily  piperacillin/tazobactam IVPB.. 3.375 Gram(s) IV Intermittent every 8 hours  QUEtiapine 12.5 milliGRAM(s) Oral at bedtime  thiamine 100 milliGRAM(s) Oral daily      Vital Signs Last 24 Hrs  T(C): 36.4 (29 Sep 2021 05:23), Max: 36.8 (28 Sep 2021 20:33)  T(F): 97.6 (29 Sep 2021 05:23), Max: 98.3 (28 Sep 2021 20:33)  HR: 62 (29 Sep 2021 05:23) (62 - 77)  BP: 112/71 (29 Sep 2021 05:23) (112/71 - 127/89)  BP(mean): --  RR: 20 (29 Sep 2021 05:23) (18 - 20)  SpO2: 99% (29 Sep 2021 05:23) (96% - 99%)          09-28 @ 07:01  -  09-29 @ 07:00  --------------------------------------------------------  IN: 480 mL / OUT: 1200 mL / NET: -720 mL          LABS:                        11.8   5.70  )-----------( 134      ( 29 Sep 2021 07:18 )             35.8     09-29    138  |  103  |  13  ----------------------------<  124<H>  3.9   |  22  |  0.86    Ca    8.8      29 Sep 2021 07:11            CAPILLARY BLOOD GLUCOSE      POCT Blood Glucose.: 103 mg/dL (29 Sep 2021 08:00)        Procalcitonin, Serum: 0.03 ng/mL (09-27-21 @ 07:04)    Serum Pro-Brain Natriuretic Peptide: 8188 pg/mL (09-27-21 @ 07:04)                CULTURES:     Culture - Blood (collected 09-26-21 @ 17:24)  Source: .Blood Blood-Peripheral  Preliminary Report (09-27-21 @ 18:01):    No growth to date.    Culture - Blood (collected 09-26-21 @ 17:24)  Source: .Blood Blood-Peripheral  Preliminary Report (09-27-21 @ 18:01):    No growth to date.        Culture - Urine (collected 09-26-21 @ 17:21)  Source: Clean Catch Clean Catch (Midstream)  Final Report (09-27-21 @ 13:43):    No growth            Physical Examination:  PULM: Decreased BS L  CVS: RRR    RADIOLOGY REVIEWED  CT chest: < from: CT Chest w/ IV Cont (09.26.21 @ 17:30) >    FINDINGS:  CHEST:  LUNGS AND LARGE AIRWAYS: Patent central airways. Left upper lobe alveolar opacity. Bilateral lower lobe dependent atelectasis.  PLEURA: Bilateral pleural effusions, large on the left and small to moderate on the right.  VESSELS: Atheromatous calcifications of the thoracic aorta without aneurysmal dilatation. Coronary artery calcifications.  HEART: Heart size is normal. Transvenous pacemaker. Status postTAVR. Mitral annular calcification. Watchman device. No pericardial effusion.  MEDIASTINUM AND YONY: No lymphadenopathy.  CHEST WALL AND LOWER NECK: Mildly enlarged right supraclavicular lymph nodes.    ABDOMEN AND PELVIS:  LIVER: Within normal limits.  BILE DUCTS: Normal caliber.  GALLBLADDER: Small gallstones.  SPLEEN: 1 cm splenule.  PANCREAS: Within normal limits.  ADRENALS: Within normal limits.  KIDNEYS/URETERS: Within normal limits.    BLADDER: Incompletely distended. Mild mural thickening.  REPRODUCTIVE ORGANS: Enlarged prostate.    BOWEL: Colonic diverticulosis. No diverticulitis. No bowel obstruction. Small direct esophageal hiatal hernia. Appendix not visualized.  PERITONEUM: No ascites.  VESSELS: Atheromatous calcifications.  RETROPERITONEUM/LYMPH NODES: No lymphadenopathy.  ABDOMINAL WALL: Within normal limits.  BONES: Dextroscoliosis of thoracolumbar spine with accompanying degenerative changes.    IMPRESSION:  Left upper lobe pneumonia.    Bilateral pleural effusions.      < end of copied text >
Follow-up Pulm Progress Note    Alert but confused  O2 sats 88% on RA, improved to 95% on 2LNC  Denies CP, SOB    Medications:  MEDICATIONS  (STANDING):  albuterol/ipratropium for Nebulization 3 milliLiter(s) Nebulizer every 6 hours  buMETAnide 0.5 milliGRAM(s) Oral <User Schedule>  dextrose 40% Gel 15 Gram(s) Oral once  dextrose 5%. 1000 milliLiter(s) (50 mL/Hr) IV Continuous <Continuous>  dextrose 5%. 1000 milliLiter(s) (100 mL/Hr) IV Continuous <Continuous>  dextrose 50% Injectable 25 Gram(s) IV Push once  dextrose 50% Injectable 12.5 Gram(s) IV Push once  dextrose 50% Injectable 25 Gram(s) IV Push once  donepezil 5 milliGRAM(s) Oral daily  finasteride 5 milliGRAM(s) Oral daily  glucagon  Injectable 1 milliGRAM(s) IntraMuscular once  insulin lispro (ADMELOG) corrective regimen sliding scale   SubCutaneous three times a day before meals  insulin lispro (ADMELOG) corrective regimen sliding scale   SubCutaneous at bedtime  melatonin 3 milliGRAM(s) Oral at bedtime  memantine 10 milliGRAM(s) Oral two times a day  metoprolol succinate ER 25 milliGRAM(s) Oral daily  mirtazapine 7.5 milliGRAM(s) Oral daily  piperacillin/tazobactam IVPB.. 3.375 Gram(s) IV Intermittent every 8 hours  polyethylene glycol 3350 17 Gram(s) Oral daily  QUEtiapine 12.5 milliGRAM(s) Oral at bedtime  thiamine 100 milliGRAM(s) Oral daily    Vital Signs Last 24 Hrs  T(C): 36.4 (01 Oct 2021 11:51), Max: 37.1 (01 Oct 2021 00:05)  T(F): 97.5 (01 Oct 2021 11:51), Max: 98.8 (01 Oct 2021 00:05)  HR: 84 (01 Oct 2021 11:51) (71 - 91)  BP: 124/73 (01 Oct 2021 11:51) (110/74 - 141/60)  BP(mean): --  RR: 20 (01 Oct 2021 11:51) (18 - 20)  SpO2: 97% (01 Oct 2021 11:51) (94% - 100%)    09-30 @ 07:01  -  10-01 @ 07:00  --------------------------------------------------------  IN: 1060 mL / OUT: 0 mL / NET: 1060 mL    LABS:                        11.0   5.74  )-----------( 157      ( 01 Oct 2021 06:44 )             34.7     CAPILLARY BLOOD GLUCOSE  POCT Blood Glucose.: 152 mg/dL (01 Oct 2021 12:10)    CULTURES:     Culture - Blood (collected 09-26-21 @ 17:24)  Source: .Blood Blood-Peripheral  Preliminary Report (09-27-21 @ 18:01):    No growth to date.    Culture - Blood (collected 09-26-21 @ 17:24)  Source: .Blood Blood-Peripheral  Preliminary Report (09-27-21 @ 18:01):    No growth to date.    Culture - Urine (collected 09-26-21 @ 17:21)  Source: Clean Catch Clean Catch (Midstream)  Final Report (09-27-21 @ 13:43):    No growth    Physical Examination:  PULM: decreased BS  CVS: RRR    RADIOLOGY REVIEWED  CT chest: < from: CT Chest w/ IV Cont (09.26.21 @ 17:30) >    FINDINGS:  CHEST:  LUNGS AND LARGE AIRWAYS: Patent central airways. Left upper lobe alveolar opacity. Bilateral lower lobe dependent atelectasis.  PLEURA: Bilateral pleural effusions, large on the left and small to moderate on the right.  VESSELS: Atheromatous calcifications of the thoracic aorta without aneurysmal dilatation. Coronary artery calcifications.  HEART: Heart size is normal. Transvenous pacemaker. Status postTAVR. Mitral annular calcification. Watchman device. No pericardial effusion.  MEDIASTINUM AND YONY: No lymphadenopathy.  CHEST WALL AND LOWER NECK: Mildly enlarged right supraclavicular lymph nodes.    ABDOMEN AND PELVIS:  LIVER: Within normal limits.  BILE DUCTS: Normal caliber.  GALLBLADDER: Small gallstones.  SPLEEN: 1 cm splenule.  PANCREAS: Within normal limits.  ADRENALS: Within normal limits.  KIDNEYS/URETERS: Within normal limits.    BLADDER: Incompletely distended. Mild mural thickening.  REPRODUCTIVE ORGANS: Enlarged prostate.    BOWEL: Colonic diverticulosis. No diverticulitis. No bowel obstruction. Small direct esophageal hiatal hernia. Appendix not visualized.  PERITONEUM: No ascites.  VESSELS: Atheromatous calcifications.  RETROPERITONEUM/LYMPH NODES: No lymphadenopathy.  ABDOMINAL WALL: Within normal limits.  BONES: Dextroscoliosis of thoracolumbar spine with accompanying degenerative changes.    IMPRESSION:  Left upper lobe pneumonia.    Bilateral pleural effusions.        --- End of Report ---    < end of copied text >      TTE: < from: Transthoracic Echocardiogram (09.27.21 @ 16:46) >  Dimensions:    Normal Values:  LA:     4.3    2.0 - 4.0 cm  Ao:     3.2    2.0 - 3.8 cm  SEPTUM: 1.6    0.6 - 1.2 cm  PWT:    1.6    0.6- 1.1 cm  LVIDd:  4.0    3.0 - 5.6 cm  LVIDs:  2.9    1.8 - 4.0 cm  Derived variables:  LVMI: 152 g/m2  RWT: 0.80  Fractional short: 28 %  EF (Visual Estimate): 50 %  Doppler Peak Velocity (m/sec): AoV=1.4  ------------------------------------------------------------------------  Observations:  Mitral Valve: Mitral annular calcification and calcified  mitral leaflets with decreased diastolic opening.  Mild-moderate mitral regurgitation. Mean transmitral valve  gradient equals 3 mm Hg, consistent with mild to moderate  mitral stenosis.  Aortic Valve/Aorta: Transcatheter aortic valve replacement.  Peak transaortic valve gradient equals 8 mm Hg, mean  transaortic valve gradient equals 5 mm Hg, which is  probably normal in the presence of a transcatheter aortic  valve replacement. No aortic valve regurgitation seen. Peak  left ventricular outflow tract gradient equals 2 mm Hg,  mean gradient is equal to 1 mm Hg, LVOT velocity time  integral equals 13 cm.  Aortic Root: 3.2 cm.  Left Atrium: Severe left atrial enlargement.  Left Ventricle: Mild global left ventricular systolic  dysfunction. Severe concentric left ventricular hypertrophy  (thickening) with diffuse areas of myocardium demonstrating  bright enhancement, possibly suggestive of infiltrative  cardiomyopathy.  Severe reversible diastolic dysfunction  (Stage III).  Right Heart: Mild right atrial enlargement. Normal right  ventricular size with decreased right ventricular systolic  function. Normal tricuspid valve. Mild tricuspid  regurgitation. Normal pulmonic valve. Minimal pulmonic  regurgitation.  Pericardium/Pleura: Trace to small pericardial effusion.  Bilateral pleural effusions.  Hemodynamic: Estimated right atrial pressure is 8 mm Hg.  Estimated right ventricular systolic pressure equals 39 mm  Hg, assuming right atrial pressure equals 8 mm Hg,  consistent with borderline pulmonary hypertension.  ------------------------------------------------------------------------  Conclusions:  1. Mitral annular calcification and calcified mitral  leaflets with decreased diastolic opening. Mild-moderate  mitral regurgitation. Mean transmitral valve gradient  equals 3 mm Hg, consistent with mild to moderate mitral  stenosis.  2. Transcatheter aortic valve replacement. Peak transaortic  valve gradient equals 8 mm Hg, mean transaortic valve  gradient equals 5 mm Hg, which is probably normal in the  presence of a transcatheter aortic valve replacement. No  aortic valve regurgitation seen.  3. Severe left atrial enlargement.  4. Severe concentric left ventricular hypertrophy  (thickening) with diffuse areas of myocardium demonstrating  bright enhancement, possibly suggestive of infiltrative  cardiomyopathy.  5. Mild global left ventricular systolic dysfunction.  6. Severe reversible diastolic dysfunction (Stage III).  7. Mild right atrial enlargement.  8. Normal right ventricular size with decreased right  ventricular systolic function.  9. Estimated pulmonary artery systolic pressure equals 39  mm Hg, assuming right atrial pressure equals 8 mm Hg,  consistent with borderline pulmonary pressures.  10. Trace to small pericardial effusion.  11. Bilateral pleural effusions.    < end of copied text >    
Follow-up Pulm Progress Note    Slightly confused this AM per RN  O2 sats 96% on 3LNC  No acute distress    Medications:  MEDICATIONS  (STANDING):  aspirin enteric coated 81 milliGRAM(s) Oral daily  azithromycin  IVPB 500 milliGRAM(s) IV Intermittent every 24 hours  buMETAnide 0.5 milliGRAM(s) Oral <User Schedule>  dextrose 40% Gel 15 Gram(s) Oral once  dextrose 5%. 1000 milliLiter(s) (50 mL/Hr) IV Continuous <Continuous>  dextrose 5%. 1000 milliLiter(s) (100 mL/Hr) IV Continuous <Continuous>  dextrose 50% Injectable 25 Gram(s) IV Push once  dextrose 50% Injectable 12.5 Gram(s) IV Push once  dextrose 50% Injectable 25 Gram(s) IV Push once  donepezil 5 milliGRAM(s) Oral daily  finasteride 5 milliGRAM(s) Oral daily  glucagon  Injectable 1 milliGRAM(s) IntraMuscular once  insulin lispro (ADMELOG) corrective regimen sliding scale   SubCutaneous three times a day before meals  insulin lispro (ADMELOG) corrective regimen sliding scale   SubCutaneous at bedtime  melatonin 3 milliGRAM(s) Oral at bedtime  memantine 10 milliGRAM(s) Oral two times a day  metoprolol succinate ER 25 milliGRAM(s) Oral daily  mirtazapine 7.5 milliGRAM(s) Oral daily  piperacillin/tazobactam IVPB.. 3.375 Gram(s) IV Intermittent every 8 hours  QUEtiapine 12.5 milliGRAM(s) Oral at bedtime  thiamine 100 milliGRAM(s) Oral daily        Vital Signs Last 24 Hrs  T(C): 36.3 (28 Sep 2021 10:35), Max: 37.1 (27 Sep 2021 20:02)  T(F): 97.4 (28 Sep 2021 10:35), Max: 98.7 (27 Sep 2021 20:02)  HR: 80 (28 Sep 2021 10:35) (60 - 105)  BP: 135/80 (28 Sep 2021 10:35) (110/71 - 139/78)  BP(mean): --  RR: 19 (28 Sep 2021 10:35) (18 - 20)  SpO2: 95% (28 Sep 2021 10:35) (94% - 99%)    VBG pH 7.34 09-26 @ 13:39    VBG pCO2 46 09-26 @ 13:39    VBG O2 sat 20.4 09-26 @ 13:39    VBG lactate 2.3 09-26 @ 13:39    09-27 @ 07:01  -  09-28 @ 07:00  --------------------------------------------------------  IN: 340 mL / OUT: 1390 mL / NET: -1050 mL    LABS:                        11.6   6.08  )-----------( 124      ( 28 Sep 2021 07:05 )             35.1     09-28    135  |  103  |  15  ----------------------------<  120<H>  3.9   |  21<L>  |  0.85    Ca    8.5      28 Sep 2021 07:02    TPro  7.9  /  Alb  4.3  /  TBili  1.0  /  DBili  x   /  AST  23  /  ALT  19  /  AlkPhos  114  09-26    CAPILLARY BLOOD GLUCOSE  POCT Blood Glucose.: 104 mg/dL (28 Sep 2021 08:28)    PT/INR - ( 26 Sep 2021 13:41 )   PT: 14.2 sec;   INR: 1.19 ratio         PTT - ( 26 Sep 2021 13:41 )  PTT:30.5 sec  Urinalysis Basic - ( 27 Sep 2021 00:22 )    Color: Red / Appearance: Turbid / SG: >1.050 / pH: x  Gluc: x / Ketone: Negative  / Bili: Negative / Urobili: Negative   Blood: x / Protein: >600 / Nitrite: Negative   Leuk Esterase: Negative / RBC: >50 /hpf / WBC 61 /HPF   Sq Epi: x / Non Sq Epi: x / Bacteria: Negative    Procalcitonin, Serum: 0.03 ng/mL (09-27-21 @ 07:04)    Serum Pro-Brain Natriuretic Peptide: 8188 pg/mL (09-27-21 @ 07:04)        CULTURES:     Culture - Blood (collected 09-26-21 @ 17:24)  Source: .Blood Blood-Peripheral  Preliminary Report (09-27-21 @ 18:01):    No growth to date.    Culture - Blood (collected 09-26-21 @ 17:24)  Source: .Blood Blood-Peripheral  Preliminary Report (09-27-21 @ 18:01):    No growth to date.    Culture - Urine (collected 09-26-21 @ 17:21)  Source: Clean Catch Clean Catch (Midstream)  Final Report (09-27-21 @ 13:43):    No growth    Physical Examination:  PULM: Decreased BS L  CVS: RRR    RADIOLOGY REVIEWED  CT chest: < from: CT Chest w/ IV Cont (09.26.21 @ 17:30) >    FINDINGS:  CHEST:  LUNGS AND LARGE AIRWAYS: Patent central airways. Left upper lobe alveolar opacity. Bilateral lower lobe dependent atelectasis.  PLEURA: Bilateral pleural effusions, large on the left and small to moderate on the right.  VESSELS: Atheromatous calcifications of the thoracic aorta without aneurysmal dilatation. Coronary artery calcifications.  HEART: Heart size is normal. Transvenous pacemaker. Status postTAVR. Mitral annular calcification. Watchman device. No pericardial effusion.  MEDIASTINUM AND YONY: No lymphadenopathy.  CHEST WALL AND LOWER NECK: Mildly enlarged right supraclavicular lymph nodes.    ABDOMEN AND PELVIS:  LIVER: Within normal limits.  BILE DUCTS: Normal caliber.  GALLBLADDER: Small gallstones.  SPLEEN: 1 cm splenule.  PANCREAS: Within normal limits.  ADRENALS: Within normal limits.  KIDNEYS/URETERS: Within normal limits.    BLADDER: Incompletely distended. Mild mural thickening.  REPRODUCTIVE ORGANS: Enlarged prostate.    BOWEL: Colonic diverticulosis. No diverticulitis. No bowel obstruction. Small direct esophageal hiatal hernia. Appendix not visualized.  PERITONEUM: No ascites.  VESSELS: Atheromatous calcifications.  RETROPERITONEUM/LYMPH NODES: No lymphadenopathy.  ABDOMINAL WALL: Within normal limits.  BONES: Dextroscoliosis of thoracolumbar spine with accompanying degenerative changes.    IMPRESSION:  Left upper lobe pneumonia.    Bilateral pleural effusions.      < end of copied text >      TTE: < from: Transthoracic Echocardiogram (09.27.21 @ 16:46) >  Dimensions:    Normal Values:  LA:     4.3    2.0 - 4.0 cm  Ao:     3.2    2.0 - 3.8 cm  SEPTUM: 1.6    0.6 - 1.2 cm  PWT:    1.6    0.6- 1.1 cm  LVIDd:  4.0    3.0 - 5.6 cm  LVIDs:  2.9    1.8 - 4.0 cm  Derived variables:  LVMI: 152 g/m2  RWT: 0.80  Fractional short: 28 %  EF (Visual Estimate): 50 %  Doppler Peak Velocity (m/sec): AoV=1.4  ------------------------------------------------------------------------  Observations:  Mitral Valve: Mitral annular calcification and calcified  mitral leaflets with decreased diastolic opening.  Mild-moderate mitral regurgitation. Mean transmitral valve  gradient equals 3 mm Hg, consistent with mild to moderate  mitral stenosis.  Aortic Valve/Aorta: Transcatheter aortic valve replacement.  Peak transaortic valve gradient equals 8 mm Hg, mean  transaortic valve gradient equals 5 mm Hg, which is  probably normal in the presence of a transcatheter aortic  valve replacement. No aortic valve regurgitation seen. Peak  left ventricular outflow tract gradient equals 2 mm Hg,  mean gradient is equal to 1 mm Hg, LVOT velocity time  integral equals 13 cm.  Aortic Root: 3.2 cm.  Left Atrium: Severe left atrial enlargement.  Left Ventricle: Mild global left ventricular systolic  dysfunction. Severe concentric left ventricular hypertrophy  (thickening) with diffuse areas of myocardium demonstrating  bright enhancement, possibly suggestive of infiltrative  cardiomyopathy.  Severe reversible diastolic dysfunction  (Stage III).  Right Heart: Mild right atrial enlargement. Normal right  ventricular size with decreased right ventricular systolic  function. Normal tricuspid valve. Mild tricuspid  regurgitation. Normal pulmonic valve. Minimal pulmonic  regurgitation.  Pericardium/Pleura: Trace to small pericardial effusion.  Bilateral pleural effusions.  Hemodynamic: Estimated right atrial pressure is 8 mm Hg.  Estimated right ventricular systolic pressure equals 39 mm  Hg, assuming right atrial pressure equals 8 mm Hg,  consistent with borderline pulmonary hypertension.  ------------------------------------------------------------------------  Conclusions:  1. Mitral annular calcification and calcified mitral  leaflets with decreased diastolic opening. Mild-moderate  mitral regurgitation. Mean transmitral valve gradient  equals 3 mm Hg, consistent with mild to moderate mitral  stenosis.  2. Transcatheter aortic valve replacement. Peak transaortic  valve gradient equals 8 mm Hg, mean transaortic valve  gradient equals 5 mm Hg, which is probably normal in the  presence of a transcatheter aortic valve replacement. No  aortic valve regurgitation seen.  3. Severe left atrial enlargement.  4. Severe concentric left ventricular hypertrophy  (thickening) with diffuse areas of myocardium demonstrating  bright enhancement, possibly suggestive of infiltrative  cardiomyopathy.  5. Mild global left ventricular systolic dysfunction.  6. Severe reversible diastolic dysfunction (Stage III).  7. Mild right atrial enlargement.  8. Normal right ventricular size with decreased right  ventricular systolic function.  9. Estimated pulmonary artery systolic pressure equals 39  mm Hg, assuming right atrial pressure equals 8 mm Hg,  consistent with borderline pulmonary pressures.  10. Trace to small pericardial effusion.  11. Bilateral pleural effusions.    < end of copied text >

## 2021-10-01 NOTE — PROGRESS NOTE ADULT - PROBLEM SELECTOR PLAN 3
2nd to b/l pleural effusions with underlying PNA  -Continue to wean O2 as tolerated, keep sats >90% (currently 2LNC)
2nd to b/l pleural effusions with underlying PNA, also may be some degree of atelectasis due to deconditioning & immobility  -Continue to wean O2 as tolerated, keep sats >90%  -Mild hypoxia on RA when seen today (88%) improved to 95% with 2LNC   -Incentive spirometry  -OOB, ambulation as tolerated
2nd to b/l pleural effusions with underlying PNA  -Decrease O2 to 2LNC, continue to wean O2 as tolerated, keep sats >90%
2nd to b/l pleural effusions with underlying PNA  -O2 sats 91-92% on triage on RA  -Keep sats >90% with supplemental O2 (currently 3LNC).
07-Oct-2019

## 2021-10-01 NOTE — PROGRESS NOTE ADULT - PROBLEM SELECTOR PLAN 2
CT chest with b/l pleural effusions, L>R  -Pro BNP >8000  -Keep O>I as tolerated  -TTE noted  -Cards following.
CT chest with b/l pleural effusions, L>R  -Pro BNP >8000  -Keep O>I as tolerated  -TTE noted  -Cards following.  -F/u CXR
CT chest with b/l pleural effusions, L>R  -Pro BNP >8000  -Keep O>I as tolerated  -TTE noted  -Cards following.
CT chest with b/l pleural effusions, L>R  -Pro BNP >8000  -Keep O>I as tolerated  -TTE noted  -Cards following.

## 2021-10-01 NOTE — PROGRESS NOTE ADULT - ASSESSMENT
89yo male pt with PMHx of Afib (no AC), HTN, BPH, DM2, Dementia was brought to ED by his daughter (Alexia) from [facility] ([phone]) c/o feeling not well, chills, shaking, and SOB this morning. Pt's poor historian (A&Ox1, hard hearing). As per the daughter, she got a phone call and was informed pt's not well. When she went to [facility] she noticed pt's generalized weak, shaking, shivering, grinding teeth, difficult ambulation with SOB. Pt normally walks with his walker but he was not able to ambulate today.     Problem/Plan - 1:  ·  Problem: Sepsis.   ·  Plan: Resolved.  Present on admission as has Fever, tachycardia and Leucocytosis .   S/P cultures and starting IV Abxs.   Hemodynamically  stable.     Problem/Plan - 2:  ·  Problem: Pneumonia.   ·  Plan: IV ABxs and PO Augmentin outpt  . Pulmonary helping.      Problem/Plan - 3:  ·  Problem: Pleural effusion.   ·  Plan: Pulmonary helping .      Will check TTE also.     Problem/Plan - 4:  ·  Problem: Chronic atrial fibrillation.   ·  Plan: Not on AC . Cards following. Hold ASA as gross hematuria .      Problem/Plan - 5:  ·  Problem: HTN (hypertension).   ·  Plan: BP meds with hold parameters.     Problem/Plan - 6:  ·  Problem: BPH without urinary obstruction.   ·  Plan: On Finasteride.     Problem/Plan - 7:  ·  Problem: Diabetes mellitus.   ·  Plan: Holding PO meds and SSI for now.     Problem/Plan - 8:  ·  Problem: Dementia.   ·  Plan: supportive care.     Problem/Plan - 9:  ·  Problem: Hematuria .   ·  Plan: Urology helping. Clearing now.   Will hold Lovenox and ASA put SCD for DVT prophylaxis. HH stable .      Advance care planning ; DNR .     Disposition : DC planning to [facility] today . Spoke to daughter Alexia in detail his condition and discharge plan . All her questions were answered.  D/W ACP .

## 2021-10-01 NOTE — DISCHARGE NOTE PROVIDER - PROVIDER TOKENS
PROVIDER:[TOKEN:[[id number]]],PROVIDER:[TOKEN:[[id number]]],PROVIDER:[TOKEN:[[id number]]]

## 2021-10-01 NOTE — DISCHARGE NOTE PROVIDER - NSDCCPCAREPLAN_GEN_ALL_CORE_FT
PRINCIPAL DISCHARGE DIAGNOSIS  Diagnosis: Sepsis due to pneumonia  Assessment and Plan of Treatment: Complete antibiotics as prescribed.  Follow up with your primary doctor in 1-2 weeks.  If you develop fever, chills, malaise, or change in mental status call your Health Care Provider or go to the Emergency Department.  Nutrition is important, eat small frequent meals to help ensure you get adequate calories.  Do not stay in bed all day!  Increase your activity daily as tolerated.      SECONDARY DISCHARGE DIAGNOSES  Diagnosis: Pleural effusion  Assessment and Plan of Treatment: Condition stable    Diagnosis: Hematuria  Assessment and Plan of Treatment: Resolved    Diagnosis: Generalized weakness  Assessment and Plan of Treatment:     Diagnosis: HTN (hypertension)  Assessment and Plan of Treatment:     Diagnosis: Shortness of breath  Assessment and Plan of Treatment:     Diagnosis: Chronic atrial fibrillation  Assessment and Plan of Treatment:

## 2021-10-01 NOTE — DISCHARGE NOTE PROVIDER - NSDCMRMEDTOKEN_GEN_ALL_CORE_FT
Aricept 5 mg oral tablet: 1 tab(s) orally once a day  Augmentin 875 mg-125 mg oral tablet: 1 tab(s) orally every 12 hours  bumetanide 0.5 mg oral tablet: 1 tab(s) orally Monday, Wednesday, and Friday  finasteride 5 mg oral tablet: 1 tab(s) orally once a day  Melatonin 3 mg oral tablet: 1 tab(s) orally once a day (at bedtime)  mirtazapine 7.5 mg oral tablet: 1 tab(s) orally once a day (at bedtime)  Namenda 10 mg oral tablet: 1 tab(s) orally 2 times a day  Pravachol 20 mg oral tablet: 1 tab(s) orally once a day (at bedtime)  SEROquel 25 mg oral tablet: 0.5 tab(s) orally once a day (at bedtime)   thiamine 100 mg oral tablet: 1 tab(s) orally once a day  Toprol-XL 25 mg oral tablet, extended release: 1 tab(s) orally once a day  Tylenol 325 mg oral tablet: 2 tab(s) orally every 6 hours, As Needed -

## 2021-10-01 NOTE — DISCHARGE NOTE PROVIDER - HOSPITAL COURSE
89yo male pt with PMHx of Afib (no AC), HTN, BPH, DM2, Dementia was brought to ED by his daughter (Alexia) from [facility] ([phone]) c/o feeling not well, chills, shaking, and SOB this morning. Pt's poor historian (A&Ox1, hard hearing). As per the daughter, she got a phone call and was informed pt's not well. When she went to [facility] she noticed pt's generalized weak, shaking, shivering, grinding teeth, difficult ambulation with SOB. Pt normally walks with his walker but he was not able to ambulate today.    Course:

## 2021-10-01 NOTE — PROGRESS NOTE ADULT - ASSESSMENT
89yo male pt with PMHx of Afib (no AC), HTN, BPH, DM2, Dementia was brought to ED by his daughter (Alexia) from [facility] ([phone]) c/o feeling not well, chills, shaking, and SOB this morning. Pt's poor historian (A&Ox1, hard hearing). As per the daughter, she got a phone call and was informed pt's not well. When she went to [facility] she noticed pt's generalized weak, shaking, shivering, grinding teeth, difficult ambulation with SOB. Pt normally walks with his walker but he was not able to ambulate today. (26 Sep 2021 19:55)    ER vs:  Tmax 100.5 (R), P 98, /92, on 3-4L NC.  WBC 11.2 --> 7.4.  Lact 2.3.  UA (-)nit/LE.  UCx (-).  RVP and covid pcr (-).  Cxr showed L pleural effusion.  CT chest shows JIMENA pna and b/l pleural effusions.  CTap shows enlarged prostate.      Pt on rocephin/azithro for CAP.  ID consult called for further abx management.       JIMENA pna:    - Pt with low grade fever, leukocytosis, elevated lactate, feeling weak.    - Abx broadened zosyn to cover for possible aspiration pna.  f/u S&S:  recs appreciated.   - LEg Ag neg, azithro d/c'd on 9/29  - f/u bcx:  NGTD  - WBC and temp curve improving, cont to monitor.    Hematuria:    - UA (-) nit/LE.  Ucx no growth, no evidence for UTI.  Pt with large blood.  s/p bladder scan of 210ml    - Urology f/u noted.  Cont to monitor UO, condom cath in place.    - Pt with continued hematuria, occasional clots - f/u with Urology.    * Recommend 7 day course of abx, can de-escalte to PO augmentin upon discharge, through 10/3.      Will follow,    Oxana Koch  [phone]

## 2021-10-01 NOTE — PROGRESS NOTE ADULT - PROBLEM SELECTOR PLAN 1
Presented to ED from assisted living facility with SOB, weakness, fever, chills x1 day  -CT chest with b/l pl effusions L>R, associated atelectasis, JIMENA PNA  -Mild leukocytosis on admission, since resolved  -Tmax on admission 100.5F, afebrile now   -RVP, COVID negative  -UA negative  -PCT normal  -Urine legionella negative, azithromycin d/c   -C/w Zosyn 3.375g q8h x7 days  -Duoneb q6h   -ID f/u  -? aspiration component  -S/s recs noted, no clear aspiration  -Diet per speech therapy  -CXR in AM
Presented to ED from assisted living facility with SOB, weakness, fever, chills x1 day  -CT chest with b/l pl effusions L>R, associated atelectasis, JIMENA PNA  -Mild leukocytosis on admission, since resolved  -Tmax on admission 100.5F, afebrile now x24 hrs  -RVP, COVID negative  -UA negative  -PCT normal  -F/u urine legionella  -C/w Zosyn 3.375g q8h x7 days   -C/w Azithromycin until legionella negative  -? aspiration component, will check speech & swallow
Presented to ED from assisted living facility with SOB, weakness, fever, chills x1 day  -CT chest with b/l pl effusions L>R, associated atelectasis, JIMENA PNA  -Mild leukocytosis on admission, since resolved  -Tmax on admission 100.5F, afebrile now x24 hrs  -RVP, COVID negative  -UA negative  -PCT normal  -Urine legionella negative, d/c azithromycin   -C/w Zosyn 3.375g q8h x7 days  -Duoneb q6h   -? aspiration component, f/u S&S recs
Presented to ED from assisted living facility with SOB, weakness, fever, chills x1 day  -CT chest with b/l pl effusions L>R, associated atelectasis, JIMENA PNA  -Mild leukocytosis on admission, since resolved  -Tmax on admission 100.5F, afebrile now   -RVP, COVID negative  -UA negative  -PCT normal  -Urine legionella negative, azithromycin d/c   -C/w Zosyn 3.375g q8h x7 days  -Duoneb q6h   -? aspiration component  -S/s recs noted, no clear aspiration  -Diet per speech therapy

## 2021-10-01 NOTE — DISCHARGE NOTE NURSING/CASE MANAGEMENT/SOCIAL WORK - NSPROEXTENSIONSOFSELF_GEN_A_NUR
"Requested Prescriptions   Pending Prescriptions Disp Refills     hydrochlorothiazide (HYDRODIURIL) 25 MG tablet [Pharmacy Med Name:  Last Written Prescription Date:  2/28/2019  Last Fill Quantity: 90,  # refills: 3   Last office visit: 9/13/2019 with prescribing provider:     Future Office Visit:   Next 5 appointments (look out 90 days)    Apr 01, 2020  8:20 AM CDT  PHYSICAL with MEHREEN Puckett CNP  [facility] [address]  [facility] [id number] [phone]        HYDROCHLOROTHIAZIDE 25MG TABLETS] 90 tablet 3     Sig: TAKE 1 TABLET(25 MG) BY MOUTH DAILY       Diuretics (Including Combos) Protocol Passed - 2/25/2020 10:32 AM        Passed - Blood pressure under 140/90 in past 12 months     BP Readings from Last 3 Encounters:   09/13/19 112/68   02/28/19 100/66   01/19/18 108/68                 Passed - Recent (12 mo) or future (30 days) visit within the authorizing provider's specialty     Patient has had an office visit with the authorizing provider or a provider within the authorizing providers department within the previous 12 mos or has a future within next 30 days. See \"Patient Info\" tab in inbasket, or \"Choose Columns\" in Meds & Orders section of the refill encounter.              Passed - Medication is active on med list        Passed - Patient is age 18 or older        Passed - No active pregancy on record        Passed - Normal serum creatinine on file in past 12 months     Recent Labs   Lab Test 02/28/19  0954   CR 0.88              Passed - Normal serum potassium on file in past 12 months     Recent Labs   Lab Test 02/28/19  0954   POTASSIUM 4.1                    Passed - Normal serum sodium on file in past 12 months     Recent Labs   Lab Test 02/28/19  0954                 Passed - No positive pregnancy test in past 12 months        "
Prescription approved per [facility].    Last office visit was 9/13/19 with instructions to return in about 6 months  Last refilled 2/28/19  
none

## 2021-10-01 NOTE — DISCHARGE NOTE PROVIDER - CARE PROVIDER_API CALL
JOSE ZEPEDA [facility]  [address] [address]  Phone: ([phone]  Fax: ()-  Follow Up Time:     Liam Laureano)  Urology  [address] [place], [place] [address]  [place] [address]  Phone: [phone]  Fax: [phone]  Follow Up Time:     Jorge Luis Owens)  Critical Care Medicine  [address] [place] [address] [address]  [place] [address]  Phone: [phone]  Fax: [phone]  Follow Up Time:

## 2021-10-01 NOTE — DISCHARGE NOTE NURSING/CASE MANAGEMENT/SOCIAL WORK - PATIENT PORTAL LINK FT
You can access the FollowMyHealth Patient Portal offered by  by registering at the following website: [email or web address]. By joining [facility]’s FollowMyHealth portal, you will also be able to view your health information using other applications (apps) compatible with our system.

## 2021-10-01 NOTE — DISCHARGE NOTE NURSING/CASE MANAGEMENT/SOCIAL WORK - NSDCVIVACCINE_GEN_ALL_CORE_FT
influenza, injectable, quadrivalent, preservative free; 10-Oct-2019 12:36; Ashley Figueroa (RN); [employer]; [id number] (Exp. Date: 30-Jun-2020); IntraMuscular; Deltoid Right.; 0.5 milliLiter(s); VIS (VIS Published: 15-Aug-2019, VIS Presented: 10-Oct-2019);   Tdap; 30-Jun-2019 18:38; Edith Almaraz (RN); [employer]; [id number] (Exp. Date: 04-Apr-2021); IntraMuscular; Deltoid Right.; 0.5 milliLiter(s); VIS (VIS Published: 09-May-2013, VIS Presented: 30-Jun-2019);   Tdap; 07-Oct-2019 06:56; Negar Burns (RN); [employer]; [id number] (Exp. Date: 08-Aug-2020); IntraMuscular; Deltoid Right.; 0.5 milliLiter(s); VIS (VIS Published: 09-May-2013, VIS Presented: 07-Oct-2019);   Tdap; 14-Jun-2021 20:02; Paige Gore (RN); [employer]; [id number] (Exp. Date: 11-Sep-2022); IntraMuscular; Deltoid Right.; 0.5 milliLiter(s); VIS (VIS Published: 09-May-2013, VIS Presented: 14-Jun-2021);

## 2021-10-01 NOTE — PROGRESS NOTE ADULT - REASON FOR ADMISSION
fever and  chills

## 2021-10-12 ENCOUNTER — APPOINTMENT (OUTPATIENT)
Dept: UROLOGY | Facility: CLINIC | Age: 86
End: 2021-10-12
Payer: MEDICARE

## 2021-10-12 VITALS — TEMPERATURE: 98 F | DIASTOLIC BLOOD PRESSURE: 71 MMHG | HEART RATE: 45 BPM | SYSTOLIC BLOOD PRESSURE: 118 MMHG

## 2021-10-12 DIAGNOSIS — R31.0 GROSS HEMATURIA: ICD-10-CM

## 2021-10-12 DIAGNOSIS — N40.0 BENIGN PROSTATIC HYPERPLASIA WITHOUT LOWER URINARY TRACT SYMPMS: ICD-10-CM

## 2021-10-12 PROCEDURE — 99213 OFFICE O/P EST LOW 20 MIN: CPT

## 2021-10-13 PROBLEM — R31.0 GROSS HEMATURIA: Status: ACTIVE | Noted: 2021-10-13

## 2021-10-13 NOTE — ASSESSMENT
[FreeTextEntry1] : In summary, this is a 89-year-old man with recent history of hematuria when hospitalized for treatment for pneumonia after undergoing catheterization.  In all likelihood this is related to his catheterization which may have been mildly traumatic causing hematuria.  He does have BPH as noted on a CT scan done at the time of his hospitalization.  There was no evidence of kidney or bladder stone no evidence of any urinary tract malignancy although the CT was done without contrast.\par \par Given that this hematuria is likely related to catheterization and the other most likely potential could be simply his BPH, I would have a high threshold to put him through additional work-up given his advanced age and no obvious abnormality on CT.  If the hematuria does recur or start to become more persistent or clinically significant, further work-up would be considered with cystoscopy.

## 2021-10-13 NOTE — HISTORY OF PRESENT ILLNESS
[FreeTextEntry1] : Chris Julien returns to the office today.  He is a 89-year-old man who was recently admitted to [facility] for fevers and chills and was diagnosed with pneumonia.  He apparently was catheterized in the emergency department and gross hematuria was noted following that catheterization.  The catheter was subsequently removed and he had a condom catheter placed but continued to have blood in his urine for several days when he was hospitalized.  He was voiding without any significant residual however and was left without further catheterization.\par \par He lives in an assisted living situation.  There is apparently a small amount of blood noted in the adult diapers that he wears after his discharge.  He is not complaining of any dysuria.  He has had prior gastric bleeding when he was on blood thinner years ago.  He has a Watchman device placed.  He is not on anticoagulation at this time.\par \par \par

## 2021-10-26 PROCEDURE — U0005: CPT

## 2021-10-26 PROCEDURE — 92610 EVALUATE SWALLOWING FUNCTION: CPT

## 2021-10-26 PROCEDURE — 84295 ASSAY OF SERUM SODIUM: CPT

## 2021-10-26 PROCEDURE — 87040 BLOOD CULTURE FOR BACTERIA: CPT

## 2021-10-26 PROCEDURE — 74177 CT ABD & PELVIS W/CONTRAST: CPT | Mod: MA

## 2021-10-26 PROCEDURE — 96374 THER/PROPH/DIAG INJ IV PUSH: CPT

## 2021-10-26 PROCEDURE — 83036 HEMOGLOBIN GLYCOSYLATED A1C: CPT

## 2021-10-26 PROCEDURE — 82962 GLUCOSE BLOOD TEST: CPT

## 2021-10-26 PROCEDURE — 99285 EMERGENCY DEPT VISIT HI MDM: CPT

## 2021-10-26 PROCEDURE — 83605 ASSAY OF LACTIC ACID: CPT

## 2021-10-26 PROCEDURE — 83880 ASSAY OF NATRIURETIC PEPTIDE: CPT

## 2021-10-26 PROCEDURE — 94640 AIRWAY INHALATION TREATMENT: CPT

## 2021-10-26 PROCEDURE — 86900 BLOOD TYPING SEROLOGIC ABO: CPT

## 2021-10-26 PROCEDURE — 85610 PROTHROMBIN TIME: CPT

## 2021-10-26 PROCEDURE — 71260 CT THORAX DX C+: CPT | Mod: MA

## 2021-10-26 PROCEDURE — 86850 RBC ANTIBODY SCREEN: CPT

## 2021-10-26 PROCEDURE — 86901 BLOOD TYPING SEROLOGIC RH(D): CPT

## 2021-10-26 PROCEDURE — 93306 TTE W/DOPPLER COMPLETE: CPT

## 2021-10-26 PROCEDURE — 82435 ASSAY OF BLOOD CHLORIDE: CPT

## 2021-10-26 PROCEDURE — 86769 SARS-COV-2 COVID-19 ANTIBODY: CPT

## 2021-10-26 PROCEDURE — 96375 TX/PRO/DX INJ NEW DRUG ADDON: CPT

## 2021-10-26 PROCEDURE — 80048 BASIC METABOLIC PNL TOTAL CA: CPT

## 2021-10-26 PROCEDURE — 85018 HEMOGLOBIN: CPT

## 2021-10-26 PROCEDURE — 84132 ASSAY OF SERUM POTASSIUM: CPT

## 2021-10-26 PROCEDURE — 97110 THERAPEUTIC EXERCISES: CPT

## 2021-10-26 PROCEDURE — 82947 ASSAY GLUCOSE BLOOD QUANT: CPT

## 2021-10-26 PROCEDURE — 0225U NFCT DS DNA&RNA 21 SARSCOV2: CPT

## 2021-10-26 PROCEDURE — 97116 GAIT TRAINING THERAPY: CPT

## 2021-10-26 PROCEDURE — 85014 HEMATOCRIT: CPT

## 2021-10-26 PROCEDURE — 85730 THROMBOPLASTIN TIME PARTIAL: CPT

## 2021-10-26 PROCEDURE — 93005 ELECTROCARDIOGRAM TRACING: CPT

## 2021-10-26 PROCEDURE — U0003: CPT

## 2021-10-26 PROCEDURE — 84145 PROCALCITONIN (PCT): CPT

## 2021-10-26 PROCEDURE — 82803 BLOOD GASES ANY COMBINATION: CPT

## 2021-10-26 PROCEDURE — 85025 COMPLETE CBC W/AUTO DIFF WBC: CPT

## 2021-10-26 PROCEDURE — 87086 URINE CULTURE/COLONY COUNT: CPT

## 2021-10-26 PROCEDURE — 71045 X-RAY EXAM CHEST 1 VIEW: CPT

## 2021-10-26 PROCEDURE — 80053 COMPREHEN METABOLIC PANEL: CPT

## 2021-10-26 PROCEDURE — 87449 NOS EACH ORGANISM AG IA: CPT

## 2021-10-26 PROCEDURE — 82330 ASSAY OF CALCIUM: CPT

## 2021-10-26 PROCEDURE — 97161 PT EVAL LOW COMPLEX 20 MIN: CPT

## 2021-10-26 PROCEDURE — 85027 COMPLETE CBC AUTOMATED: CPT

## 2021-10-26 PROCEDURE — 81001 URINALYSIS AUTO W/SCOPE: CPT

## 2021-12-23 NOTE — ED ADULT NURSE NOTE - OBJECTIVE STATEMENT
Last visit 11/10/21  next 3/21/22  
89y male arrived to ED s/p fall. Patient had mechanical fall outside of assisted living. Patient p/w with laceration at right eyebrow. Patient endorses right hip pain. Patient was ambulatory at the scene. Patient denies LOC, CP, SOB, n/v/d, ab pain, chills, fever. PMHx TAVR, pacemaker, DM, HLD, HTN. Patient takes ASA. Patient A&Ox4, ambulatory, VS stable.

## 2022-01-01 ENCOUNTER — NON-APPOINTMENT (OUTPATIENT)
Age: 87
End: 2022-01-01

## 2022-01-01 ENCOUNTER — TRANSCRIPTION ENCOUNTER (OUTPATIENT)
Age: 87
End: 2022-01-01

## 2022-01-01 ENCOUNTER — EMERGENCY (EMERGENCY)
Facility: HOSPITAL | Age: 87
LOS: 1 days | Discharge: HOME | End: 2022-01-01
Attending: EMERGENCY MEDICINE
Payer: MEDICARE

## 2022-01-01 ENCOUNTER — INPATIENT (INPATIENT)
Facility: HOSPITAL | Age: 87
LOS: 6 days | Discharge: HOME HEALTH | DRG: 291 | End: 2022-08-28
Attending: HOSPITALIST | Admitting: STUDENT IN AN ORGANIZED HEALTH CARE EDUCATION/TRAINING PROGRAM
Payer: MEDICARE

## 2022-01-01 ENCOUNTER — INPATIENT (INPATIENT)
Facility: HOSPITAL | Age: 87
LOS: 5 days | Discharge: HOME HEALTH | DRG: 291 | End: 2022-08-12
Attending: INTERNAL MEDICINE | Admitting: STUDENT IN AN ORGANIZED HEALTH CARE EDUCATION/TRAINING PROGRAM
Payer: MEDICARE

## 2022-01-01 ENCOUNTER — LABORATORY RESULT (OUTPATIENT)
Age: 87
End: 2022-01-01

## 2022-01-01 ENCOUNTER — APPOINTMENT (OUTPATIENT)
Dept: GERIATRICS | Facility: ASSISTED LIVING FACILITY | Age: 87
End: 2022-01-01

## 2022-01-01 ENCOUNTER — INPATIENT (INPATIENT)
Facility: HOSPITAL | Age: 87
LOS: 2 days | Discharge: NURSING, REHAB OR LONG-TERM CARE | DRG: 689 | End: 2022-12-15
Attending: HOSPITALIST | Admitting: HOSPITALIST
Payer: MEDICARE

## 2022-01-01 ENCOUNTER — APPOINTMENT (OUTPATIENT)
Dept: GERIATRICS | Facility: ASSISTED LIVING FACILITY | Age: 87
End: 2022-01-01
Payer: MEDICARE

## 2022-01-01 ENCOUNTER — INPATIENT (INPATIENT)
Facility: HOSPITAL | Age: 87
LOS: 3 days | Discharge: NURSING, REHAB OR LONG-TERM CARE | DRG: 291 | End: 2022-05-06
Attending: HOSPITALIST | Admitting: STUDENT IN AN ORGANIZED HEALTH CARE EDUCATION/TRAINING PROGRAM
Payer: MEDICARE

## 2022-01-01 VITALS
OXYGEN SATURATION: 98 % | SYSTOLIC BLOOD PRESSURE: 106 MMHG | HEART RATE: 68 BPM | TEMPERATURE: 97 F | RESPIRATION RATE: 18 BRPM | DIASTOLIC BLOOD PRESSURE: 68 MMHG | RESPIRATION RATE: 16 BRPM | OXYGEN SATURATION: 95 % | TEMPERATURE: 97 F | DIASTOLIC BLOOD PRESSURE: 74 MMHG | SYSTOLIC BLOOD PRESSURE: 103 MMHG | HEART RATE: 71 BPM

## 2022-01-01 VITALS
TEMPERATURE: 98 F | RESPIRATION RATE: 18 BRPM | SYSTOLIC BLOOD PRESSURE: 128 MMHG | HEART RATE: 81 BPM | DIASTOLIC BLOOD PRESSURE: 51 MMHG | OXYGEN SATURATION: 95 %

## 2022-01-01 VITALS
SYSTOLIC BLOOD PRESSURE: 127 MMHG | HEART RATE: 62 BPM | TEMPERATURE: 98.3 F | DIASTOLIC BLOOD PRESSURE: 78 MMHG | RESPIRATION RATE: 12 BRPM | OXYGEN SATURATION: 92 %

## 2022-01-01 VITALS
DIASTOLIC BLOOD PRESSURE: 60 MMHG | SYSTOLIC BLOOD PRESSURE: 110 MMHG | OXYGEN SATURATION: 90 % | RESPIRATION RATE: 12 BRPM | HEART RATE: 59 BPM

## 2022-01-01 VITALS
OXYGEN SATURATION: 94 % | DIASTOLIC BLOOD PRESSURE: 54 MMHG | SYSTOLIC BLOOD PRESSURE: 110 MMHG | HEART RATE: 70 BPM | BODY MASS INDEX: 22.73 KG/M2 | WEIGHT: 140.8 LBS | RESPIRATION RATE: 16 BRPM

## 2022-01-01 VITALS
SYSTOLIC BLOOD PRESSURE: 110 MMHG | BODY MASS INDEX: 22.73 KG/M2 | DIASTOLIC BLOOD PRESSURE: 54 MMHG | HEART RATE: 70 BPM | WEIGHT: 140.8 LBS | RESPIRATION RATE: 16 BRPM | OXYGEN SATURATION: 94 %

## 2022-01-01 VITALS
HEART RATE: 68 BPM | RESPIRATION RATE: 14 BRPM | OXYGEN SATURATION: 93 % | SYSTOLIC BLOOD PRESSURE: 124 MMHG | DIASTOLIC BLOOD PRESSURE: 80 MMHG

## 2022-01-01 VITALS
RESPIRATION RATE: 18 BRPM | HEART RATE: 99 BPM | TEMPERATURE: 98 F | WEIGHT: 165.35 LBS | DIASTOLIC BLOOD PRESSURE: 81 MMHG | SYSTOLIC BLOOD PRESSURE: 113 MMHG | HEIGHT: 66 IN

## 2022-01-01 VITALS
SYSTOLIC BLOOD PRESSURE: 115 MMHG | HEART RATE: 87 BPM | RESPIRATION RATE: 18 BRPM | OXYGEN SATURATION: 96 % | DIASTOLIC BLOOD PRESSURE: 76 MMHG | TEMPERATURE: 97 F

## 2022-01-01 VITALS
DIASTOLIC BLOOD PRESSURE: 67 MMHG | TEMPERATURE: 98 F | HEIGHT: 66 IN | WEIGHT: 119.93 LBS | RESPIRATION RATE: 18 BRPM | OXYGEN SATURATION: 91 % | SYSTOLIC BLOOD PRESSURE: 112 MMHG | HEART RATE: 76 BPM

## 2022-01-01 VITALS
HEIGHT: 65 IN | SYSTOLIC BLOOD PRESSURE: 127 MMHG | DIASTOLIC BLOOD PRESSURE: 80 MMHG | RESPIRATION RATE: 16 BRPM | OXYGEN SATURATION: 96 % | HEART RATE: 86 BPM | WEIGHT: 184.97 LBS

## 2022-01-01 VITALS
OXYGEN SATURATION: 92 % | HEART RATE: 92 BPM | TEMPERATURE: 97 F | DIASTOLIC BLOOD PRESSURE: 77 MMHG | SYSTOLIC BLOOD PRESSURE: 106 MMHG | RESPIRATION RATE: 18 BRPM

## 2022-01-01 VITALS
RESPIRATION RATE: 18 BRPM | HEART RATE: 88 BPM | SYSTOLIC BLOOD PRESSURE: 113 MMHG | DIASTOLIC BLOOD PRESSURE: 65 MMHG | HEIGHT: 65 IN | OXYGEN SATURATION: 98 %

## 2022-01-01 VITALS
SYSTOLIC BLOOD PRESSURE: 146 MMHG | OXYGEN SATURATION: 98 % | RESPIRATION RATE: 18 BRPM | WEIGHT: 134.92 LBS | HEIGHT: 65 IN | HEART RATE: 82 BPM | TEMPERATURE: 98 F | DIASTOLIC BLOOD PRESSURE: 90 MMHG

## 2022-01-01 VITALS — DIASTOLIC BLOOD PRESSURE: 80 MMHG | SYSTOLIC BLOOD PRESSURE: 130 MMHG

## 2022-01-01 VITALS — WEIGHT: 137.35 LBS

## 2022-01-01 VITALS — HEIGHT: 65 IN

## 2022-01-01 DIAGNOSIS — I48.91 UNSPECIFIED ATRIAL FIBRILLATION: ICD-10-CM

## 2022-01-01 DIAGNOSIS — J96.01 ACUTE RESPIRATORY FAILURE WITH HYPOXIA: ICD-10-CM

## 2022-01-01 DIAGNOSIS — N40.0 BENIGN PROSTATIC HYPERPLASIA WITHOUT LOWER URINARY TRACT SYMPTOMS: ICD-10-CM

## 2022-01-01 DIAGNOSIS — Z98.89 OTHER SPECIFIED POSTPROCEDURAL STATES: Chronic | ICD-10-CM

## 2022-01-01 DIAGNOSIS — I48.20 CHRONIC ATRIAL FIBRILLATION, UNSPECIFIED: ICD-10-CM

## 2022-01-01 DIAGNOSIS — L89.611 PRESSURE ULCER OF RIGHT HEEL, STAGE 1: ICD-10-CM

## 2022-01-01 DIAGNOSIS — R41.82 ALTERED MENTAL STATUS, UNSPECIFIED: ICD-10-CM

## 2022-01-01 DIAGNOSIS — F03.90 UNSPECIFIED DEMENTIA WITHOUT BEHAVIORAL DISTURBANCE: ICD-10-CM

## 2022-01-01 DIAGNOSIS — R09.02 HYPOXEMIA: ICD-10-CM

## 2022-01-01 DIAGNOSIS — I50.9 HEART FAILURE, UNSPECIFIED: ICD-10-CM

## 2022-01-01 DIAGNOSIS — R77.8 OTHER SPECIFIED ABNORMALITIES OF PLASMA PROTEINS: ICD-10-CM

## 2022-01-01 DIAGNOSIS — I10 ESSENTIAL (PRIMARY) HYPERTENSION: ICD-10-CM

## 2022-01-01 DIAGNOSIS — E78.5 HYPERLIPIDEMIA, UNSPECIFIED: ICD-10-CM

## 2022-01-01 DIAGNOSIS — M48.00 SPINAL STENOSIS, SITE UNSPECIFIED: ICD-10-CM

## 2022-01-01 DIAGNOSIS — L03.90 CELLULITIS, UNSPECIFIED: ICD-10-CM

## 2022-01-01 DIAGNOSIS — E87.1 HYPO-OSMOLALITY AND HYPONATREMIA: ICD-10-CM

## 2022-01-01 DIAGNOSIS — E11.9 TYPE 2 DIABETES MELLITUS WITHOUT COMPLICATIONS: ICD-10-CM

## 2022-01-01 DIAGNOSIS — I50.33 ACUTE ON CHRONIC DIASTOLIC (CONGESTIVE) HEART FAILURE: ICD-10-CM

## 2022-01-01 DIAGNOSIS — E87.6 HYPOKALEMIA: ICD-10-CM

## 2022-01-01 DIAGNOSIS — Z86.79 PERSONAL HISTORY OF OTHER DISEASES OF THE CIRCULATORY SYSTEM: ICD-10-CM

## 2022-01-01 DIAGNOSIS — R29.6 REPEATED FALLS: ICD-10-CM

## 2022-01-01 DIAGNOSIS — Z98.61 ATHEROSCLEROTIC HEART DISEASE OF NATIVE CORONARY ARTERY W/OUT ANGINA PECTORIS: ICD-10-CM

## 2022-01-01 DIAGNOSIS — F01.50 VASCULAR DEMENTIA W/OUT BEHAVIORAL DISTURBANCE: ICD-10-CM

## 2022-01-01 DIAGNOSIS — R31.9 HEMATURIA, UNSPECIFIED: ICD-10-CM

## 2022-01-01 DIAGNOSIS — R53.81 OTHER MALAISE: ICD-10-CM

## 2022-01-01 DIAGNOSIS — D64.9 ANEMIA, UNSPECIFIED: ICD-10-CM

## 2022-01-01 DIAGNOSIS — I35.0 NONRHEUMATIC AORTIC (VALVE) STENOSIS: ICD-10-CM

## 2022-01-01 DIAGNOSIS — Z29.9 ENCOUNTER FOR PROPHYLACTIC MEASURES, UNSPECIFIED: ICD-10-CM

## 2022-01-01 DIAGNOSIS — H91.90 UNSPECIFIED HEARING LOSS, UNSPECIFIED EAR: ICD-10-CM

## 2022-01-01 DIAGNOSIS — R06.02 SHORTNESS OF BREATH: ICD-10-CM

## 2022-01-01 DIAGNOSIS — N39.0 URINARY TRACT INFECTION, SITE NOT SPECIFIED: ICD-10-CM

## 2022-01-01 DIAGNOSIS — Z99.81 DEPENDENCE ON SUPPLEMENTAL OXYGEN: ICD-10-CM

## 2022-01-01 DIAGNOSIS — E11.9 TYPE 2 DIABETES MELLITUS W/OUT COMPLICATIONS: ICD-10-CM

## 2022-01-01 DIAGNOSIS — K59.00 CONSTIPATION, UNSPECIFIED: ICD-10-CM

## 2022-01-01 DIAGNOSIS — F03.90 UNSPECIFIED DEMENTIA, UNSPECIFIED SEVERITY, WITHOUT BEHAVIORAL DISTURBANCE, PSYCHOTIC DISTURBANCE, MOOD DISTURBANCE, AND ANXIETY: ICD-10-CM

## 2022-01-01 DIAGNOSIS — S91.301A UNSPECIFIED OPEN WOUND, RIGHT FOOT, INITIAL ENCOUNTER: ICD-10-CM

## 2022-01-01 DIAGNOSIS — F03.918 UNSPECIFIED DEMENTIA, UNSPECIFIED SEVERITY, WITH OTHER BEHAVIORAL DISTURBANCE: ICD-10-CM

## 2022-01-01 DIAGNOSIS — I25.10 ATHEROSCLEROTIC HEART DISEASE OF NATIVE CORONARY ARTERY WITHOUT ANGINA PECTORIS: ICD-10-CM

## 2022-01-01 DIAGNOSIS — S09.90XA UNSPECIFIED INJURY OF HEAD, INITIAL ENCOUNTER: ICD-10-CM

## 2022-01-01 DIAGNOSIS — I50.43 ACUTE ON CHRONIC COMBINED SYSTOLIC (CONGESTIVE) AND DIASTOLIC (CONGESTIVE) HEART FAILURE: ICD-10-CM
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
## 2022-01-01 DIAGNOSIS — L03.115 CELLULITIS OF RIGHT LOWER LIMB: ICD-10-CM

## 2022-01-01 DIAGNOSIS — Z71.89 OTHER SPECIFIED COUNSELING: ICD-10-CM

## 2022-01-01 DIAGNOSIS — I25.10 ATHEROSCLEROTIC HEART DISEASE OF NATIVE CORONARY ARTERY W/OUT ANGINA PECTORIS: ICD-10-CM

## 2022-01-01 DIAGNOSIS — Z95.2 PRESENCE OF PROSTHETIC HEART VALVE: ICD-10-CM

## 2022-01-01 DIAGNOSIS — F32.A DEPRESSION, UNSPECIFIED: ICD-10-CM

## 2022-01-01 DIAGNOSIS — K21.9 GASTRO-ESOPHAGEAL REFLUX DISEASE W/OUT ESOPHAGITIS: ICD-10-CM

## 2022-01-01 DIAGNOSIS — I48.2 CHRONIC ATRIAL FIBRILLATION: ICD-10-CM

## 2022-01-01 DIAGNOSIS — E46 UNSPECIFIED PROTEIN-CALORIE MALNUTRITION: ICD-10-CM

## 2022-01-01 DIAGNOSIS — R26.81 UNSTEADINESS ON FEET: ICD-10-CM

## 2022-01-01 DIAGNOSIS — S91.311A LACERATION WITHOUT FOREIGN BODY, RIGHT FOOT, INITIAL ENCOUNTER: ICD-10-CM

## 2022-01-01 LAB
-  AMIKACIN: SIGNIFICANT CHANGE UP
-  AMOXICILLIN/CLAVULANIC ACID: SIGNIFICANT CHANGE UP
-  AMPICILLIN/SULBACTAM: SIGNIFICANT CHANGE UP
-  AMPICILLIN: SIGNIFICANT CHANGE UP
-  AZTREONAM: SIGNIFICANT CHANGE UP
-  CEFAZOLIN: SIGNIFICANT CHANGE UP
-  CEFEPIME: SIGNIFICANT CHANGE UP
-  CEFOXITIN: SIGNIFICANT CHANGE UP
-  CEFTRIAXONE: SIGNIFICANT CHANGE UP
-  CIPROFLOXACIN: SIGNIFICANT CHANGE UP
-  ERTAPENEM: SIGNIFICANT CHANGE UP
-  GENTAMICIN: SIGNIFICANT CHANGE UP
-  IMIPENEM: SIGNIFICANT CHANGE UP
-  LEVOFLOXACIN: SIGNIFICANT CHANGE UP
-  MEROPENEM: SIGNIFICANT CHANGE UP
-  NITROFURANTOIN: SIGNIFICANT CHANGE UP
-  PIPERACILLIN/TAZOBACTAM: SIGNIFICANT CHANGE UP
-  TOBRAMYCIN: SIGNIFICANT CHANGE UP
-  TRIMETHOPRIM/SULFAMETHOXAZOLE: SIGNIFICANT CHANGE UP
A1C WITH ESTIMATED AVERAGE GLUCOSE RESULT: 6.2 % — HIGH (ref 4–5.6)
A1C WITH ESTIMATED AVERAGE GLUCOSE RESULT: 6.4 % — HIGH (ref 4–5.6)
A1C WITH ESTIMATED AVERAGE GLUCOSE RESULT: 6.4 % — HIGH (ref 4–5.6)
ACANTHOCYTES BLD QL SMEAR: SLIGHT — SIGNIFICANT CHANGE UP
ALBUMIN SERPL ELPH-MCNC: 3.4 G/DL — SIGNIFICANT CHANGE UP (ref 3.3–5)
ALBUMIN SERPL ELPH-MCNC: 3.4 G/DL — SIGNIFICANT CHANGE UP (ref 3.3–5)
ALBUMIN SERPL ELPH-MCNC: 3.5 G/DL — SIGNIFICANT CHANGE UP (ref 3.3–5)
ALBUMIN SERPL ELPH-MCNC: 3.5 G/DL — SIGNIFICANT CHANGE UP (ref 3.3–5)
ALBUMIN SERPL ELPH-MCNC: 3.6 G/DL — SIGNIFICANT CHANGE UP (ref 3.3–5)
ALBUMIN SERPL ELPH-MCNC: 4 G/DL — SIGNIFICANT CHANGE UP (ref 3.3–5)
ALP SERPL-CCNC: 75 U/L — SIGNIFICANT CHANGE UP (ref 40–120)
ALP SERPL-CCNC: 78 U/L — SIGNIFICANT CHANGE UP (ref 40–120)
ALP SERPL-CCNC: 82 U/L — SIGNIFICANT CHANGE UP (ref 40–120)
ALP SERPL-CCNC: 83 U/L — SIGNIFICANT CHANGE UP (ref 40–120)
ALP SERPL-CCNC: 88 U/L — SIGNIFICANT CHANGE UP (ref 40–120)
ALP SERPL-CCNC: 93 U/L — SIGNIFICANT CHANGE UP (ref 40–120)
ALP SERPL-CCNC: 95 U/L — SIGNIFICANT CHANGE UP (ref 40–120)
ALP SERPL-CCNC: 96 U/L — SIGNIFICANT CHANGE UP (ref 40–120)
ALT FLD-CCNC: 11 U/L — SIGNIFICANT CHANGE UP (ref 10–45)
ALT FLD-CCNC: 12 U/L — SIGNIFICANT CHANGE UP (ref 10–45)
ALT FLD-CCNC: 13 U/L — SIGNIFICANT CHANGE UP (ref 10–45)
ALT FLD-CCNC: 15 U/L — SIGNIFICANT CHANGE UP (ref 10–45)
ALT FLD-CCNC: 16 U/L — SIGNIFICANT CHANGE UP (ref 10–45)
ALT FLD-CCNC: 18 U/L — SIGNIFICANT CHANGE UP (ref 10–45)
ALT FLD-CCNC: 23 U/L — SIGNIFICANT CHANGE UP (ref 10–45)
ALT FLD-CCNC: 8 U/L — LOW (ref 10–45)
ANION GAP SERPL CALC-SCNC: 10 MMOL/L — SIGNIFICANT CHANGE UP (ref 5–17)
ANION GAP SERPL CALC-SCNC: 11 MMOL/L — SIGNIFICANT CHANGE UP (ref 5–17)
ANION GAP SERPL CALC-SCNC: 12 MMOL/L — SIGNIFICANT CHANGE UP (ref 5–17)
ANION GAP SERPL CALC-SCNC: 13 MMOL/L — SIGNIFICANT CHANGE UP (ref 5–17)
ANION GAP SERPL CALC-SCNC: 14 MMOL/L — SIGNIFICANT CHANGE UP (ref 5–17)
ANION GAP SERPL CALC-SCNC: 14 MMOL/L — SIGNIFICANT CHANGE UP (ref 5–17)
ANION GAP SERPL CALC-SCNC: 15 MMOL/L — SIGNIFICANT CHANGE UP (ref 5–17)
ANION GAP SERPL CALC-SCNC: 15 MMOL/L — SIGNIFICANT CHANGE UP (ref 5–17)
ANISOCYTOSIS BLD QL: SLIGHT — SIGNIFICANT CHANGE UP
APPEARANCE UR: ABNORMAL
APPEARANCE UR: ABNORMAL
APPEARANCE UR: CLEAR — SIGNIFICANT CHANGE UP
APTT BLD: 31.7 SEC — SIGNIFICANT CHANGE UP (ref 27.5–35.5)
APTT BLD: 31.7 SEC — SIGNIFICANT CHANGE UP (ref 27.5–35.5)
AST SERPL-CCNC: 19 U/L — SIGNIFICANT CHANGE UP (ref 10–40)
AST SERPL-CCNC: 23 U/L — SIGNIFICANT CHANGE UP (ref 10–40)
AST SERPL-CCNC: 23 U/L — SIGNIFICANT CHANGE UP (ref 10–40)
AST SERPL-CCNC: 25 U/L — SIGNIFICANT CHANGE UP (ref 10–40)
AST SERPL-CCNC: 33 U/L — SIGNIFICANT CHANGE UP (ref 10–40)
AST SERPL-CCNC: 34 U/L — SIGNIFICANT CHANGE UP (ref 10–40)
AST SERPL-CCNC: 42 U/L — HIGH (ref 10–40)
AST SERPL-CCNC: 54 U/L — HIGH (ref 10–40)
B-OH-BUTYR SERPL-SCNC: 0.1 MMOL/L — SIGNIFICANT CHANGE UP
BACTERIA # UR AUTO: ABNORMAL
BACTERIA # UR AUTO: NEGATIVE — SIGNIFICANT CHANGE UP
BACTERIA # UR AUTO: NEGATIVE — SIGNIFICANT CHANGE UP
BASE EXCESS BLDA CALC-SCNC: 1.5 MMOL/L — SIGNIFICANT CHANGE UP (ref -2–3)
BASE EXCESS BLDA CALC-SCNC: 2.4 MMOL/L — SIGNIFICANT CHANGE UP (ref -2–3)
BASE EXCESS BLDV CALC-SCNC: -0.8 MMOL/L — SIGNIFICANT CHANGE UP (ref -2–2)
BASE EXCESS BLDV CALC-SCNC: -1 MMOL/L — SIGNIFICANT CHANGE UP (ref -2–2)
BASE EXCESS BLDV CALC-SCNC: 3 MMOL/L — SIGNIFICANT CHANGE UP (ref -2–3)
BASOPHILS # BLD AUTO: 0 K/UL — SIGNIFICANT CHANGE UP (ref 0–0.2)
BASOPHILS # BLD AUTO: 0.02 K/UL — SIGNIFICANT CHANGE UP (ref 0–0.2)
BASOPHILS # BLD AUTO: 0.04 K/UL — SIGNIFICANT CHANGE UP (ref 0–0.2)
BASOPHILS # BLD AUTO: 0.07 K/UL — SIGNIFICANT CHANGE UP (ref 0–0.2)
BASOPHILS NFR BLD AUTO: 0 % — SIGNIFICANT CHANGE UP (ref 0–2)
BASOPHILS NFR BLD AUTO: 0.3 % — SIGNIFICANT CHANGE UP (ref 0–2)
BASOPHILS NFR BLD AUTO: 0.4 % — SIGNIFICANT CHANGE UP (ref 0–2)
BASOPHILS NFR BLD AUTO: 0.5 % — SIGNIFICANT CHANGE UP (ref 0–2)
BASOPHILS NFR BLD AUTO: 0.8 % — SIGNIFICANT CHANGE UP (ref 0–2)
BASOPHILS NFR BLD AUTO: 0.9 % — SIGNIFICANT CHANGE UP (ref 0–2)
BILIRUB SERPL-MCNC: 0.6 MG/DL — SIGNIFICANT CHANGE UP (ref 0.2–1.2)
BILIRUB SERPL-MCNC: 0.6 MG/DL — SIGNIFICANT CHANGE UP (ref 0.2–1.2)
BILIRUB SERPL-MCNC: 0.8 MG/DL — SIGNIFICANT CHANGE UP (ref 0.2–1.2)
BILIRUB SERPL-MCNC: 0.9 MG/DL — SIGNIFICANT CHANGE UP (ref 0.2–1.2)
BILIRUB SERPL-MCNC: 1 MG/DL — SIGNIFICANT CHANGE UP (ref 0.2–1.2)
BILIRUB SERPL-MCNC: 1.8 MG/DL — HIGH (ref 0.2–1.2)
BILIRUB UR-MCNC: NEGATIVE — SIGNIFICANT CHANGE UP
BLOOD GAS VENOUS - CREATININE: SIGNIFICANT CHANGE UP MG/DL (ref 0.5–1.3)
BUN SERPL-MCNC: 13 MG/DL — SIGNIFICANT CHANGE UP (ref 7–23)
BUN SERPL-MCNC: 16 MG/DL — SIGNIFICANT CHANGE UP (ref 7–23)
BUN SERPL-MCNC: 17 MG/DL — SIGNIFICANT CHANGE UP (ref 7–23)
BUN SERPL-MCNC: 17 MG/DL — SIGNIFICANT CHANGE UP (ref 7–23)
BUN SERPL-MCNC: 18 MG/DL — SIGNIFICANT CHANGE UP (ref 7–23)
BUN SERPL-MCNC: 19 MG/DL — SIGNIFICANT CHANGE UP (ref 7–23)
BUN SERPL-MCNC: 20 MG/DL — SIGNIFICANT CHANGE UP (ref 7–23)
BUN SERPL-MCNC: 21 MG/DL — SIGNIFICANT CHANGE UP (ref 7–23)
BUN SERPL-MCNC: 22 MG/DL — SIGNIFICANT CHANGE UP (ref 7–23)
BUN SERPL-MCNC: 23 MG/DL — SIGNIFICANT CHANGE UP (ref 7–23)
BUN SERPL-MCNC: 24 MG/DL — HIGH (ref 7–23)
BUN SERPL-MCNC: 26 MG/DL — HIGH (ref 7–23)
BURR CELLS BLD QL SMEAR: PRESENT — SIGNIFICANT CHANGE UP
CA-I SERPL-SCNC: 1.15 MMOL/L — SIGNIFICANT CHANGE UP (ref 1.15–1.33)
CA-I SERPL-SCNC: 1.15 MMOL/L — SIGNIFICANT CHANGE UP (ref 1.15–1.33)
CA-I SERPL-SCNC: 1.23 MMOL/L — SIGNIFICANT CHANGE UP (ref 1.15–1.33)
CALCIUM SERPL-MCNC: 7.4 MG/DL — LOW (ref 8.4–10.5)
CALCIUM SERPL-MCNC: 8.5 MG/DL — SIGNIFICANT CHANGE UP (ref 8.4–10.5)
CALCIUM SERPL-MCNC: 8.6 MG/DL — SIGNIFICANT CHANGE UP (ref 8.4–10.5)
CALCIUM SERPL-MCNC: 8.7 MG/DL — SIGNIFICANT CHANGE UP (ref 8.4–10.5)
CALCIUM SERPL-MCNC: 8.8 MG/DL — SIGNIFICANT CHANGE UP (ref 8.4–10.5)
CALCIUM SERPL-MCNC: 8.8 MG/DL — SIGNIFICANT CHANGE UP (ref 8.4–10.5)
CALCIUM SERPL-MCNC: 8.9 MG/DL — SIGNIFICANT CHANGE UP (ref 8.4–10.5)
CALCIUM SERPL-MCNC: 8.9 MG/DL — SIGNIFICANT CHANGE UP (ref 8.4–10.5)
CALCIUM SERPL-MCNC: 9 MG/DL — SIGNIFICANT CHANGE UP (ref 8.4–10.5)
CALCIUM SERPL-MCNC: 9 MG/DL — SIGNIFICANT CHANGE UP (ref 8.4–10.5)
CALCIUM SERPL-MCNC: 9.2 MG/DL — SIGNIFICANT CHANGE UP (ref 8.4–10.5)
CALCIUM SERPL-MCNC: 9.3 MG/DL — SIGNIFICANT CHANGE UP (ref 8.4–10.5)
CALCIUM SERPL-MCNC: 9.4 MG/DL — SIGNIFICANT CHANGE UP (ref 8.4–10.5)
CHLORIDE BLDV-SCNC: 104 MMOL/L — SIGNIFICANT CHANGE UP (ref 96–108)
CHLORIDE BLDV-SCNC: 105 MMOL/L — SIGNIFICANT CHANGE UP (ref 96–108)
CHLORIDE BLDV-SCNC: 105 MMOL/L — SIGNIFICANT CHANGE UP (ref 96–108)
CHLORIDE SERPL-SCNC: 101 MMOL/L — SIGNIFICANT CHANGE UP (ref 96–108)
CHLORIDE SERPL-SCNC: 102 MMOL/L — SIGNIFICANT CHANGE UP (ref 96–108)
CHLORIDE SERPL-SCNC: 102 MMOL/L — SIGNIFICANT CHANGE UP (ref 96–108)
CHLORIDE SERPL-SCNC: 103 MMOL/L — SIGNIFICANT CHANGE UP (ref 96–108)
CHLORIDE SERPL-SCNC: 103 MMOL/L — SIGNIFICANT CHANGE UP (ref 96–108)
CHLORIDE SERPL-SCNC: 104 MMOL/L — SIGNIFICANT CHANGE UP (ref 96–108)
CHLORIDE SERPL-SCNC: 105 MMOL/L — SIGNIFICANT CHANGE UP (ref 96–108)
CHLORIDE SERPL-SCNC: 106 MMOL/L — SIGNIFICANT CHANGE UP (ref 96–108)
CHLORIDE SERPL-SCNC: 106 MMOL/L — SIGNIFICANT CHANGE UP (ref 96–108)
CHLORIDE SERPL-SCNC: 107 MMOL/L — SIGNIFICANT CHANGE UP (ref 96–108)
CHLORIDE SERPL-SCNC: 107 MMOL/L — SIGNIFICANT CHANGE UP (ref 96–108)
CHLORIDE SERPL-SCNC: 108 MMOL/L — SIGNIFICANT CHANGE UP (ref 96–108)
CHLORIDE SERPL-SCNC: 109 MMOL/L — HIGH (ref 96–108)
CHLORIDE SERPL-SCNC: 112 MMOL/L — HIGH (ref 96–108)
CK SERPL-CCNC: 62 U/L — SIGNIFICANT CHANGE UP (ref 30–200)
CK SERPL-CCNC: 90 U/L — SIGNIFICANT CHANGE UP (ref 30–200)
CO2 BLDA-SCNC: 26 MMOL/L — HIGH (ref 19–24)
CO2 BLDA-SCNC: 26 MMOL/L — HIGH (ref 19–24)
CO2 BLDV-SCNC: 25 MMOL/L — SIGNIFICANT CHANGE UP (ref 22–26)
CO2 BLDV-SCNC: 26 MMOL/L — SIGNIFICANT CHANGE UP (ref 22–26)
CO2 BLDV-SCNC: 30 MMOL/L — HIGH (ref 22–26)
CO2 SERPL-SCNC: 16 MMOL/L — LOW (ref 22–31)
CO2 SERPL-SCNC: 18 MMOL/L — LOW (ref 22–31)
CO2 SERPL-SCNC: 19 MMOL/L — LOW (ref 22–31)
CO2 SERPL-SCNC: 20 MMOL/L — LOW (ref 22–31)
CO2 SERPL-SCNC: 21 MMOL/L — LOW (ref 22–31)
CO2 SERPL-SCNC: 22 MMOL/L — SIGNIFICANT CHANGE UP (ref 22–31)
CO2 SERPL-SCNC: 23 MMOL/L — SIGNIFICANT CHANGE UP (ref 22–31)
CO2 SERPL-SCNC: 24 MMOL/L — SIGNIFICANT CHANGE UP (ref 22–31)
CO2 SERPL-SCNC: 25 MMOL/L — SIGNIFICANT CHANGE UP (ref 22–31)
CO2 SERPL-SCNC: 26 MMOL/L — SIGNIFICANT CHANGE UP (ref 22–31)
CO2 SERPL-SCNC: 27 MMOL/L — SIGNIFICANT CHANGE UP (ref 22–31)
COLOR SPEC: SIGNIFICANT CHANGE UP
COLOR SPEC: YELLOW — SIGNIFICANT CHANGE UP
COLOR SPEC: YELLOW — SIGNIFICANT CHANGE UP
CREAT SERPL-MCNC: 0.69 MG/DL — SIGNIFICANT CHANGE UP (ref 0.5–1.3)
CREAT SERPL-MCNC: 0.71 MG/DL — SIGNIFICANT CHANGE UP (ref 0.5–1.3)
CREAT SERPL-MCNC: 0.71 MG/DL — SIGNIFICANT CHANGE UP (ref 0.5–1.3)
CREAT SERPL-MCNC: 0.75 MG/DL — SIGNIFICANT CHANGE UP (ref 0.5–1.3)
CREAT SERPL-MCNC: 0.75 MG/DL — SIGNIFICANT CHANGE UP (ref 0.5–1.3)
CREAT SERPL-MCNC: 0.77 MG/DL — SIGNIFICANT CHANGE UP (ref 0.5–1.3)
CREAT SERPL-MCNC: 0.78 MG/DL — SIGNIFICANT CHANGE UP (ref 0.5–1.3)
CREAT SERPL-MCNC: 0.78 MG/DL — SIGNIFICANT CHANGE UP (ref 0.5–1.3)
CREAT SERPL-MCNC: 0.79 MG/DL — SIGNIFICANT CHANGE UP (ref 0.5–1.3)
CREAT SERPL-MCNC: 0.79 MG/DL — SIGNIFICANT CHANGE UP (ref 0.5–1.3)
CREAT SERPL-MCNC: 0.8 MG/DL — SIGNIFICANT CHANGE UP (ref 0.5–1.3)
CREAT SERPL-MCNC: 0.81 MG/DL — SIGNIFICANT CHANGE UP (ref 0.5–1.3)
CREAT SERPL-MCNC: 0.81 MG/DL — SIGNIFICANT CHANGE UP (ref 0.5–1.3)
CREAT SERPL-MCNC: 0.82 MG/DL — SIGNIFICANT CHANGE UP (ref 0.5–1.3)
CREAT SERPL-MCNC: 0.83 MG/DL — SIGNIFICANT CHANGE UP (ref 0.5–1.3)
CREAT SERPL-MCNC: 0.87 MG/DL — SIGNIFICANT CHANGE UP (ref 0.5–1.3)
CREAT SERPL-MCNC: 0.9 MG/DL — SIGNIFICANT CHANGE UP (ref 0.5–1.3)
CREAT SERPL-MCNC: 0.91 MG/DL — SIGNIFICANT CHANGE UP (ref 0.5–1.3)
CREAT SERPL-MCNC: 1.01 MG/DL — SIGNIFICANT CHANGE UP (ref 0.5–1.3)
CULTURE RESULTS: NO GROWTH — SIGNIFICANT CHANGE UP
CULTURE RESULTS: NO GROWTH — SIGNIFICANT CHANGE UP
CULTURE RESULTS: SIGNIFICANT CHANGE UP
DACRYOCYTES BLD QL SMEAR: SLIGHT — SIGNIFICANT CHANGE UP
DIFF PNL FLD: ABNORMAL
DIFF PNL FLD: ABNORMAL
DIFF PNL FLD: NEGATIVE — SIGNIFICANT CHANGE UP
EGFR: 68 ML/MIN/1.73M2 — SIGNIFICANT CHANGE UP
EGFR: 78 ML/MIN/1.73M2 — SIGNIFICANT CHANGE UP
EGFR: 79 ML/MIN/1.73M2 — SIGNIFICANT CHANGE UP
EGFR: 79 ML/MIN/1.73M2 — SIGNIFICANT CHANGE UP
EGFR: 81 ML/MIN/1.73M2 — SIGNIFICANT CHANGE UP
EGFR: 82 ML/MIN/1.73M2 — SIGNIFICANT CHANGE UP
EGFR: 84 ML/MIN/1.73M2 — SIGNIFICANT CHANGE UP
EGFR: 85 ML/MIN/1.73M2 — SIGNIFICANT CHANGE UP
EGFR: 86 ML/MIN/1.73M2 — SIGNIFICANT CHANGE UP
ELLIPTOCYTES BLD QL SMEAR: SLIGHT — SIGNIFICANT CHANGE UP
EOSINOPHIL # BLD AUTO: 0 K/UL — SIGNIFICANT CHANGE UP (ref 0–0.5)
EOSINOPHIL # BLD AUTO: 0 K/UL — SIGNIFICANT CHANGE UP (ref 0–0.5)
EOSINOPHIL # BLD AUTO: 0.04 K/UL — SIGNIFICANT CHANGE UP (ref 0–0.5)
EOSINOPHIL # BLD AUTO: 0.09 K/UL — SIGNIFICANT CHANGE UP (ref 0–0.5)
EOSINOPHIL # BLD AUTO: 0.1 K/UL — SIGNIFICANT CHANGE UP (ref 0–0.5)
EOSINOPHIL # BLD AUTO: 0.1 K/UL — SIGNIFICANT CHANGE UP (ref 0–0.5)
EOSINOPHIL # BLD AUTO: 0.11 K/UL — SIGNIFICANT CHANGE UP (ref 0–0.5)
EOSINOPHIL # BLD AUTO: 0.13 K/UL — SIGNIFICANT CHANGE UP (ref 0–0.5)
EOSINOPHIL # BLD AUTO: 0.14 K/UL — SIGNIFICANT CHANGE UP (ref 0–0.5)
EOSINOPHIL # BLD AUTO: 0.14 K/UL — SIGNIFICANT CHANGE UP (ref 0–0.5)
EOSINOPHIL NFR BLD AUTO: 0 % — SIGNIFICANT CHANGE UP (ref 0–6)
EOSINOPHIL NFR BLD AUTO: 0 % — SIGNIFICANT CHANGE UP (ref 0–6)
EOSINOPHIL NFR BLD AUTO: 0.9 % — SIGNIFICANT CHANGE UP (ref 0–6)
EOSINOPHIL NFR BLD AUTO: 1.3 % — SIGNIFICANT CHANGE UP (ref 0–6)
EOSINOPHIL NFR BLD AUTO: 1.8 % — SIGNIFICANT CHANGE UP (ref 0–6)
EOSINOPHIL NFR BLD AUTO: 2.3 % — SIGNIFICANT CHANGE UP (ref 0–6)
EOSINOPHIL NFR BLD AUTO: 2.6 % — SIGNIFICANT CHANGE UP (ref 0–6)
EOSINOPHIL NFR BLD AUTO: 2.7 % — SIGNIFICANT CHANGE UP (ref 0–6)
EOSINOPHIL NFR BLD AUTO: 2.7 % — SIGNIFICANT CHANGE UP (ref 0–6)
EOSINOPHIL NFR BLD AUTO: 2.8 % — SIGNIFICANT CHANGE UP (ref 0–6)
EPI CELLS # UR: 0 /HPF — SIGNIFICANT CHANGE UP
EPI CELLS # UR: 0 /HPF — SIGNIFICANT CHANGE UP
ESTIMATED AVERAGE GLUCOSE: 131 MG/DL — HIGH (ref 68–114)
ESTIMATED AVERAGE GLUCOSE: 137 MG/DL — HIGH (ref 68–114)
ESTIMATED AVERAGE GLUCOSE: 137 MG/DL — HIGH (ref 68–114)
FLUAV AG NPH QL: SIGNIFICANT CHANGE UP
FLUBV AG NPH QL: SIGNIFICANT CHANGE UP
GAS PNL BLDA: SIGNIFICANT CHANGE UP
GAS PNL BLDA: SIGNIFICANT CHANGE UP
GAS PNL BLDV: 135 MMOL/L — LOW (ref 136–145)
GAS PNL BLDV: 138 MMOL/L — SIGNIFICANT CHANGE UP (ref 136–145)
GAS PNL BLDV: 138 MMOL/L — SIGNIFICANT CHANGE UP (ref 136–145)
GAS PNL BLDV: SIGNIFICANT CHANGE UP
GLUCOSE BLDC GLUCOMTR-MCNC: 100 MG/DL — HIGH (ref 70–99)
GLUCOSE BLDC GLUCOMTR-MCNC: 100 MG/DL — HIGH (ref 70–99)
GLUCOSE BLDC GLUCOMTR-MCNC: 103 MG/DL — HIGH (ref 70–99)
GLUCOSE BLDC GLUCOMTR-MCNC: 104 MG/DL — HIGH (ref 70–99)
GLUCOSE BLDC GLUCOMTR-MCNC: 109 MG/DL — HIGH (ref 70–99)
GLUCOSE BLDC GLUCOMTR-MCNC: 112 MG/DL — HIGH (ref 70–99)
GLUCOSE BLDC GLUCOMTR-MCNC: 113 MG/DL — HIGH (ref 70–99)
GLUCOSE BLDC GLUCOMTR-MCNC: 120 MG/DL — HIGH (ref 70–99)
GLUCOSE BLDC GLUCOMTR-MCNC: 121 MG/DL — HIGH (ref 70–99)
GLUCOSE BLDC GLUCOMTR-MCNC: 123 MG/DL — HIGH (ref 70–99)
GLUCOSE BLDC GLUCOMTR-MCNC: 124 MG/DL — HIGH (ref 70–99)
GLUCOSE BLDC GLUCOMTR-MCNC: 125 MG/DL — HIGH (ref 70–99)
GLUCOSE BLDC GLUCOMTR-MCNC: 132 MG/DL — HIGH (ref 70–99)
GLUCOSE BLDC GLUCOMTR-MCNC: 133 MG/DL — HIGH (ref 70–99)
GLUCOSE BLDC GLUCOMTR-MCNC: 135 MG/DL — HIGH (ref 70–99)
GLUCOSE BLDC GLUCOMTR-MCNC: 140 MG/DL — HIGH (ref 70–99)
GLUCOSE BLDC GLUCOMTR-MCNC: 140 MG/DL — HIGH (ref 70–99)
GLUCOSE BLDC GLUCOMTR-MCNC: 146 MG/DL — HIGH (ref 70–99)
GLUCOSE BLDC GLUCOMTR-MCNC: 148 MG/DL — HIGH (ref 70–99)
GLUCOSE BLDC GLUCOMTR-MCNC: 150 MG/DL — HIGH (ref 70–99)
GLUCOSE BLDC GLUCOMTR-MCNC: 70 MG/DL — SIGNIFICANT CHANGE UP (ref 70–99)
GLUCOSE BLDC GLUCOMTR-MCNC: 71 MG/DL — SIGNIFICANT CHANGE UP (ref 70–99)
GLUCOSE BLDC GLUCOMTR-MCNC: 82 MG/DL — SIGNIFICANT CHANGE UP (ref 70–99)
GLUCOSE BLDC GLUCOMTR-MCNC: 85 MG/DL — SIGNIFICANT CHANGE UP (ref 70–99)
GLUCOSE BLDC GLUCOMTR-MCNC: 91 MG/DL — SIGNIFICANT CHANGE UP (ref 70–99)
GLUCOSE BLDC GLUCOMTR-MCNC: 91 MG/DL — SIGNIFICANT CHANGE UP (ref 70–99)
GLUCOSE BLDC GLUCOMTR-MCNC: 92 MG/DL — SIGNIFICANT CHANGE UP (ref 70–99)
GLUCOSE BLDC GLUCOMTR-MCNC: 93 MG/DL — SIGNIFICANT CHANGE UP (ref 70–99)
GLUCOSE BLDC GLUCOMTR-MCNC: 94 MG/DL — SIGNIFICANT CHANGE UP (ref 70–99)
GLUCOSE BLDC GLUCOMTR-MCNC: 95 MG/DL — SIGNIFICANT CHANGE UP (ref 70–99)
GLUCOSE BLDC GLUCOMTR-MCNC: 98 MG/DL — SIGNIFICANT CHANGE UP (ref 70–99)
GLUCOSE BLDC GLUCOMTR-MCNC: 99 MG/DL — SIGNIFICANT CHANGE UP (ref 70–99)
GLUCOSE BLDV-MCNC: 111 MG/DL — HIGH (ref 70–99)
GLUCOSE BLDV-MCNC: 116 MG/DL — HIGH (ref 70–99)
GLUCOSE BLDV-MCNC: 223 MG/DL — HIGH (ref 70–99)
GLUCOSE SERPL-MCNC: 100 MG/DL — HIGH (ref 70–99)
GLUCOSE SERPL-MCNC: 101 MG/DL — HIGH (ref 70–99)
GLUCOSE SERPL-MCNC: 102 MG/DL — HIGH (ref 70–99)
GLUCOSE SERPL-MCNC: 105 MG/DL — HIGH (ref 70–99)
GLUCOSE SERPL-MCNC: 107 MG/DL — HIGH (ref 70–99)
GLUCOSE SERPL-MCNC: 112 MG/DL — HIGH (ref 70–99)
GLUCOSE SERPL-MCNC: 112 MG/DL — HIGH (ref 70–99)
GLUCOSE SERPL-MCNC: 113 MG/DL — HIGH (ref 70–99)
GLUCOSE SERPL-MCNC: 114 MG/DL — HIGH (ref 70–99)
GLUCOSE SERPL-MCNC: 115 MG/DL — HIGH (ref 70–99)
GLUCOSE SERPL-MCNC: 121 MG/DL — HIGH (ref 70–99)
GLUCOSE SERPL-MCNC: 122 MG/DL — HIGH (ref 70–99)
GLUCOSE SERPL-MCNC: 124 MG/DL — HIGH (ref 70–99)
GLUCOSE SERPL-MCNC: 136 MG/DL — HIGH (ref 70–99)
GLUCOSE SERPL-MCNC: 136 MG/DL — HIGH (ref 70–99)
GLUCOSE SERPL-MCNC: 158 MG/DL — HIGH (ref 70–99)
GLUCOSE SERPL-MCNC: 159 MG/DL — HIGH (ref 70–99)
GLUCOSE SERPL-MCNC: 206 MG/DL — HIGH (ref 70–99)
GLUCOSE SERPL-MCNC: 93 MG/DL — SIGNIFICANT CHANGE UP (ref 70–99)
GLUCOSE SERPL-MCNC: 97 MG/DL — SIGNIFICANT CHANGE UP (ref 70–99)
GLUCOSE SERPL-MCNC: 99 MG/DL — SIGNIFICANT CHANGE UP (ref 70–99)
GLUCOSE SERPL-MCNC: 99 MG/DL — SIGNIFICANT CHANGE UP (ref 70–99)
GLUCOSE UR QL: NEGATIVE — SIGNIFICANT CHANGE UP
HCO3 BLDA-SCNC: 25 MMOL/L — SIGNIFICANT CHANGE UP (ref 21–28)
HCO3 BLDA-SCNC: 25 MMOL/L — SIGNIFICANT CHANGE UP (ref 21–28)
HCO3 BLDV-SCNC: 24 MMOL/L — SIGNIFICANT CHANGE UP (ref 22–29)
HCO3 BLDV-SCNC: 25 MMOL/L — SIGNIFICANT CHANGE UP (ref 22–29)
HCO3 BLDV-SCNC: 29 MMOL/L — SIGNIFICANT CHANGE UP (ref 22–29)
HCT VFR BLD CALC: 24.6 % — LOW (ref 39–50)
HCT VFR BLD CALC: 34.2 % — LOW (ref 39–50)
HCT VFR BLD CALC: 35.2 % — LOW (ref 39–50)
HCT VFR BLD CALC: 35.5 % — LOW (ref 39–50)
HCT VFR BLD CALC: 35.6 % — LOW (ref 39–50)
HCT VFR BLD CALC: 36.8 % — LOW (ref 39–50)
HCT VFR BLD CALC: 37 % — LOW (ref 39–50)
HCT VFR BLD CALC: 37.7 % — LOW (ref 39–50)
HCT VFR BLD CALC: 38.5 % — LOW (ref 39–50)
HCT VFR BLD CALC: 38.8 % — LOW (ref 39–50)
HCT VFR BLD CALC: 39.2 % — SIGNIFICANT CHANGE UP (ref 39–50)
HCT VFR BLD CALC: 39.3 % — SIGNIFICANT CHANGE UP (ref 39–50)
HCT VFR BLD CALC: 39.5 % — SIGNIFICANT CHANGE UP (ref 39–50)
HCT VFR BLD CALC: 39.6 % — SIGNIFICANT CHANGE UP (ref 39–50)
HCT VFR BLD CALC: 40.3 % — SIGNIFICANT CHANGE UP (ref 39–50)
HCT VFR BLD CALC: 40.5 % — SIGNIFICANT CHANGE UP (ref 39–50)
HCT VFR BLD CALC: 41 % — SIGNIFICANT CHANGE UP (ref 39–50)
HCT VFR BLD CALC: 41.5 % — SIGNIFICANT CHANGE UP (ref 39–50)
HCT VFR BLD CALC: 41.7 % — SIGNIFICANT CHANGE UP (ref 39–50)
HCT VFR BLD CALC: 43.1 % — SIGNIFICANT CHANGE UP (ref 39–50)
HCT VFR BLDA CALC: 38 % — LOW (ref 39–51)
HCT VFR BLDA CALC: 39 % — SIGNIFICANT CHANGE UP (ref 39–51)
HCT VFR BLDA CALC: 44 % — SIGNIFICANT CHANGE UP (ref 39–51)
HGB BLD CALC-MCNC: 12.6 G/DL — SIGNIFICANT CHANGE UP (ref 12.6–17.4)
HGB BLD CALC-MCNC: 12.9 G/DL — SIGNIFICANT CHANGE UP (ref 12.6–17.4)
HGB BLD CALC-MCNC: 14.7 G/DL — SIGNIFICANT CHANGE UP (ref 12.6–17.4)
HGB BLD-MCNC: 10.3 G/DL — LOW (ref 13–17)
HGB BLD-MCNC: 10.7 G/DL — LOW (ref 13–17)
HGB BLD-MCNC: 11.3 G/DL — LOW (ref 13–17)
HGB BLD-MCNC: 11.4 G/DL — LOW (ref 13–17)
HGB BLD-MCNC: 11.5 G/DL — LOW (ref 13–17)
HGB BLD-MCNC: 11.6 G/DL — LOW (ref 13–17)
HGB BLD-MCNC: 11.9 G/DL — LOW (ref 13–17)
HGB BLD-MCNC: 12.2 G/DL — LOW (ref 13–17)
HGB BLD-MCNC: 12.5 G/DL — LOW (ref 13–17)
HGB BLD-MCNC: 12.5 G/DL — LOW (ref 13–17)
HGB BLD-MCNC: 12.6 G/DL — LOW (ref 13–17)
HGB BLD-MCNC: 12.6 G/DL — LOW (ref 13–17)
HGB BLD-MCNC: 12.8 G/DL — LOW (ref 13–17)
HGB BLD-MCNC: 12.9 G/DL — LOW (ref 13–17)
HGB BLD-MCNC: 13 G/DL — SIGNIFICANT CHANGE UP (ref 13–17)
HGB BLD-MCNC: 13.2 G/DL — SIGNIFICANT CHANGE UP (ref 13–17)
HGB BLD-MCNC: 13.7 G/DL — SIGNIFICANT CHANGE UP (ref 13–17)
HGB BLD-MCNC: 8 G/DL — LOW (ref 13–17)
HOROWITZ INDEX BLDA+IHG-RTO: 21 — SIGNIFICANT CHANGE UP
HOROWITZ INDEX BLDA+IHG-RTO: 28 — SIGNIFICANT CHANGE UP
HYALINE CASTS # UR AUTO: 1 /LPF — SIGNIFICANT CHANGE UP (ref 0–2)
HYALINE CASTS # UR AUTO: 1 /LPF — SIGNIFICANT CHANGE UP (ref 0–7)
IMM GRANULOCYTES NFR BLD AUTO: 0.2 % — SIGNIFICANT CHANGE UP (ref 0–1.5)
IMM GRANULOCYTES NFR BLD AUTO: 0.4 % — SIGNIFICANT CHANGE UP (ref 0–1.5)
IMM GRANULOCYTES NFR BLD AUTO: 0.5 % — SIGNIFICANT CHANGE UP (ref 0–0.9)
IMM GRANULOCYTES NFR BLD AUTO: 0.7 % — SIGNIFICANT CHANGE UP (ref 0–1.5)
IMM GRANULOCYTES NFR BLD AUTO: 0.8 % — SIGNIFICANT CHANGE UP (ref 0–1.5)
INR BLD: 1.27 RATIO — HIGH (ref 0.88–1.16)
INR BLD: 1.4 RATIO — HIGH (ref 0.88–1.16)
KETONES UR-MCNC: NEGATIVE — SIGNIFICANT CHANGE UP
KETONES UR-MCNC: SIGNIFICANT CHANGE UP
KETONES UR-MCNC: SIGNIFICANT CHANGE UP
LACTATE BLDV-MCNC: 1.6 MMOL/L — SIGNIFICANT CHANGE UP (ref 0.5–2)
LACTATE BLDV-MCNC: 2 MMOL/L — SIGNIFICANT CHANGE UP (ref 0.7–2)
LACTATE BLDV-MCNC: 2.1 MMOL/L — HIGH (ref 0.7–2)
LEUKOCYTE ESTERASE UR-ACNC: ABNORMAL
LEUKOCYTE ESTERASE UR-ACNC: NEGATIVE — SIGNIFICANT CHANGE UP
LEUKOCYTE ESTERASE UR-ACNC: NEGATIVE — SIGNIFICANT CHANGE UP
LYMPHOCYTES # BLD AUTO: 0.34 K/UL — LOW (ref 1–3.3)
LYMPHOCYTES # BLD AUTO: 0.37 K/UL — LOW (ref 1–3.3)
LYMPHOCYTES # BLD AUTO: 0.39 K/UL — LOW (ref 1–3.3)
LYMPHOCYTES # BLD AUTO: 0.42 K/UL — LOW (ref 1–3.3)
LYMPHOCYTES # BLD AUTO: 0.43 K/UL — LOW (ref 1–3.3)
LYMPHOCYTES # BLD AUTO: 0.46 K/UL — LOW (ref 1–3.3)
LYMPHOCYTES # BLD AUTO: 0.51 K/UL — LOW (ref 1–3.3)
LYMPHOCYTES # BLD AUTO: 0.52 K/UL — LOW (ref 1–3.3)
LYMPHOCYTES # BLD AUTO: 0.55 K/UL — LOW (ref 1–3.3)
LYMPHOCYTES # BLD AUTO: 0.6 K/UL — LOW (ref 1–3.3)
LYMPHOCYTES # BLD AUTO: 10.3 % — LOW (ref 13–44)
LYMPHOCYTES # BLD AUTO: 10.4 % — LOW (ref 13–44)
LYMPHOCYTES # BLD AUTO: 10.4 % — LOW (ref 13–44)
LYMPHOCYTES # BLD AUTO: 11 % — LOW (ref 13–44)
LYMPHOCYTES # BLD AUTO: 5.2 % — LOW (ref 13–44)
LYMPHOCYTES # BLD AUTO: 5.7 % — LOW (ref 13–44)
LYMPHOCYTES # BLD AUTO: 7.1 % — LOW (ref 13–44)
LYMPHOCYTES # BLD AUTO: 7.6 % — LOW (ref 13–44)
LYMPHOCYTES # BLD AUTO: 8.7 % — LOW (ref 13–44)
LYMPHOCYTES # BLD AUTO: 9.8 % — LOW (ref 13–44)
MACROCYTES BLD QL: SLIGHT — SIGNIFICANT CHANGE UP
MACROCYTES BLD QL: SLIGHT — SIGNIFICANT CHANGE UP
MAGNESIUM SERPL-MCNC: 1.9 MG/DL — SIGNIFICANT CHANGE UP (ref 1.6–2.6)
MAGNESIUM SERPL-MCNC: 1.9 MG/DL — SIGNIFICANT CHANGE UP (ref 1.6–2.6)
MAGNESIUM SERPL-MCNC: 2 MG/DL — SIGNIFICANT CHANGE UP (ref 1.6–2.6)
MAGNESIUM SERPL-MCNC: 2.1 MG/DL — SIGNIFICANT CHANGE UP (ref 1.6–2.6)
MANUAL SMEAR VERIFICATION: SIGNIFICANT CHANGE UP
MCHC RBC-ENTMCNC: 27.2 PG — SIGNIFICANT CHANGE UP (ref 27–34)
MCHC RBC-ENTMCNC: 27.5 PG — SIGNIFICANT CHANGE UP (ref 27–34)
MCHC RBC-ENTMCNC: 27.6 PG — SIGNIFICANT CHANGE UP (ref 27–34)
MCHC RBC-ENTMCNC: 27.8 PG — SIGNIFICANT CHANGE UP (ref 27–34)
MCHC RBC-ENTMCNC: 29.1 PG — SIGNIFICANT CHANGE UP (ref 27–34)
MCHC RBC-ENTMCNC: 29.2 PG — SIGNIFICANT CHANGE UP (ref 27–34)
MCHC RBC-ENTMCNC: 29.2 PG — SIGNIFICANT CHANGE UP (ref 27–34)
MCHC RBC-ENTMCNC: 29.4 PG — SIGNIFICANT CHANGE UP (ref 27–34)
MCHC RBC-ENTMCNC: 29.5 PG — SIGNIFICANT CHANGE UP (ref 27–34)
MCHC RBC-ENTMCNC: 29.5 PG — SIGNIFICANT CHANGE UP (ref 27–34)
MCHC RBC-ENTMCNC: 29.7 PG — SIGNIFICANT CHANGE UP (ref 27–34)
MCHC RBC-ENTMCNC: 29.7 PG — SIGNIFICANT CHANGE UP (ref 27–34)
MCHC RBC-ENTMCNC: 29.8 PG — SIGNIFICANT CHANGE UP (ref 27–34)
MCHC RBC-ENTMCNC: 30 PG — SIGNIFICANT CHANGE UP (ref 27–34)
MCHC RBC-ENTMCNC: 30.1 GM/DL — LOW (ref 32–36)
MCHC RBC-ENTMCNC: 30.1 PG — SIGNIFICANT CHANGE UP (ref 27–34)
MCHC RBC-ENTMCNC: 30.8 GM/DL — LOW (ref 32–36)
MCHC RBC-ENTMCNC: 31.3 GM/DL — LOW (ref 32–36)
MCHC RBC-ENTMCNC: 31.5 GM/DL — LOW (ref 32–36)
MCHC RBC-ENTMCNC: 31.7 GM/DL — LOW (ref 32–36)
MCHC RBC-ENTMCNC: 31.8 GM/DL — LOW (ref 32–36)
MCHC RBC-ENTMCNC: 31.8 GM/DL — LOW (ref 32–36)
MCHC RBC-ENTMCNC: 31.9 GM/DL — LOW (ref 32–36)
MCHC RBC-ENTMCNC: 32 GM/DL — SIGNIFICANT CHANGE UP (ref 32–36)
MCHC RBC-ENTMCNC: 32.1 GM/DL — SIGNIFICANT CHANGE UP (ref 32–36)
MCHC RBC-ENTMCNC: 32.2 GM/DL — SIGNIFICANT CHANGE UP (ref 32–36)
MCHC RBC-ENTMCNC: 32.3 GM/DL — SIGNIFICANT CHANGE UP (ref 32–36)
MCHC RBC-ENTMCNC: 32.3 GM/DL — SIGNIFICANT CHANGE UP (ref 32–36)
MCHC RBC-ENTMCNC: 32.4 GM/DL — SIGNIFICANT CHANGE UP (ref 32–36)
MCHC RBC-ENTMCNC: 32.5 GM/DL — SIGNIFICANT CHANGE UP (ref 32–36)
MCHC RBC-ENTMCNC: 32.6 GM/DL — SIGNIFICANT CHANGE UP (ref 32–36)
MCV RBC AUTO: 90.2 FL — SIGNIFICANT CHANGE UP (ref 80–100)
MCV RBC AUTO: 90.2 FL — SIGNIFICANT CHANGE UP (ref 80–100)
MCV RBC AUTO: 91 FL — SIGNIFICANT CHANGE UP (ref 80–100)
MCV RBC AUTO: 91.4 FL — SIGNIFICANT CHANGE UP (ref 80–100)
MCV RBC AUTO: 91.4 FL — SIGNIFICANT CHANGE UP (ref 80–100)
MCV RBC AUTO: 91.7 FL — SIGNIFICANT CHANGE UP (ref 80–100)
MCV RBC AUTO: 91.9 FL — SIGNIFICANT CHANGE UP (ref 80–100)
MCV RBC AUTO: 91.9 FL — SIGNIFICANT CHANGE UP (ref 80–100)
MCV RBC AUTO: 92 FL — SIGNIFICANT CHANGE UP (ref 80–100)
MCV RBC AUTO: 92 FL — SIGNIFICANT CHANGE UP (ref 80–100)
MCV RBC AUTO: 92.2 FL — SIGNIFICANT CHANGE UP (ref 80–100)
MCV RBC AUTO: 92.3 FL — SIGNIFICANT CHANGE UP (ref 80–100)
MCV RBC AUTO: 92.5 FL — SIGNIFICANT CHANGE UP (ref 80–100)
MCV RBC AUTO: 92.5 FL — SIGNIFICANT CHANGE UP (ref 80–100)
MCV RBC AUTO: 92.6 FL — SIGNIFICANT CHANGE UP (ref 80–100)
MCV RBC AUTO: 92.9 FL — SIGNIFICANT CHANGE UP (ref 80–100)
MCV RBC AUTO: 93.3 FL — SIGNIFICANT CHANGE UP (ref 80–100)
MCV RBC AUTO: 93.3 FL — SIGNIFICANT CHANGE UP (ref 80–100)
MCV RBC AUTO: 93.4 FL — SIGNIFICANT CHANGE UP (ref 80–100)
MCV RBC AUTO: 93.5 FL — SIGNIFICANT CHANGE UP (ref 80–100)
METAMYELOCYTES # FLD: 0.9 % — HIGH (ref 0–0)
METHOD TYPE: SIGNIFICANT CHANGE UP
MICROCYTES BLD QL: SLIGHT — SIGNIFICANT CHANGE UP
MICROCYTES BLD QL: SLIGHT — SIGNIFICANT CHANGE UP
MONOCYTES # BLD AUTO: 0.18 K/UL — SIGNIFICANT CHANGE UP (ref 0–0.9)
MONOCYTES # BLD AUTO: 0.18 K/UL — SIGNIFICANT CHANGE UP (ref 0–0.9)
MONOCYTES # BLD AUTO: 0.2 K/UL — SIGNIFICANT CHANGE UP (ref 0–0.9)
MONOCYTES # BLD AUTO: 0.35 K/UL — SIGNIFICANT CHANGE UP (ref 0–0.9)
MONOCYTES # BLD AUTO: 0.35 K/UL — SIGNIFICANT CHANGE UP (ref 0–0.9)
MONOCYTES # BLD AUTO: 0.37 K/UL — SIGNIFICANT CHANGE UP (ref 0–0.9)
MONOCYTES # BLD AUTO: 0.44 K/UL — SIGNIFICANT CHANGE UP (ref 0–0.9)
MONOCYTES # BLD AUTO: 0.54 K/UL — SIGNIFICANT CHANGE UP (ref 0–0.9)
MONOCYTES # BLD AUTO: 0.76 K/UL — SIGNIFICANT CHANGE UP (ref 0–0.9)
MONOCYTES # BLD AUTO: 0.88 K/UL — SIGNIFICANT CHANGE UP (ref 0–0.9)
MONOCYTES NFR BLD AUTO: 10.9 % — SIGNIFICANT CHANGE UP (ref 2–14)
MONOCYTES NFR BLD AUTO: 12.2 % — SIGNIFICANT CHANGE UP (ref 2–14)
MONOCYTES NFR BLD AUTO: 3.7 % — SIGNIFICANT CHANGE UP (ref 2–14)
MONOCYTES NFR BLD AUTO: 4.3 % — SIGNIFICANT CHANGE UP (ref 2–14)
MONOCYTES NFR BLD AUTO: 5.2 % — SIGNIFICANT CHANGE UP (ref 2–14)
MONOCYTES NFR BLD AUTO: 5.4 % — SIGNIFICANT CHANGE UP (ref 2–14)
MONOCYTES NFR BLD AUTO: 6.3 % — SIGNIFICANT CHANGE UP (ref 2–14)
MONOCYTES NFR BLD AUTO: 7.5 % — SIGNIFICANT CHANGE UP (ref 2–14)
MONOCYTES NFR BLD AUTO: 8.6 % — SIGNIFICANT CHANGE UP (ref 2–14)
MONOCYTES NFR BLD AUTO: 8.8 % — SIGNIFICANT CHANGE UP (ref 2–14)
MRSA PCR RESULT.: SIGNIFICANT CHANGE UP
NEUTROPHILS # BLD AUTO: 3.04 K/UL — SIGNIFICANT CHANGE UP (ref 1.8–7.4)
NEUTROPHILS # BLD AUTO: 3.24 K/UL — SIGNIFICANT CHANGE UP (ref 1.8–7.4)
NEUTROPHILS # BLD AUTO: 3.47 K/UL — SIGNIFICANT CHANGE UP (ref 1.8–7.4)
NEUTROPHILS # BLD AUTO: 3.91 K/UL — SIGNIFICANT CHANGE UP (ref 1.8–7.4)
NEUTROPHILS # BLD AUTO: 3.91 K/UL — SIGNIFICANT CHANGE UP (ref 1.8–7.4)
NEUTROPHILS # BLD AUTO: 4.6 K/UL — SIGNIFICANT CHANGE UP (ref 1.8–7.4)
NEUTROPHILS # BLD AUTO: 4.81 K/UL — SIGNIFICANT CHANGE UP (ref 1.8–7.4)
NEUTROPHILS # BLD AUTO: 5.62 K/UL — SIGNIFICANT CHANGE UP (ref 1.8–7.4)
NEUTROPHILS # BLD AUTO: 5.75 K/UL — SIGNIFICANT CHANGE UP (ref 1.8–7.4)
NEUTROPHILS # BLD AUTO: 7.55 K/UL — HIGH (ref 1.8–7.4)
NEUTROPHILS NFR BLD AUTO: 75 % — SIGNIFICANT CHANGE UP (ref 43–77)
NEUTROPHILS NFR BLD AUTO: 78.1 % — HIGH (ref 43–77)
NEUTROPHILS NFR BLD AUTO: 78.2 % — HIGH (ref 43–77)
NEUTROPHILS NFR BLD AUTO: 78.8 % — HIGH (ref 43–77)
NEUTROPHILS NFR BLD AUTO: 82.6 % — HIGH (ref 43–77)
NEUTROPHILS NFR BLD AUTO: 82.6 % — HIGH (ref 43–77)
NEUTROPHILS NFR BLD AUTO: 83.5 % — HIGH (ref 43–77)
NEUTROPHILS NFR BLD AUTO: 83.9 % — HIGH (ref 43–77)
NEUTROPHILS NFR BLD AUTO: 84.5 % — HIGH (ref 43–77)
NEUTROPHILS NFR BLD AUTO: 87.8 % — HIGH (ref 43–77)
NEUTS BAND # BLD: 0.9 % — SIGNIFICANT CHANGE UP (ref 0–8)
NITRITE UR-MCNC: NEGATIVE — SIGNIFICANT CHANGE UP
NITRITE UR-MCNC: NEGATIVE — SIGNIFICANT CHANGE UP
NITRITE UR-MCNC: POSITIVE
NRBC # BLD: 0 /100 WBCS — SIGNIFICANT CHANGE UP (ref 0–0)
NT-PROBNP SERPL-SCNC: 4902 PG/ML — HIGH (ref 0–300)
NT-PROBNP SERPL-SCNC: 5631 PG/ML — HIGH (ref 0–300)
NT-PROBNP SERPL-SCNC: 6989 PG/ML — HIGH (ref 0–300)
NT-PROBNP SERPL-SCNC: HIGH PG/ML (ref 0–300)
ORGANISM # SPEC MICROSCOPIC CNT: SIGNIFICANT CHANGE UP
ORGANISM # SPEC MICROSCOPIC CNT: SIGNIFICANT CHANGE UP
OVALOCYTES BLD QL SMEAR: SIGNIFICANT CHANGE UP
OVALOCYTES BLD QL SMEAR: SLIGHT — SIGNIFICANT CHANGE UP
PCO2 BLDA: 32 MMHG — LOW (ref 35–48)
PCO2 BLDA: 34 MMHG — LOW (ref 35–48)
PCO2 BLDV: 38 MMHG — LOW (ref 42–55)
PCO2 BLDV: 44 MMHG — SIGNIFICANT CHANGE UP (ref 42–55)
PCO2 BLDV: 49 MMHG — SIGNIFICANT CHANGE UP (ref 42–55)
PH BLDA: 7.47 — HIGH (ref 7.35–7.45)
PH BLDA: 7.5 — HIGH (ref 7.35–7.45)
PH BLDV: 7.36 — SIGNIFICANT CHANGE UP (ref 7.32–7.43)
PH BLDV: 7.38 — SIGNIFICANT CHANGE UP (ref 7.32–7.43)
PH BLDV: 7.4 — SIGNIFICANT CHANGE UP (ref 7.32–7.43)
PH UR: 5.5 — SIGNIFICANT CHANGE UP (ref 5–8)
PH UR: 5.5 — SIGNIFICANT CHANGE UP (ref 5–8)
PH UR: 6.5 — SIGNIFICANT CHANGE UP (ref 5–8)
PHOSPHATE SERPL-MCNC: 3.2 MG/DL — SIGNIFICANT CHANGE UP (ref 2.5–4.5)
PHOSPHATE SERPL-MCNC: 3.6 MG/DL — SIGNIFICANT CHANGE UP (ref 2.5–4.5)
PHOSPHATE SERPL-MCNC: 3.9 MG/DL — SIGNIFICANT CHANGE UP (ref 2.5–4.5)
PLAT MORPH BLD: NORMAL — SIGNIFICANT CHANGE UP
PLATELET # BLD AUTO: 112 K/UL — LOW (ref 150–400)
PLATELET # BLD AUTO: 124 K/UL — LOW (ref 150–400)
PLATELET # BLD AUTO: 125 K/UL — LOW (ref 150–400)
PLATELET # BLD AUTO: 128 K/UL — LOW (ref 150–400)
PLATELET # BLD AUTO: 130 K/UL — LOW (ref 150–400)
PLATELET # BLD AUTO: 130 K/UL — LOW (ref 150–400)
PLATELET # BLD AUTO: 136 K/UL — LOW (ref 150–400)
PLATELET # BLD AUTO: 136 K/UL — LOW (ref 150–400)
PLATELET # BLD AUTO: 140 K/UL — LOW (ref 150–400)
PLATELET # BLD AUTO: 143 K/UL — LOW (ref 150–400)
PLATELET # BLD AUTO: 144 K/UL — LOW (ref 150–400)
PLATELET # BLD AUTO: 144 K/UL — LOW (ref 150–400)
PLATELET # BLD AUTO: 148 K/UL — LOW (ref 150–400)
PLATELET # BLD AUTO: 151 K/UL — SIGNIFICANT CHANGE UP (ref 150–400)
PLATELET # BLD AUTO: 151 K/UL — SIGNIFICANT CHANGE UP (ref 150–400)
PLATELET # BLD AUTO: 153 K/UL — SIGNIFICANT CHANGE UP (ref 150–400)
PLATELET # BLD AUTO: 155 K/UL — SIGNIFICANT CHANGE UP (ref 150–400)
PLATELET # BLD AUTO: 162 K/UL — SIGNIFICANT CHANGE UP (ref 150–400)
PLATELET # BLD AUTO: 174 K/UL — SIGNIFICANT CHANGE UP (ref 150–400)
PLATELET # BLD AUTO: 90 K/UL — LOW (ref 150–400)
PO2 BLDA: 130 MMHG — HIGH (ref 83–108)
PO2 BLDA: 67 MMHG — LOW (ref 83–108)
PO2 BLDV: 23 MMHG — LOW (ref 25–45)
PO2 BLDV: 24 MMHG — LOW (ref 25–45)
PO2 BLDV: 25 MMHG — SIGNIFICANT CHANGE UP (ref 25–45)
POIKILOCYTOSIS BLD QL AUTO: SIGNIFICANT CHANGE UP
POIKILOCYTOSIS BLD QL AUTO: SIGNIFICANT CHANGE UP
POIKILOCYTOSIS BLD QL AUTO: SLIGHT — SIGNIFICANT CHANGE UP
POLYCHROMASIA BLD QL SMEAR: SLIGHT — SIGNIFICANT CHANGE UP
POTASSIUM BLDV-SCNC: 3.9 MMOL/L — SIGNIFICANT CHANGE UP (ref 3.5–5.1)
POTASSIUM BLDV-SCNC: 4.4 MMOL/L — SIGNIFICANT CHANGE UP (ref 3.5–5.1)
POTASSIUM BLDV-SCNC: 4.6 MMOL/L — SIGNIFICANT CHANGE UP (ref 3.5–5.1)
POTASSIUM SERPL-MCNC: 3.2 MMOL/L — LOW (ref 3.5–5.3)
POTASSIUM SERPL-MCNC: 3.4 MMOL/L — LOW (ref 3.5–5.3)
POTASSIUM SERPL-MCNC: 3.4 MMOL/L — LOW (ref 3.5–5.3)
POTASSIUM SERPL-MCNC: 3.5 MMOL/L — SIGNIFICANT CHANGE UP (ref 3.5–5.3)
POTASSIUM SERPL-MCNC: 3.5 MMOL/L — SIGNIFICANT CHANGE UP (ref 3.5–5.3)
POTASSIUM SERPL-MCNC: 3.6 MMOL/L — SIGNIFICANT CHANGE UP (ref 3.5–5.3)
POTASSIUM SERPL-MCNC: 3.7 MMOL/L — SIGNIFICANT CHANGE UP (ref 3.5–5.3)
POTASSIUM SERPL-MCNC: 3.9 MMOL/L — SIGNIFICANT CHANGE UP (ref 3.5–5.3)
POTASSIUM SERPL-MCNC: 3.9 MMOL/L — SIGNIFICANT CHANGE UP (ref 3.5–5.3)
POTASSIUM SERPL-MCNC: 4.1 MMOL/L — SIGNIFICANT CHANGE UP (ref 3.5–5.3)
POTASSIUM SERPL-MCNC: 4.2 MMOL/L — SIGNIFICANT CHANGE UP (ref 3.5–5.3)
POTASSIUM SERPL-MCNC: 4.3 MMOL/L — SIGNIFICANT CHANGE UP (ref 3.5–5.3)
POTASSIUM SERPL-MCNC: 4.3 MMOL/L — SIGNIFICANT CHANGE UP (ref 3.5–5.3)
POTASSIUM SERPL-MCNC: 4.6 MMOL/L — SIGNIFICANT CHANGE UP (ref 3.5–5.3)
POTASSIUM SERPL-MCNC: 5.5 MMOL/L — HIGH (ref 3.5–5.3)
POTASSIUM SERPL-MCNC: 5.8 MMOL/L — HIGH (ref 3.5–5.3)
POTASSIUM SERPL-SCNC: 3.2 MMOL/L — LOW (ref 3.5–5.3)
POTASSIUM SERPL-SCNC: 3.4 MMOL/L — LOW (ref 3.5–5.3)
POTASSIUM SERPL-SCNC: 3.4 MMOL/L — LOW (ref 3.5–5.3)
POTASSIUM SERPL-SCNC: 3.5 MMOL/L — SIGNIFICANT CHANGE UP (ref 3.5–5.3)
POTASSIUM SERPL-SCNC: 3.5 MMOL/L — SIGNIFICANT CHANGE UP (ref 3.5–5.3)
POTASSIUM SERPL-SCNC: 3.6 MMOL/L — SIGNIFICANT CHANGE UP (ref 3.5–5.3)
POTASSIUM SERPL-SCNC: 3.7 MMOL/L — SIGNIFICANT CHANGE UP (ref 3.5–5.3)
POTASSIUM SERPL-SCNC: 3.9 MMOL/L — SIGNIFICANT CHANGE UP (ref 3.5–5.3)
POTASSIUM SERPL-SCNC: 3.9 MMOL/L — SIGNIFICANT CHANGE UP (ref 3.5–5.3)
POTASSIUM SERPL-SCNC: 4.1 MMOL/L — SIGNIFICANT CHANGE UP (ref 3.5–5.3)
POTASSIUM SERPL-SCNC: 4.2 MMOL/L — SIGNIFICANT CHANGE UP (ref 3.5–5.3)
POTASSIUM SERPL-SCNC: 4.3 MMOL/L — SIGNIFICANT CHANGE UP (ref 3.5–5.3)
POTASSIUM SERPL-SCNC: 4.3 MMOL/L — SIGNIFICANT CHANGE UP (ref 3.5–5.3)
POTASSIUM SERPL-SCNC: 4.6 MMOL/L — SIGNIFICANT CHANGE UP (ref 3.5–5.3)
POTASSIUM SERPL-SCNC: 5.5 MMOL/L — HIGH (ref 3.5–5.3)
POTASSIUM SERPL-SCNC: 5.8 MMOL/L — HIGH (ref 3.5–5.3)
PROT SERPL-MCNC: 6.3 G/DL — SIGNIFICANT CHANGE UP (ref 6–8.3)
PROT SERPL-MCNC: 6.8 G/DL — SIGNIFICANT CHANGE UP (ref 6–8.3)
PROT SERPL-MCNC: 6.9 G/DL — SIGNIFICANT CHANGE UP (ref 6–8.3)
PROT SERPL-MCNC: 7.2 G/DL — SIGNIFICANT CHANGE UP (ref 6–8.3)
PROT SERPL-MCNC: 7.4 G/DL — SIGNIFICANT CHANGE UP (ref 6–8.3)
PROT SERPL-MCNC: 7.9 G/DL — SIGNIFICANT CHANGE UP (ref 6–8.3)
PROT UR-MCNC: ABNORMAL
PROT UR-MCNC: ABNORMAL
PROT UR-MCNC: NEGATIVE — SIGNIFICANT CHANGE UP
PROTHROM AB SERPL-ACNC: 14.6 SEC — HIGH (ref 10.5–13.4)
PROTHROM AB SERPL-ACNC: 16.1 SEC — HIGH (ref 10.5–13.4)
RAPID RVP RESULT: SIGNIFICANT CHANGE UP
RAPID RVP RESULT: SIGNIFICANT CHANGE UP
RBC # BLD: 2.66 M/UL — LOW (ref 4.2–5.8)
RBC # BLD: 3.74 M/UL — LOW (ref 4.2–5.8)
RBC # BLD: 3.8 M/UL — LOW (ref 4.2–5.8)
RBC # BLD: 3.87 M/UL — LOW (ref 4.2–5.8)
RBC # BLD: 3.91 M/UL — LOW (ref 4.2–5.8)
RBC # BLD: 4 M/UL — LOW (ref 4.2–5.8)
RBC # BLD: 4.06 M/UL — LOW (ref 4.2–5.8)
RBC # BLD: 4.1 M/UL — LOW (ref 4.2–5.8)
RBC # BLD: 4.2 M/UL — SIGNIFICANT CHANGE UP (ref 4.2–5.8)
RBC # BLD: 4.21 M/UL — SIGNIFICANT CHANGE UP (ref 4.2–5.8)
RBC # BLD: 4.27 M/UL — SIGNIFICANT CHANGE UP (ref 4.2–5.8)
RBC # BLD: 4.27 M/UL — SIGNIFICANT CHANGE UP (ref 4.2–5.8)
RBC # BLD: 4.29 M/UL — SIGNIFICANT CHANGE UP (ref 4.2–5.8)
RBC # BLD: 4.3 M/UL — SIGNIFICANT CHANGE UP (ref 4.2–5.8)
RBC # BLD: 4.33 M/UL — SIGNIFICANT CHANGE UP (ref 4.2–5.8)
RBC # BLD: 4.38 M/UL — SIGNIFICANT CHANGE UP (ref 4.2–5.8)
RBC # BLD: 4.39 M/UL — SIGNIFICANT CHANGE UP (ref 4.2–5.8)
RBC # BLD: 4.45 M/UL — SIGNIFICANT CHANGE UP (ref 4.2–5.8)
RBC # BLD: 4.54 M/UL — SIGNIFICANT CHANGE UP (ref 4.2–5.8)
RBC # BLD: 4.69 M/UL — SIGNIFICANT CHANGE UP (ref 4.2–5.8)
RBC # FLD: 14.1 % — SIGNIFICANT CHANGE UP (ref 10.3–14.5)
RBC # FLD: 14.2 % — SIGNIFICANT CHANGE UP (ref 10.3–14.5)
RBC # FLD: 14.4 % — SIGNIFICANT CHANGE UP (ref 10.3–14.5)
RBC # FLD: 14.4 % — SIGNIFICANT CHANGE UP (ref 10.3–14.5)
RBC # FLD: 14.5 % — SIGNIFICANT CHANGE UP (ref 10.3–14.5)
RBC # FLD: 14.6 % — HIGH (ref 10.3–14.5)
RBC # FLD: 14.6 % — HIGH (ref 10.3–14.5)
RBC # FLD: 14.8 % — HIGH (ref 10.3–14.5)
RBC # FLD: 14.9 % — HIGH (ref 10.3–14.5)
RBC # FLD: 14.9 % — HIGH (ref 10.3–14.5)
RBC # FLD: 15 % — HIGH (ref 10.3–14.5)
RBC # FLD: 15.2 % — HIGH (ref 10.3–14.5)
RBC # FLD: 16.1 % — HIGH (ref 10.3–14.5)
RBC # FLD: 16.4 % — HIGH (ref 10.3–14.5)
RBC # FLD: 16.6 % — HIGH (ref 10.3–14.5)
RBC BLD AUTO: ABNORMAL
RBC CASTS # UR COMP ASSIST: 1 /HPF — SIGNIFICANT CHANGE UP (ref 0–4)
RBC CASTS # UR COMP ASSIST: 623 /HPF — HIGH (ref 0–4)
RBC CASTS # UR COMP ASSIST: >50 /HPF — HIGH (ref 0–4)
RSV RNA NPH QL NAA+NON-PROBE: SIGNIFICANT CHANGE UP
S AUREUS DNA NOSE QL NAA+PROBE: DETECTED
S AUREUS DNA NOSE QL NAA+PROBE: SIGNIFICANT CHANGE UP
S AUREUS DNA NOSE QL NAA+PROBE: SIGNIFICANT CHANGE UP
SAO2 % BLDA: 94.9 % — SIGNIFICANT CHANGE UP (ref 94–98)
SAO2 % BLDA: 99.1 % — HIGH (ref 94–98)
SAO2 % BLDV: 29.4 % — LOW (ref 67–88)
SAO2 % BLDV: 32.1 % — LOW (ref 67–88)
SAO2 % BLDV: 34.2 % — LOW (ref 67–88)
SARS-COV-2 RNA SPEC QL NAA+PROBE: SIGNIFICANT CHANGE UP
SCHISTOCYTES BLD QL AUTO: SLIGHT — SIGNIFICANT CHANGE UP
SODIUM SERPL-SCNC: 134 MMOL/L — LOW (ref 135–145)
SODIUM SERPL-SCNC: 136 MMOL/L — SIGNIFICANT CHANGE UP (ref 135–145)
SODIUM SERPL-SCNC: 137 MMOL/L — SIGNIFICANT CHANGE UP (ref 135–145)
SODIUM SERPL-SCNC: 138 MMOL/L — SIGNIFICANT CHANGE UP (ref 135–145)
SODIUM SERPL-SCNC: 139 MMOL/L — SIGNIFICANT CHANGE UP (ref 135–145)
SODIUM SERPL-SCNC: 139 MMOL/L — SIGNIFICANT CHANGE UP (ref 135–145)
SODIUM SERPL-SCNC: 140 MMOL/L — SIGNIFICANT CHANGE UP (ref 135–145)
SODIUM SERPL-SCNC: 141 MMOL/L — SIGNIFICANT CHANGE UP (ref 135–145)
SODIUM SERPL-SCNC: 142 MMOL/L — SIGNIFICANT CHANGE UP (ref 135–145)
SODIUM SERPL-SCNC: 143 MMOL/L — SIGNIFICANT CHANGE UP (ref 135–145)
SODIUM SERPL-SCNC: 144 MMOL/L — SIGNIFICANT CHANGE UP (ref 135–145)
SP GR SPEC: 1.02 — SIGNIFICANT CHANGE UP (ref 1.01–1.02)
SPECIMEN SOURCE: SIGNIFICANT CHANGE UP
TARGETS BLD QL SMEAR: SLIGHT — SIGNIFICANT CHANGE UP
TROPONIN T, HIGH SENSITIVITY RESULT: 100 NG/L — HIGH (ref 0–51)
TROPONIN T, HIGH SENSITIVITY RESULT: 123 NG/L — HIGH (ref 0–51)
TROPONIN T, HIGH SENSITIVITY RESULT: 127 NG/L — HIGH (ref 0–51)
TROPONIN T, HIGH SENSITIVITY RESULT: 75 NG/L — HIGH (ref 0–51)
TROPONIN T, HIGH SENSITIVITY RESULT: 84 NG/L — HIGH (ref 0–51)
TROPONIN T, HIGH SENSITIVITY RESULT: 93 NG/L — HIGH (ref 0–51)
TROPONIN T, HIGH SENSITIVITY RESULT: 93 NG/L — HIGH (ref 0–51)
UROBILINOGEN FLD QL: 1 — SIGNIFICANT CHANGE UP
UROBILINOGEN FLD QL: NEGATIVE — SIGNIFICANT CHANGE UP
UROBILINOGEN FLD QL: NEGATIVE — SIGNIFICANT CHANGE UP
VARIANT LYMPHS # BLD: 0.9 % — SIGNIFICANT CHANGE UP (ref 0–6)
WBC # BLD: 2.12 K/UL — LOW (ref 3.8–10.5)
WBC # BLD: 3.92 K/UL — SIGNIFICANT CHANGE UP (ref 3.8–10.5)
WBC # BLD: 4.05 K/UL — SIGNIFICANT CHANGE UP (ref 3.8–10.5)
WBC # BLD: 4.05 K/UL — SIGNIFICANT CHANGE UP (ref 3.8–10.5)
WBC # BLD: 4.07 K/UL — SIGNIFICANT CHANGE UP (ref 3.8–10.5)
WBC # BLD: 4.13 K/UL — SIGNIFICANT CHANGE UP (ref 3.8–10.5)
WBC # BLD: 4.16 K/UL — SIGNIFICANT CHANGE UP (ref 3.8–10.5)
WBC # BLD: 4.38 K/UL — SIGNIFICANT CHANGE UP (ref 3.8–10.5)
WBC # BLD: 4.5 K/UL — SIGNIFICANT CHANGE UP (ref 3.8–10.5)
WBC # BLD: 4.73 K/UL — SIGNIFICANT CHANGE UP (ref 3.8–10.5)
WBC # BLD: 4.96 K/UL — SIGNIFICANT CHANGE UP (ref 3.8–10.5)
WBC # BLD: 4.96 K/UL — SIGNIFICANT CHANGE UP (ref 3.8–10.5)
WBC # BLD: 5 K/UL — SIGNIFICANT CHANGE UP (ref 3.8–10.5)
WBC # BLD: 5.15 K/UL — SIGNIFICANT CHANGE UP (ref 3.8–10.5)
WBC # BLD: 5.48 K/UL — SIGNIFICANT CHANGE UP (ref 3.8–10.5)
WBC # BLD: 5.51 K/UL — SIGNIFICANT CHANGE UP (ref 3.8–10.5)
WBC # BLD: 6.15 K/UL — SIGNIFICANT CHANGE UP (ref 3.8–10.5)
WBC # BLD: 6.54 K/UL — SIGNIFICANT CHANGE UP (ref 3.8–10.5)
WBC # BLD: 7.19 K/UL — SIGNIFICANT CHANGE UP (ref 3.8–10.5)
WBC # BLD: 8.84 K/UL — SIGNIFICANT CHANGE UP (ref 3.8–10.5)
WBC # FLD AUTO: 2.12 K/UL — LOW (ref 3.8–10.5)
WBC # FLD AUTO: 3.92 K/UL — SIGNIFICANT CHANGE UP (ref 3.8–10.5)
WBC # FLD AUTO: 4.05 K/UL — SIGNIFICANT CHANGE UP (ref 3.8–10.5)
WBC # FLD AUTO: 4.05 K/UL — SIGNIFICANT CHANGE UP (ref 3.8–10.5)
WBC # FLD AUTO: 4.07 K/UL — SIGNIFICANT CHANGE UP (ref 3.8–10.5)
WBC # FLD AUTO: 4.13 K/UL — SIGNIFICANT CHANGE UP (ref 3.8–10.5)
WBC # FLD AUTO: 4.16 K/UL — SIGNIFICANT CHANGE UP (ref 3.8–10.5)
WBC # FLD AUTO: 4.38 K/UL — SIGNIFICANT CHANGE UP (ref 3.8–10.5)
WBC # FLD AUTO: 4.5 K/UL — SIGNIFICANT CHANGE UP (ref 3.8–10.5)
WBC # FLD AUTO: 4.73 K/UL — SIGNIFICANT CHANGE UP (ref 3.8–10.5)
WBC # FLD AUTO: 4.96 K/UL — SIGNIFICANT CHANGE UP (ref 3.8–10.5)
WBC # FLD AUTO: 4.96 K/UL — SIGNIFICANT CHANGE UP (ref 3.8–10.5)
WBC # FLD AUTO: 5 K/UL — SIGNIFICANT CHANGE UP (ref 3.8–10.5)
WBC # FLD AUTO: 5.15 K/UL — SIGNIFICANT CHANGE UP (ref 3.8–10.5)
WBC # FLD AUTO: 5.48 K/UL — SIGNIFICANT CHANGE UP (ref 3.8–10.5)
WBC # FLD AUTO: 5.51 K/UL — SIGNIFICANT CHANGE UP (ref 3.8–10.5)
WBC # FLD AUTO: 6.15 K/UL — SIGNIFICANT CHANGE UP (ref 3.8–10.5)
WBC # FLD AUTO: 6.54 K/UL — SIGNIFICANT CHANGE UP (ref 3.8–10.5)
WBC # FLD AUTO: 7.19 K/UL — SIGNIFICANT CHANGE UP (ref 3.8–10.5)
WBC # FLD AUTO: 8.84 K/UL — SIGNIFICANT CHANGE UP (ref 3.8–10.5)
WBC UR QL: 0 /HPF — SIGNIFICANT CHANGE UP (ref 0–5)
WBC UR QL: 15 /HPF — HIGH (ref 0–5)
WBC UR QL: 2 /HPF — SIGNIFICANT CHANGE UP (ref 0–5)

## 2022-01-01 PROCEDURE — 82435 ASSAY OF BLOOD CHLORIDE: CPT

## 2022-01-01 PROCEDURE — 93356 MYOCRD STRAIN IMG SPCKL TRCK: CPT

## 2022-01-01 PROCEDURE — 36415 COLL VENOUS BLD VENIPUNCTURE: CPT

## 2022-01-01 PROCEDURE — 83880 ASSAY OF NATRIURETIC PEPTIDE: CPT

## 2022-01-01 PROCEDURE — 99239 HOSP IP/OBS DSCHRG MGMT >30: CPT

## 2022-01-01 PROCEDURE — 99223 1ST HOSP IP/OBS HIGH 75: CPT

## 2022-01-01 PROCEDURE — 81001 URINALYSIS AUTO W/SCOPE: CPT

## 2022-01-01 PROCEDURE — 73110 X-RAY EXAM OF WRIST: CPT | Mod: 26,LT

## 2022-01-01 PROCEDURE — 85014 HEMATOCRIT: CPT

## 2022-01-01 PROCEDURE — 97110 THERAPEUTIC EXERCISES: CPT

## 2022-01-01 PROCEDURE — 99232 SBSQ HOSP IP/OBS MODERATE 35: CPT

## 2022-01-01 PROCEDURE — 70450 CT HEAD/BRAIN W/O DYE: CPT | Mod: MG

## 2022-01-01 PROCEDURE — G1004: CPT

## 2022-01-01 PROCEDURE — 71045 X-RAY EXAM CHEST 1 VIEW: CPT | Mod: 26

## 2022-01-01 PROCEDURE — 72170 X-RAY EXAM OF PELVIS: CPT

## 2022-01-01 PROCEDURE — 82550 ASSAY OF CK (CPK): CPT

## 2022-01-01 PROCEDURE — 97530 THERAPEUTIC ACTIVITIES: CPT

## 2022-01-01 PROCEDURE — 99285 EMERGENCY DEPT VISIT HI MDM: CPT | Mod: 25,GC

## 2022-01-01 PROCEDURE — 73110 X-RAY EXAM OF WRIST: CPT

## 2022-01-01 PROCEDURE — 83735 ASSAY OF MAGNESIUM: CPT

## 2022-01-01 PROCEDURE — 80053 COMPREHEN METABOLIC PANEL: CPT

## 2022-01-01 PROCEDURE — 72170 X-RAY EXAM OF PELVIS: CPT | Mod: 26

## 2022-01-01 PROCEDURE — 71045 X-RAY EXAM CHEST 1 VIEW: CPT

## 2022-01-01 PROCEDURE — 99233 SBSQ HOSP IP/OBS HIGH 50: CPT

## 2022-01-01 PROCEDURE — 84484 ASSAY OF TROPONIN QUANT: CPT

## 2022-01-01 PROCEDURE — 83605 ASSAY OF LACTIC ACID: CPT

## 2022-01-01 PROCEDURE — 93306 TTE W/DOPPLER COMPLETE: CPT | Mod: 26

## 2022-01-01 PROCEDURE — U0005: CPT

## 2022-01-01 PROCEDURE — 85025 COMPLETE CBC W/AUTO DIFF WBC: CPT

## 2022-01-01 PROCEDURE — 74177 CT ABD & PELVIS W/CONTRAST: CPT | Mod: MG

## 2022-01-01 PROCEDURE — 82803 BLOOD GASES ANY COMBINATION: CPT

## 2022-01-01 PROCEDURE — 73522 X-RAY EXAM HIPS BI 3-4 VIEWS: CPT

## 2022-01-01 PROCEDURE — 72125 CT NECK SPINE W/O DYE: CPT | Mod: 26,MG

## 2022-01-01 PROCEDURE — 82010 KETONE BODYS QUAN: CPT

## 2022-01-01 PROCEDURE — 80048 BASIC METABOLIC PNL TOTAL CA: CPT

## 2022-01-01 PROCEDURE — 85610 PROTHROMBIN TIME: CPT

## 2022-01-01 PROCEDURE — 71046 X-RAY EXAM CHEST 2 VIEWS: CPT | Mod: 26

## 2022-01-01 PROCEDURE — 70450 CT HEAD/BRAIN W/O DYE: CPT | Mod: 26,MA

## 2022-01-01 PROCEDURE — 84100 ASSAY OF PHOSPHORUS: CPT

## 2022-01-01 PROCEDURE — 93306 TTE W/DOPPLER COMPLETE: CPT

## 2022-01-01 PROCEDURE — 99284 EMERGENCY DEPT VISIT MOD MDM: CPT | Mod: GC

## 2022-01-01 PROCEDURE — 72125 CT NECK SPINE W/O DYE: CPT | Mod: 26,MA

## 2022-01-01 PROCEDURE — 84295 ASSAY OF SERUM SODIUM: CPT

## 2022-01-01 PROCEDURE — 82565 ASSAY OF CREATININE: CPT

## 2022-01-01 PROCEDURE — 96374 THER/PROPH/DIAG INJ IV PUSH: CPT

## 2022-01-01 PROCEDURE — 71046 X-RAY EXAM CHEST 2 VIEWS: CPT

## 2022-01-01 PROCEDURE — 84132 ASSAY OF SERUM POTASSIUM: CPT

## 2022-01-01 PROCEDURE — 87086 URINE CULTURE/COLONY COUNT: CPT

## 2022-01-01 PROCEDURE — 87637 SARSCOV2&INF A&B&RSV AMP PRB: CPT

## 2022-01-01 PROCEDURE — 74177 CT ABD & PELVIS W/CONTRAST: CPT | Mod: 26,MG

## 2022-01-01 PROCEDURE — 70450 CT HEAD/BRAIN W/O DYE: CPT | Mod: MA

## 2022-01-01 PROCEDURE — 70450 CT HEAD/BRAIN W/O DYE: CPT | Mod: 26,MG

## 2022-01-01 PROCEDURE — 99284 EMERGENCY DEPT VISIT MOD MDM: CPT | Mod: 25

## 2022-01-01 PROCEDURE — 72125 CT NECK SPINE W/O DYE: CPT | Mod: MG

## 2022-01-01 PROCEDURE — 82947 ASSAY GLUCOSE BLOOD QUANT: CPT

## 2022-01-01 PROCEDURE — 74177 CT ABD & PELVIS W/CONTRAST: CPT | Mod: 26,MA

## 2022-01-01 PROCEDURE — 82330 ASSAY OF CALCIUM: CPT

## 2022-01-01 PROCEDURE — 99222 1ST HOSP IP/OBS MODERATE 55: CPT

## 2022-01-01 PROCEDURE — 99285 EMERGENCY DEPT VISIT HI MDM: CPT

## 2022-01-01 PROCEDURE — 0225U NFCT DS DNA&RNA 21 SARSCOV2: CPT

## 2022-01-01 PROCEDURE — 83036 HEMOGLOBIN GLYCOSYLATED A1C: CPT

## 2022-01-01 PROCEDURE — 85018 HEMOGLOBIN: CPT

## 2022-01-01 PROCEDURE — 99233 SBSQ HOSP IP/OBS HIGH 50: CPT | Mod: GC

## 2022-01-01 PROCEDURE — 87640 STAPH A DNA AMP PROBE: CPT

## 2022-01-01 PROCEDURE — 99285 EMERGENCY DEPT VISIT HI MDM: CPT | Mod: 25

## 2022-01-01 PROCEDURE — 71260 CT THORAX DX C+: CPT | Mod: 26,MA

## 2022-01-01 PROCEDURE — 85027 COMPLETE CBC AUTOMATED: CPT

## 2022-01-01 PROCEDURE — 97162 PT EVAL MOD COMPLEX 30 MIN: CPT

## 2022-01-01 PROCEDURE — 93010 ELECTROCARDIOGRAM REPORT: CPT

## 2022-01-01 PROCEDURE — 85730 THROMBOPLASTIN TIME PARTIAL: CPT

## 2022-01-01 PROCEDURE — 87641 MR-STAPH DNA AMP PROBE: CPT

## 2022-01-01 PROCEDURE — 93010 ELECTROCARDIOGRAM REPORT: CPT | Mod: GC

## 2022-01-01 PROCEDURE — 74177 CT ABD & PELVIS W/CONTRAST: CPT | Mod: MA

## 2022-01-01 PROCEDURE — 99291 CRITICAL CARE FIRST HOUR: CPT | Mod: FT,25,GC

## 2022-01-01 PROCEDURE — 72125 CT NECK SPINE W/O DYE: CPT | Mod: MA

## 2022-01-01 PROCEDURE — 73522 X-RAY EXAM HIPS BI 3-4 VIEWS: CPT | Mod: 26

## 2022-01-01 PROCEDURE — 87040 BLOOD CULTURE FOR BACTERIA: CPT

## 2022-01-01 PROCEDURE — 82962 GLUCOSE BLOOD TEST: CPT

## 2022-01-01 PROCEDURE — U0003: CPT

## 2022-01-01 PROCEDURE — 36600 WITHDRAWAL OF ARTERIAL BLOOD: CPT

## 2022-01-01 PROCEDURE — 97161 PT EVAL LOW COMPLEX 20 MIN: CPT

## 2022-01-01 PROCEDURE — 93005 ELECTROCARDIOGRAM TRACING: CPT

## 2022-01-01 RX ORDER — DEXTROSE 50 % IN WATER 50 %
25 SYRINGE (ML) INTRAVENOUS ONCE
Refills: 0 | Status: DISCONTINUED | OUTPATIENT
Start: 2022-01-01 | End: 2022-01-01

## 2022-01-01 RX ORDER — QUETIAPINE FUMARATE 200 MG/1
25 TABLET, FILM COATED ORAL AT BEDTIME
Refills: 0 | Status: DISCONTINUED | OUTPATIENT
Start: 2022-01-01 | End: 2022-01-01

## 2022-01-01 RX ORDER — ASCORBIC ACID 60 MG
500 TABLET,CHEWABLE ORAL DAILY
Refills: 0 | Status: DISCONTINUED | OUTPATIENT
Start: 2022-01-01 | End: 2022-01-01

## 2022-01-01 RX ORDER — BUMETANIDE 0.25 MG/ML
1 INJECTION INTRAMUSCULAR; INTRAVENOUS
Qty: 0 | Refills: 0 | DISCHARGE
Start: 2022-01-01

## 2022-01-01 RX ORDER — MEMANTINE HYDROCHLORIDE 10 MG/1
10 TABLET ORAL
Refills: 0 | Status: DISCONTINUED | OUTPATIENT
Start: 2022-01-01 | End: 2022-01-01

## 2022-01-01 RX ORDER — BUMETANIDE 0.25 MG/ML
0.5 INJECTION INTRAMUSCULAR; INTRAVENOUS DAILY
Refills: 0 | Status: DISCONTINUED | OUTPATIENT
Start: 2022-01-01 | End: 2022-01-01

## 2022-01-01 RX ORDER — PANTOPRAZOLE SODIUM 20 MG/1
40 TABLET, DELAYED RELEASE ORAL
Refills: 0 | Status: DISCONTINUED | OUTPATIENT
Start: 2022-01-01 | End: 2022-01-01

## 2022-01-01 RX ORDER — FINASTERIDE 5 MG/1
5 TABLET, FILM COATED ORAL DAILY
Refills: 0 | Status: DISCONTINUED | OUTPATIENT
Start: 2022-01-01 | End: 2022-01-01

## 2022-01-01 RX ORDER — LANOLIN ALCOHOL/MO/W.PET/CERES
3 CREAM (GRAM) TOPICAL AT BEDTIME
Refills: 0 | Status: DISCONTINUED | OUTPATIENT
Start: 2022-01-01 | End: 2022-01-01

## 2022-01-01 RX ORDER — MUPIROCIN 20 MG/G
1 OINTMENT TOPICAL
Refills: 0 | Status: DISCONTINUED | OUTPATIENT
Start: 2022-01-01 | End: 2022-01-01

## 2022-01-01 RX ORDER — DONEPEZIL HYDROCHLORIDE 5 MG/1
5 TABLET ORAL
Qty: 30 | Refills: 6 | Status: ACTIVE | COMMUNITY
Start: 2020-07-07 | End: 1900-01-01

## 2022-01-01 RX ORDER — BUMETANIDE 0.25 MG/ML
1 INJECTION INTRAMUSCULAR; INTRAVENOUS DAILY
Refills: 0 | Status: DISCONTINUED | OUTPATIENT
Start: 2022-01-01 | End: 2022-01-01

## 2022-01-01 RX ORDER — BACITRACIN ZINC NEOMYCIN SULFATE POLYMYXIN B SULFATE 400; 3.5; 5 [IU]/G; MG/G; [IU]/G
1 OINTMENT TOPICAL
Qty: 0 | Refills: 0 | DISCHARGE
Start: 2022-01-01

## 2022-01-01 RX ORDER — DONEPEZIL HYDROCHLORIDE 10 MG/1
5 TABLET, FILM COATED ORAL AT BEDTIME
Refills: 0 | Status: DISCONTINUED | OUTPATIENT
Start: 2022-01-01 | End: 2022-01-01

## 2022-01-01 RX ORDER — MAGNESIUM OXIDE 400 MG ORAL TABLET 241.3 MG
400 TABLET ORAL ONCE
Refills: 0 | Status: COMPLETED | OUTPATIENT
Start: 2022-01-01 | End: 2022-01-01

## 2022-01-01 RX ORDER — DONEPEZIL HYDROCHLORIDE 10 MG/1
1 TABLET, FILM COATED ORAL
Qty: 0 | Refills: 0 | DISCHARGE
Start: 2022-01-01

## 2022-01-01 RX ORDER — ZINC SULFATE TAB 220 MG (50 MG ZINC EQUIVALENT) 220 (50 ZN) MG
220 TAB ORAL DAILY
Refills: 0 | Status: DISCONTINUED | OUTPATIENT
Start: 2022-01-01 | End: 2022-01-01

## 2022-01-01 RX ORDER — FINASTERIDE 5 MG/1
1 TABLET, FILM COATED ORAL
Qty: 0 | Refills: 0 | DISCHARGE
Start: 2022-01-01

## 2022-01-01 RX ORDER — LANOLIN ALCOHOL/MO/W.PET/CERES
1 CREAM (GRAM) TOPICAL
Qty: 0 | Refills: 0 | DISCHARGE
Start: 2022-01-01

## 2022-01-01 RX ORDER — ACETAMINOPHEN 500 MG
2 TABLET ORAL
Qty: 0 | Refills: 0 | DISCHARGE

## 2022-01-01 RX ORDER — SODIUM CHLORIDE 9 MG/ML
1000 INJECTION, SOLUTION INTRAVENOUS
Refills: 0 | Status: DISCONTINUED | OUTPATIENT
Start: 2022-01-01 | End: 2022-01-01

## 2022-01-01 RX ORDER — MEMANTINE HYDROCHLORIDE 10 MG/1
1 TABLET ORAL
Qty: 0 | Refills: 0 | DISCHARGE
Start: 2022-01-01

## 2022-01-01 RX ORDER — DEXTROSE 50 % IN WATER 50 %
12.5 SYRINGE (ML) INTRAVENOUS ONCE
Refills: 0 | Status: DISCONTINUED | OUTPATIENT
Start: 2022-01-01 | End: 2022-01-01

## 2022-01-01 RX ORDER — ACETAMINOPHEN 500 MG
650 TABLET ORAL EVERY 6 HOURS
Refills: 0 | Status: DISCONTINUED | OUTPATIENT
Start: 2022-01-01 | End: 2022-01-01

## 2022-01-01 RX ORDER — MIRTAZAPINE 45 MG/1
1 TABLET, ORALLY DISINTEGRATING ORAL
Qty: 0 | Refills: 0 | DISCHARGE
Start: 2022-01-01

## 2022-01-01 RX ORDER — THIAMINE MONONITRATE (VIT B1) 100 MG
100 TABLET ORAL DAILY
Refills: 0 | Status: DISCONTINUED | OUTPATIENT
Start: 2022-01-01 | End: 2022-01-01

## 2022-01-01 RX ORDER — MEMANTINE HYDROCHLORIDE 10 MG/1
1 TABLET ORAL
Qty: 0 | Refills: 0 | DISCHARGE

## 2022-01-01 RX ORDER — BUMETANIDE 0.25 MG/ML
1 INJECTION INTRAMUSCULAR; INTRAVENOUS
Qty: 30 | Refills: 0
Start: 2022-01-01 | End: 2022-01-01

## 2022-01-01 RX ORDER — ASPIRIN/CALCIUM CARB/MAGNESIUM 324 MG
1 TABLET ORAL
Qty: 0 | Refills: 0 | DISCHARGE
Start: 2022-01-01

## 2022-01-01 RX ORDER — ASPIRIN/CALCIUM CARB/MAGNESIUM 324 MG
81 TABLET ORAL DAILY
Refills: 0 | Status: DISCONTINUED | OUTPATIENT
Start: 2022-01-01 | End: 2022-01-01

## 2022-01-01 RX ORDER — ONDANSETRON 8 MG/1
4 TABLET, FILM COATED ORAL EVERY 8 HOURS
Refills: 0 | Status: DISCONTINUED | OUTPATIENT
Start: 2022-01-01 | End: 2022-01-01

## 2022-01-01 RX ORDER — CADEXOMER IODINE 0.9 %
1 PADS, MEDICATED (EA) TOPICAL DAILY
Refills: 0 | Status: DISCONTINUED | OUTPATIENT
Start: 2022-01-01 | End: 2022-01-01

## 2022-01-01 RX ORDER — CADEXOMER IODINE 0.9 %
1 PADS, MEDICATED (EA) TOPICAL
Qty: 30 | Refills: 0
Start: 2022-01-01 | End: 2022-01-01

## 2022-01-01 RX ORDER — CEFTRIAXONE 500 MG/1
1000 INJECTION, POWDER, FOR SOLUTION INTRAMUSCULAR; INTRAVENOUS EVERY 24 HOURS
Refills: 0 | Status: COMPLETED | OUTPATIENT
Start: 2022-01-01 | End: 2022-01-01

## 2022-01-01 RX ORDER — PREGABALIN 225 MG/1
1 CAPSULE ORAL
Qty: 0 | Refills: 0 | DISCHARGE
Start: 2022-01-01

## 2022-01-01 RX ORDER — METOPROLOL TARTRATE 50 MG
1 TABLET ORAL
Qty: 0 | Refills: 0 | DISCHARGE
Start: 2022-01-01

## 2022-01-01 RX ORDER — ENOXAPARIN SODIUM 100 MG/ML
40 INJECTION SUBCUTANEOUS EVERY 24 HOURS
Refills: 0 | Status: DISCONTINUED | OUTPATIENT
Start: 2022-01-01 | End: 2022-01-01

## 2022-01-01 RX ORDER — ELECTROLYTES/DEXTROSE
SOLUTION, ORAL ORAL
Qty: 30 | Refills: 6 | Status: ACTIVE | COMMUNITY
Start: 2022-01-01 | End: 1900-01-01

## 2022-01-01 RX ORDER — DEXTROSE 50 % IN WATER 50 %
25 SYRINGE (ML) INTRAVENOUS ONCE
Refills: 0 | Status: COMPLETED | OUTPATIENT
Start: 2022-01-01 | End: 2022-01-01

## 2022-01-01 RX ORDER — DONEPEZIL HYDROCHLORIDE 10 MG/1
1 TABLET, FILM COATED ORAL
Qty: 0 | Refills: 0 | DISCHARGE

## 2022-01-01 RX ORDER — MIRTAZAPINE 45 MG/1
7.5 TABLET, ORALLY DISINTEGRATING ORAL AT BEDTIME
Refills: 0 | Status: DISCONTINUED | OUTPATIENT
Start: 2022-01-01 | End: 2022-01-01

## 2022-01-01 RX ORDER — QUETIAPINE FUMARATE 200 MG/1
12.5 TABLET, FILM COATED ORAL AT BEDTIME
Refills: 0 | Status: DISCONTINUED | OUTPATIENT
Start: 2022-01-01 | End: 2022-01-01

## 2022-01-01 RX ORDER — METOPROLOL SUCCINATE 25 MG/1
25 TABLET, EXTENDED RELEASE ORAL DAILY
Qty: 30 | Refills: 3 | Status: ACTIVE | COMMUNITY
Start: 1900-01-01 | End: 1900-01-01

## 2022-01-01 RX ORDER — METOPROLOL TARTRATE 50 MG
25 TABLET ORAL DAILY
Refills: 0 | Status: DISCONTINUED | OUTPATIENT
Start: 2022-01-01 | End: 2022-01-01

## 2022-01-01 RX ORDER — METOPROLOL TARTRATE 50 MG
1 TABLET ORAL
Qty: 0 | Refills: 0 | DISCHARGE

## 2022-01-01 RX ORDER — INSULIN LISPRO 100/ML
VIAL (ML) SUBCUTANEOUS
Refills: 0 | Status: DISCONTINUED | OUTPATIENT
Start: 2022-01-01 | End: 2022-01-01

## 2022-01-01 RX ORDER — ASPIRIN/CALCIUM CARB/MAGNESIUM 324 MG
1 TABLET ORAL
Qty: 0 | Refills: 0 | DISCHARGE

## 2022-01-01 RX ORDER — BUMETANIDE 0.25 MG/ML
1 INJECTION INTRAMUSCULAR; INTRAVENOUS ONCE
Refills: 0 | Status: COMPLETED | OUTPATIENT
Start: 2022-01-01 | End: 2022-01-01

## 2022-01-01 RX ORDER — LANOLIN ALCOHOL/MO/W.PET/CERES
1 CREAM (GRAM) TOPICAL
Qty: 0 | Refills: 0 | DISCHARGE

## 2022-01-01 RX ORDER — DEXTROSE 50 % IN WATER 50 %
12.5 SYRINGE (ML) INTRAVENOUS ONCE
Refills: 0 | Status: COMPLETED | OUTPATIENT
Start: 2022-01-01 | End: 2022-01-01

## 2022-01-01 RX ORDER — CHLORHEXIDINE GLUCONATE 213 G/1000ML
1 SOLUTION TOPICAL DAILY
Refills: 0 | Status: DISCONTINUED | OUTPATIENT
Start: 2022-01-01 | End: 2022-01-01

## 2022-01-01 RX ORDER — BUMETANIDE 0.5 MG/1
0.5 TABLET ORAL
Qty: 30 | Refills: 6 | Status: ACTIVE | COMMUNITY
Start: 2019-05-14 | End: 1900-01-01

## 2022-01-01 RX ORDER — MIRTAZAPINE 7.5 MG/1
7.5 TABLET, FILM COATED ORAL
Qty: 30 | Refills: 6 | Status: ACTIVE | COMMUNITY
Start: 2019-11-29 | End: 1900-01-01

## 2022-01-01 RX ORDER — MUPIROCIN 20 MG/G
2 OINTMENT TOPICAL
Qty: 22 | Refills: 0 | Status: DISCONTINUED | COMMUNITY
Start: 2022-01-01

## 2022-01-01 RX ORDER — GLUCAGON INJECTION, SOLUTION 0.5 MG/.1ML
1 INJECTION, SOLUTION SUBCUTANEOUS ONCE
Refills: 0 | Status: DISCONTINUED | OUTPATIENT
Start: 2022-01-01 | End: 2022-01-01

## 2022-01-01 RX ORDER — OMEGA-3/DHA/EPA/FISH OIL 35-113.5MG
1000 TABLET,CHEWABLE ORAL
Qty: 90 | Refills: 3 | Status: ACTIVE | COMMUNITY
Start: 2022-01-01 | End: 1900-01-01

## 2022-01-01 RX ORDER — BACITRACIN ZINC NEOMYCIN SULFATE POLYMYXIN B SULFATE 400; 3.5; 5 [IU]/G; MG/G; [IU]/G
1 OINTMENT TOPICAL DAILY
Refills: 0 | Status: DISCONTINUED | OUTPATIENT
Start: 2022-01-01 | End: 2022-01-01

## 2022-01-01 RX ORDER — INSULIN LISPRO 100/ML
VIAL (ML) SUBCUTANEOUS AT BEDTIME
Refills: 0 | Status: DISCONTINUED | OUTPATIENT
Start: 2022-01-01 | End: 2022-01-01

## 2022-01-01 RX ORDER — LIDOCAINE 4 G/100G
1 CREAM TOPICAL ONCE
Refills: 0 | Status: DISCONTINUED | OUTPATIENT
Start: 2022-01-01 | End: 2022-01-01

## 2022-01-01 RX ORDER — POTASSIUM CHLORIDE 20 MEQ
20 PACKET (EA) ORAL ONCE
Refills: 0 | Status: COMPLETED | OUTPATIENT
Start: 2022-01-01 | End: 2022-01-01

## 2022-01-01 RX ORDER — AMPICILLIN SODIUM AND SULBACTAM SODIUM 250; 125 MG/ML; MG/ML
INJECTION, POWDER, FOR SUSPENSION INTRAMUSCULAR; INTRAVENOUS
Refills: 0 | Status: DISCONTINUED | OUTPATIENT
Start: 2022-01-01 | End: 2022-01-01

## 2022-01-01 RX ORDER — ASPIRIN 81 MG/1
81 TABLET ORAL
Qty: 90 | Refills: 2 | Status: ACTIVE | COMMUNITY
Start: 1900-01-01 | End: 1900-01-01

## 2022-01-01 RX ORDER — THIAMINE MONONITRATE (VIT B1) 100 MG
1 TABLET ORAL
Qty: 0 | Refills: 0 | DISCHARGE
Start: 2022-01-01

## 2022-01-01 RX ORDER — ASCORBIC ACID 60 MG
1 TABLET,CHEWABLE ORAL
Qty: 0 | Refills: 0 | DISCHARGE
Start: 2022-01-01

## 2022-01-01 RX ORDER — PREGABALIN 225 MG/1
1 CAPSULE ORAL
Qty: 0 | Refills: 0 | DISCHARGE

## 2022-01-01 RX ORDER — DEXTROSE 50 % IN WATER 50 %
15 SYRINGE (ML) INTRAVENOUS ONCE
Refills: 0 | Status: DISCONTINUED | OUTPATIENT
Start: 2022-01-01 | End: 2022-01-01

## 2022-01-01 RX ORDER — GLUCOSAMINE HCL/CHONDROITIN SU 500-400 MG
3 CAPSULE ORAL
Qty: 30 | Refills: 12 | Status: ACTIVE | COMMUNITY
Start: 2020-07-07 | End: 1900-01-01

## 2022-01-01 RX ORDER — CEFTRIAXONE 500 MG/1
1000 INJECTION, POWDER, FOR SOLUTION INTRAMUSCULAR; INTRAVENOUS ONCE
Refills: 0 | Status: COMPLETED | OUTPATIENT
Start: 2022-01-01 | End: 2022-01-01

## 2022-01-01 RX ORDER — BUMETANIDE 0.25 MG/ML
1 INJECTION INTRAMUSCULAR; INTRAVENOUS
Qty: 0 | Refills: 0 | DISCHARGE

## 2022-01-01 RX ORDER — FINASTERIDE 5 MG/1
1 TABLET, FILM COATED ORAL
Qty: 0 | Refills: 0 | DISCHARGE

## 2022-01-01 RX ORDER — PRAVASTATIN SODIUM 20 MG/1
20 TABLET ORAL
Qty: 30 | Refills: 6 | Status: ACTIVE | COMMUNITY
Start: 2019-05-14 | End: 1900-01-01

## 2022-01-01 RX ORDER — ATORVASTATIN CALCIUM 80 MG/1
10 TABLET, FILM COATED ORAL AT BEDTIME
Refills: 0 | Status: DISCONTINUED | OUTPATIENT
Start: 2022-01-01 | End: 2022-01-01

## 2022-01-01 RX ORDER — PANTOPRAZOLE 40 MG/1
40 TABLET, DELAYED RELEASE ORAL
Qty: 30 | Refills: 6 | Status: ACTIVE | COMMUNITY
Start: 2022-01-01 | End: 1900-01-01

## 2022-01-01 RX ORDER — MEMANTINE HYDROCHLORIDE 10 MG/1
10 TABLET, FILM COATED ORAL
Qty: 60 | Refills: 5 | Status: ACTIVE | COMMUNITY
Start: 2022-01-01 | End: 1900-01-01

## 2022-01-01 RX ORDER — POTASSIUM CHLORIDE 20 MEQ
40 PACKET (EA) ORAL ONCE
Refills: 0 | Status: COMPLETED | OUTPATIENT
Start: 2022-01-01 | End: 2022-01-01

## 2022-01-01 RX ORDER — PANTOPRAZOLE SODIUM 20 MG/1
1 TABLET, DELAYED RELEASE ORAL
Qty: 30 | Refills: 0
Start: 2022-01-01 | End: 2022-01-01

## 2022-01-01 RX ORDER — QUETIAPINE FUMARATE 25 MG/1
25 TABLET ORAL
Qty: 15 | Refills: 6 | Status: ACTIVE | COMMUNITY
Start: 2019-11-29

## 2022-01-01 RX ORDER — PREGABALIN 225 MG/1
1000 CAPSULE ORAL DAILY
Refills: 0 | Status: DISCONTINUED | OUTPATIENT
Start: 2022-01-01 | End: 2022-01-01

## 2022-01-01 RX ORDER — AMPICILLIN SODIUM AND SULBACTAM SODIUM 250; 125 MG/ML; MG/ML
3 INJECTION, POWDER, FOR SUSPENSION INTRAMUSCULAR; INTRAVENOUS ONCE
Refills: 0 | Status: COMPLETED | OUTPATIENT
Start: 2022-01-01 | End: 2022-01-01

## 2022-01-01 RX ORDER — CEPHALEXIN 500 MG/1
500 CAPSULE ORAL
Qty: 4 | Refills: 0 | Status: DISCONTINUED | COMMUNITY
Start: 2022-01-01

## 2022-01-01 RX ORDER — MUPIROCIN 20 MG/G
1 OINTMENT TOPICAL
Qty: 0 | Refills: 0 | DISCHARGE
Start: 2022-01-01

## 2022-01-01 RX ORDER — HALOPERIDOL DECANOATE 100 MG/ML
1 INJECTION INTRAMUSCULAR ONCE
Refills: 0 | Status: COMPLETED | OUTPATIENT
Start: 2022-01-01 | End: 2022-01-01

## 2022-01-01 RX ORDER — ZINC SULFATE TAB 220 MG (50 MG ZINC EQUIVALENT) 220 (50 ZN) MG
1 TAB ORAL
Qty: 0 | Refills: 0 | DISCHARGE
Start: 2022-01-01

## 2022-01-01 RX ORDER — AMOXICILLIN AND CLAVULANATE POTASSIUM 875; 125 MG/1; MG/1
875-125 TABLET, COATED ORAL
Qty: 14 | Refills: 0 | Status: DISCONTINUED | COMMUNITY
Start: 2022-01-01 | End: 2022-01-01

## 2022-01-01 RX ORDER — CHLORHEXIDINE GLUCONATE 213 G/1000ML
1 SOLUTION TOPICAL
Refills: 0 | Status: DISCONTINUED | OUTPATIENT
Start: 2022-01-01 | End: 2022-01-01

## 2022-01-01 RX ORDER — BUMETANIDE 0.25 MG/ML
0.5 INJECTION INTRAMUSCULAR; INTRAVENOUS ONCE
Refills: 0 | Status: COMPLETED | OUTPATIENT
Start: 2022-01-01 | End: 2022-01-01

## 2022-01-01 RX ORDER — AMPICILLIN SODIUM AND SULBACTAM SODIUM 250; 125 MG/ML; MG/ML
3 INJECTION, POWDER, FOR SUSPENSION INTRAMUSCULAR; INTRAVENOUS EVERY 6 HOURS
Refills: 0 | Status: DISCONTINUED | OUTPATIENT
Start: 2022-01-01 | End: 2022-01-01

## 2022-01-01 RX ORDER — PIPERACILLIN AND TAZOBACTAM 4; .5 G/20ML; G/20ML
3.38 INJECTION, POWDER, LYOPHILIZED, FOR SOLUTION INTRAVENOUS ONCE
Refills: 0 | Status: COMPLETED | OUTPATIENT
Start: 2022-01-01 | End: 2022-01-01

## 2022-01-01 RX ORDER — BUMETANIDE 0.25 MG/ML
0.5 INJECTION INTRAMUSCULAR; INTRAVENOUS
Refills: 0 | Status: DISCONTINUED | OUTPATIENT
Start: 2022-01-01 | End: 2022-01-01

## 2022-01-01 RX ORDER — POLYETHYLENE GLYCOL 3350 17 G/17G
17 POWDER, FOR SOLUTION ORAL DAILY
Refills: 0 | Status: DISCONTINUED | OUTPATIENT
Start: 2022-01-01 | End: 2022-01-01

## 2022-01-01 RX ORDER — MUPIROCIN 20 MG/G
0 OINTMENT TOPICAL
Qty: 0 | Refills: 0 | DISCHARGE

## 2022-01-01 RX ORDER — QUETIAPINE FUMARATE 200 MG/1
12.5 TABLET, FILM COATED ORAL ONCE
Refills: 0 | Status: COMPLETED | OUTPATIENT
Start: 2022-01-01 | End: 2022-01-01

## 2022-01-01 RX ORDER — CALCIUM CARBONATE/VITAMIN D3 600 MG-10
100 TABLET ORAL
Qty: 30 | Refills: 6 | Status: ACTIVE | COMMUNITY
Start: 2022-01-01 | End: 1900-01-01

## 2022-01-01 RX ORDER — POLYETHYLENE GLYCOL 3350 17 G/17G
17 POWDER, FOR SOLUTION ORAL
Qty: 30 | Refills: 6 | Status: ACTIVE | COMMUNITY
Start: 2022-01-01 | End: 1900-01-01

## 2022-01-01 RX ORDER — HALOPERIDOL DECANOATE 100 MG/ML
0.5 INJECTION INTRAMUSCULAR ONCE
Refills: 0 | Status: COMPLETED | OUTPATIENT
Start: 2022-01-01 | End: 2022-01-01

## 2022-01-01 RX ORDER — PIPERACILLIN AND TAZOBACTAM 4; .5 G/20ML; G/20ML
3.38 INJECTION, POWDER, LYOPHILIZED, FOR SOLUTION INTRAVENOUS EVERY 8 HOURS
Refills: 0 | Status: DISCONTINUED | OUTPATIENT
Start: 2022-01-01 | End: 2022-01-01

## 2022-01-01 RX ORDER — BUMETANIDE 0.25 MG/ML
0.5 INJECTION INTRAMUSCULAR; INTRAVENOUS ONCE
Refills: 0 | Status: DISCONTINUED | OUTPATIENT
Start: 2022-01-01 | End: 2022-01-01

## 2022-01-01 RX ADMIN — Medication 81 MILLIGRAM(S): at 11:47

## 2022-01-01 RX ADMIN — DONEPEZIL HYDROCHLORIDE 5 MILLIGRAM(S): 10 TABLET, FILM COATED ORAL at 23:07

## 2022-01-01 RX ADMIN — ATORVASTATIN CALCIUM 10 MILLIGRAM(S): 80 TABLET, FILM COATED ORAL at 21:19

## 2022-01-01 RX ADMIN — ATORVASTATIN CALCIUM 10 MILLIGRAM(S): 80 TABLET, FILM COATED ORAL at 22:04

## 2022-01-01 RX ADMIN — BACITRACIN ZINC NEOMYCIN SULFATE POLYMYXIN B SULFATE 1 APPLICATION(S): 400; 3.5; 5 OINTMENT TOPICAL at 12:38

## 2022-01-01 RX ADMIN — ENOXAPARIN SODIUM 40 MILLIGRAM(S): 100 INJECTION SUBCUTANEOUS at 05:54

## 2022-01-01 RX ADMIN — Medication 100 MILLIGRAM(S): at 15:33

## 2022-01-01 RX ADMIN — ENOXAPARIN SODIUM 40 MILLIGRAM(S): 100 INJECTION SUBCUTANEOUS at 11:31

## 2022-01-01 RX ADMIN — Medication 3 MILLIGRAM(S): at 22:01

## 2022-01-01 RX ADMIN — Medication 25 MILLIGRAM(S): at 05:39

## 2022-01-01 RX ADMIN — FINASTERIDE 5 MILLIGRAM(S): 5 TABLET, FILM COATED ORAL at 12:14

## 2022-01-01 RX ADMIN — QUETIAPINE FUMARATE 25 MILLIGRAM(S): 200 TABLET, FILM COATED ORAL at 22:03

## 2022-01-01 RX ADMIN — Medication 1 TABLET(S): at 12:14

## 2022-01-01 RX ADMIN — PIPERACILLIN AND TAZOBACTAM 25 GRAM(S): 4; .5 INJECTION, POWDER, LYOPHILIZED, FOR SOLUTION INTRAVENOUS at 01:06

## 2022-01-01 RX ADMIN — Medication 1 TABLET(S): at 11:31

## 2022-01-01 RX ADMIN — ATORVASTATIN CALCIUM 10 MILLIGRAM(S): 80 TABLET, FILM COATED ORAL at 22:54

## 2022-01-01 RX ADMIN — FINASTERIDE 5 MILLIGRAM(S): 5 TABLET, FILM COATED ORAL at 11:25

## 2022-01-01 RX ADMIN — PREGABALIN 1000 MICROGRAM(S): 225 CAPSULE ORAL at 11:11

## 2022-01-01 RX ADMIN — MEMANTINE HYDROCHLORIDE 10 MILLIGRAM(S): 10 TABLET ORAL at 17:33

## 2022-01-01 RX ADMIN — Medication 500 MILLIGRAM(S): at 12:14

## 2022-01-01 RX ADMIN — Medication 100 MILLIGRAM(S): at 12:11

## 2022-01-01 RX ADMIN — Medication 1 APPLICATION(S): at 13:32

## 2022-01-01 RX ADMIN — CEFTRIAXONE 100 MILLIGRAM(S): 500 INJECTION, POWDER, FOR SOLUTION INTRAMUSCULAR; INTRAVENOUS at 00:08

## 2022-01-01 RX ADMIN — MEMANTINE HYDROCHLORIDE 10 MILLIGRAM(S): 10 TABLET ORAL at 17:43

## 2022-01-01 RX ADMIN — ENOXAPARIN SODIUM 40 MILLIGRAM(S): 100 INJECTION SUBCUTANEOUS at 06:04

## 2022-01-01 RX ADMIN — Medication 25 GRAM(S): at 08:22

## 2022-01-01 RX ADMIN — MEMANTINE HYDROCHLORIDE 10 MILLIGRAM(S): 10 TABLET ORAL at 17:16

## 2022-01-01 RX ADMIN — Medication 500 MILLIGRAM(S): at 12:17

## 2022-01-01 RX ADMIN — Medication 1 TABLET(S): at 17:34

## 2022-01-01 RX ADMIN — MIRTAZAPINE 7.5 MILLIGRAM(S): 45 TABLET, ORALLY DISINTEGRATING ORAL at 21:38

## 2022-01-01 RX ADMIN — PREGABALIN 1000 MICROGRAM(S): 225 CAPSULE ORAL at 13:39

## 2022-01-01 RX ADMIN — FINASTERIDE 5 MILLIGRAM(S): 5 TABLET, FILM COATED ORAL at 13:56

## 2022-01-01 RX ADMIN — Medication 1 TABLET(S): at 13:39

## 2022-01-01 RX ADMIN — MUPIROCIN 1 APPLICATION(S): 20 OINTMENT TOPICAL at 17:48

## 2022-01-01 RX ADMIN — ZINC SULFATE TAB 220 MG (50 MG ZINC EQUIVALENT) 220 MILLIGRAM(S): 220 (50 ZN) TAB at 16:53

## 2022-01-01 RX ADMIN — POLYETHYLENE GLYCOL 3350 17 GRAM(S): 17 POWDER, FOR SOLUTION ORAL at 13:34

## 2022-01-01 RX ADMIN — MEMANTINE HYDROCHLORIDE 10 MILLIGRAM(S): 10 TABLET ORAL at 18:37

## 2022-01-01 RX ADMIN — ZINC SULFATE TAB 220 MG (50 MG ZINC EQUIVALENT) 220 MILLIGRAM(S): 220 (50 ZN) TAB at 11:31

## 2022-01-01 RX ADMIN — Medication 100 MILLIGRAM(S): at 13:41

## 2022-01-01 RX ADMIN — Medication 81 MILLIGRAM(S): at 13:56

## 2022-01-01 RX ADMIN — MIRTAZAPINE 7.5 MILLIGRAM(S): 45 TABLET, ORALLY DISINTEGRATING ORAL at 22:51

## 2022-01-01 RX ADMIN — PANTOPRAZOLE SODIUM 40 MILLIGRAM(S): 20 TABLET, DELAYED RELEASE ORAL at 05:59

## 2022-01-01 RX ADMIN — POLYETHYLENE GLYCOL 3350 17 GRAM(S): 17 POWDER, FOR SOLUTION ORAL at 12:14

## 2022-01-01 RX ADMIN — DONEPEZIL HYDROCHLORIDE 5 MILLIGRAM(S): 10 TABLET, FILM COATED ORAL at 21:39

## 2022-01-01 RX ADMIN — CHLORHEXIDINE GLUCONATE 1 APPLICATION(S): 213 SOLUTION TOPICAL at 11:47

## 2022-01-01 RX ADMIN — Medication 3 MILLIGRAM(S): at 22:04

## 2022-01-01 RX ADMIN — MIRTAZAPINE 7.5 MILLIGRAM(S): 45 TABLET, ORALLY DISINTEGRATING ORAL at 21:26

## 2022-01-01 RX ADMIN — Medication 3 MILLIGRAM(S): at 21:29

## 2022-01-01 RX ADMIN — HALOPERIDOL DECANOATE 1 MILLIGRAM(S): 100 INJECTION INTRAMUSCULAR at 16:59

## 2022-01-01 RX ADMIN — MEMANTINE HYDROCHLORIDE 10 MILLIGRAM(S): 10 TABLET ORAL at 05:21

## 2022-01-01 RX ADMIN — ATORVASTATIN CALCIUM 10 MILLIGRAM(S): 80 TABLET, FILM COATED ORAL at 21:30

## 2022-01-01 RX ADMIN — Medication 1 TABLET(S): at 17:09

## 2022-01-01 RX ADMIN — QUETIAPINE FUMARATE 12.5 MILLIGRAM(S): 200 TABLET, FILM COATED ORAL at 22:01

## 2022-01-01 RX ADMIN — FINASTERIDE 5 MILLIGRAM(S): 5 TABLET, FILM COATED ORAL at 13:33

## 2022-01-01 RX ADMIN — Medication 81 MILLIGRAM(S): at 13:32

## 2022-01-01 RX ADMIN — MEMANTINE HYDROCHLORIDE 10 MILLIGRAM(S): 10 TABLET ORAL at 05:35

## 2022-01-01 RX ADMIN — MAGNESIUM OXIDE 400 MG ORAL TABLET 400 MILLIGRAM(S): 241.3 TABLET ORAL at 13:38

## 2022-01-01 RX ADMIN — DONEPEZIL HYDROCHLORIDE 5 MILLIGRAM(S): 10 TABLET, FILM COATED ORAL at 21:29

## 2022-01-01 RX ADMIN — MIRTAZAPINE 7.5 MILLIGRAM(S): 45 TABLET, ORALLY DISINTEGRATING ORAL at 21:23

## 2022-01-01 RX ADMIN — Medication 1 TABLET(S): at 14:05

## 2022-01-01 RX ADMIN — PIPERACILLIN AND TAZOBACTAM 25 GRAM(S): 4; .5 INJECTION, POWDER, LYOPHILIZED, FOR SOLUTION INTRAVENOUS at 12:16

## 2022-01-01 RX ADMIN — MIRTAZAPINE 7.5 MILLIGRAM(S): 45 TABLET, ORALLY DISINTEGRATING ORAL at 21:37

## 2022-01-01 RX ADMIN — POLYETHYLENE GLYCOL 3350 17 GRAM(S): 17 POWDER, FOR SOLUTION ORAL at 11:37

## 2022-01-01 RX ADMIN — MIRTAZAPINE 7.5 MILLIGRAM(S): 45 TABLET, ORALLY DISINTEGRATING ORAL at 23:08

## 2022-01-01 RX ADMIN — POLYETHYLENE GLYCOL 3350 17 GRAM(S): 17 POWDER, FOR SOLUTION ORAL at 13:45

## 2022-01-01 RX ADMIN — Medication 1 APPLICATION(S): at 21:21

## 2022-01-01 RX ADMIN — CHLORHEXIDINE GLUCONATE 1 APPLICATION(S): 213 SOLUTION TOPICAL at 11:10

## 2022-01-01 RX ADMIN — Medication 3 MILLIGRAM(S): at 21:39

## 2022-01-01 RX ADMIN — PREGABALIN 1000 MICROGRAM(S): 225 CAPSULE ORAL at 12:13

## 2022-01-01 RX ADMIN — PREGABALIN 1000 MICROGRAM(S): 225 CAPSULE ORAL at 11:46

## 2022-01-01 RX ADMIN — MEMANTINE HYDROCHLORIDE 10 MILLIGRAM(S): 10 TABLET ORAL at 06:31

## 2022-01-01 RX ADMIN — FINASTERIDE 5 MILLIGRAM(S): 5 TABLET, FILM COATED ORAL at 12:39

## 2022-01-01 RX ADMIN — BUMETANIDE 0.5 MILLIGRAM(S): 0.25 INJECTION INTRAMUSCULAR; INTRAVENOUS at 05:53

## 2022-01-01 RX ADMIN — MEMANTINE HYDROCHLORIDE 10 MILLIGRAM(S): 10 TABLET ORAL at 17:37

## 2022-01-01 RX ADMIN — DONEPEZIL HYDROCHLORIDE 5 MILLIGRAM(S): 10 TABLET, FILM COATED ORAL at 21:52

## 2022-01-01 RX ADMIN — QUETIAPINE FUMARATE 12.5 MILLIGRAM(S): 200 TABLET, FILM COATED ORAL at 03:15

## 2022-01-01 RX ADMIN — Medication 81 MILLIGRAM(S): at 12:10

## 2022-01-01 RX ADMIN — PANTOPRAZOLE SODIUM 40 MILLIGRAM(S): 20 TABLET, DELAYED RELEASE ORAL at 08:00

## 2022-01-01 RX ADMIN — Medication 1 TABLET(S): at 18:06

## 2022-01-01 RX ADMIN — Medication 81 MILLIGRAM(S): at 11:09

## 2022-01-01 RX ADMIN — ATORVASTATIN CALCIUM 10 MILLIGRAM(S): 80 TABLET, FILM COATED ORAL at 21:23

## 2022-01-01 RX ADMIN — ZINC SULFATE TAB 220 MG (50 MG ZINC EQUIVALENT) 220 MILLIGRAM(S): 220 (50 ZN) TAB at 13:39

## 2022-01-01 RX ADMIN — BUMETANIDE 0.5 MILLIGRAM(S): 0.25 INJECTION INTRAMUSCULAR; INTRAVENOUS at 06:32

## 2022-01-01 RX ADMIN — Medication 1 TABLET(S): at 11:07

## 2022-01-01 RX ADMIN — FINASTERIDE 5 MILLIGRAM(S): 5 TABLET, FILM COATED ORAL at 12:58

## 2022-01-01 RX ADMIN — BUMETANIDE 1 MILLIGRAM(S): 0.25 INJECTION INTRAMUSCULAR; INTRAVENOUS at 05:22

## 2022-01-01 RX ADMIN — BUMETANIDE 1 MILLIGRAM(S): 0.25 INJECTION INTRAMUSCULAR; INTRAVENOUS at 05:51

## 2022-01-01 RX ADMIN — QUETIAPINE FUMARATE 12.5 MILLIGRAM(S): 200 TABLET, FILM COATED ORAL at 21:38

## 2022-01-01 RX ADMIN — Medication 3 MILLIGRAM(S): at 22:54

## 2022-01-01 RX ADMIN — PANTOPRAZOLE SODIUM 40 MILLIGRAM(S): 20 TABLET, DELAYED RELEASE ORAL at 05:53

## 2022-01-01 RX ADMIN — MUPIROCIN 1 APPLICATION(S): 20 OINTMENT TOPICAL at 17:42

## 2022-01-01 RX ADMIN — MEMANTINE HYDROCHLORIDE 10 MILLIGRAM(S): 10 TABLET ORAL at 05:39

## 2022-01-01 RX ADMIN — Medication 500 MILLIGRAM(S): at 12:11

## 2022-01-01 RX ADMIN — AMPICILLIN SODIUM AND SULBACTAM SODIUM 200 GRAM(S): 250; 125 INJECTION, POWDER, FOR SUSPENSION INTRAMUSCULAR; INTRAVENOUS at 18:58

## 2022-01-01 RX ADMIN — MUPIROCIN 1 APPLICATION(S): 20 OINTMENT TOPICAL at 05:26

## 2022-01-01 RX ADMIN — DONEPEZIL HYDROCHLORIDE 5 MILLIGRAM(S): 10 TABLET, FILM COATED ORAL at 21:20

## 2022-01-01 RX ADMIN — ENOXAPARIN SODIUM 40 MILLIGRAM(S): 100 INJECTION SUBCUTANEOUS at 11:16

## 2022-01-01 RX ADMIN — QUETIAPINE FUMARATE 25 MILLIGRAM(S): 200 TABLET, FILM COATED ORAL at 22:08

## 2022-01-01 RX ADMIN — Medication 500 MILLIGRAM(S): at 12:10

## 2022-01-01 RX ADMIN — CHLORHEXIDINE GLUCONATE 1 APPLICATION(S): 213 SOLUTION TOPICAL at 12:19

## 2022-01-01 RX ADMIN — Medication 25 MILLIGRAM(S): at 12:58

## 2022-01-01 RX ADMIN — DONEPEZIL HYDROCHLORIDE 5 MILLIGRAM(S): 10 TABLET, FILM COATED ORAL at 21:26

## 2022-01-01 RX ADMIN — PANTOPRAZOLE SODIUM 40 MILLIGRAM(S): 20 TABLET, DELAYED RELEASE ORAL at 05:38

## 2022-01-01 RX ADMIN — MEMANTINE HYDROCHLORIDE 10 MILLIGRAM(S): 10 TABLET ORAL at 05:59

## 2022-01-01 RX ADMIN — CHLORHEXIDINE GLUCONATE 1 APPLICATION(S): 213 SOLUTION TOPICAL at 11:32

## 2022-01-01 RX ADMIN — QUETIAPINE FUMARATE 25 MILLIGRAM(S): 200 TABLET, FILM COATED ORAL at 21:11

## 2022-01-01 RX ADMIN — Medication 100 MILLIGRAM(S): at 13:35

## 2022-01-01 RX ADMIN — BUMETANIDE 1 MILLIGRAM(S): 0.25 INJECTION INTRAMUSCULAR; INTRAVENOUS at 23:10

## 2022-01-01 RX ADMIN — Medication 25 MILLIGRAM(S): at 06:31

## 2022-01-01 RX ADMIN — AMPICILLIN SODIUM AND SULBACTAM SODIUM 200 GRAM(S): 250; 125 INJECTION, POWDER, FOR SUSPENSION INTRAMUSCULAR; INTRAVENOUS at 00:00

## 2022-01-01 RX ADMIN — Medication 1 TABLET(S): at 05:57

## 2022-01-01 RX ADMIN — Medication 25 MILLIGRAM(S): at 05:47

## 2022-01-01 RX ADMIN — MIRTAZAPINE 7.5 MILLIGRAM(S): 45 TABLET, ORALLY DISINTEGRATING ORAL at 22:08

## 2022-01-01 RX ADMIN — POLYETHYLENE GLYCOL 3350 17 GRAM(S): 17 POWDER, FOR SOLUTION ORAL at 11:19

## 2022-01-01 RX ADMIN — Medication 25 MILLIGRAM(S): at 05:21

## 2022-01-01 RX ADMIN — MEMANTINE HYDROCHLORIDE 10 MILLIGRAM(S): 10 TABLET ORAL at 05:45

## 2022-01-01 RX ADMIN — Medication 1 TABLET(S): at 12:10

## 2022-01-01 RX ADMIN — PANTOPRAZOLE SODIUM 40 MILLIGRAM(S): 20 TABLET, DELAYED RELEASE ORAL at 05:48

## 2022-01-01 RX ADMIN — DONEPEZIL HYDROCHLORIDE 5 MILLIGRAM(S): 10 TABLET, FILM COATED ORAL at 03:15

## 2022-01-01 RX ADMIN — ZINC SULFATE TAB 220 MG (50 MG ZINC EQUIVALENT) 220 MILLIGRAM(S): 220 (50 ZN) TAB at 13:35

## 2022-01-01 RX ADMIN — MEMANTINE HYDROCHLORIDE 10 MILLIGRAM(S): 10 TABLET ORAL at 16:59

## 2022-01-01 RX ADMIN — BUMETANIDE 1 MILLIGRAM(S): 0.25 INJECTION INTRAMUSCULAR; INTRAVENOUS at 09:08

## 2022-01-01 RX ADMIN — MIRTAZAPINE 7.5 MILLIGRAM(S): 45 TABLET, ORALLY DISINTEGRATING ORAL at 21:12

## 2022-01-01 RX ADMIN — CHLORHEXIDINE GLUCONATE 1 APPLICATION(S): 213 SOLUTION TOPICAL at 13:42

## 2022-01-01 RX ADMIN — MEMANTINE HYDROCHLORIDE 10 MILLIGRAM(S): 10 TABLET ORAL at 05:51

## 2022-01-01 RX ADMIN — Medication 100 MILLIGRAM(S): at 11:06

## 2022-01-01 RX ADMIN — Medication 25 MILLIGRAM(S): at 05:25

## 2022-01-01 RX ADMIN — PREGABALIN 1000 MICROGRAM(S): 225 CAPSULE ORAL at 11:15

## 2022-01-01 RX ADMIN — Medication 500 MILLIGRAM(S): at 13:34

## 2022-01-01 RX ADMIN — Medication 25 MILLIGRAM(S): at 05:31

## 2022-01-01 RX ADMIN — Medication 1 APPLICATION(S): at 22:00

## 2022-01-01 RX ADMIN — FINASTERIDE 5 MILLIGRAM(S): 5 TABLET, FILM COATED ORAL at 11:59

## 2022-01-01 RX ADMIN — FINASTERIDE 5 MILLIGRAM(S): 5 TABLET, FILM COATED ORAL at 14:06

## 2022-01-01 RX ADMIN — Medication 100 MILLIGRAM(S): at 12:40

## 2022-01-01 RX ADMIN — ATORVASTATIN CALCIUM 10 MILLIGRAM(S): 80 TABLET, FILM COATED ORAL at 21:06

## 2022-01-01 RX ADMIN — ATORVASTATIN CALCIUM 10 MILLIGRAM(S): 80 TABLET, FILM COATED ORAL at 21:43

## 2022-01-01 RX ADMIN — FINASTERIDE 5 MILLIGRAM(S): 5 TABLET, FILM COATED ORAL at 12:10

## 2022-01-01 RX ADMIN — PREGABALIN 1000 MICROGRAM(S): 225 CAPSULE ORAL at 11:59

## 2022-01-01 RX ADMIN — ENOXAPARIN SODIUM 40 MILLIGRAM(S): 100 INJECTION SUBCUTANEOUS at 05:38

## 2022-01-01 RX ADMIN — PIPERACILLIN AND TAZOBACTAM 25 GRAM(S): 4; .5 INJECTION, POWDER, LYOPHILIZED, FOR SOLUTION INTRAVENOUS at 18:16

## 2022-01-01 RX ADMIN — ATORVASTATIN CALCIUM 10 MILLIGRAM(S): 80 TABLET, FILM COATED ORAL at 21:27

## 2022-01-01 RX ADMIN — FINASTERIDE 5 MILLIGRAM(S): 5 TABLET, FILM COATED ORAL at 11:38

## 2022-01-01 RX ADMIN — MIRTAZAPINE 7.5 MILLIGRAM(S): 45 TABLET, ORALLY DISINTEGRATING ORAL at 03:15

## 2022-01-01 RX ADMIN — Medication 1 APPLICATION(S): at 13:00

## 2022-01-01 RX ADMIN — Medication 25 MILLIGRAM(S): at 05:51

## 2022-01-01 RX ADMIN — Medication 3 MILLIGRAM(S): at 03:15

## 2022-01-01 RX ADMIN — DONEPEZIL HYDROCHLORIDE 5 MILLIGRAM(S): 10 TABLET, FILM COATED ORAL at 22:04

## 2022-01-01 RX ADMIN — Medication 81 MILLIGRAM(S): at 12:14

## 2022-01-01 RX ADMIN — BUMETANIDE 0.5 MILLIGRAM(S): 0.25 INJECTION INTRAMUSCULAR; INTRAVENOUS at 05:38

## 2022-01-01 RX ADMIN — Medication 3 MILLIGRAM(S): at 23:09

## 2022-01-01 RX ADMIN — PANTOPRAZOLE SODIUM 40 MILLIGRAM(S): 20 TABLET, DELAYED RELEASE ORAL at 05:52

## 2022-01-01 RX ADMIN — ATORVASTATIN CALCIUM 10 MILLIGRAM(S): 80 TABLET, FILM COATED ORAL at 23:07

## 2022-01-01 RX ADMIN — ZINC SULFATE TAB 220 MG (50 MG ZINC EQUIVALENT) 220 MILLIGRAM(S): 220 (50 ZN) TAB at 13:33

## 2022-01-01 RX ADMIN — MEMANTINE HYDROCHLORIDE 10 MILLIGRAM(S): 10 TABLET ORAL at 05:53

## 2022-01-01 RX ADMIN — Medication 81 MILLIGRAM(S): at 11:25

## 2022-01-01 RX ADMIN — ENOXAPARIN SODIUM 40 MILLIGRAM(S): 100 INJECTION SUBCUTANEOUS at 06:41

## 2022-01-01 RX ADMIN — CHLORHEXIDINE GLUCONATE 1 APPLICATION(S): 213 SOLUTION TOPICAL at 15:52

## 2022-01-01 RX ADMIN — FINASTERIDE 5 MILLIGRAM(S): 5 TABLET, FILM COATED ORAL at 13:38

## 2022-01-01 RX ADMIN — CHLORHEXIDINE GLUCONATE 1 APPLICATION(S): 213 SOLUTION TOPICAL at 22:51

## 2022-01-01 RX ADMIN — ENOXAPARIN SODIUM 40 MILLIGRAM(S): 100 INJECTION SUBCUTANEOUS at 11:46

## 2022-01-01 RX ADMIN — Medication 1 APPLICATION(S): at 12:19

## 2022-01-01 RX ADMIN — BUMETANIDE 1 MILLIGRAM(S): 0.25 INJECTION INTRAMUSCULAR; INTRAVENOUS at 05:37

## 2022-01-01 RX ADMIN — DONEPEZIL HYDROCHLORIDE 5 MILLIGRAM(S): 10 TABLET, FILM COATED ORAL at 22:01

## 2022-01-01 RX ADMIN — MIRTAZAPINE 7.5 MILLIGRAM(S): 45 TABLET, ORALLY DISINTEGRATING ORAL at 21:19

## 2022-01-01 RX ADMIN — MIRTAZAPINE 7.5 MILLIGRAM(S): 45 TABLET, ORALLY DISINTEGRATING ORAL at 22:48

## 2022-01-01 RX ADMIN — ENOXAPARIN SODIUM 40 MILLIGRAM(S): 100 INJECTION SUBCUTANEOUS at 06:28

## 2022-01-01 RX ADMIN — Medication 100 MILLIGRAM(S): at 11:25

## 2022-01-01 RX ADMIN — PREGABALIN 1000 MICROGRAM(S): 225 CAPSULE ORAL at 12:09

## 2022-01-01 RX ADMIN — Medication 1 TABLET(S): at 05:47

## 2022-01-01 RX ADMIN — Medication 81 MILLIGRAM(S): at 12:17

## 2022-01-01 RX ADMIN — FINASTERIDE 5 MILLIGRAM(S): 5 TABLET, FILM COATED ORAL at 11:46

## 2022-01-01 RX ADMIN — FINASTERIDE 5 MILLIGRAM(S): 5 TABLET, FILM COATED ORAL at 12:01

## 2022-01-01 RX ADMIN — Medication 100 MILLIGRAM(S): at 12:58

## 2022-01-01 RX ADMIN — Medication 100 MILLIGRAM(S): at 11:34

## 2022-01-01 RX ADMIN — Medication 25 MILLIGRAM(S): at 05:53

## 2022-01-01 RX ADMIN — CHLORHEXIDINE GLUCONATE 1 APPLICATION(S): 213 SOLUTION TOPICAL at 12:13

## 2022-01-01 RX ADMIN — BUMETANIDE 0.5 MILLIGRAM(S): 0.25 INJECTION INTRAMUSCULAR; INTRAVENOUS at 06:27

## 2022-01-01 RX ADMIN — Medication 500 MILLIGRAM(S): at 14:06

## 2022-01-01 RX ADMIN — Medication 1 TABLET(S): at 05:51

## 2022-01-01 RX ADMIN — MEMANTINE HYDROCHLORIDE 10 MILLIGRAM(S): 10 TABLET ORAL at 05:52

## 2022-01-01 RX ADMIN — MEMANTINE HYDROCHLORIDE 10 MILLIGRAM(S): 10 TABLET ORAL at 17:09

## 2022-01-01 RX ADMIN — PIPERACILLIN AND TAZOBACTAM 25 GRAM(S): 4; .5 INJECTION, POWDER, LYOPHILIZED, FOR SOLUTION INTRAVENOUS at 05:37

## 2022-01-01 RX ADMIN — Medication 81 MILLIGRAM(S): at 11:33

## 2022-01-01 RX ADMIN — MEMANTINE HYDROCHLORIDE 10 MILLIGRAM(S): 10 TABLET ORAL at 17:52

## 2022-01-01 RX ADMIN — ATORVASTATIN CALCIUM 10 MILLIGRAM(S): 80 TABLET, FILM COATED ORAL at 21:38

## 2022-01-01 RX ADMIN — CHLORHEXIDINE GLUCONATE 1 APPLICATION(S): 213 SOLUTION TOPICAL at 10:00

## 2022-01-01 RX ADMIN — MEMANTINE HYDROCHLORIDE 10 MILLIGRAM(S): 10 TABLET ORAL at 06:28

## 2022-01-01 RX ADMIN — MEMANTINE HYDROCHLORIDE 10 MILLIGRAM(S): 10 TABLET ORAL at 05:49

## 2022-01-01 RX ADMIN — PREGABALIN 1000 MICROGRAM(S): 225 CAPSULE ORAL at 12:39

## 2022-01-01 RX ADMIN — BUMETANIDE 1 MILLIGRAM(S): 0.25 INJECTION INTRAMUSCULAR; INTRAVENOUS at 20:06

## 2022-01-01 RX ADMIN — DONEPEZIL HYDROCHLORIDE 5 MILLIGRAM(S): 10 TABLET, FILM COATED ORAL at 22:09

## 2022-01-01 RX ADMIN — ENOXAPARIN SODIUM 40 MILLIGRAM(S): 100 INJECTION SUBCUTANEOUS at 05:53

## 2022-01-01 RX ADMIN — PANTOPRAZOLE SODIUM 40 MILLIGRAM(S): 20 TABLET, DELAYED RELEASE ORAL at 05:45

## 2022-01-01 RX ADMIN — BUMETANIDE 1 MILLIGRAM(S): 0.25 INJECTION INTRAMUSCULAR; INTRAVENOUS at 05:52

## 2022-01-01 RX ADMIN — FINASTERIDE 5 MILLIGRAM(S): 5 TABLET, FILM COATED ORAL at 12:17

## 2022-01-01 RX ADMIN — QUETIAPINE FUMARATE 12.5 MILLIGRAM(S): 200 TABLET, FILM COATED ORAL at 04:52

## 2022-01-01 RX ADMIN — MEMANTINE HYDROCHLORIDE 10 MILLIGRAM(S): 10 TABLET ORAL at 18:17

## 2022-01-01 RX ADMIN — MEMANTINE HYDROCHLORIDE 10 MILLIGRAM(S): 10 TABLET ORAL at 17:10

## 2022-01-01 RX ADMIN — Medication 500 MILLIGRAM(S): at 13:32

## 2022-01-01 RX ADMIN — PREGABALIN 1000 MICROGRAM(S): 225 CAPSULE ORAL at 12:58

## 2022-01-01 RX ADMIN — ENOXAPARIN SODIUM 40 MILLIGRAM(S): 100 INJECTION SUBCUTANEOUS at 12:16

## 2022-01-01 RX ADMIN — BUMETANIDE 0.5 MILLIGRAM(S): 0.25 INJECTION INTRAMUSCULAR; INTRAVENOUS at 05:35

## 2022-01-01 RX ADMIN — MEMANTINE HYDROCHLORIDE 10 MILLIGRAM(S): 10 TABLET ORAL at 05:36

## 2022-01-01 RX ADMIN — PANTOPRAZOLE SODIUM 40 MILLIGRAM(S): 20 TABLET, DELAYED RELEASE ORAL at 05:51

## 2022-01-01 RX ADMIN — MEMANTINE HYDROCHLORIDE 10 MILLIGRAM(S): 10 TABLET ORAL at 17:01

## 2022-01-01 RX ADMIN — Medication 100 MILLIGRAM(S): at 12:17

## 2022-01-01 RX ADMIN — Medication 81 MILLIGRAM(S): at 13:34

## 2022-01-01 RX ADMIN — Medication 81 MILLIGRAM(S): at 12:58

## 2022-01-01 RX ADMIN — MEMANTINE HYDROCHLORIDE 10 MILLIGRAM(S): 10 TABLET ORAL at 17:03

## 2022-01-01 RX ADMIN — Medication 81 MILLIGRAM(S): at 11:31

## 2022-01-01 RX ADMIN — CHLORHEXIDINE GLUCONATE 1 APPLICATION(S): 213 SOLUTION TOPICAL at 12:36

## 2022-01-01 RX ADMIN — QUETIAPINE FUMARATE 25 MILLIGRAM(S): 200 TABLET, FILM COATED ORAL at 21:38

## 2022-01-01 RX ADMIN — DONEPEZIL HYDROCHLORIDE 5 MILLIGRAM(S): 10 TABLET, FILM COATED ORAL at 22:48

## 2022-01-01 RX ADMIN — Medication 1 APPLICATION(S): at 15:34

## 2022-01-01 RX ADMIN — Medication 20 MILLIEQUIVALENT(S): at 11:33

## 2022-01-01 RX ADMIN — MIRTAZAPINE 7.5 MILLIGRAM(S): 45 TABLET, ORALLY DISINTEGRATING ORAL at 21:30

## 2022-01-01 RX ADMIN — DONEPEZIL HYDROCHLORIDE 5 MILLIGRAM(S): 10 TABLET, FILM COATED ORAL at 21:06

## 2022-01-01 RX ADMIN — PANTOPRAZOLE SODIUM 40 MILLIGRAM(S): 20 TABLET, DELAYED RELEASE ORAL at 06:28

## 2022-01-01 RX ADMIN — MIRTAZAPINE 7.5 MILLIGRAM(S): 45 TABLET, ORALLY DISINTEGRATING ORAL at 22:03

## 2022-01-01 RX ADMIN — DONEPEZIL HYDROCHLORIDE 5 MILLIGRAM(S): 10 TABLET, FILM COATED ORAL at 22:54

## 2022-01-01 RX ADMIN — QUETIAPINE FUMARATE 12.5 MILLIGRAM(S): 200 TABLET, FILM COATED ORAL at 13:57

## 2022-01-01 RX ADMIN — Medication 650 MILLIGRAM(S): at 13:00

## 2022-01-01 RX ADMIN — Medication 3 MILLIGRAM(S): at 21:27

## 2022-01-01 RX ADMIN — CHLORHEXIDINE GLUCONATE 1 APPLICATION(S): 213 SOLUTION TOPICAL at 10:32

## 2022-01-01 RX ADMIN — Medication 3 MILLIGRAM(S): at 21:20

## 2022-01-01 RX ADMIN — Medication 25 MILLIGRAM(S): at 05:52

## 2022-01-01 RX ADMIN — PANTOPRAZOLE SODIUM 40 MILLIGRAM(S): 20 TABLET, DELAYED RELEASE ORAL at 05:31

## 2022-01-01 RX ADMIN — ATORVASTATIN CALCIUM 10 MILLIGRAM(S): 80 TABLET, FILM COATED ORAL at 22:01

## 2022-01-01 RX ADMIN — ATORVASTATIN CALCIUM 10 MILLIGRAM(S): 80 TABLET, FILM COATED ORAL at 21:52

## 2022-01-01 RX ADMIN — Medication 1 TABLET(S): at 13:34

## 2022-01-01 RX ADMIN — Medication 3 MILLIGRAM(S): at 22:50

## 2022-01-01 RX ADMIN — ATORVASTATIN CALCIUM 10 MILLIGRAM(S): 80 TABLET, FILM COATED ORAL at 03:14

## 2022-01-01 RX ADMIN — DONEPEZIL HYDROCHLORIDE 5 MILLIGRAM(S): 10 TABLET, FILM COATED ORAL at 21:24

## 2022-01-01 RX ADMIN — PREGABALIN 1000 MICROGRAM(S): 225 CAPSULE ORAL at 13:31

## 2022-01-01 RX ADMIN — BUMETANIDE 1 MILLIGRAM(S): 0.25 INJECTION INTRAMUSCULAR; INTRAVENOUS at 05:29

## 2022-01-01 RX ADMIN — DONEPEZIL HYDROCHLORIDE 5 MILLIGRAM(S): 10 TABLET, FILM COATED ORAL at 22:42

## 2022-01-01 RX ADMIN — Medication 81 MILLIGRAM(S): at 12:01

## 2022-01-01 RX ADMIN — FINASTERIDE 5 MILLIGRAM(S): 5 TABLET, FILM COATED ORAL at 11:33

## 2022-01-01 RX ADMIN — POLYETHYLENE GLYCOL 3350 17 GRAM(S): 17 POWDER, FOR SOLUTION ORAL at 12:00

## 2022-01-01 RX ADMIN — ENOXAPARIN SODIUM 40 MILLIGRAM(S): 100 INJECTION SUBCUTANEOUS at 12:46

## 2022-01-01 RX ADMIN — PREGABALIN 1000 MICROGRAM(S): 225 CAPSULE ORAL at 13:33

## 2022-01-01 RX ADMIN — BACITRACIN ZINC NEOMYCIN SULFATE POLYMYXIN B SULFATE 1 APPLICATION(S): 400; 3.5; 5 OINTMENT TOPICAL at 00:13

## 2022-01-01 RX ADMIN — QUETIAPINE FUMARATE 12.5 MILLIGRAM(S): 200 TABLET, FILM COATED ORAL at 21:27

## 2022-01-01 RX ADMIN — PREGABALIN 1000 MICROGRAM(S): 225 CAPSULE ORAL at 14:06

## 2022-01-01 RX ADMIN — FINASTERIDE 5 MILLIGRAM(S): 5 TABLET, FILM COATED ORAL at 11:07

## 2022-01-01 RX ADMIN — Medication 3 MILLIGRAM(S): at 22:09

## 2022-01-01 RX ADMIN — QUETIAPINE FUMARATE 25 MILLIGRAM(S): 200 TABLET, FILM COATED ORAL at 22:53

## 2022-01-01 RX ADMIN — Medication 25 MILLIGRAM(S): at 05:35

## 2022-01-01 RX ADMIN — CHLORHEXIDINE GLUCONATE 1 APPLICATION(S): 213 SOLUTION TOPICAL at 15:34

## 2022-01-01 RX ADMIN — SODIUM CHLORIDE 50 MILLILITER(S): 9 INJECTION, SOLUTION INTRAVENOUS at 07:53

## 2022-01-01 RX ADMIN — Medication 1 APPLICATION(S): at 11:32

## 2022-01-01 RX ADMIN — ZINC SULFATE TAB 220 MG (50 MG ZINC EQUIVALENT) 220 MILLIGRAM(S): 220 (50 ZN) TAB at 12:18

## 2022-01-01 RX ADMIN — CEFTRIAXONE 100 MILLIGRAM(S): 500 INJECTION, POWDER, FOR SOLUTION INTRAMUSCULAR; INTRAVENOUS at 05:37

## 2022-01-01 RX ADMIN — CHLORHEXIDINE GLUCONATE 1 APPLICATION(S): 213 SOLUTION TOPICAL at 05:25

## 2022-01-01 RX ADMIN — Medication 3 MILLIGRAM(S): at 21:24

## 2022-01-01 RX ADMIN — Medication 25 MILLIGRAM(S): at 05:59

## 2022-01-01 RX ADMIN — ENOXAPARIN SODIUM 40 MILLIGRAM(S): 100 INJECTION SUBCUTANEOUS at 05:35

## 2022-01-01 RX ADMIN — CHLORHEXIDINE GLUCONATE 1 APPLICATION(S): 213 SOLUTION TOPICAL at 07:03

## 2022-01-01 RX ADMIN — Medication 25 MILLIGRAM(S): at 05:37

## 2022-01-01 RX ADMIN — Medication 81 MILLIGRAM(S): at 13:00

## 2022-01-01 RX ADMIN — Medication 1 TABLET(S): at 18:39

## 2022-01-01 RX ADMIN — ZINC SULFATE TAB 220 MG (50 MG ZINC EQUIVALENT) 220 MILLIGRAM(S): 220 (50 ZN) TAB at 15:33

## 2022-01-01 RX ADMIN — Medication 1 TABLET(S): at 05:37

## 2022-01-01 RX ADMIN — FINASTERIDE 5 MILLIGRAM(S): 5 TABLET, FILM COATED ORAL at 13:37

## 2022-01-01 RX ADMIN — Medication 100 MILLIGRAM(S): at 12:10

## 2022-01-01 RX ADMIN — Medication 81 MILLIGRAM(S): at 15:34

## 2022-01-01 RX ADMIN — PREGABALIN 1000 MICROGRAM(S): 225 CAPSULE ORAL at 12:18

## 2022-01-01 RX ADMIN — ENOXAPARIN SODIUM 40 MILLIGRAM(S): 100 INJECTION SUBCUTANEOUS at 05:51

## 2022-01-01 RX ADMIN — QUETIAPINE FUMARATE 12.5 MILLIGRAM(S): 200 TABLET, FILM COATED ORAL at 21:52

## 2022-01-01 RX ADMIN — Medication 650 MILLIGRAM(S): at 14:00

## 2022-01-01 RX ADMIN — BUMETANIDE 1 MILLIGRAM(S): 0.25 INJECTION INTRAMUSCULAR; INTRAVENOUS at 05:47

## 2022-01-01 RX ADMIN — Medication 1 APPLICATION(S): at 11:47

## 2022-01-01 RX ADMIN — Medication 3 MILLIGRAM(S): at 21:52

## 2022-01-01 RX ADMIN — MEMANTINE HYDROCHLORIDE 10 MILLIGRAM(S): 10 TABLET ORAL at 18:25

## 2022-01-01 RX ADMIN — Medication 81 MILLIGRAM(S): at 11:16

## 2022-01-01 RX ADMIN — Medication 25 MILLIGRAM(S): at 05:38

## 2022-01-01 RX ADMIN — QUETIAPINE FUMARATE 12.5 MILLIGRAM(S): 200 TABLET, FILM COATED ORAL at 00:14

## 2022-01-01 RX ADMIN — QUETIAPINE FUMARATE 12.5 MILLIGRAM(S): 200 TABLET, FILM COATED ORAL at 23:08

## 2022-01-01 RX ADMIN — Medication 100 MILLIGRAM(S): at 12:13

## 2022-01-01 RX ADMIN — Medication 1 APPLICATION(S): at 12:12

## 2022-01-01 RX ADMIN — ENOXAPARIN SODIUM 40 MILLIGRAM(S): 100 INJECTION SUBCUTANEOUS at 05:52

## 2022-01-01 RX ADMIN — HALOPERIDOL DECANOATE 0.5 MILLIGRAM(S): 100 INJECTION INTRAMUSCULAR at 18:41

## 2022-01-01 RX ADMIN — Medication 81 MILLIGRAM(S): at 12:40

## 2022-01-01 RX ADMIN — CEFTRIAXONE 100 MILLIGRAM(S): 500 INJECTION, POWDER, FOR SOLUTION INTRAMUSCULAR; INTRAVENOUS at 06:41

## 2022-01-01 RX ADMIN — CHLORHEXIDINE GLUCONATE 1 APPLICATION(S): 213 SOLUTION TOPICAL at 11:17

## 2022-01-01 RX ADMIN — Medication 40 MILLIEQUIVALENT(S): at 13:40

## 2022-01-01 RX ADMIN — QUETIAPINE FUMARATE 25 MILLIGRAM(S): 200 TABLET, FILM COATED ORAL at 21:19

## 2022-01-01 RX ADMIN — PANTOPRAZOLE SODIUM 40 MILLIGRAM(S): 20 TABLET, DELAYED RELEASE ORAL at 05:35

## 2022-01-01 RX ADMIN — Medication 25 MILLIGRAM(S): at 09:35

## 2022-01-01 RX ADMIN — Medication 25 MILLIGRAM(S): at 06:29

## 2022-01-01 RX ADMIN — Medication 100 MILLIGRAM(S): at 11:59

## 2022-01-01 RX ADMIN — Medication 100 MILLIGRAM(S): at 11:31

## 2022-01-01 RX ADMIN — MEMANTINE HYDROCHLORIDE 10 MILLIGRAM(S): 10 TABLET ORAL at 18:07

## 2022-01-01 RX ADMIN — Medication 100 MILLIGRAM(S): at 11:27

## 2022-01-01 RX ADMIN — Medication 1 APPLICATION(S): at 12:10

## 2022-01-01 RX ADMIN — MEMANTINE HYDROCHLORIDE 10 MILLIGRAM(S): 10 TABLET ORAL at 05:25

## 2022-01-01 RX ADMIN — MEMANTINE HYDROCHLORIDE 10 MILLIGRAM(S): 10 TABLET ORAL at 17:29

## 2022-01-01 RX ADMIN — Medication 25 MILLIGRAM(S): at 05:46

## 2022-01-01 RX ADMIN — Medication 25 MILLIGRAM(S): at 05:36

## 2022-01-01 RX ADMIN — ZINC SULFATE TAB 220 MG (50 MG ZINC EQUIVALENT) 220 MILLIGRAM(S): 220 (50 ZN) TAB at 11:07

## 2022-01-01 RX ADMIN — Medication 1 TABLET(S): at 12:17

## 2022-01-01 RX ADMIN — MEMANTINE HYDROCHLORIDE 10 MILLIGRAM(S): 10 TABLET ORAL at 17:05

## 2022-01-01 RX ADMIN — BUMETANIDE 0.5 MILLIGRAM(S): 0.25 INJECTION INTRAMUSCULAR; INTRAVENOUS at 15:56

## 2022-01-01 RX ADMIN — Medication 81 MILLIGRAM(S): at 13:38

## 2022-01-01 RX ADMIN — PANTOPRAZOLE SODIUM 40 MILLIGRAM(S): 20 TABLET, DELAYED RELEASE ORAL at 07:29

## 2022-01-01 RX ADMIN — ATORVASTATIN CALCIUM 10 MILLIGRAM(S): 80 TABLET, FILM COATED ORAL at 22:48

## 2022-01-01 RX ADMIN — POLYETHYLENE GLYCOL 3350 17 GRAM(S): 17 POWDER, FOR SOLUTION ORAL at 13:35

## 2022-01-01 RX ADMIN — PIPERACILLIN AND TAZOBACTAM 25 GRAM(S): 4; .5 INJECTION, POWDER, LYOPHILIZED, FOR SOLUTION INTRAVENOUS at 21:23

## 2022-01-01 RX ADMIN — PREGABALIN 1000 MICROGRAM(S): 225 CAPSULE ORAL at 12:11

## 2022-01-01 RX ADMIN — ATORVASTATIN CALCIUM 10 MILLIGRAM(S): 80 TABLET, FILM COATED ORAL at 21:12

## 2022-01-01 RX ADMIN — PREGABALIN 1000 MICROGRAM(S): 225 CAPSULE ORAL at 11:30

## 2022-01-01 RX ADMIN — Medication 1 TABLET(S): at 12:11

## 2022-01-01 RX ADMIN — DONEPEZIL HYDROCHLORIDE 5 MILLIGRAM(S): 10 TABLET, FILM COATED ORAL at 21:38

## 2022-01-01 RX ADMIN — FINASTERIDE 5 MILLIGRAM(S): 5 TABLET, FILM COATED ORAL at 11:16

## 2022-01-01 RX ADMIN — MEMANTINE HYDROCHLORIDE 10 MILLIGRAM(S): 10 TABLET ORAL at 05:38

## 2022-01-01 RX ADMIN — Medication 100 MILLIGRAM(S): at 11:46

## 2022-01-01 RX ADMIN — PREGABALIN 1000 MICROGRAM(S): 225 CAPSULE ORAL at 11:25

## 2022-01-01 RX ADMIN — ATORVASTATIN CALCIUM 10 MILLIGRAM(S): 80 TABLET, FILM COATED ORAL at 22:09

## 2022-01-01 RX ADMIN — Medication 1 APPLICATION(S): at 11:14

## 2022-01-01 RX ADMIN — PIPERACILLIN AND TAZOBACTAM 200 GRAM(S): 4; .5 INJECTION, POWDER, LYOPHILIZED, FOR SOLUTION INTRAVENOUS at 14:21

## 2022-01-01 RX ADMIN — ZINC SULFATE TAB 220 MG (50 MG ZINC EQUIVALENT) 220 MILLIGRAM(S): 220 (50 ZN) TAB at 12:15

## 2022-01-01 RX ADMIN — PANTOPRAZOLE SODIUM 40 MILLIGRAM(S): 20 TABLET, DELAYED RELEASE ORAL at 05:46

## 2022-01-01 RX ADMIN — PREGABALIN 1000 MICROGRAM(S): 225 CAPSULE ORAL at 11:33

## 2022-01-01 RX ADMIN — Medication 100 MILLIGRAM(S): at 13:33

## 2022-01-01 RX ADMIN — BUMETANIDE 0.5 MILLIGRAM(S): 0.25 INJECTION INTRAMUSCULAR; INTRAVENOUS at 15:53

## 2022-01-01 RX ADMIN — MIRTAZAPINE 7.5 MILLIGRAM(S): 45 TABLET, ORALLY DISINTEGRATING ORAL at 22:53

## 2022-01-01 RX ADMIN — PIPERACILLIN AND TAZOBACTAM 25 GRAM(S): 4; .5 INJECTION, POWDER, LYOPHILIZED, FOR SOLUTION INTRAVENOUS at 17:47

## 2022-01-01 RX ADMIN — QUETIAPINE FUMARATE 12.5 MILLIGRAM(S): 200 TABLET, FILM COATED ORAL at 21:29

## 2022-01-01 RX ADMIN — QUETIAPINE FUMARATE 12.5 MILLIGRAM(S): 200 TABLET, FILM COATED ORAL at 21:23

## 2022-01-01 RX ADMIN — ZINC SULFATE TAB 220 MG (50 MG ZINC EQUIVALENT) 220 MILLIGRAM(S): 220 (50 ZN) TAB at 12:09

## 2022-01-01 RX ADMIN — Medication 1 APPLICATION(S): at 12:18

## 2022-01-01 RX ADMIN — Medication 1 TABLET(S): at 13:37

## 2022-01-01 RX ADMIN — FINASTERIDE 5 MILLIGRAM(S): 5 TABLET, FILM COATED ORAL at 11:31

## 2022-01-01 RX ADMIN — Medication 500 MILLIGRAM(S): at 11:31

## 2022-01-01 RX ADMIN — BUMETANIDE 1 MILLIGRAM(S): 0.25 INJECTION INTRAMUSCULAR; INTRAVENOUS at 05:53

## 2022-01-01 RX ADMIN — FINASTERIDE 5 MILLIGRAM(S): 5 TABLET, FILM COATED ORAL at 12:11

## 2022-01-01 RX ADMIN — AMPICILLIN SODIUM AND SULBACTAM SODIUM 200 GRAM(S): 250; 125 INJECTION, POWDER, FOR SUSPENSION INTRAMUSCULAR; INTRAVENOUS at 05:50

## 2022-01-01 RX ADMIN — QUETIAPINE FUMARATE 12.5 MILLIGRAM(S): 200 TABLET, FILM COATED ORAL at 18:08

## 2022-01-01 RX ADMIN — Medication 1 TABLET(S): at 17:01

## 2022-01-01 RX ADMIN — POLYETHYLENE GLYCOL 3350 17 GRAM(S): 17 POWDER, FOR SOLUTION ORAL at 13:42

## 2022-01-01 RX ADMIN — BUMETANIDE 0.5 MILLIGRAM(S): 0.25 INJECTION INTRAMUSCULAR; INTRAVENOUS at 05:59

## 2022-01-01 RX ADMIN — MIRTAZAPINE 7.5 MILLIGRAM(S): 45 TABLET, ORALLY DISINTEGRATING ORAL at 22:00

## 2022-01-01 RX ADMIN — MIRTAZAPINE 7.5 MILLIGRAM(S): 45 TABLET, ORALLY DISINTEGRATING ORAL at 21:52

## 2022-01-01 RX ADMIN — Medication 500 MILLIGRAM(S): at 11:06

## 2022-01-01 RX ADMIN — ENOXAPARIN SODIUM 40 MILLIGRAM(S): 100 INJECTION SUBCUTANEOUS at 11:28

## 2022-01-01 RX ADMIN — PIPERACILLIN AND TAZOBACTAM 25 GRAM(S): 4; .5 INJECTION, POWDER, LYOPHILIZED, FOR SOLUTION INTRAVENOUS at 00:43

## 2022-01-01 RX ADMIN — Medication 81 MILLIGRAM(S): at 11:37

## 2022-01-01 RX ADMIN — MEMANTINE HYDROCHLORIDE 10 MILLIGRAM(S): 10 TABLET ORAL at 05:47

## 2022-01-01 RX ADMIN — Medication 500 MILLIGRAM(S): at 13:39

## 2022-01-01 RX ADMIN — MEMANTINE HYDROCHLORIDE 10 MILLIGRAM(S): 10 TABLET ORAL at 05:31

## 2022-01-01 RX ADMIN — Medication 3 MILLIGRAM(S): at 21:11

## 2022-01-01 RX ADMIN — PIPERACILLIN AND TAZOBACTAM 25 GRAM(S): 4; .5 INJECTION, POWDER, LYOPHILIZED, FOR SOLUTION INTRAVENOUS at 11:30

## 2022-01-01 RX ADMIN — Medication 81 MILLIGRAM(S): at 12:11

## 2022-03-17 NOTE — HISTORY OF PRESENT ILLNESS
[FreeTextEntry1] : long time trouble hearing. he has needed his ears cleaned in the past  he has bilat hearing aidsnn he otherwise feels ok Discharged

## 2022-05-02 NOTE — ED PROVIDER NOTE - OBJECTIVE STATEMENT
89yo male pt with PMHx of Afib (no AC), HTN, BPH, DM2, Dementia was brought to ED by his daughter (Alexia) from [facility] ([phone]) with confusion, not acting like himself, responding to questions, normally some mild interactions A&Ox1 (hard of hearing).

## 2022-05-02 NOTE — ED PROVIDER NOTE - ATTENDING CONTRIBUTION TO CARE
89yr M hx of dementia, HTN, BPH, DM, afib not on AC w confusion and not at baseline. no report of recent illness, trauma, unable to provide history.   exam as above.   concern for acute infectious etiology vs intra cranial vs metaoblic. will do ct labs, ua and reassess. likely admit.

## 2022-05-02 NOTE — ED PROVIDER NOTE - PROGRESS NOTE DETAILS
Josse Rahman, PGY3: No actionable findings on CT. Labs grossly unremarkable. Possible late repeat of earlier labs for BHB and trop as numbers are similar to initial set. Unclear etiology of AMS. Patient endorsed to hospitalist for admission. Josse Rahman, PGY3: UA w/ large blood. CT A/P ordered.

## 2022-05-02 NOTE — ED ADULT NURSE NOTE - OBJECTIVE STATEMENT
89yo M with PMH AFIB, HTN, BPH, DM2, Dementia, presents to ED via EMS from [facility] c/o AMS. As per EMS, "Daughter was with pt at [facility] and noted he was not acting himself and was confused. Normally he is alert and talkative, and walks with assistance." EMS states "he was not answering any of our questions on assessment, his pupils were pinpoint and sluggish to light, he was breathing shallow and administered Narcan with no improvement." 22G IV placed in left forearm by EMS, NS 0.09% fluids running through IV upon arrival. Patient is responsive to painful stimuli, not answering any questions at this time and is not following commands. Strong peripheral pulses. Breathing spontaneously, shallow, chest rise symmetrical. Lungs clear b/l on auscultation. Abdomen soft and nondistended on palpation. Skin warm, dry, intact, normal for ethnicity. NSR on cardiac monitor. Stretcher in lowest position, side rails up.

## 2022-05-02 NOTE — ED PROVIDER NOTE - CLINICAL SUMMARY MEDICAL DECISION MAKING FREE TEXT BOX
89yo male pt with PMHx of Afib (no AC), HTN, BPH, DM2, Dementia was brought to ED by his daughter (Alexia) from [facility] ([phone]) presents with confusion/AMS. Obtain sepsis DKA labs, r/o stroke bleed, likely admission for AMS. full code

## 2022-05-02 NOTE — ED PROVIDER NOTE - CCCP TRG CHIEF CMPLNT
Janny called asking to fax return to work slip to employee health at [phone] and [facility] at [phone] attn [phone]. She is returning Monday the 11th with NO restrictions.    unresponsive

## 2022-05-02 NOTE — ED PROVIDER NOTE - PHYSICAL EXAMINATION
[Const] appears very frail/elderly, not responding to questions  [HEENT] PERRL, EOMI, moist mucus membranes  [Neck] Supple, trachea midline  [CV] +S1/S2, no m/r/g appreciated  [Lungs] Clear to auscultations bilaterally, no adventitious lung sounds  [Abd] soft, non-tender, nondistended in all 4 quadrants  [MSK] moving all extremities  [Skin] warm, dry, well-perfused  [Neuro] A&Ox0, not answering questions, responds to noxious stimuli

## 2022-05-03 NOTE — H&P ADULT - ASSESSMENT
89yo male pt with PMH of CAD s/p AICD, Afib s/p Watchman (no AC due to hematuria), HTN, BPH, DM2, Dementia, [place] from [facility] brought is by amita Nichols ([facility]) for AMS.

## 2022-05-03 NOTE — H&P ADULT - PROBLEM SELECTOR PLAN 3
s/p AICD - son is unsure if it is b/c of EF or heart rhythm    obtain TTE  continue Aspirin despite hematuria  continue Lipitor 10mg PO qhs

## 2022-05-03 NOTE — H&P ADULT - NSHPLABSRESULTS_GEN_ALL_CORE
Lactate 2.1 trended down to 1.7  Trop 84 trended down to 60 then 90  BNP elevated +12K    CBC and CMP grossly unremarkable    CT Head personally reviewed: No ICH, significant volume loss and chronic lacunar infarcts    CT A/P: BL pleural effusions, prostatomegaly, bladder wall thickening    CXR personally reviewed: AICD in place, mild pulmonary vascular congestion    UA shows hematuria

## 2022-05-03 NOTE — H&P ADULT - CLICK TO LAUNCH ORM
C/o rectal bleeding x 3 days, with headache x 1 day.. Pt describes stool as black tarry, with clots. Pt states she takes iron pills. Denies SOB. Denies fever. A&ox4, no signs of resp distress. Monitor placed.    .

## 2022-05-03 NOTE — CONSULT NOTE ADULT - SUBJECTIVE AND OBJECTIVE BOX
DATE OF SERVICE: 05-03-22 @ 16:35    CHIEF COMPLAINT:Patient is a 89y old  Male who presents with a chief complaint of AMS (03 May 2022 13:41)      HISTORY OF PRESENT ILLNESS:  89yo male pt with PMH of CAD s/p AICD, Afib s/p Watchman (no AC due to hematuria), HTN, BPH, DM2, Dementia, [place] from [facility] brought is by son Simone (HCP) for AMS. On Sunday he was completely normal at his baseline, AOAx3, interactive, ambulates with a walker. Yesterday over the course of the day he became increasingly somnolent and toward the end of the day staff was unable to get him out of his chair so he was brought to the ED. According to his son, he appeared "catatonic" and was completely unresponsive. He reports that in the ED he responded only when the COVID swab was performed and when he was straight cath'd. On my interview he was completely awake, verbal and asking what the plan is. He is extremely hard of hearing thus it is difficult to assess orientation but by discussing with Simone, he appears to be back to baseline. He is speaking in clear, full sentences and appears comfortable. He says he wants to understand me but can't hear. (03 May 2022 12:20)      PAST MEDICAL & SURGICAL HISTORY:  HTN (hypertension)  HLD (hyperlipidemia)  DM (diabetes mellitus)  BPH (benign prostatic hypertrophy)  Insomnia  RBBB  Chrnic atrial fibrillation  Dementia  Unsteady gait  SP shoulder surgery  S/P knee surgery      MEDICATIONS:  aspirin enteric coated 81 milliGRAM(s) Oral daily  buMETAnide Injectable 0.5 milliGRAM(s) IV Push daily  metoprolol succinate ER 25 milliGRAM(s) Oral daily        acetaminophen     Tablet .. 650 milliGRAM(s) Oral every 6 hours PRN  donepezil 5 milliGRAM(s) Oral at bedtime  melatonin 3 milliGRAM(s) Oral at bedtime  memantine 10 milliGRAM(s) Oral two times a day  mirtazapine 7.5 milliGRAM(s) Oral at bedtime  QUEtiapine 12.5 milliGRAM(s) Oral at bedtime      atorvastatin 10 milliGRAM(s) Oral at bedtime  finasteride 5 milliGRAM(s) Oral daily    cyanocobalamin 1000 MICROGram(s) Oral daily  thiamine 100 milliGRAM(s) Oral daily      FAMILY HISTORY:  Family history of diabetes mellitus        Non-contributory    SOCIAL HISTORY:    Not an active smoker  Allergies    No Known Allergies    Intolerances    	    REVIEW OF SYSTEMS:  CONSTITUTIONAL: No fever  EYES: No eye pain, visual disturbances, or discharge  ENMT:  No difficulty hearing, tinnitus  NECK: No pain or stiffness  RESPIRATORY: No cough, wheezing,  CARDIOVASCULAR: No chest pain, palpitations, passing out, dizziness, or leg swelling  GASTROINTESTINAL:  No nausea, vomiting, diarrhea or constipation. No melena.  GENITOURINARY: No dysuria, hematuria  NEUROLOGICAL: No stroke like symptoms  SKIN: No burning or lesions   ENDOCRINE: No heat or cold intolerance  MUSCULOSKELETAL: No joint pain or swelling  PSYCHIATRIC: No  anxiety, mood swings  HEME/LYMPH: No bleeding gums  ALLERGY AND IMMUNOLOGIC: No hives or eczema	    All other ROS negative    PHYSICAL EXAM:  T(C): 36.3 (05-03-22 @ 12:47), Max: 37.1 (05-03-22 @ 00:04)  HR: 85 (05-03-22 @ 12:47) (76 - 86)  BP: 105/82 (05-03-22 @ 12:47) (102/71 - 128/86)  RR: 18 (05-03-22 @ 12:47) (12 - 18)  SpO2: 95% (05-03-22 @ 12:47) (94% - 96%)  Wt(kg): --  I&O's Summary      Appearance: Normal	  HEENT:   Normal oral mucosa, EOMI	  Cardiovascular:  S1 S2, + JVD,    Respiratory: + SOB with minimal exertion  Psychiatry: Alert  Gastrointestinal:  Soft, Non-tender, + BS	  Skin: No rashes   Neurologic: Non-focal  Extremities:  No edema  Vascular: Peripheral pulses palpable    	    	  	  CARDIAC MARKERS:  Labs personally reviewed by me                                  13.0   7.19  )-----------( 124      ( 03 May 2022 13:09 )             41.5     05-03    138  |  112<H>  |  21  ----------------------------<  159<H>  5.5<H>   |  16<L>  |  0.69    Ca    7.4<L>      03 May 2022 00:03    TPro  7.9  /  Alb  4.0  /  TBili  1.8<H>  /  DBili  x   /  AST  54<H>  /  ALT  23  /  AlkPhos  78  05-02          EKG: Personally reviewed by me - JENNI with RBBB  Radiology: Personally reviewed by me -     Xray Chest 1 View- PORTABLE-Urgent (Xray Chest 1 View- PORTABLE-Urgent .) (05.02.22 @ 23:02)   he lungs are clear. No pleural effusions or pneumothorax.  No acute osseous pathology.  Clear lungs.    Transthoracic Echocardiogram (09.27.21 @ 16:46)  Conclusions:  1. Mitral annular calcification and calcified mitral  leaflets with decreased diastolic opening. Mild-moderate  mitral regurgitation. Mean transmitral valve gradient  equals 3 mm Hg, consistent with mild to moderate mitral  stenosis.  2. Transcatheter aortic valve replacement. Peak transaortic  valve gradient equals 8 mm Hg, mean transaortic valve  gradient equals 5 mm Hg, which is probably normal in the  presence of a transcatheter aortic valve replacement. No  aortic valve regurgitation seen.  3. Severe left atrial enlargement.  4. Severe concentric left ventricular hypertrophy  (thickening) with diffuse areas of myocardium demonstrating  bright enhancement, possibly suggestive of infiltrative  cardiomyopathy.  5. Mild global left ventricular systolic dysfunction.  6. Severe reversible diastolic dysfunction (Stage III).  7. Mild right atrial enlargement.  8. Normal right ventricular size with decreased right  ventricular systolic function.  9. Estimated pulmonary artery systolic pressure equals 39  mm Hg, assuming right atrial pressure equals 8 mm Hg,  consistent with borderline pulmonary pressures.  10. Trace to small pericardial effusion.  11. Bilateral pleural effusions.    CT Abdomen and Pelvis w/ IV Cont (05.03.22 @ 01:36)  IMPRESSION:  Moderate bilateral pleural effusions, with associated compressive   atelectasis.  Urinary bladder is minimally distended. There is diffuse wall thickening   vs. underdistension.  Correlation with urinalysis is recommended.  Enlarged prostate gland.        Assessment /Plan:     89yo male pt with PMH of CAD s/p AICD, Afib s/p Watchman (no AC due to hematuria), HTN, BPH, DM2, Dementia, [place] from [facility] brought is by amita Nichols (HCP) for AMS now with concerns of ADHF exacerbation.    Problem/Plan -1  Problem: ADHF  Plan:  - Follows with Dr. Trevor Schneider for OP Cardiology  - Prelim CXR shows clear lungs  - CT A/P reveals moderate B/L pleural effusions with associated compressive atelectasis  - Prior echo from 2021 shows Severe concentric left ventricular hypertrophy (thickening) with diffuse areas of myocardium demonstrating bright enhancement, possibly suggestive of infiltrative  cardiomyopathy, Mild global left ventricular systolic dysfunction, Severe reversible diastolic dysfunction (Stage III), EF 50%  - Awaiting repeat TTE  - Patient with preserved EF, ICD in place   - BNP 56348  - Physcial exam with +Orthopnea and +JVD, no LE edema  - Takes Bumex 0.5mg PO three times per week  - Recommend IV Diuresis  - c/w metoprolol     Problem/Plan -2  Problem: AF  Plan:  - s/p Watchman  - c/w Metoprolol    Problem/Plan -3  Problem: HTN  Plan:  - c/w Toprol XL 25mg PO daily    Problem/Plan -4  Problem: CAD  Plan:  - Denies chest pain  - Troponin elevated likely in the setting of ADHF exacerbation  - EKG with no ischemic changes noted  - c/w ASA      Differential diagnosis and plan of care discussed with patient after the evaluation. Counseling on diet, nutritional counseling, weight management, exercise and medication compliance was done.   Advanced care planning/advanced directives discussed with patient/family. DNR status including forceful chest compressions to attempt to restart the heart, ventilator support/artificial breathing, electric shock, artificial nutrition, health care proxy, Molst form all discussed with pt. Pt wishes to consider. More than fifteen minutes spent on discussing advanced directives.     Cyn Dodson [facility]-BC  Clarence Vazquez DO [facility]  Cardiovascular Medicine  [address], [address]  Office [phone]  Cell [phone] DATE OF SERVICE: 05-03-22 @ 16:35    CHIEF COMPLAINT:Patient is a 89y old  Male who presents with a chief complaint of AMS (03 May 2022 13:41)      HISTORY OF PRESENT ILLNESS:  89yo male pt with PMH of CAD s/p AICD, Afib s/p Watchman (no AC due to hematuria), HTN, BPH, DM2, Dementia, [place] from [facility] brought is by son Simone (HCP) for AMS. On Sunday he was completely normal at his baseline, AOAx3, interactive, ambulates with a walker. Yesterday over the course of the day he became increasingly somnolent and toward the end of the day staff was unable to get him out of his chair so he was brought to the ED. According to his son, he appeared "catatonic" and was completely unresponsive. He reports that in the ED he responded only when the COVID swab was performed and when he was straight cath'd. On my interview he was completely awake, verbal and asking what the plan is. He is extremely hard of hearing thus it is difficult to assess orientation but by discussing with Simone, he appears to be back to baseline. He is speaking in clear, full sentences and appears comfortable. He says he wants to understand me but can't hear. (03 May 2022 12:20)      PAST MEDICAL & SURGICAL HISTORY:  HTN (hypertension)  HLD (hyperlipidemia)  DM (diabetes mellitus)  BPH (benign prostatic hypertrophy)  Insomnia  RBBB  Chrnic atrial fibrillation  Dementia  Unsteady gait  SP shoulder surgery  S/P knee surgery      MEDICATIONS:  aspirin enteric coated 81 milliGRAM(s) Oral daily  buMETAnide Injectable 0.5 milliGRAM(s) IV Push daily  metoprolol succinate ER 25 milliGRAM(s) Oral daily        acetaminophen     Tablet .. 650 milliGRAM(s) Oral every 6 hours PRN  donepezil 5 milliGRAM(s) Oral at bedtime  melatonin 3 milliGRAM(s) Oral at bedtime  memantine 10 milliGRAM(s) Oral two times a day  mirtazapine 7.5 milliGRAM(s) Oral at bedtime  QUEtiapine 12.5 milliGRAM(s) Oral at bedtime      atorvastatin 10 milliGRAM(s) Oral at bedtime  finasteride 5 milliGRAM(s) Oral daily    cyanocobalamin 1000 MICROGram(s) Oral daily  thiamine 100 milliGRAM(s) Oral daily      FAMILY HISTORY:  Family history of diabetes mellitus        Non-contributory    SOCIAL HISTORY:    Not an active smoker  Allergies    No Known Allergies    Intolerances    	    REVIEW OF SYSTEMS:  CONSTITUTIONAL: No fever  EYES: No eye pain, visual disturbances, or discharge  ENMT:  No difficulty hearing, tinnitus  NECK: No pain or stiffness  RESPIRATORY: No cough, wheezing,  CARDIOVASCULAR: No chest pain, palpitations, passing out, dizziness, or leg swelling  GASTROINTESTINAL:  No nausea, vomiting, diarrhea or constipation. No melena.  GENITOURINARY: No dysuria, hematuria  NEUROLOGICAL: No stroke like symptoms  SKIN: No burning or lesions   ENDOCRINE: No heat or cold intolerance  MUSCULOSKELETAL: No joint pain or swelling  PSYCHIATRIC: No  anxiety, mood swings  HEME/LYMPH: No bleeding gums  ALLERGY AND IMMUNOLOGIC: No hives or eczema	    All other ROS negative    PHYSICAL EXAM:  T(C): 36.3 (05-03-22 @ 12:47), Max: 37.1 (05-03-22 @ 00:04)  HR: 85 (05-03-22 @ 12:47) (76 - 86)  BP: 105/82 (05-03-22 @ 12:47) (102/71 - 128/86)  RR: 18 (05-03-22 @ 12:47) (12 - 18)  SpO2: 95% (05-03-22 @ 12:47) (94% - 96%)  Wt(kg): --  I&O's Summary      Appearance: Normal	  HEENT:   Normal oral mucosa, EOMI	  Cardiovascular:  S1 S2, + JVD,    Respiratory: + SOB with minimal exertion  Psychiatry: Alert  Gastrointestinal:  Soft, Non-tender, + BS	  Skin: No rashes   Neurologic: Non-focal  Extremities:  No edema  Vascular: Peripheral pulses palpable    	    	  	  CARDIAC MARKERS:  Labs personally reviewed by me                                  13.0   7.19  )-----------( 124      ( 03 May 2022 13:09 )             41.5     05-03    138  |  112<H>  |  21  ----------------------------<  159<H>  5.5<H>   |  16<L>  |  0.69    Ca    7.4<L>      03 May 2022 00:03    TPro  7.9  /  Alb  4.0  /  TBili  1.8<H>  /  DBili  x   /  AST  54<H>  /  ALT  23  /  AlkPhos  78  05-02          EKG: Personally reviewed by me - JENNI with RBBB  Radiology: Personally reviewed by me -     Xray Chest 1 View- PORTABLE-Urgent (Xray Chest 1 View- PORTABLE-Urgent .) (05.02.22 @ 23:02)   he lungs are clear. No pleural effusions or pneumothorax.  No acute osseous pathology.  Clear lungs.    Transthoracic Echocardiogram (09.27.21 @ 16:46)  Conclusions:  1. Mitral annular calcification and calcified mitral  leaflets with decreased diastolic opening. Mild-moderate  mitral regurgitation. Mean transmitral valve gradient  equals 3 mm Hg, consistent with mild to moderate mitral  stenosis.  2. Transcatheter aortic valve replacement. Peak transaortic  valve gradient equals 8 mm Hg, mean transaortic valve  gradient equals 5 mm Hg, which is probably normal in the  presence of a transcatheter aortic valve replacement. No  aortic valve regurgitation seen.  3. Severe left atrial enlargement.  4. Severe concentric left ventricular hypertrophy  (thickening) with diffuse areas of myocardium demonstrating  bright enhancement, possibly suggestive of infiltrative  cardiomyopathy.  5. Mild global left ventricular systolic dysfunction.  6. Severe reversible diastolic dysfunction (Stage III).  7. Mild right atrial enlargement.  8. Normal right ventricular size with decreased right  ventricular systolic function.  9. Estimated pulmonary artery systolic pressure equals 39  mm Hg, assuming right atrial pressure equals 8 mm Hg,  consistent with borderline pulmonary pressures.  10. Trace to small pericardial effusion.  11. Bilateral pleural effusions.    CT Abdomen and Pelvis w/ IV Cont (05.03.22 @ 01:36)  IMPRESSION:  Moderate bilateral pleural effusions, with associated compressive   atelectasis.  Urinary bladder is minimally distended. There is diffuse wall thickening   vs. underdistension.  Correlation with urinalysis is recommended.  Enlarged prostate gland.        Assessment /Plan:     89yo male pt with PMH of CAD s/p AICD, Afib s/p Watchman (no AC due to hematuria), HTN, BPH, DM2, Dementia, [place] from [facility] brought is by amita Nichols (HCP) for AMS now with concerns of ADHF exacerbation.    Problem/Plan -1  Problem: ADHF  Plan:  - Follows with Dr. Trevor Schneider for OP Cardiology  - Prelim CXR shows clear lungs  - CT A/P reveals moderate B/L pleural effusions with associated compressive atelectasis  - Prior echo from 2021 shows Severe concentric left ventricular hypertrophy (thickening) with diffuse areas of myocardium demonstrating bright enhancement, possibly suggestive of infiltrative  cardiomyopathy, Mild global left ventricular systolic dysfunction, Severe reversible diastolic dysfunction (Stage III), EF 50%  - Awaiting repeat TTE  - Patient with preserved EF, ICD in place   - BNP 24871  - Physcial exam with +Orthopnea and +JVD, no LE edema  - Takes Bumex 0.5mg PO three times per week  - Recommend IV Diuresis, consider 1mg IV qd     Problem/Plan -2  Problem: AF  Plan:  - s/p Watchman, not on AC  - c/w Metoprolol    Problem/Plan -3  Problem: HTN  Plan:  - c/w Toprol XL 25mg PO daily    Problem/Plan -4  Problem: CAD  Plan:  - Denies chest pain  - Troponin elevated likely in the setting of ADHF exacerbation  - EKG with no ischemic changes noted  - c/w ASA      Differential diagnosis and plan of care discussed with patient after the evaluation. Counseling on diet, nutritional counseling, weight management, exercise and medication compliance was done.   Advanced care planning/advanced directives discussed with patient/family. DNR status including forceful chest compressions to attempt to restart the heart, ventilator support/artificial breathing, electric shock, artificial nutrition, health care proxy, Molst form all discussed with pt. Pt wishes to consider. More than fifteen minutes spent on discussing advanced directives.       Cyn Dodson [facility]-BC  Clarence Vazquez DO [facility]  Cardiovascular Medicine  [address] [place] [address], [address]  Office [phone]  Cell [phone]

## 2022-05-03 NOTE — H&P ADULT - PROBLEM SELECTOR PLAN 1
suspect due to Heart Failure Exacerbation, unclear subtype  Obtain TTE  on Bumex at home three times weekly, will start daily dose  Follows with Trevor Schneider outpt  Daily weights, Intake and output monitoring, 1200mL fluid restriction  Cardiology consulted    AF s/p Watchman device, not on AC  currently rate controlled  Continue Metoprolol 100mg PO daily

## 2022-05-03 NOTE — H&P ADULT - PROBLEM SELECTOR PLAN 8
continue Mirtazepine 7.5mg PO qhs  continue Seroquel 25mg PO qhs  Continue Donepezil 5mg PO daily   Continue Memantine 10mg PO BID  Continue Melatonin 3mg PO qhs

## 2022-05-03 NOTE — H&P ADULT - HISTORY OF PRESENT ILLNESS
89yo male pt with PMH of CAD s/p AICD, Afib s/p Watchman (no AC due to hematuria), HTN, BPH, DM2, Dementia, [place] from [facility] brought is by son Simone (HCP) for AMS. On Sunday he was completely normal at his baseline, AOAx3, interactive, ambulates with a walker. Yesterday over the course of the day he became increasingly somnolent and toward the end of the day staff was unable to get him out of his chair so he was brought to the ED. According to his son, he appeared "catatonic" and was completely unresponsive. He reports that in the ED he responded only when the COVID swab was performed and when he was straight cath'd. On my interview he was completely awake, verbal and asking what the plan is. He is extremely hard of hearing thus it is difficult to assess orientation but by discussing with Simone, he appears to be back to baseline. He is speaking in clear, full sentences and appears comfortable. He says he wants to understand me but can't hear.

## 2022-05-03 NOTE — ED ADULT NURSE REASSESSMENT NOTE - NS ED NURSE REASSESS COMMENT FT1
Pt straight cath'd for sterile urine sample collection.. Procedure, risks, and benefits of catheter explained to patient and patient's family, verbalized understanding. Second RN present to confirm sterility. Pt tolerated well. Urinary catheter drained clear luke urine, no clots visualized. 700mL of urine drained from patient. UA and C&S collected and sent to lab. Patient cleaned, changed and new diaper placed on patient. Patient repositioned in stretcher for comfort. Rectal temp obtained, 98.7. Pending results.

## 2022-05-03 NOTE — CONSULT NOTE ADULT - SUBJECTIVE AND OBJECTIVE BOX
89 yo Male known to  service and Dr. Liam Laureano from  for gross hematuria after catheterization. Pt arrives yesterday from the [facility] with unresponsiveness. During workup, pt underwent straight cath by RNs and was documented to have 700cc of yellow urine. Since had voided into diaper and per staff is red. None saved currently.   Pt seen and examined is poor historian. States tho that he has urinated twice and " it felt good" Unable to obtain anything further.    Last yr outpt follow up revealed no need for invasive diagnostic testing due to pts age, CT A/P which revealed only BPH and resolved hematuria.     PAST MEDICAL & SURGICAL HISTORY:  HTN (hypertension)    HLD (hyperlipidemia)    DM (diabetes mellitus)    BPH (benign prostatic hypertrophy)    Insomnia    RBBB    Chronic atrial fibrillation    Dementia    Unsteady gait    S/P shoulder surgery    S/P knee surgery        MEDICATIONS  (STANDING):  aspirin enteric coated 81 milliGRAM(s) Oral daily  atorvastatin 10 milliGRAM(s) Oral at bedtime  buMETAnide Injectable 0.5 milliGRAM(s) IV Push daily  cyanocobalamin 1000 MICROGram(s) Oral daily  donepezil 5 milliGRAM(s) Oral at bedtime  finasteride 5 milliGRAM(s) Oral daily  melatonin 3 milliGRAM(s) Oral at bedtime  memantine 10 milliGRAM(s) Oral two times a day  metoprolol succinate ER 25 milliGRAM(s) Oral daily  mirtazapine 7.5 milliGRAM(s) Oral at bedtime  QUEtiapine 12.5 milliGRAM(s) Oral at bedtime  thiamine 100 milliGRAM(s) Oral daily    MEDICATIONS  (PRN):  acetaminophen     Tablet .. 650 milliGRAM(s) Oral every 6 hours PRN Temp greater or equal to 38C (100.4F), Mild Pain (1 - 3)      FAMILY HISTORY:  Family history of diabetes mellitus        Allergies    No Known Allergies    Intolerances        SOCIAL HISTORY:  retired  l[facility] the [facility]     REVIEW OF SYSTEMS: Otherwise negative as stated in HPI    Physical Exam  Vital signs  T(C): 36.3 (22 @ 12:47), Max: 37.1 (22 @ 00:04)  HR: 85 (22 @ 12:47)  BP: 105/82 (22 @ 12:47)  SpO2: 95% (22 @ 12:47)  Wt(kg): --    Output    UOP N/A    Gen:  FRAIL AWAKE ALERT NAD AXOX1 NONTOXIC APPEARING     Pulm:  NO RESP DISTRESS  	  CV:  S1S2    GI:  SOFT NT/ND    : NONPALP BLADDER NO SUPRAPUBIC TENDERNESS  CIRC PHALLUS BL DESC TESTIS  DIAPER WITHOUT EVIDENCE OF BLEEDING AT THIS TIME     MSK: NO CVAT BL                             	      LABS:                            13.0   7.19  )-----------( 124      ( 03 May 2022 13:09 )             41.5         138  |  112<H>  |  21  ----------------------------<  159<H>  5.5<H>   |  16<L>  |  0.69    Ca    7.4<L>      03 May 2022 00:03    TPro  7.9  /  Alb  4.0  /  TBili  1.8<H>  /  DBili  x   /  AST  54<H>  /  ALT  23  /  AlkPhos  78      PT/INR - ( 02 May 2022 22:08 )   PT: 16.1 sec;   INR: 1.40 ratio         PTT - ( 02 May 2022 22:08 )  PTT:31.7 sec  Urinalysis Basic - ( 03 May 2022 00:03 )    Color: Yellow / Appearance: Slightly Turbid / S.022 / pH: x  Gluc: x / Ketone: Trace  / Bili: Negative / Urobili: Negative   Blood: x / Protein: 30 mg/dL / Nitrite: Negative   Leuk Esterase: Negative / RBC: 623 /hpf / WBC 2 /HPF   Sq Epi: x / Non Sq Epi: 0 /hpf / Bacteria: Negative        Urine Cx: SENT AND REC     RADIOLOGY:  < from: CT Abdomen and Pelvis w/ IV Cont (22 @ 01:36) >  FINDINGS:  LOWER CHEST: Moderate bilateral pleural effusions with associated   compressive atelectasis. Status post TAVR and pacemaker. Coronary   arterial calcification/stents. Calcification of the mitral annulus.    LIVER: Within normal limits.  BILE DUCTS: Normal caliber.  GALLBLADDER: Within normal limits.  SPLEEN: Within normal limits.  PANCREAS: Within normal limits.  ADRENALS: Nodular thickening of the left adrenal gland.  KIDNEYS/URETERS: No hydronephrosis. Subcentimeter hypodense focus in the   right kidney, too small to characterize.    BLADDER: Minimally distended. There is diffuse wall thickening vs   underdistension of the bladder wall.  REPRODUCTIVE ORGANS: Prostate gland is enlarged.    BOWEL: No bowel obstruction. Appendix is within normal limits. Hiatal   hernia.  Diffuse colonic diverticulosis.  PERITONEUM: No ascites.  VESSELS: Atherosclerotic changes.  RETROPERITONEUM/LYMPH NODES: No lymphadenopathy.  ABDOMINAL WALL: Within normal limits.  BONES: Scoliosis and degenerative changes. Again noted, is an appearance   suggestive of a small to moderate right hip effusion, similar in   appearance to prior CT pelvis dated 10/7/2019.    IMPRESSION:  Moderate bilateral pleural effusions, with associated compressive   atelectasis.    Urinary bladder is minimally distended. There is diffuse wall thickening   vs. underdistension.  Correlation with urinalysis is recommended.    Enlarged prostate gland.        < end of copied text >       OTC Reading glasses recommended.

## 2022-05-03 NOTE — CONSULT NOTE ADULT - ASSESSMENT
89 yo male known to Urology service for past gross hematuria after catheterization 2021 who arrives with unresponsiveness Pt underwent straight cath with documented yellow urine returned and subsequently with gross hematuria    ucx sent and rec  please bladder scan to ensure complete bladder emptying    - no CBI required at this time  - will tolerate higher PVRs so long as patient is comfortable and urine color remains acceptable without clots  - encourage increased hydration to help clear urine   - urine cx no growth  - if patient starts to start retaining large amounts of urine, abdomen becomes distended or patient becomes uncomfortable or urine color worsens with clots please page urology at [phone]  - avoid urethral catheterization as patient highly likely to remove or pull at catheter given baseline dementia  - no further urologic investigations or interventions warranted for his gross hematuria at this time, please do not hesitate to contact urology should any additional urologic concerns or questions arise

## 2022-05-04 NOTE — PROGRESS NOTE ADULT - SUBJECTIVE AND OBJECTIVE BOX
DATE OF SERVICE: 05-04-22 @ 09:39    Patient is a 89y old  Male who presents with a chief complaint of AMS (03 May 2022 16:34)      INTERVAL HISTORY: Feels ok    REVIEW OF SYSTEMS:  CONSTITUTIONAL: No weakness  EYES/ENT: No visual changes;  No throat pain   NECK: No pain or stiffness  RESPIRATORY: No cough, wheezing; No shortness of breath  CARDIOVASCULAR: No chest pain or palpitations  GASTROINTESTINAL: No abdominal  pain. No nausea, vomiting, or hematemesis  GENITOURINARY: No dysuria, frequency or hematuria  NEUROLOGICAL: No stroke like symptoms  SKIN: No rashes    	  MEDICATIONS:  buMETAnide Injectable 0.5 milliGRAM(s) IV Push daily  metoprolol succinate ER 25 milliGRAM(s) Oral daily        PHYSICAL EXAM:  T(C): 36.4 (05-04-22 @ 08:15), Max: 36.7 (05-03-22 @ 16:52)  HR: 72 (05-04-22 @ 08:15) (72 - 103)  BP: 144/95 (05-04-22 @ 08:15) (102/71 - 157/96)  RR: 20 (05-04-22 @ 08:15) (17 - 20)  SpO2: 92% (05-04-22 @ 08:15) (92% - 96%)  Wt(kg): --  I&O's Summary      Weight (kg): 67.7 (05-04 @ 03:00)    Appearance: In no distress	  HEENT:    PERRL, EOMI	  Cardiovascular:  S1 S2, + JVD  Respiratory: diminished B/L	  Gastrointestinal:  Soft, Non-tender, + BS	  Vascularature:  No edema of LE  Psychiatric: Appropriate affect   Neuro: no acute focal deficits                               13.0   7.19  )-----------( 124      ( 03 May 2022 13:09 )             41.5     05-03    138  |  112<H>  |  21  ----------------------------<  159<H>  5.5<H>   |  16<L>  |  0.69    Ca    7.4<L>      03 May 2022 00:03    TPro  7.9  /  Alb  4.0  /  TBili  1.8<H>  /  DBili  x   /  AST  54<H>  /  ALT  23  /  AlkPhos  78  05-02        Labs personally reviewed      ASSESSMENT/PLAN: 	    3yo male pt with PMH of CAD s/p AICD, Afib s/p Watchman (no AC due to hematuria), HTN, BPH, DM2, Dementia, [place] from [facility] brought is by amita Simone (HCP) for AMS now with concerns of ADHF exacerbation.    Problem/Plan -1  Problem: ADHF  Plan:  - Follows with Dr. Trevor Schneider for OP Cardiology  - Prelim CXR shows clear lungs  - CT A/P reveals moderate B/L pleural effusions with associated compressive atelectasis  - Prior echo from 2021 shows Severe concentric left ventricular hypertrophy (thickening) with diffuse areas of myocardium demonstrating bright enhancement, possibly suggestive of infiltrative  cardiomyopathy, Mild global left ventricular systolic dysfunction, Severe reversible diastolic dysfunction (Stage III), EF 50%  - Awaiting repeat TTE  - Patient with preserved EF, ICD in place   - BNP 75123  - Physcial exam with +Orthopnea and +JVD, no LE edema  - Takes Bumex 0.5mg PO three times per week  - Recommend IV Diuresis, consider 1mg IV qd     Problem/Plan -2  Problem: AF  Plan:  - s/p Watchman, not on AC  - c/w Metoprolol    Problem/Plan -3  Problem: HTN  Plan:  - c/w Toprol XL 25mg PO daily    Problem/Plan -4  Problem: CAD  Plan:  - Denies chest pain  - Troponin elevated likely in the setting of ADHF exacerbation  - EKG with no ischemic changes noted  - c/w STEPHANIE Dodson, AG-NP   Clarence Vazquez DO Located within [facility]  Cardiovascular Medicine  [address] [place] [address], [address]  Office: [phone]  Cell: [phone] DATE OF SERVICE: 05-04-22 @ 09:39    Patient is a 89y old  Male who presents with a chief complaint of AMS (03 May 2022 16:34)      INTERVAL HISTORY: Feels ok    REVIEW OF SYSTEMS:  CONSTITUTIONAL: No weakness  EYES/ENT: No visual changes;  No throat pain   NECK: No pain or stiffness  RESPIRATORY: No cough, wheezing; No shortness of breath  CARDIOVASCULAR: No chest pain or palpitations  GASTROINTESTINAL: No abdominal  pain. No nausea, vomiting, or hematemesis  GENITOURINARY: No dysuria, frequency or hematuria  NEUROLOGICAL: No stroke like symptoms  SKIN: No rashes    	  MEDICATIONS:  buMETAnide Injectable 0.5 milliGRAM(s) IV Push daily  metoprolol succinate ER 25 milliGRAM(s) Oral daily        PHYSICAL EXAM:  T(C): 36.4 (05-04-22 @ 08:15), Max: 36.7 (05-03-22 @ 16:52)  HR: 72 (05-04-22 @ 08:15) (72 - 103)  BP: 144/95 (05-04-22 @ 08:15) (102/71 - 157/96)  RR: 20 (05-04-22 @ 08:15) (17 - 20)  SpO2: 92% (05-04-22 @ 08:15) (92% - 96%)  Wt(kg): --  I&O's Summary      Weight (kg): 67.7 (05-04 @ 03:00)    Appearance: In no distress	  HEENT:    PERRL, EOMI	  Cardiovascular:  S1 S2, + JVD  Respiratory: diminished B/L	  Gastrointestinal:  Soft, Non-tender, + BS	  Vascularature:  No edema of LE  Psychiatric: Appropriate affect   Neuro: no acute focal deficits                               13.0   7.19  )-----------( 124      ( 03 May 2022 13:09 )             41.5     05-03    138  |  112<H>  |  21  ----------------------------<  159<H>  5.5<H>   |  16<L>  |  0.69    Ca    7.4<L>      03 May 2022 00:03    TPro  7.9  /  Alb  4.0  /  TBili  1.8<H>  /  DBili  x   /  AST  54<H>  /  ALT  23  /  AlkPhos  78  05-02        Labs personally reviewed    Transthoracic Echocardiogram (05.03.22 @ 13:02)  EF (Visual Estimate): 50 %  Doppler Peak Velocity (m/sec): AoV=1.5 TV=2.9  ------------------------------------------------------------------------  Observations:  Mitral Valve: Mitral annular, leaflet, and chordal  calcification.  Mild mitral stenosis. Mean gradient 3.0 mmHg at  79/min.  Mild mitral regurgitation.  Aortic Valve/Aorta: Transcatheter aortic valve with normal  function. No aortic valve regurgitation seen.  Normal aortic root size.  Left Atrium: Severely dilated left atrium.  Left Ventricle: Severe concentric left ventricular  hypertrophy.  Estimated ejection fraction 50%. Diffuse hypokinesis.  Strain imaging was performed with a HR of 84 bpm and a BP  of 105/82 mmHg . Global L. Strain= -11.9%.  Right Heart: Moderate right atrial enlargement. Suboptimal  visualization of the right ventricle.  Normal tricuspid valve. Mild-moderate tricuspid  regurgitation.  Pericardium/Pleura: Trace pericardial effusion.  Bilateral pleural effusions.  Hemodynamic: Estimated right atrial pressure is severely  elevated.  Mild-moderate pulmonary hypertension. Estimated PASP 50  mmHg.  ------------------------------------------------------------------------  Conclusions:  Consider infilatrative cardiomyopathy (e.g. amyloid).  Severe concentric left ventricular hypertrophy.  Estimated ejection fraction 50%. Diffuse hypokinesis.  Mild mitral stenosis due to annular calcification.  Transcatheter aortic valve with normal function.  Suboptimal visualization of the right ventricle.  Mild-moderate pulmonary hypertension.      ASSESSMENT/PLAN: 	    3yo male pt with PMH of CAD s/p AICD, Afib s/p Watchman (no AC due to hematuria), HTN, BPH, DM2, Dementia, [place] from [facility] brought is by amita Nichols ([facility]) for AMS now with concerns of ADHF exacerbation.    Problem/Plan -1  Problem: ADHF  Plan:  - Follows with Dr. Trevor Schneider for OP Cardiology  - Prelim CXR shows clear lungs  - CT A/P reveals moderate B/L pleural effusions with associated compressive atelectasis  - Prior echo from 2021 shows Severe concentric left ventricular hypertrophy (thickening) with diffuse areas of myocardium demonstrating bright enhancement, possibly suggestive of infiltrative  cardiomyopathy, Mild global left ventricular systolic dysfunction, Severe reversible diastolic dysfunction (Stage III), EF 50%  - Repeat TTE with similar findings  - Patient with preserved EF, ICD in place   - BNP 63796  - Physcial exam with +Orthopnea and +JVD, no LE edema  - Takes Bumex 0.5mg PO three times per week  - Recommend IV Diuresis, consider 1mg IV qd   - Responding well to IV diuresis    Problem/Plan -2  Problem: AF  Plan:  - s/p Watchman, not on AC  - c/w Metoprolol    Problem/Plan -3  Problem: HTN  Plan:  - c/w Toprol XL 25mg PO daily    Problem/Plan -4  Problem: CAD  Plan:  - Denies chest pain  - Troponin elevated likely in the setting of ADHF exacerbation  - EKG with no ischemic changes noted  - c/w STEPHANIE Dodson, AG-NP   Clarence Vazquez DO [facility]  Cardiovascular Medicine  [address] [place] [address], [address]  Office: [phone]  Cell: [phone] DATE OF SERVICE: 05-04-22 @ 09:39    Patient is a 89y old  Male who presents with a chief complaint of AMS (03 May 2022 16:34)      INTERVAL HISTORY: Feels ok    REVIEW OF SYSTEMS:  CONSTITUTIONAL: No weakness  EYES/ENT: No visual changes;  No throat pain   NECK: No pain or stiffness  RESPIRATORY: No cough, wheezing; No shortness of breath  CARDIOVASCULAR: No chest pain or palpitations  GASTROINTESTINAL: No abdominal  pain. No nausea, vomiting, or hematemesis  GENITOURINARY: No dysuria, frequency or hematuria  NEUROLOGICAL: No stroke like symptoms  SKIN: No rashes    	  MEDICATIONS:  buMETAnide Injectable 0.5 milliGRAM(s) IV Push daily  metoprolol succinate ER 25 milliGRAM(s) Oral daily        PHYSICAL EXAM:  T(C): 36.4 (05-04-22 @ 08:15), Max: 36.7 (05-03-22 @ 16:52)  HR: 72 (05-04-22 @ 08:15) (72 - 103)  BP: 144/95 (05-04-22 @ 08:15) (102/71 - 157/96)  RR: 20 (05-04-22 @ 08:15) (17 - 20)  SpO2: 92% (05-04-22 @ 08:15) (92% - 96%)  Wt(kg): --  I&O's Summary      Weight (kg): 67.7 (05-04 @ 03:00)    Appearance: In no distress	  HEENT:    PERRL, EOMI	  Cardiovascular:  S1 S2, + JVD  Respiratory: diminished B/L	  Gastrointestinal:  Soft, Non-tender, + BS	  Vascularature:  No edema of LE  Psychiatric: Appropriate affect   Neuro: no acute focal deficits                               13.0   7.19  )-----------( 124      ( 03 May 2022 13:09 )             41.5     05-03    138  |  112<H>  |  21  ----------------------------<  159<H>  5.5<H>   |  16<L>  |  0.69    Ca    7.4<L>      03 May 2022 00:03    TPro  7.9  /  Alb  4.0  /  TBili  1.8<H>  /  DBili  x   /  AST  54<H>  /  ALT  23  /  AlkPhos  78  05-02        Labs personally reviewed    Transthoracic Echocardiogram (05.03.22 @ 13:02)  EF (Visual Estimate): 50 %  Doppler Peak Velocity (m/sec): AoV=1.5 TV=2.9  ------------------------------------------------------------------------  Observations:  Mitral Valve: Mitral annular, leaflet, and chordal  calcification.  Mild mitral stenosis. Mean gradient 3.0 mmHg at  79/min.  Mild mitral regurgitation.  Aortic Valve/Aorta: Transcatheter aortic valve with normal  function. No aortic valve regurgitation seen.  Normal aortic root size.  Left Atrium: Severely dilated left atrium.  Left Ventricle: Severe concentric left ventricular  hypertrophy.  Estimated ejection fraction 50%. Diffuse hypokinesis.  Strain imaging was performed with a HR of 84 bpm and a BP  of 105/82 mmHg . Global L. Strain= -11.9%.  Right Heart: Moderate right atrial enlargement. Suboptimal  visualization of the right ventricle.  Normal tricuspid valve. Mild-moderate tricuspid  regurgitation.  Pericardium/Pleura: Trace pericardial effusion.  Bilateral pleural effusions.  Hemodynamic: Estimated right atrial pressure is severely  elevated.  Mild-moderate pulmonary hypertension. Estimated PASP 50  mmHg.  ------------------------------------------------------------------------  Conclusions:  Consider infilatrative cardiomyopathy (e.g. amyloid).  Severe concentric left ventricular hypertrophy.  Estimated ejection fraction 50%. Diffuse hypokinesis.  Mild mitral stenosis due to annular calcification.  Transcatheter aortic valve with normal function.  Suboptimal visualization of the right ventricle.  Mild-moderate pulmonary hypertension.      ASSESSMENT/PLAN: 	    3yo male pt with PMH of CAD s/p AICD, Afib s/p Watchman (no AC due to hematuria), HTN, BPH, DM2, Dementia, [place] from [facility] brought is by amita Nichols ([facility]) for AMS now with concerns of ADHF exacerbation.    Problem/Plan -1  Problem: ADHF  Plan:  - Follows with Dr. Trevor Schneider for OP Cardiology  - Prelim CXR shows clear lungs  - CT A/P reveals moderate B/L pleural effusions with associated compressive atelectasis  - Prior echo from 2021 shows Severe concentric left ventricular hypertrophy (thickening) with diffuse areas of myocardium demonstrating bright enhancement, possibly suggestive of infiltrative  cardiomyopathy, Mild global left ventricular systolic dysfunction, Severe reversible diastolic dysfunction (Stage III), EF 50%  - Repeat TTE with similar findings  - Patient with preserved EF, ICD in place   - BNP 97897  - Physcial exam with +Orthopnea and +JVD, no LE edema  - Takes Bumex 0.5mg PO three times per week  - Responding well to IV diuresis, will monitor and transition to PO soon    Problem/Plan -2  Problem: AF  Plan:  - s/p Watchman, not on AC  - c/w Metoprolol    Problem/Plan -3  Problem: HTN  Plan:  - c/w Toprol XL 25mg PO daily    Problem/Plan -4  Problem: CAD  Plan:  - Denies chest pain  - Troponin elevated likely in the setting of ADHF exacerbation  - EKG with no ischemic changes noted  - c/w STEPHANIE Dodson, AG-NP   Clarence Vazquez DO [facility]  Cardiovascular Medicine  [address], [address]  Office: [phone]  Cell: [phone]

## 2022-05-04 NOTE — PHYSICAL THERAPY INITIAL EVALUATION ADULT - ADDITIONAL COMMENTS
Pt resides at [facility], requires assistance with ADLs and medication management. Additional services are provided for a private pay rate at [facility]. Pt owns walker, shower chair, and grab bars are in place. Ambulates with RW at baseline.

## 2022-05-04 NOTE — PROGRESS NOTE ADULT - PROBLEM SELECTOR PLAN 10
DVT ppx: SCDs  Diet: Carb controlled  B12 1000mcg po daily  Tylenol PRN pain or fever  Thiamine supplement daily  Disposition: PT eval, back to [facility] when medically cleared continue Mirtazepine 7.5mg PO qhs  continue Seroquel 25mg PO qhs  Continue Donepezil 5mg PO daily   Continue Memantine 10mg PO BID  Continue Melatonin 3mg PO qhs

## 2022-05-04 NOTE — PROGRESS NOTE ADULT - PROBLEM SELECTOR PLAN 3
s/p AICD - son is unsure if it is b/c of EF or heart rhythm    obtain TTE  continue Aspirin despite hematuria  continue Lipitor 10mg PO qhs likely due to heart failure exacerbation  stable

## 2022-05-04 NOTE — PROGRESS NOTE ADULT - PROBLEM SELECTOR PLAN 1
suspect due to Heart Failure Exacerbation, unclear subtype   TTE to follow   on Bumex at home three times weekly, will start daily dose  Follows with Trevor Schneider outpt  Daily weights, Intake and output monitoring, 1200mL fluid restriction  Cardiology consulted    AF s/p Watchman device, not on AC.  currently rate controlled  Continue Metoprolol 100mg PO daily -unclear subtype  -TTE to follow   -Cardiology following and recommendations has been followed  -on Bumex at home three times weekly, currently on 0.5 mg IVP daily   -Follows with Trevor Schneider outpt  -Daily weights, Intake and output monitoring, 1200mL fluid restriction

## 2022-05-04 NOTE — PROGRESS NOTE ADULT - PROBLEM SELECTOR PLAN 4
repeat CBC, was stable on admission  Consult Urology -s/p AICD   -TTE to follow  -continue Aspirin despite hematuria  -continue Lipitor 10mg PO qhs

## 2022-05-04 NOTE — PROGRESS NOTE ADULT - SUBJECTIVE AND OBJECTIVE BOX
[facility] Division of Hospital Medicine  Bernadette Maza MD   Geriatric fellow PGY4    SUBJECTIVE / OVERNIGHT EVENTS:  Patient was evaluated at bedside in NAD. Oriented in person. Able to say he is in the hospital because he was having problems with urination. Hard of hearing. Patient states he ate his breakfast without problems.     ADDITIONAL REVIEW OF SYSTEMS:  Denies CP, SOB, palpitations.     MEDICATIONS  (STANDING):  aspirin enteric coated 81 milliGRAM(s) Oral daily  atorvastatin 10 milliGRAM(s) Oral at bedtime  buMETAnide Injectable 0.5 milliGRAM(s) IV Push daily  cyanocobalamin 1000 MICROGram(s) Oral daily  donepezil 5 milliGRAM(s) Oral at bedtime  finasteride 5 milliGRAM(s) Oral daily  melatonin 3 milliGRAM(s) Oral at bedtime  memantine 10 milliGRAM(s) Oral two times a day  metoprolol succinate ER 25 milliGRAM(s) Oral daily  mirtazapine 7.5 milliGRAM(s) Oral at bedtime  QUEtiapine 12.5 milliGRAM(s) Oral at bedtime  thiamine 100 milliGRAM(s) Oral daily    MEDICATIONS  (PRN):  acetaminophen     Tablet .. 650 milliGRAM(s) Oral every 6 hours PRN Temp greater or equal to 38C (100.4F), Mild Pain (1 - 3)  lidocaine 4% Cream 1 Application(s) Topical once PRN pain      I&O's Summary    04 May 2022 07:01  -  04 May 2022 12:24  --------------------------------------------------------  IN: 0 mL / OUT: 0 mL / NET: 0 mL        PHYSICAL EXAM:  Vital Signs Last 24 Hrs  T(C): 36.3 (04 May 2022 10:32), Max: 36.7 (03 May 2022 16:52)  T(F): 97.4 (04 May 2022 10:32), Max: 98 (03 May 2022 16:52)  HR: 76 (04 May 2022 10:32) (72 - 103)  BP: 151/82 (04 May 2022 10:32) (105/82 - 157/96)  BP(mean): --  RR: 20 (04 May 2022 10:32) (17 - 20)  SpO2: 93% (04 May 2022 10:32) (92% - 95%)    CONSTITUTIONAL: NAD, well-developed, well-groomed  EYES: PERRLA; conjunctiva and sclera clear  ENMT: Moist oral mucosa, no pharyngeal injection or exudates; normal dentition  NECK: Supple, no palpable masses; no thyromegaly  RESPIRATORY: Normal respiratory effort; lungs are clear to auscultation bilaterally  CARDIOVASCULAR: Regular rate and rhythm, normal S1 and S2, no murmur/rub/gallop; No lower extremity edema; Peripheral pulses are 2+ bilaterally  ABDOMEN: Nontender to palpation, normoactive bowel sounds, no rebound/guarding; No hepatosplenomegaly  MUSCULOSKELETAL:  Normal gait; no clubbing or cyanosis of digits; no joint swelling or tenderness to palpation  PSYCH: A+O to person, place, and time; affect appropriate  NEUROLOGY: CN 2-12 are intact and symmetric; no gross sensory deficits   SKIN: No rashes; no palpable lesions    LABS:                        12.2   6.54  )-----------( 140      ( 04 May 2022 11:27 )             37.7     05-04    139  |  105  |  26<H>  ----------------------------<  158<H>  4.1   |  19<L>  |  0.75    Ca    9.2      04 May 2022 11:27  Phos  3.6     05-04  Mg     1.9     05-04    TPro  7.9  /  Alb  4.0  /  TBili  1.8<H>  /  DBili  x   /  AST  54<H>  /  ALT  23  /  AlkPhos  78  05-02    PT/INR - ( 02 May 2022 22:08 )   PT: 16.1 sec;   INR: 1.40 ratio         PTT - ( 02 May 2022 22:08 )  PTT:31.7 sec      Urinalysis Basic - ( 03 May 2022 00:03 )    Color: Yellow / Appearance: Slightly Turbid / S.022 / pH: x  Gluc: x / Ketone: Trace  / Bili: Negative / Urobili: Negative   Blood: x / Protein: 30 mg/dL / Nitrite: Negative   Leuk Esterase: Negative / RBC: 623 /hpf / WBC 2 /HPF   Sq Epi: x / Non Sq Epi: 0 /hpf / Bacteria: Negative        Culture - Urine (collected 03 May 2022 03:12)  Source: Clean Catch Clean Catch (Midstream)  Final Report (04 May 2022 07:23):    No growth    Culture - Blood (collected 03 May 2022 02:07)  Source: .Blood Blood-Peripheral  Preliminary Report (04 May 2022 03:01):    No growth to date.    Culture - Blood (collected 03 May 2022 02:07)  Source: .Blood Blood-Peripheral  Preliminary Report (04 May 2022 03:01):    No growth to date.      RADIOLOGY & ADDITIONAL TESTS:  Results Reviewed:   Imaging Personally Reviewed:  Electrocardiogram Personally Reviewed:    COORDINATION OF CARE:  Care Discussed with Consultants/Other Providers [Y/N]:  Prior or Outpatient Records Reviewed [Y/N]:   [facility] Division of Hospital Medicine  Bernadette Maza MD   Geriatric fellow PGY4    SUBJECTIVE / OVERNIGHT EVENTS:  Patient was evaluated at bedside in NAD. Oriented in person. Able to say he is in the hospital because he was having problems with urination. Hard of hearing. Patient states he ate his breakfast without problems.     ADDITIONAL REVIEW OF SYSTEMS:  Denies CP, SOB, palpitations.     MEDICATIONS  (STANDING):  aspirin enteric coated 81 milliGRAM(s) Oral daily  atorvastatin 10 milliGRAM(s) Oral at bedtime  buMETAnide Injectable 0.5 milliGRAM(s) IV Push daily  cyanocobalamin 1000 MICROGram(s) Oral daily  donepezil 5 milliGRAM(s) Oral at bedtime  finasteride 5 milliGRAM(s) Oral daily  melatonin 3 milliGRAM(s) Oral at bedtime  memantine 10 milliGRAM(s) Oral two times a day  metoprolol succinate ER 25 milliGRAM(s) Oral daily  mirtazapine 7.5 milliGRAM(s) Oral at bedtime  QUEtiapine 12.5 milliGRAM(s) Oral at bedtime  thiamine 100 milliGRAM(s) Oral daily    MEDICATIONS  (PRN):  acetaminophen     Tablet .. 650 milliGRAM(s) Oral every 6 hours PRN Temp greater or equal to 38C (100.4F), Mild Pain (1 - 3)  lidocaine 4% Cream 1 Application(s) Topical once PRN pain      I&O's Summary    04 May 2022 07:01  -  04 May 2022 12:24  --------------------------------------------------------  IN: 0 mL / OUT: 0 mL / NET: 0 mL        PHYSICAL EXAM:  Vital Signs Last 24 Hrs  T(C): 36.3 (04 May 2022 10:32), Max: 36.7 (03 May 2022 16:52)  T(F): 97.4 (04 May 2022 10:32), Max: 98 (03 May 2022 16:52)  HR: 76 (04 May 2022 10:32) (72 - 103)  BP: 151/82 (04 May 2022 10:32) (105/82 - 157/96)  BP(mean): --  RR: 20 (04 May 2022 10:32) (17 - 20)  SpO2: 93% (04 May 2022 10:32) (92% - 95%)    CONSTITUTIONAL: NAD, well-developed,   EYES: PERRLA; conjunctiva and sclera clear  ENMT: Moist oral mucosa, no pharyngeal injection or exudates;   NECK: Supple, no palpable masses; no thyromegaly  RESPIRATORY: Normal respiratory effort; lungs are clear to auscultation bilaterally  CARDIOVASCULAR: Regular rate and rhythm, normal S1 and S2, no murmur/rub/gallop; No lower extremity edema; Peripheral pulses are 2+ bilaterally  ABDOMEN: Nontender to palpation, normoactive bowel sounds, no rebound/guarding;   MUSCULOSKELETAL:  gait was not evaluated; no clubbing or cyanosis of digits; no joint swelling or tenderness to palpation  PSYCH: A+O to person, place, and not in time; affect appropriate  NEUROLOGY: no gross sensory deficits   SKIN: multiple bluish lesions on bl arms, left ear with bruise no open lesion    LABS:                        12.2   6.54  )-----------( 140      ( 04 May 2022 11:27 )             37.7     05-04    139  |  105  |  26<H>  ----------------------------<  158<H>  4.1   |  19<L>  |  0.75    Ca    9.2      04 May 2022 11:27  Phos  3.6     05-04  Mg     1.9     05-04    TPro  7.9  /  Alb  4.0  /  TBili  1.8<H>  /  DBili  x   /  AST  54<H>  /  ALT  23  /  AlkPhos  78  05-02    PT/INR - ( 02 May 2022 22:08 )   PT: 16.1 sec;   INR: 1.40 ratio         PTT - ( 02 May 2022 22:08 )  PTT:31.7 sec      Urinalysis Basic - ( 03 May 2022 00:03 )    Color: Yellow / Appearance: Slightly Turbid / S.022 / pH: x  Gluc: x / Ketone: Trace  / Bili: Negative / Urobili: Negative   Blood: x / Protein: 30 mg/dL / Nitrite: Negative   Leuk Esterase: Negative / RBC: 623 /hpf / WBC 2 /HPF   Sq Epi: x / Non Sq Epi: 0 /hpf / Bacteria: Negative      Culture - Urine (collected 03 May 2022 03:12)  Source: Clean Catch Clean Catch (Midstream)  Final Report (04 May 2022 07:23):    No growth    Culture - Blood (collected 03 May 2022 02:07)  Source: .Blood Blood-Peripheral  Preliminary Report (04 May 2022 03:01):    No growth to date.    Culture - Blood (collected 03 May 2022 02:07)  Source: .Blood Blood-Peripheral  Preliminary Report (04 May 2022 03:01):    No growth to date.      RADIOLOGY & ADDITIONAL TESTS:  Results Reviewed:   Imaging Personally Reviewed:  Electrocardiogram Personally Reviewed:    COORDINATION OF CARE:  Care Discussed with Consultants/Other Providers [Y/N]:  Prior or Outpatient Records Reviewed [Y/N]:

## 2022-05-04 NOTE — PHYSICAL THERAPY INITIAL EVALUATION ADULT - PERTINENT HX OF CURRENT PROBLEM, REHAB EVAL
89yo male pt with PMH of CAD s/p AICD, Afib s/p Watchman (no AC due to hematuria), HTN, BPH, DM2, Dementia, [place] from [facility] brought is by amita Nichols ([facility]) for AMS. Abdomen CT: Moderate bilateral pleural effusions, with associated compressive atelectasis. Urinary bladder is minimally distended. There is diffuse wall thickening vs. underdistension.  Correlation with urinalysis is recommended. Moderate bilateral pleural effusions, with associated compressive atelectasis.

## 2022-05-04 NOTE — PROGRESS NOTE ADULT - PROBLEM SELECTOR PLAN 12
DVT ppx: SCDs  Diet: Carb controlled  B12 1000mcg po daily  Tylenol PRN pain or fever  Thiamine supplement daily  Disposition: PT eval, back to [facility] when medically cleared    PLan and case discussed with medical attending Dr. Rasheed.

## 2022-05-04 NOTE — PROGRESS NOTE ADULT - PROBLEM SELECTOR PLAN 8
continue Mirtazepine 7.5mg PO qhs  continue Seroquel 25mg PO qhs  Continue Donepezil 5mg PO daily   Continue Memantine 10mg PO BID  Continue Melatonin 3mg PO qhs Metoprolol as above

## 2022-05-04 NOTE — PROGRESS NOTE ADULT - PROBLEM SELECTOR PLAN 2
likely due to heart failure exacerbation  stable - Most likely related to ADHF.   -As per family member patient is at baseline mental status now.

## 2022-05-04 NOTE — PHYSICAL THERAPY INITIAL EVALUATION ADULT - GAIT TRAINING, PT EVAL
GOAL: Patient will ambulate 50ft independently using least restrictive device for household distances in 2 weeks.

## 2022-05-04 NOTE — PATIENT PROFILE ADULT - PATIENT'S PREFERRED PRONOUN
Patient placed on continuous cardiac monitor, automatic blood pressure cuff and continuous pulse oximeter.   Withheld/decline to answer

## 2022-05-04 NOTE — PROGRESS NOTE ADULT - PROBLEM SELECTOR PLAN 6
Metoprolol as above Hg level stable  Urology consulted, no interventions at the moment.   s/p straight cath in the ER

## 2022-05-04 NOTE — PROGRESS NOTE ADULT - PROBLEM SELECTOR PROBLEM 8
Pulmonary Critical Care Progress Note    Date of service: 4/23/2017     Interval Events:  24 hour interval history reviewed  Reason for visit:  Atelectasis, COPD, MARY, pneumothoraces      SR  IS of 750   CT with air leak      PFSH:  No change.    Respiratory:     Pulse Oximetry: 98 %  CT with bilateral pneumothoraces  Increasing SQ air into neck and face  Air leak in chest tube  Few coarse crackles, no wheezing       Recent Labs      04/20/17   1405  04/20/17   1525  04/20/17   1650   ISTATAPH  7.237*  7.330*  7.309*   ISTATAPCO2  55.7*  49.5*  57.8*   ISTATAPO2  99*  106*  95*   ISTATATCO2  25  28  31   [id number]  96  98  96   ISTATARTHCO3  23.7  26.1*  29.0*   ISTATARTBE  -4  0  2   ISTATTEMP  35.9 C  37.0 C  37.8 C   ISTATFIO2  40  40  40   ISTATSPEC  Arterial  Arterial  Arterial   ISTATAPHTC  7.252*  7.330*  7.297*   [id number]  92*  106*  100*       HemoDynamics:  Pulse: 79  NIBP: 122/50 mmHg     SR    Neuro:  Awake and alert    Fluids:  Intake/Output       04/21/17 0700 - 04/22/17 0659 04/22/17 0700 - 04/23/17 0659 04/23/17 0700 - 04/24/17 0659      [id number] 7290-8194 Total 0255-9328 5742-4224 Total 0997-8504 7653-7424 Total       Intake    P.O.  720  940 1660  --  -- --  --  -- --    P.O.  -- -- -- -- -- --    I.V.  205.1  -- 205.1  --  -- --  --  -- --    Amiodarone Volume 34 -- 34 -- -- -- -- -- --    Insulin Volume 71.1 -- 71.1 -- -- -- -- -- --    IV Piggyback Volume (IV Piggyback) 100 -- 100 -- -- -- -- -- --    Total Intake 925.1 940 1865.1 -- -- -- -- -- --       Output    Urine  375  650 1025  --  340 340  --  -- --    Number of Times Voided -- -- -- 1 x 3 x 4 x -- -- --    Indwelling Cathether 375 -- 375 -- -- -- -- -- --    Void (ml) -- 650 650 -- 340 340 -- -- --    Drains  240  360 600  250  -- 250  --  -- --    Mediastinal Chest Tube 1 240 360 600 250 -- 250 -- -- --    Total Output 615 1010 1625 250 340 590 -- -- --       Net I/O     310.1 -70 240.1 -250 -340 -590 -- -- --            Recent Labs      17   1120  17   0500  17   0540  17   0555   SODIUM   --    --    --   136  134*  133*   POTASSIUM  4.6   < >  4.1  4.0  3.9  4.6   CHLORIDE   --    --    --   104  100  99   CO2   --    --    --   26  29  31   BUN   --    --    --   14  18  19   CREATININE   --    --    --   0.65  0.71  0.66   MAGNESIUM  2.4   --   2.1   --    --    --    CALCIUM   --    --    --   7.4*  8.0*  7.9*    < > = values in this interval not displayed.       GI/Nutrition:  Abd soft ND/NT  No N/V/P    Liver Function  Recent Labs      17   05017   0540  17   0555   GLUCOSE  127*  73  94       Heme:  Recent Labs      17   1120  17   0500  17   0540  17   0555   RBC   --   2.86*  2.73*  2.56*   HEMOGLOBIN   --   9.1*  8.7*  8.2*   HEMATOCRIT   --   28.2*  27.1*  25.4*   PLATELETCT  108*  123*  120*  129*   PROTHROMBTM  19.6*  14.6  13.2  13.3   APTT  35.4   --    --    --    INR  1.61*  1.10  0.97  0.98       Infectious Disease:  Temp  Av.6 °C (97.9 °F)  Min: 36.1 °C (97 °F)  Max: 37.1 °C (98.8 °F)    Recent Labs      17   0500  17   0540  17   0555   WBC  19.6*  18.9*  14.8*     Current Facility-Administered Medications   Medication Dose Frequency Provider Last Rate Last Dose   • alprazolam (XANAX) tablet 0.25 mg  0.25 mg TID PRN Donna Mann A.P.N.   0.25 mg at 17 1315   • tiotropium (SPIRIVA) 18 MCG inhalation capsule 1 Cap  1 Cap DAILY Fernandez Hays M.D.       • amiodarone (CORDARONE) tablet 400 mg  400 mg TWICE DAILY Donna Tuckert, A.P.N.   400 mg at 17   • furosemide (LASIX) injection 20 mg  20 mg Q DAY Donna Tuckert, A.P.N.   20 mg at 17 0751   • potassium chloride ER (KLOR-CON) tablet 10 mEq  10 mEq DAILY Donna Mann, A.P.N.   10 mEq at 17 0751   • atorvastatin (LIPITOR) tablet 10 mg  10 mg DAILY Tayler Kimball A.P.N.   10 mg at 17 0750   •  budesonide-formoterol (SYMBICORT) 160-4.5 MCG/ACT inhaler 2 Puff  2 Puff BID Tayler Kimball A.P.N.   2 Puff at 04/22/17 0812   • sertraline (ZOLOFT) tablet 50 mg  50 mg DAILY Tayler Kimball A.P.N.   50 mg at 04/22/17 0750   • albuterol inhaler 2 Puff  2 Puff Q4HRS PRN Tayler Kimball A.P.N.   2 Puff at 04/22/17 1516   • Respiratory Care per Protocol   Continuous RT Tayler Kimball A.P.N.       • NS infusion   Continuous Tayler Kimball A.P.N. 10 mL/hr at 04/20/17 1207 500 mL at 04/20/17 1207   • Pharmacy Consult Request ...Pain Management Review 1 Each  1 Each PRN Tayler Kimball A.P.N.       • docusate sodium (COLACE) capsule 100 mg  100 mg QAM Tayler Kimball A.P.N.   100 mg at 04/22/17 0750    And   • senna-docusate (PERICOLACE or SENOKOT S) 8.6-50 MG per tablet 1 Tab  1 Tab Nightly Tayler Kimball A.P.N.   1 Tab at 04/22/17 2127    And   • senna-docusate (PERICOLACE or SENOKOT S) 8.6-50 MG per tablet 1 Tab  1 Tab Q24HRS PRN Tayler Kimball A.P.N.        And   • lactulose 20 GM/30ML solution 30 mL  30 mL Q24HRS PRN Tayler Kimball A.P.N.        And   • bisacodyl (DULCOLAX) suppository 10 mg  10 mg Q24HRS PRN Tayler Kimball A.P.N.        And   • fleet enema 133 mL  1 Each Once PRN Tayler Kimball A.P.N.       • aspirin EC (ECOTRIN) tablet 81 mg  81 mg DAILY Tayler Kimball A.P.N.   81 mg at 04/22/17 0750   • MD ALERT... warfarin (COUMADIN) per pharmacy protocol   pharmacy to dose Tayler Kimball A.P.N.       • oxycodone immediate-release (ROXICODONE) tablet 5 mg  5 mg Q3HRS PRN Tayler Kimball, A.P.N.   5 mg at 04/20/17 2029   • oxycodone immediate release (ROXICODONE) tablet 10 mg  10 mg Q3HRS PRN Tayler Kimball, A.P.N.       • tramadol (ULTRAM) 50 MG tablet 50 mg  50 mg Q4HRS PRN Tayler Kimball, A.P.N.   50 mg at 04/22/17 1852   • ondansetron (ZOFRAN) syringe/vial injection 4 mg  4 mg Q6HRS PRN Tayler Kimball, A.P.N.   4 mg at 04/21/17 0835    Or   • prochlorperazine (COMPAZINE) injection 10 mg  10 mg Q6HRS  PRN Tayler Kimball, A.P.N.   10 mg at 04/21/17 1014    Or   • promethazine (PHENERGAN) suppository 25 mg  25 mg Q6HRS PRN Tayler Kimball, A.P.N.       • acetaminophen (TYLENOL) tablet 650 mg  650 mg Q4HRS PRN Tayler Kimball, A.P.N.        Or   • acetaminophen (TYLENOL) suppository 650 mg  650 mg Q4HRS PRN Tayler Kimball, A.P.N.       • mag hydrox-al hydrox-simeth (MAALOX PLUS ES or MYLANTA DS) suspension 30 mL  30 mL Q4HRS PRN Tayler Kibmall, A.P.N.       • diphenhydrAMINE (BENADRYL) tablet/capsule 25 mg  25 mg HS PRN - MR X 1 Tayler Kimball, A.P.N.   25 mg at 04/22/17 2127   • hydrocodone-acetaminophen (NORCO) 5-325 MG per tablet 1-2 Tab  1-2 Tab Q4HRS PRN Tayler Kimball, A.P.N.   2 Tab at 04/23/17 0445   • ipratropium-albuterol (DUONEB) nebulizer solution 3 mL  3 mL Q2HRS PRN (RT) Fernandez Hays M.D.         Last reviewed on 4/20/2017  6:03 AM by Hyacinth Keith NASH    Quality  Measures:  Labs reviewed, Medications reviewed and Radiology images reviewed  Avila catheter: No Avila      DVT Prophylaxis: Warfarin (Coumadin)  DVT prophylaxis - mechanical: SCDs  Ulcer prophylaxis: Not indicated            Assessment and Plan:    Atelectasis   - cont IS, PEP therapy  Bilateral pneumothoraces with bronchopleural fistula and increased subcutaneous emphysema   - CT reviewed   - keep CT to suction   - thoracic surgery consutl  Status post mitral valve repair, left atrial appendage ligation and maze procedure  Acute exacerbation of severe stage III chronic obstructive pulmonary disease   - improved   - cont BDs  History of atrial flutter - in SR  Systemic arterial hypertension   - cont BP control  Dyslipidemia  Anxiety  Obstructive sleep apnea - CPAP at night    Keep in ICU.  Significant bilateral pneumothoraces with increasing subcutaneous emphysema.      Critical Care Time:  35 minutes  Date of service:  4/23/17  27727  No time overlap  Time excludes procedures  Discussed with RN, RT, Team     I, Chetan Leon  (Scribe), am scribing for, and in the presence of, Fernandez Morales M.D.    Electronically signed by: Chetan Leon (Scribe), 4/23/2017    IFernandez M.D. personally performed the services described in this documentation, as scribed by Chetan Leon in my presence, and it is both accurate and complete.   Dementia Hypertension

## 2022-05-04 NOTE — PROGRESS NOTE ADULT - PROBLEM SELECTOR PLAN 5
continue Finasteride 5mg PO daily AF s/p Watchman device, not on AC.  currently rate controlled  Currently on Toprol XL 25 mg Daily

## 2022-05-04 NOTE — PATIENT PROFILE ADULT - FALL HARM RISK - HARM RISK INTERVENTIONS
Assistance with ambulation/Assistance OOB with selected safe patient handling equipment/Communicate Risk of Fall with Harm to all staff/Discuss with provider need for PT consult/Monitor gait and stability/Reinforce activity limits and safety measures with patient and family/Tailored Fall Risk Interventions/Visual Cue: Yellow wristband and red socks/Bed in lowest position, wheels locked, appropriate side rails in place/Call bell, personal items and telephone in reach/Instruct patient to call for assistance before getting out of bed or chair/Non-slip footwear when patient is out of bed/[place] to call system/Physically safe environment - no spills, clutter or unnecessary equipment/Purposeful Proactive Rounding/Room/bathroom lighting operational, light cord in reach

## 2022-05-05 NOTE — PROGRESS NOTE ADULT - PROBLEM SELECTOR PLAN 8
continue Finasteride 5mg PO daily. Hg level stable. No episode in the last 24 hrs  Urology consulted, no interventions at the moment.   s/p straight cath in the ER.

## 2022-05-05 NOTE — PROGRESS NOTE ADULT - PROBLEM SELECTOR PLAN 7
Hg level stable. No episode in the last 24 hrs  Urology consulted, no interventions at the moment.   s/p straight cath in the ER. AF s/p Watchman device, not on AC.  currently rate controlled  Currently on Toprol XL 25 mg Daily.

## 2022-05-05 NOTE — PROGRESS NOTE ADULT - PROBLEM SELECTOR PLAN 4
likely due to heart failure exacerbation  stable. -PT evaluation for optimization at the time of discharge.

## 2022-05-05 NOTE — PROGRESS NOTE ADULT - ATTENDING COMMENTS
Pt seen and examined. No acute events overnight. Limited ROS on account of cognitive impairment.   On exam, AAOX 1, no rales on auscultation. No LE edema.  Labs reviewed. Notable for mild hypokalemia ; repleted this AM.  Seen by Cardiology ; reccs appreciated.  Given improvement in volume status will d/c IV bumex and restart home PO regimen Bumex 0.5mg 3x a week ( MWF).    d/w PJ Turk,
Pt seen and examined. Admitted for AMS, acute decompensated HF. Also noted to be in urinary retention and to have hematuria.  Mental status improving ; c/w IV diuresis; f/up TTE. Strict I/O.  Urology following for hematuria; s/p straight cath. check bladder scan.

## 2022-05-05 NOTE — PROGRESS NOTE ADULT - SUBJECTIVE AND OBJECTIVE BOX
DATE OF SERVICE: 05-05-22 @ 14:46    Patient is a 89y old  Male who presents with a chief complaint of AMS (05 May 2022 11:44)      INTERVAL HISTORY: Feels ok.     REVIEW OF SYSTEMS:  CONSTITUTIONAL: No weakness  EYES/ENT: No visual changes;  No throat pain   NECK: No pain or stiffness  RESPIRATORY: No cough, wheezing; No shortness of breath  CARDIOVASCULAR: No chest pain or palpitations  GASTROINTESTINAL: No abdominal  pain. No nausea, vomiting, or hematemesis  GENITOURINARY: No dysuria, frequency or hematuria  NEUROLOGICAL: No stroke like symptoms  SKIN: No rashes    	  MEDICATIONS:  buMETAnide 0.5 milliGRAM(s) Oral once  metoprolol succinate ER 25 milliGRAM(s) Oral daily        PHYSICAL EXAM:  T(C): 36.6 (05-05-22 @ 09:19), Max: 36.6 (05-04-22 @ 19:34)  HR: 72 (05-05-22 @ 09:19) (72 - 75)  BP: 139/86 (05-05-22 @ 09:19) (114/58 - 139/86)  RR: 18 (05-05-22 @ 09:19) (18 - 18)  SpO2: 94% (05-05-22 @ 09:19) (94% - 96%)  Wt(kg): --  I&O's Summary    04 May 2022 07:01  -  05 May 2022 07:00  --------------------------------------------------------  IN: 470 mL / OUT: 0 mL / NET: 470 mL    05 May 2022 07:01  -  05 May 2022 14:46  --------------------------------------------------------  IN: 0 mL / OUT: 1 mL / NET: -1 mL          Appearance: In no distress	  HEENT:    PERRL, EOMI	  Cardiovascular:  S1 S2, No JVD  Respiratory: Lungs clear to auscultation	  Gastrointestinal:  Soft, Non-tender, + BS	  Vascularature:  No edema of LE  Psychiatric: Appropriate affect   Neuro: no acute focal deficits                               12.5   5.51  )-----------( 162      ( 05 May 2022 07:04 )             38.8     05-05    141  |  105  |  22  ----------------------------<  114<H>  3.4<L>   |  22  |  0.75    Ca    9.4      05 May 2022 07:04  Phos  3.6     05-04  Mg     1.9     05-04          Labs personally reviewed      ASSESSMENT/PLAN: 	      89yo male pt with PMH of CAD s/p AICD, Afib s/p Watchman (no AC due to hematuria), HTN, BPH, DM2, Dementia, [place] from [facility] brought is by amita Nichols (HCP) for AMS now with concerns of ADHF exacerbation.    Problem/Plan -1  Problem: ADHF  Plan:  - Follows with Dr. Trevor Schneider for OP Cardiology  - Prelim CXR shows clear lungs  - CT A/P reveals moderate B/L pleural effusions with associated compressive atelectasis  - Prior echo from 2021 shows Severe concentric left ventricular hypertrophy (thickening) with diffuse areas of myocardium demonstrating bright enhancement, possibly suggestive of infiltrative  cardiomyopathy, Mild global left ventricular systolic dysfunction, Severe reversible diastolic dysfunction (Stage III), EF 50%  - Repeat TTE with similar findings  - Patient with preserved EF, ICD in place   - BNP [id number]  - Physcial exam with +Orthopnea and +JVD, no LE edema  - Takes Bumex 0.5mg PO three times per week  - Responding well to IV diuresis, will monitor and transition to PO soon  - Transitioned to PO Lasix 5/5  - I&Os, 1.2 L Fluid restriction    Problem/Plan -2  Problem: AF  Plan:  - s/p Watchman, not on AC  - c/w Metoprolol    Problem/Plan -3  Problem: HTN  Plan:  - c/w Toprol XL 25mg PO daily    Problem/Plan -4  Problem: CAD  Plan:  - Denies chest pain  - Troponin elevated likely in the setting of ADHF exacerbation  - EKG with no ischemic changes noted  - c/w ASA        Cyn Dodson, AG-NP   Clarence Vazquez DO [facility]  Cardiovascular Medicine  [address], [address]  Office: [phone]  Cell: [phone]

## 2022-05-05 NOTE — PROGRESS NOTE ADULT - PROBLEM SELECTOR PLAN 1
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 EF 50%  -Cardiology following and recommendations has been followed  - currently on Bumex 0.5 mg IVP daily, will transition to PO dosage as per cardio.  Bumex 0.5 mg PO three times a week (Monday, Wednesday and Friday).   -Follows with Trevor tamez  -Daily weights, Intake and output monitoring, 1200mL fluid restriction

## 2022-05-05 NOTE — PROGRESS NOTE ADULT - PROBLEM SELECTOR PLAN 5
-s/p AICD   -TTE to follow  -continue Aspirin despite hematuria (resolved)  -continue Lipitor 10mg PO hs likely due to heart failure exacerbation  stable.

## 2022-05-05 NOTE — PROGRESS NOTE ADULT - SUBJECTIVE AND OBJECTIVE BOX
[facility] Division of Hospital Medicine  Bernadette Maza MD   Geriatric fellow PGY4    SUBJECTIVE / OVERNIGHT EVENTS:  Patient was evaluated at bedside in NAD. Oriented in person and place. Denies pain at the moment. Affirms have all his breakfast. Denies CP, SOB, Palpitations.  Asked about his daughter and recall she was in the hospital yesterday. BM on 5/5    ADDITIONAL REVIEW OF SYSTEMS:  Non contributory     MEDICATIONS  (STANDING):  aspirin enteric coated 81 milliGRAM(s) Oral daily  atorvastatin 10 milliGRAM(s) Oral at bedtime  buMETAnide Injectable 0.5 milliGRAM(s) IV Push daily  cyanocobalamin 1000 MICROGram(s) Oral daily  donepezil 5 milliGRAM(s) Oral at bedtime  finasteride 5 milliGRAM(s) Oral daily  melatonin 3 milliGRAM(s) Oral at bedtime  memantine 10 milliGRAM(s) Oral two times a day  metoprolol succinate ER 25 milliGRAM(s) Oral daily  mirtazapine 7.5 milliGRAM(s) Oral at bedtime  neomycin/bacitracin/polymyxin Topical Ointment 1 Application(s) Topical daily  QUEtiapine 12.5 milliGRAM(s) Oral at bedtime  thiamine 100 milliGRAM(s) Oral daily    MEDICATIONS  (PRN):  acetaminophen     Tablet .. 650 milliGRAM(s) Oral every 6 hours PRN Temp greater or equal to 38C (100.4F), Mild Pain (1 - 3)  lidocaine 4% Cream 1 Application(s) Topical once PRN pain      I&O's Summary    04 May 2022 07:01  -  05 May 2022 07:00  --------------------------------------------------------  IN: 470 mL / OUT: 0 mL / NET: 470 mL    05 May 2022 07:01  -  05 May 2022 11:44  --------------------------------------------------------  IN: 0 mL / OUT: 1 mL / NET: -1 mL        PHYSICAL EXAM:  Vital Signs Last 24 Hrs  T(C): 36.6 (05 May 2022 09:19), Max: 36.6 (04 May 2022 19:34)  T(F): 97.9 (05 May 2022 09:19), Max: 97.9 (05 May 2022 09:19)  HR: 72 (05 May 2022 09:19) (72 - 75)  BP: 139/86 (05 May 2022 09:19) (114/58 - 139/86)  BP(mean): --  RR: 18 (05 May 2022 09:19) (18 - 18)  SpO2: 94% (05 May 2022 09:19) (94% - 96%)  CONSTITUTIONAL: NAD, well-developed, well-groomed  EYES: PERRLA; conjunctiva and sclera clear  ENMT: Moist oral mucosa, no pharyngeal injection or exudates; normal dentition  NECK: Supple, no palpable masses; no thyromegaly  RESPIRATORY: Normal respiratory effort; lungs are clear to auscultation bilaterally  CARDIOVASCULAR: Regular rate and rhythm, normal S1 and S2, no murmur/rub/gallop; No lower extremity edema; Peripheral pulses are 2+ bilaterally  ABDOMEN: Nontender to palpation, normoactive bowel sounds, no rebound/guarding; No hepatosplenomegaly  MUSCULOSKELETAL:  Normal gait; no clubbing or cyanosis of digits; no joint swelling or tenderness to palpation  PSYCH: A+O to person, place, and time; affect appropriate  NEUROLOGY: CN 2-12 are intact and symmetric; no gross sensory deficits   SKIN: No rashes; no palpable lesions    LABS:                        12.5   5.51  )-----------( 162      ( 05 May 2022 07:04 )             38.8     05-05    141  |  105  |  22  ----------------------------<  114<H>  3.4<L>   |  22  |  0.75    Ca    9.4      05 May 2022 07:04  Phos  3.6     05-04  Mg     1.9     05-04                Culture - Urine (collected 03 May 2022 03:12)  Source: Clean Catch Clean Catch (Midstream)  Final Report (04 May 2022 07:23):    No growth    Culture - Blood (collected 03 May 2022 02:07)  Source: .Blood Blood-Peripheral  Preliminary Report (04 May 2022 03:01):    No growth to date.    Culture - Blood (collected 03 May 2022 02:07)  Source: .Blood Blood-Peripheral  Preliminary Report (04 May 2022 03:01):    No growth to date.        RADIOLOGY & ADDITIONAL TESTS:  Results Reviewed:   Imaging Personally Reviewed:  Electrocardiogram Personally Reviewed:    COORDINATION OF CARE:  Care Discussed with Consultants/Other Providers [Y/N]:  Prior or Outpatient Records Reviewed [Y/N]:   [facility] Division of Hospital Medicine  Bernadette Maza MD   Geriatric fellow PGY4    SUBJECTIVE / OVERNIGHT EVENTS:  Patient was evaluated at bedside in NAD. Oriented in person and place. Denies pain at the moment. Affirms have all his breakfast. Denies CP, SOB, Palpitations.  Asked about his daughter and recall she was in the hospital yesterday. BM on 5/5    ADDITIONAL REVIEW OF SYSTEMS:  Non contributory     MEDICATIONS  (STANDING):  aspirin enteric coated 81 milliGRAM(s) Oral daily  atorvastatin 10 milliGRAM(s) Oral at bedtime  buMETAnide Injectable 0.5 milliGRAM(s) IV Push daily  cyanocobalamin 1000 MICROGram(s) Oral daily  donepezil 5 milliGRAM(s) Oral at bedtime  finasteride 5 milliGRAM(s) Oral daily  melatonin 3 milliGRAM(s) Oral at bedtime  memantine 10 milliGRAM(s) Oral two times a day  metoprolol succinate ER 25 milliGRAM(s) Oral daily  mirtazapine 7.5 milliGRAM(s) Oral at bedtime  neomycin/bacitracin/polymyxin Topical Ointment 1 Application(s) Topical daily  QUEtiapine 12.5 milliGRAM(s) Oral at bedtime  thiamine 100 milliGRAM(s) Oral daily    MEDICATIONS  (PRN):  acetaminophen     Tablet .. 650 milliGRAM(s) Oral every 6 hours PRN Temp greater or equal to 38C (100.4F), Mild Pain (1 - 3)  lidocaine 4% Cream 1 Application(s) Topical once PRN pain      I&O's Summary    04 May 2022 07:01  -  05 May 2022 07:00  --------------------------------------------------------  IN: 470 mL / OUT: 0 mL / NET: 470 mL    05 May 2022 07:01  -  05 May 2022 11:44  --------------------------------------------------------  IN: 0 mL / OUT: 1 mL / NET: -1 mL        PHYSICAL EXAM:  Vital Signs Last 24 Hrs  T(C): 36.6 (05 May 2022 09:19), Max: 36.6 (04 May 2022 19:34)  T(F): 97.9 (05 May 2022 09:19), Max: 97.9 (05 May 2022 09:19)  HR: 72 (05 May 2022 09:19) (72 - 75)  BP: 139/86 (05 May 2022 09:19) (114/58 - 139/86)  BP(mean): --  RR: 18 (05 May 2022 09:19) (18 - 18)  SpO2: 94% (05 May 2022 09:19) (94% - 96%)    CONSTITUTIONAL: NAD, well-developed,  EYES: PERRLA; conjunctiva and sclera clear  ENMT: Moist oral mucosa, no pharyngeal injection or exudates;  NECK: Supple, no palpable masses; no thyromegaly  RESPIRATORY: Normal respiratory effort; lungs are clear to auscultation bilaterally  CARDIOVASCULAR: Regular rate and rhythm, normal S1 and S2, no murmur/rub/gallop; No lower extremity edema; Peripheral pulses are 2+ bilaterally  ABDOMEN: Nontender to palpation, normoactive bowel sounds, no rebound/guarding; No hepatosplenomegaly  MUSCULOSKELETAL:  no clubbing or cyanosis of digits; no joint swelling or tenderness to palpation  PSYCH: A+O to person, place, and no in time; affect appropriate  NEUROLOGY:  no gross sensory deficits   SKIN: bluish lesions on bl arms; no palpable lesions    LABS:                        12.5   5.51  )-----------( 162      ( 05 May 2022 07:04 )             38.8     05-05    141  |  105  |  22  ----------------------------<  114<H>  3.4<L>   |  22  |  0.75    Ca    9.4      05 May 2022 07:04  Phos  3.6     05-04  Mg     1.9     05-04                Culture - Urine (collected 03 May 2022 03:12)  Source: Clean Catch Clean Catch (Midstream)  Final Report (04 May 2022 07:23):    No growth    Culture - Blood (collected 03 May 2022 02:07)  Source: .Blood Blood-Peripheral  Preliminary Report (04 May 2022 03:01):    No growth to date.    Culture - Blood (collected 03 May 2022 02:07)  Source: .Blood Blood-Peripheral  Preliminary Report (04 May 2022 03:01):    No growth to date.        RADIOLOGY & ADDITIONAL TESTS:  Results Reviewed:   Imaging Personally Reviewed:  Electrocardiogram Personally Reviewed:    COORDINATION OF CARE:  Care Discussed with Consultants/Other Providers [Y/N]:  Prior or Outpatient Records Reviewed [Y/N]:

## 2022-05-05 NOTE — PROGRESS NOTE ADULT - PROBLEM SELECTOR PLAN 11
continue Mirtazepine 7.5mg PO qhs  continue Seroquel 25mg PO qhs  Continue Donepezil 5mg PO daily   Continue Memantine 10mg PO BID  Continue Melatonin 3mg PO qhs  No signs of agitation or delirium. c/w Insulin sliding scale.

## 2022-05-05 NOTE — PROGRESS NOTE ADULT - PROBLEM SELECTOR PLAN 6
AF s/p Watchman device, not on AC.  currently rate controlled  Currently on Toprol XL 25 mg Daily. -s/p AICD   -TTE to follow  -continue Aspirin despite hematuria (resolved)  -continue Lipitor 10mg PO hs

## 2022-05-05 NOTE — PROGRESS NOTE ADULT - PROBLEM SELECTOR PLAN 12
severely hearing impaired.   can communicate through writing/reading. continue Mirtazepine 7.5mg PO qhs  continue Seroquel 25mg PO qhs  Continue Donepezil 5mg PO daily   Continue Memantine 10mg PO BID  Continue Melatonin 3mg PO qhs  No signs of agitation or delirium.

## 2022-05-06 NOTE — DISCHARGE NOTE PROVIDER - NSDCMRMEDTOKEN_GEN_ALL_CORE_FT
Aspirin Enteric Coated 81 mg oral delayed release tablet: 1 tab(s) orally once a day  bacitracin/neomycin/polymyxin B 400 units-3.5 mg-5000 units/g topical ointment: 1 application topically once a day to left ear surgery site  bumetanide 0.5 mg oral tablet: 1 tab(s) orally once a week  cyanocobalamin 1000 mcg oral tablet: 1 tab(s) orally once a day  donepezil 5 mg oral tablet: 1 tab(s) orally once a day (at bedtime)  finasteride 5 mg oral tablet: 1 tab(s) orally once a day  melatonin 3 mg oral tablet: 1 tab(s) orally once a day (at bedtime)  memantine 10 mg oral tablet: 1 tab(s) orally 2 times a day  metoprolol succinate 25 mg oral tablet, extended release: 1 tab(s) orally once a day  mirtazapine 7.5 mg oral tablet: 1 tab(s) orally once a day (at bedtime)  mupirocin 2% topical ointment: 1 application in each nostril 2 times a day for another 2 days  Pravachol 20 mg oral tablet: 1 tab(s) orally once a day (at bedtime)  SEROquel 25 mg oral tablet: 0.5 tab(s) orally once a day (at bedtime)   thiamine 100 mg oral tablet: 1 tab(s) orally once a day  Tylenol 325 mg oral tablet: 2 tab(s) orally every 6 hours, As Needed -   Aspirin Enteric Coated 81 mg oral delayed release tablet: 1 tab(s) orally once a day  bacitracin/neomycin/polymyxin B 400 units-3.5 mg-5000 units/g topical ointment: 1 application topically once a day to left ear surgery site  bumetanide 0.5 mg oral tablet: 1 tab(s) orally Monday, Wednesday, and Friday  cyanocobalamin 1000 mcg oral tablet: 1 tab(s) orally once a day  donepezil 5 mg oral tablet: 1 tab(s) orally once a day (at bedtime)  finasteride 5 mg oral tablet: 1 tab(s) orally once a day  melatonin 3 mg oral tablet: 1 tab(s) orally once a day (at bedtime)  memantine 10 mg oral tablet: 1 tab(s) orally 2 times a day  metoprolol succinate 25 mg oral tablet, extended release: 1 tab(s) orally once a day  mirtazapine 7.5 mg oral tablet: 1 tab(s) orally once a day (at bedtime)  mupirocin 2% topical ointment: 1 application in each nostril 2 times a day for another 2 days  Pravachol 20 mg oral tablet: 1 tab(s) orally once a day (at bedtime)  SEROquel 25 mg oral tablet: 0.5 tab(s) orally once a day (at bedtime)   thiamine 100 mg oral tablet: 1 tab(s) orally once a day  Tylenol 325 mg oral tablet: 2 tab(s) orally every 6 hours, As Needed -

## 2022-05-06 NOTE — PROGRESS NOTE ADULT - TIME BILLING
case complexity and discharge planning. Pt is clinically stable for discharge to [facility]. To f/up with PCP.    d/w PJ Warren

## 2022-05-06 NOTE — DISCHARGE NOTE PROVIDER - NSDCCPCAREPLAN_GEN_ALL_CORE_FT
PRINCIPAL DISCHARGE DIAGNOSIS  Diagnosis: AMS (altered mental status)  Assessment and Plan of Treatment: Condition improved and stable.      SECONDARY DISCHARGE DIAGNOSES  Diagnosis: Hematuria  Assessment and Plan of Treatment: Resolved    Diagnosis: Acute decompensated heart failure  Assessment and Plan of Treatment: Weigh yourself daily.  If you gain 3lbs in 3 days, or 5lbs in a week call your Health Care Provider.  Do not eat or drink foods containing more than 2000mg of salt (sodium) in your diet every day.  Call your Health Care Provider if you have any swelling or increased swelling in your feet, ankles, and/or stomach.  Take all of your medication as directed.  If you become dizzy call your Health Care Provider.    Diagnosis: Dementia  Assessment and Plan of Treatment: SEEK IMMEDIATE MEDICAL CARE IF:  A fever develops.  New or worsened confusion develops.  New or worsened sleepiness develops.  Staying awake becomes hard to do.    Diagnosis: Physical deconditioning  Assessment and Plan of Treatment: You are being discharged to Rehab for physical therapy.    Diagnosis: Atrial fibrillation  Assessment and Plan of Treatment: Continue with current treatment.  Follow up with your primary doctor after discharge from Rehab.

## 2022-05-06 NOTE — DISCHARGE NOTE NURSING/CASE MANAGEMENT/SOCIAL WORK - PATIENT PORTAL LINK FT
You can access the FollowMyHealth Patient Portal offered by [facility] by registering at the following website: [email or web address]. By joining [employer]’s FollowMyHealth portal, you will also be able to view your health information using other applications (apps) compatible with our system.

## 2022-05-06 NOTE — PROGRESS NOTE ADULT - SUBJECTIVE AND OBJECTIVE BOX
Patient is a 89y old  Male who presents with a chief complaint of AMS (06 May 2022 11:52)      SUBJECTIVE / OVERNIGHT EVENTS:  Pt seen and examined. No acute events overnight. Limited ROS on account of cognitive impairment.    MEDICATIONS  (STANDING):  aspirin enteric coated 81 milliGRAM(s) Oral daily  atorvastatin 10 milliGRAM(s) Oral at bedtime  buMETAnide 0.5 milliGRAM(s) Oral <User Schedule>  chlorhexidine 2% Cloths 1 Application(s) Topical <User Schedule>  cyanocobalamin 1000 MICROGram(s) Oral daily  donepezil 5 milliGRAM(s) Oral at bedtime  finasteride 5 milliGRAM(s) Oral daily  melatonin 3 milliGRAM(s) Oral at bedtime  memantine 10 milliGRAM(s) Oral two times a day  metoprolol succinate ER 25 milliGRAM(s) Oral daily  mirtazapine 7.5 milliGRAM(s) Oral at bedtime  mupirocin 2% Ointment 1 Application(s) Both Nostrils two times a day  neomycin/bacitracin/polymyxin Topical Ointment 1 Application(s) Topical daily  QUEtiapine 12.5 milliGRAM(s) Oral at bedtime  thiamine 100 milliGRAM(s) Oral daily    MEDICATIONS  (PRN):  acetaminophen     Tablet .. 650 milliGRAM(s) Oral every 6 hours PRN Temp greater or equal to 38C (100.4F), Mild Pain (1 - 3)  lidocaine 4% Cream 1 Application(s) Topical once PRN pain      Vital Signs Last 24 Hrs  T(C): 36.3 (06 May 2022 09:35), Max: 36.7 (05 May 2022 20:07)  T(F): 97.3 (06 May 2022 09:35), Max: 98 (05 May 2022 20:07)  HR: 71 (06 May 2022 09:35) (66 - 76)  BP: 103/68 (06 May 2022 09:35) (103/68 - 150/90)  BP(mean): --  RR: 18 (06 May 2022 09:35) (18 - 18)  SpO2: 95% (06 May 2022 09:35) (94% - 95%)  CAPILLARY BLOOD GLUCOSE        I&O's Summary    05 May 2022 07:01  -  06 May 2022 07:00  --------------------------------------------------------  IN: 900 mL / OUT: 2 mL / NET: 898 mL    06 May 2022 07:01  -  06 May 2022 13:35  --------------------------------------------------------  IN: 60 mL / OUT: 0 mL / NET: 60 mL        PHYSICAL EXAM:  GENERAL: NAD, frail, anicteric, afebrile  HEAD:  Atraumatic, Normocephalic  EYES:  conjunctiva and sclera clear  NECK: Supple, No JVD  CHEST/LUNG: Clear to auscultation bilaterally; No rales  HEART: Regular rate and rhythm; No murmurs, rubs, or gallops  ABDOMEN: Soft, Nontender, Nondistended; Bowel sounds present  EXTREMITIES: No LE edema  PSYCH: flat affect  NEUROLOGY: AAOX 1, non-focal  SKIN: No rashes or lesions    LABS:                        11.5   5.15  )-----------( 151      ( 06 May 2022 07:33 )             35.6     05-06    138  |  102  |  21  ----------------------------<  124<H>  3.2<L>   |  22  |  0.90    Ca    8.8      06 May 2022 07:33                RADIOLOGY & ADDITIONAL TESTS:    Imaging Personally Reviewed:    Consultant(s) Notes Reviewed:      Care Discussed with Consultants/Other Providers: Cardiology    Patient is a 89y old  Male who presents with a chief complaint of AMS (06 May 2022 11:52)      SUBJECTIVE / OVERNIGHT EVENTS:  Pt seen and examined. No acute events overnight. Limited ROS on account of cognitive impairment.    MEDICATIONS  (STANDING):  aspirin enteric coated 81 milliGRAM(s) Oral daily  atorvastatin 10 milliGRAM(s) Oral at bedtime  buMETAnide 0.5 milliGRAM(s) Oral <User Schedule>  chlorhexidine 2% Cloths 1 Application(s) Topical <User Schedule>  cyanocobalamin 1000 MICROGram(s) Oral daily  donepezil 5 milliGRAM(s) Oral at bedtime  finasteride 5 milliGRAM(s) Oral daily  melatonin 3 milliGRAM(s) Oral at bedtime  memantine 10 milliGRAM(s) Oral two times a day  metoprolol succinate ER 25 milliGRAM(s) Oral daily  mirtazapine 7.5 milliGRAM(s) Oral at bedtime  mupirocin 2% Ointment 1 Application(s) Both Nostrils two times a day  neomycin/bacitracin/polymyxin Topical Ointment 1 Application(s) Topical daily  QUEtiapine 12.5 milliGRAM(s) Oral at bedtime  thiamine 100 milliGRAM(s) Oral daily    MEDICATIONS  (PRN):  acetaminophen     Tablet .. 650 milliGRAM(s) Oral every 6 hours PRN Temp greater or equal to 38C (100.4F), Mild Pain (1 - 3)  lidocaine 4% Cream 1 Application(s) Topical once PRN pain      Vital Signs Last 24 Hrs  T(C): 36.3 (06 May 2022 09:35), Max: 36.7 (05 May 2022 20:07)  T(F): 97.3 (06 May 2022 09:35), Max: 98 (05 May 2022 20:07)  HR: 71 (06 May 2022 09:35) (66 - 76)  BP: 103/68 (06 May 2022 09:35) (103/68 - 150/90)  BP(mean): --  RR: 18 (06 May 2022 09:35) (18 - 18)  SpO2: 95% (06 May 2022 09:35) (94% - 95%)  CAPILLARY BLOOD GLUCOSE        I&O's Summary    05 May 2022 07:01  -  06 May 2022 07:00  --------------------------------------------------------  IN: 900 mL / OUT: 2 mL / NET: 898 mL    06 May 2022 07:01  -  06 May 2022 13:35  --------------------------------------------------------  IN: 60 mL / OUT: 0 mL / NET: 60 mL        PHYSICAL EXAM:  GENERAL: NAD, frail, anicteric, afebrile  HEAD:  Atraumatic, Normocephalic  EYES:  conjunctiva and sclera clear  NECK: Supple, No JVD  CHEST/LUNG: Clear to auscultation bilaterally; No rales  HEART: Regular rate and rhythm; No murmurs, rubs, or gallops  ABDOMEN: Soft, Nontender, Nondistended; Bowel sounds present  EXTREMITIES: No LE edema  PSYCH: flat affect  NEUROLOGY: AAOX 1, non-focal  SKIN: No rashes or lesions    LABS:                        11.5   5.15  )-----------( 151      ( 06 May 2022 07:33 )             35.6     05-06    138  |  102  |  21  ----------------------------<  124<H>  3.2<L>   |  22  |  0.90    Ca    8.8      06 May 2022 07:33                  Care Discussed with Consultants/Other Providers: Cardiology

## 2022-05-06 NOTE — PROGRESS NOTE ADULT - SUBJECTIVE AND OBJECTIVE BOX
DATE OF SERVICE: 05-06-22 @ 14:48    Patient is a 89y old  Male who presents with a chief complaint of AMS (06 May 2022 11:52)      INTERVAL HISTORY: Feels ok.     REVIEW OF SYSTEMS: Limited historian  CONSTITUTIONAL: No weakness  EYES/ENT: No visual changes;  No throat pain   NECK: No pain or stiffness  RESPIRATORY: No cough, wheezing; No shortness of breath  CARDIOVASCULAR: No chest pain or palpitations  GASTROINTESTINAL: No abdominal  pain. No nausea, vomiting, or hematemesis  GENITOURINARY: No dysuria, frequency or hematuria  NEUROLOGICAL: No stroke like symptoms  SKIN: No rashes    MEDICATIONS:  buMETAnide 0.5 milliGRAM(s) Oral <User Schedule>  metoprolol succinate ER 25 milliGRAM(s) Oral daily        PHYSICAL EXAM:  T(C): 36.3 (05-06-22 @ 09:35), Max: 36.7 (05-05-22 @ 20:07)  HR: 71 (05-06-22 @ 09:35) (66 - 76)  BP: 103/68 (05-06-22 @ 09:35) (103/68 - 150/90)  RR: 18 (05-06-22 @ 09:35) (18 - 18)  SpO2: 95% (05-06-22 @ 09:35) (94% - 95%)  Wt(kg): --  I&O's Summary    05 May 2022 07:01  -  06 May 2022 07:00  --------------------------------------------------------  IN: 900 mL / OUT: 2 mL / NET: 898 mL    06 May 2022 07:01  -  06 May 2022 14:48  --------------------------------------------------------  IN: 180 mL / OUT: 0 mL / NET: 180 mL          Appearance: In no distress	  HEENT:    PERRL, EOMI	  Cardiovascular:  S1 S2, No JVD  Respiratory: Lungs clear to auscultation	  Gastrointestinal:  Soft, Non-tender, + BS	  Vascularature:  No edema of LE  Psychiatric: Appropriate affect   Neuro: no acute focal deficits                               11.5   5.15  )-----------( 151      ( 06 May 2022 07:33 )             35.6     05-06    138  |  102  |  21  ----------------------------<  124<H>  3.2<L>   |  22  |  0.90    Ca    8.8      06 May 2022 07:33          Labs personally reviewed      ASSESSMENT/PLAN: 	  89yo male pt with PMH of CAD s/p AICD, Afib s/p Watchman (no AC due to hematuria), HTN, BPH, DM2, Dementia, [place] from [facility] brought is by amita Nichols (HCP) for AMS now with concerns of ADHF exacerbation.    Problem/Plan -1  Problem: ADHF  Plan:  - Follows with Dr. Trevor Schneider for OP Cardiology  - Prelim CXR shows clear lungs  - CT A/P reveals moderate B/L pleural effusions with associated compressive atelectasis  - Prior echo from 2021 shows Severe concentric left ventricular hypertrophy (thickening) with diffuse areas of myocardium demonstrating bright enhancement, possibly suggestive of infiltrative  cardiomyopathy, Mild global left ventricular systolic dysfunction, Severe reversible diastolic dysfunction (Stage III), EF 50%  - Repeat TTE with similar findings  - Patient with preserved EF, ICD in place   - BNP 37699  - Physcial exam with +Orthopnea and +JVD, no LE edema  - Takes Bumex 0.5mg PO three times per week  - Responding well to IV diuresis, will monitor and transition to PO soon  - Transitioned to PO Lasix 5/5  - I&Os, 1.2 L Fluid restriction    Problem/Plan -2  Problem: AF  Plan:  - s/p Watchman, not on AC  - c/w Metoprolol    Problem/Plan -3  Problem: HTN  Plan:  - c/w Toprol XL 25mg PO daily    Problem/Plan -4  Problem: CAD  Plan:  - Denies chest pain  - Troponin elevated likely in the setting of ADHF exacerbation  - EKG with no ischemic changes noted  - c/w ASA          Cyn Dodson, CIERRA-NP   Clarence Vazquez DO [facility]  Cardiovascular Medicine  [address] [place] [address], [address]  Office: [phone]  Cell: [phone]

## 2022-05-06 NOTE — PROGRESS NOTE ADULT - PROBLEM/PLAN-2
Alert-The patient is alert, awake and responds to voice. The patient is oriented to time, place, and person. The triage nurse is able to obtain subjective information.
DISPLAY PLAN FREE TEXT

## 2022-05-06 NOTE — PROGRESS NOTE ADULT - PROBLEM SELECTOR PLAN 3
K 3.4, supplemented with 20 meq   Will continue monitor. Most likely secondary to IV diuresis. -resolved s/p repletion

## 2022-05-06 NOTE — PROGRESS NOTE ADULT - ASSESSMENT
89yo male pt with PMH of CAD s/p AICD, Afib s/p Watchman (no AC due), HTN, BPH, DM2, Dementia, [place] from [facility] brought is by amita Nichols (HCP) for AMS. As per previous note patient was found at baseline 3 days ago, found to the hospital after was poor responsive on LULU.   Admitted  on 5/2 due to suspected acute decompensated heart failure.  In the ER vitals were stable, Labs with BNP 33762. CTH showed chronic changes. Chest XR negative , Pelvis XR negative for fracture or dislocations.   CT A/P showed moderate pleural effusion. Compressive atelectasia. Enlarged prostate and urinary bladder distension.   Evaluates by urology team due to hematuria. No interventions at the moment.   
89yo male pt with PMH of CAD s/p AICD, Afib s/p Watchman (no AC due), HTN, BPH, DM2, Dementia, [place] from [facility] brought is by amita Nichols (HCP) for AMS. As per previous note patient was found at baseline 3 days ago, found to the hospital after was poor responsive on LULU.   Admitted  on 5/2 due to suspected acute decompensated heart failure.  
89yo male pt with PMH of CAD s/p AICD, Afib s/p Watchman (no AC due), HTN, BPH, DM2, Dementia, [place] from [facility] brought is by amita Nichols (HCP) for AMS. As per previous note patient was found at baseline 3 days ago, found to the hospital after was poor responsive on LULU.   Admitted  on 5/2 due to suspected acute decompensated heart failure.

## 2022-05-06 NOTE — DISCHARGE NOTE NURSING/CASE MANAGEMENT/SOCIAL WORK - NSDCPEFALRISK_GEN_ALL_CORE
For information on Fall & Injury Prevention, visit: [email or web address] OR  [email or web address] OR  https://www.cdc.gov/steadi/patient.html

## 2022-05-06 NOTE — PROGRESS NOTE ADULT - PROBLEM SELECTOR PLAN 8
Hg level stable. No episode in the last 24 hrs  Urology consulted, no interventions at the moment.   s/p straight cath in the ER.

## 2022-05-06 NOTE — PROGRESS NOTE ADULT - PROBLEM SELECTOR PLAN 2
- Most likely related to ADHF.   -As per family member patient is at baseline mental status now - Most likely related to ADHF.   -As per family member patient is back to  baseline mental status

## 2022-05-06 NOTE — DISCHARGE NOTE PROVIDER - HOSPITAL COURSE
89yo male pt with PMH of CAD s/p AICD, Afib s/p Watchman (no AC due), HTN, BPH, DM2, Dementia, [place] from [facility] brought is by amita Nichols (HCP) for AMS. As per previous note patient was found at baseline 3 days ago, found to       Problem/Plan - 1:  ·  Problem: Acute decompensated heart failure.   ·  Plan: -unclear subtype  -TTE showed infiltrate cardiomyopathy ?amyloid. EF 50%  -Cardiology following and recommendations has been followed  - currently on Bumex 0.5 mg IVP daily, will transition to PO dosage as per cardio.  Bumex 0.5 mg PO three times a week (Monday, Wednesday and Friday).   -Follows with Trevor Schneider outpt  -Daily weights, Intake and output monitoring, 1200mL fluid restriction.    Problem/Plan - 2:  ·  Problem: Altered mental status.   ·  Plan: - Most likely related to ADHF.   -As per family member patient is at baseline mental status now.    Problem/Plan - 3:  ·  Problem: Hypokalemia.   ·  Plan: K 3.4, supplemented with 20 meq   Will continue monitor. Most likely secondary to IV diuresis.    Problem/Plan - 4:  ·  Problem: Physical deconditioning.   ·  Plan: -PT evaluation for optimization at the time of discharge.    Problem/Plan - 5:  ·  Problem: Elevated troponin.   ·  Plan: likely due to heart failure exacerbation  stable.    Problem/Plan - 6:  ·  Problem: CAD S/P percutaneous coronary angioplasty.   ·  Plan: -s/p AICD   -TTE to follow  -continue Aspirin despite hematuria (resolved)  -continue Lipitor 10mg PO hs.    Problem/Plan - 7:  ·  Problem: Atrial fibrillation.   ·  Plan: AF s/p Watchman device, not on AC.  currently rate controlled  Currently on Toprol XL 25 mg Daily.    Problem/Plan - 8:  ·  Problem: Hematuria.   ·  Plan: Hg level stable. No episode in the last 24 hrs  Urology consulted, no interventions at the moment.

## 2022-05-06 NOTE — DISCHARGE NOTE NURSING/CASE MANAGEMENT/SOCIAL WORK - NSDCVIVACCINE_GEN_ALL_CORE_FT
influenza, injectable, quadrivalent, preservative free; 10-Oct-2019 12:36; Ashley Figueroa (RN); [employer]; [id number] (Exp. Date: 30-Jun-2020); IntraMuscular; Deltoid Right.; 0.5 milliLiter(s); VIS (VIS Published: 15-Aug-2019, VIS Presented: 10-Oct-2019);   Tdap; 30-Jun-2019 18:38; Edith Almaraz (RN); [employer]; [id number] (Exp. Date: 04-Apr-2021); IntraMuscular; Deltoid Right.; 0.5 milliLiter(s); VIS (VIS Published: 09-May-2013, VIS Presented: 30-Jun-2019);   Tdap; 07-Oct-2019 06:56; Negar Burns (RN); [employer]; [id number] (Exp. Date: 08-Aug-2020); IntraMuscular; Deltoid Right.; 0.5 milliLiter(s); VIS (VIS Published: 09-May-2013, VIS Presented: 07-Oct-2019);   Tdap; 14-Jun-2021 20:02; Paige Gore (RN); [employer]; [id number] (Exp. Date: 11-Sep-2022); IntraMuscular; Deltoid Right.; 0.5 milliLiter(s); VIS (VIS Published: 09-May-2013, VIS Presented: 14-Jun-2021);

## 2022-05-06 NOTE — PROGRESS NOTE ADULT - PROBLEM SELECTOR PLAN 12
continue Mirtazepine 7.5mg PO qhs  continue Seroquel 25mg PO qhs  Continue Donepezil 5mg PO daily   Continue Memantine 10mg PO BID  Continue Melatonin 3mg PO qhs  No signs of agitation or delirium.

## 2022-05-06 NOTE — PROGRESS NOTE ADULT - PROBLEM SELECTOR PLAN 6
-s/p AICD   -TTE to follow  -continue Aspirin despite hematuria (resolved)  -continue Lipitor 10mg PO hs -s/p AICD   -TTE reviewed  - continue Aspirin   -continue Lipitor 10mg PO hs

## 2022-05-11 NOTE — PROGRESS NOTE ADULT - PROBLEM SELECTOR PLAN 1
VSS. Patient able to tolerate oral liquids. Patient reports tolerable pain level for discharge. Patient/family received home medication per bedside delivery. Dressing intact. Polar care intact, power adapter placed with patient belongings. Thigh TEDs intact. Patient instructed not to wear KALEY hose without wearing closed-back shoes or  socks due to increased risk of falls, verbalized understanding. Sling on patient. No distress noted. Patient states he is ready for discharge. [facility] and family, verbalized understanding. IV discontinued with catheter tip intact. Staff and family at bedside to help patient dress. Patient wheeled to lobby via staff.   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 EF 50%  -Cardiology following and recommendations has been followed  - currently on Bumex 0.5 mg IVP daily, will transition to PO dosage as per cardio.  Bumex 0.5 mg PO three times a week (Monday, Wednesday and Friday).   -Follows with Trevor tamez  -Daily weights, Intake and output monitoring, 1200mL fluid restriction -unclear subtype  -TTE showed infiltrate cardiomyopathy ?amyloid. EF 50%  -Cardiology following and recommendations has been followed  - off IV bumex  - c/w Bumex 0.5 mg PO three times a week (Monday, Wednesday and Friday).   -Follows with Trevor tamez  -Daily weights, Intake and output monitoring, 1200mL fluid restriction  - Cardiology reccs appreciated

## 2022-06-15 NOTE — ED ADULT NURSE NOTE - NSFALLRSKPASTHIST_ED_ALL_ED
80 yo male with cad s/p stent on plavix, ischemic cardiomyopathy w/ mild LV dysfunction, paroxysmal afib (not on ac due to bleed in the past?), HTN, Dm2 presents to the ER with acute R sided chest pressure starting around 11AM today. He reports it was nonexertional and he took aspirin which improved the pain eventually. At baseline he can ambulate but reports that he has been becoming more weak after exerting himself past half a block, but no chest pain or dyspnea reported. He denies headache, cough, abd pain, leg swelling, dysuria or diarrhea. He reports he has anemia and was previously on warfarin but due to a ?GI bleed he was taken off but has been doing well for two years on plavix. He says he is supposed to be on aspirin also but has not been taking this. In the eR pt was given plavix, aspirin and heparinized.   yes

## 2022-07-01 PROBLEM — Z95.2 S/P TAVR (TRANSCATHETER AORTIC VALVE REPLACEMENT): Status: ACTIVE | Noted: 2022-01-01

## 2022-07-01 PROBLEM — M48.00 SPINAL STENOSIS: Status: ACTIVE | Noted: 2019-05-14

## 2022-07-01 NOTE — REASON FOR VISIT
[Initial Evaluation] : an initial evaluation [FreeTextEntry1] : dementia with behavioral disturbances

## 2022-07-01 NOTE — ASSESSMENT
[FreeTextEntry1] : vascular dementia with behavioral disturbances:\par MMSE 14/30 7/2020 with Neurology\par assistance with ADL's and IADL's\par -pysch referral\par -will likely need more care for level of care needs\par -not really able to follow commands - ? combination of severely [place] and dementia\par -c/w mirtaz and seroquel \par c/w aricept and namenda\par \par HF: appears euvolemic today\par CAD\par Afib\par HTN\par -bp controlled, cw/ current medications\par -recommend continuing bumex TIW (not daily)\par \par DM2\par Gait instability:\par multifactorial

## 2022-07-01 NOTE — PHYSICAL EXAM
[Sclera] : the sclera and conjunctiva were normal [EOMI] : extraocular movements were intact [Normal Appearance] : the appearance of the neck was normal [No Respiratory Distress] : no respiratory distress [No Acc Muscle Use] : no accessory muscle use [Respiration, Rhythm And Depth] : normal respiratory rhythm and effort [Auscultation Breath Sounds / Voice Sounds] : lungs were clear to auscultation bilaterally [Normal S1, S2] : normal S1 and S2 [Heart Rate And Rhythm] : heart rate was normal and rhythm regular [Edema] : edema was not present [Bowel Sounds] : normal bowel sounds [Abdomen Tenderness] : non-tender [Abdomen Soft] : soft [Normal Color / Pigmentation] : normal skin color and pigmentation [de-identified] : frail appearing older man [de-identified] : hard of hearing [de-identified] : wheelchair [de-identified] : difficulty following commands, constant grinding of his teeth

## 2022-07-01 NOTE — DATA REVIEWED
[FreeTextEntry1] :  ACC: [id number] EXAM:  CT ABDOMEN AND PELVIS IC                      \par \par PROCEDURE DATE:  05/03/2022  \par \par \par \par INTERPRETATION:  CLINICAL INFORMATION: Hematuria, sepsis, altered mental \par status. Evaluate for infectious source.\par \par COMPARISON: Prior CT chest, abdomen, and pelvis dated 9/26/2021.\par \par CONTRAST/COMPLICATIONS:\par IV Contrast: Omnipaque 300  90 cc administered   10 cc discarded\par Oral Contrast: NONE\par Complications: None reported at time of study completion\par \par \par PROCEDURE:\par CT of the Abdomen and Pelvis was performed.\par Sagittal and coronal reformats were performed.\par \par FINDINGS:\par LOWER CHEST: Moderate bilateral pleural effusions with associated \par compressive atelectasis. Status post TAVR and pacemaker. Coronary \par arterial calcification/stents. Calcification of the mitral annulus.\par \par LIVER: Within normal limits.\par BILE DUCTS: Normal caliber.\par GALLBLADDER: Within normal limits.\par SPLEEN: Within normal limits.\par PANCREAS: Within normal limits.\par ADRENALS: Nodular thickening of the left adrenal gland.\par KIDNEYS/URETERS: No hydronephrosis. Subcentimeter hypodense focus in the \par right kidney, too small to characterize.\par \par BLADDER: Minimally distended. There is diffuse wall thickening vs \par underdistension of the bladder wall.\par REPRODUCTIVE ORGANS: Prostate gland is enlarged.\par \par BOWEL: No bowel obstruction. Appendix is within normal limits. Hiatal \par hernia.  Diffuse colonic diverticulosis.\par PERITONEUM: No ascites.\par VESSELS: Atherosclerotic changes.\par RETROPERITONEUM/LYMPH NODES: No lymphadenopathy.\par ABDOMINAL WALL: Within normal limits.\par BONES: Scoliosis and degenerative changes. Again noted, is an appearance \par suggestive of a small to moderate right hip effusion, similar in \par appearance to prior CT pelvis dated 10/7/2019.\par \par IMPRESSION:\par Moderate bilateral pleural effusions, with associated compressive \par atelectasis.\par \par Urinary bladder is minimally distended. There is diffuse wall thickening \par vs. underdistension.  Correlation with urinalysis is recommended.\par \par Enlarged prostate gland.\par \par \par \par \par \par --- End of Report ---\par \par \par \par \par \par ALEISHA CHOWDARY MD; Attending Radiologist\par This document has been electronically signed. May  3 2022  9:04AM\par ___________________________\par \par \par ACC: [id number] EXAM:  CT BRAIN                      \par \par PROCEDURE DATE:  05/02/2022  \par \par \par \par INTERPRETATION:  CLINICAL INFORMATION: Unresponsiveness. Evaluate for \par intracranial bleeding or stroke.\par \par TECHNIQUE: Noncontrast axial CT images were acquired through the head. \par Two-dimensional sagittal and coronal reformats were obtained\par \par COMPARISON: CT head 6/14/2021.\par \par FINDINGS:\par \par No acute intracranial hemorrhage or suspicious extra-axial fluid \par collection. Background of severe chronic microvascular ischemic changes \par and moderate to severe cerebral atrophy. Chronic lacunar infarcts \par involving the bilateral basal ganglia and bilateral cerebella. Unchanged \par chronic right frontal and left parietotemporal chronic infarcts.\par \par No hydrocephalus or central herniation. Basilar cisterns remain clear. \par Scalp and imaged midfacial soft tissues are unremarkable. Calvarium is \par intact.\par \par IMPRESSION:\par \par No acute intracranial CT finding. Stable chronic findings, detailed above\par \par --- End of Report ---\par \par \par \par \par GINNA ROWE MD; Resident Radiology\par This document has been electronically signed.\par MATTHEW PAZ MD; Attending Radiologist\par This document has been electronically signed. May  3 2022 12:45AM\par \par _________________________________\par \par _________________________\par \par \par EXAM: CT BRAIN \par \par EXAM: CT CERVICAL SPINE \par \par \par PROCEDURE DATE: 10/07/2019 \par \par \par \par \par INTERPRETATION: CLINICAL INFORMATION: Unwitnessed fall. Head trauma. \par \par TECHNIQUE: Noncontrast CT scan of the head and cervical spine was performed. \par Axial images with coronal and sagittal reformatted series were obtained. \par \par COMPARISON: CT head 8/26/2019, CT cervical spine 6/30/2019. \par \par HEAD: \par \par Redemonstration enlarged ventricles and sulci consistent with volume loss \par unchanged from prior with small 5 mm bifrontal subdural hygromas. There is \par redemonstration of extensive nonspecific white matter decreased attenuation, \par likely extensive microvascular disease with multiple areas of remote lacunar \par infarction throughout the centrum semiovale, lentiform nuclei, thalami, \par midbrain and martha as well as small areas remote lacunar infarcts, with \par insufflation gliosis again noted in the left cerebellar hemisphere, \par unchanged from prior. There is no new acute intra or extra hemorrhage or \par midline shift. Basal cisterns remain patent. There is a partially empty \par sella. There is unchanged marked atherosclerotic vascular calcification of \par the carotid siphons and dolichoectatic posterior circulation, correlate with \par underlying hypertension. \par \par There are absent orbital lenses with previous cataract surgery. Paranasal \par sinuses remain unremarkable with minimal mucosal thickening, there is \par decreased pneumatization with sclerosis and opacification of the mastoid \par tips unchanged calvarium remains intact. \par \par CERVICAL SPINE: \par \par Lateral masses of C1 remain intact on C2. Unchanged mild lower cervical \par dextrocurvature, 2 mm anterolisthesis of C7 on T1, multilevel disc space \par narrowing, osteophytosis, and facet hypertrophic changes again causing \par multilevel canal and foraminal narrowing without significant interval change \par from prior. Canal contents are suboptimally evaluated on CT. MRI is more \par sensitive for soft tissue, cord or ligamentous injury and may be obtained \par for improved assessment if there is persistent cervical spine pain as \par clinically warranted. There is no significant new prevertebral soft tissue \par swelling. \par \par Unchanged carotid bulb atherosclerotic calcification and a right vocal cord \par palsy with medial deviated right true cord, ipsilateral enlargement of right \par piriform sinus and right laryngeal ventricle. 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 Unchanged right larger than left \par pleural effusions, please see dedicated reports of the chest for chest \par findings, partially seen right-sided venous catheter with tip below the edge \par of study. \par \par IMPRESSION: \par \par HEAD CT: \par \par Unchanged volume loss and microvascular disease without hemorrhage or \par midline shift. If symptoms persist consider follow-up head CT or MR if no \par contraindications. \par \par CERVICAL SPINE CT: \par \par No new obvious acute fracture pr dislocation, no prevertebral soft tissue \par swelling, redemonstration of multilevel degenerative change, MR is more \par sensitive for soft tissue tissue, disc or ligament injury and may be \par obtained if persistent cervical spine pain as clinically warranted. \par \par Right vocal cord palsy, bilateral right larger left pleural effusion, please \par see chest reports for thoracic findings. \par \par \par \par \par \par \par \par CATRACHITA COPPOLA M.D., RADIOLOGY RESIDENT \par This document has been electronically signed. \par JASSON VALLES M.D., ATTENDING RADIOLOGIST \par This document has been electronically signed. Oct 7 2019 11:41AM

## 2022-07-01 NOTE — HISTORY OF PRESENT ILLNESS
[FreeTextEntry1] : 89 yoM with vascular dementia with BD, HF, Afib, CAD s/p PCI, DM2, hx of watchman and TAVR, HLD, spinal stenosis, HTN  seen as new patient\par \par \par [facility] nurse notfied MD about patient not able to follow commands.\par \par dementia:\par was seeing neurology in past 2020 with memory loss and depression\par needed help with with ADL;s then\par \par functional status:\par hx of falls\par \par CAD s/p AICD /  Afib s/p watchman (not on AC) /  HTN:\par Cardiologist Dr. Schneider\par \par echo 5/2022:  infiltrative cardiomyopathy, severe conc LVH, LVEF 50%, diffuse hypokinesis\par mild mitral stenosis, TAVR with normal fxn, mild/mod pHTN\par \par hospitalization may 2022  with acute heart failure adjustment in bumex 0.5mg TIW and fluid restriction, also had hematuria\par \par labs from 5/2022:\par EGFR 79 with SCr0.90\par anemia of 11.5  stable\par a1c 6.4%--  not on meds\par \par retired: used to own a gas station

## 2022-08-01 NOTE — ED PROVIDER NOTE - OBJECTIVE STATEMENT
Patient is a 89 y/o M w/ PMHx sig for SCC, CHF, dementia, depression, DM who presents to the ED from [facility] for unwitnessed fall. Patient unable to provide details, believes he is in a motel. Denies any chest pain, palpatations, n/v, f/c, urinary/bowel changes, headache, or abd pain. Not on AC.

## 2022-08-01 NOTE — ED PROVIDER NOTE - CLINICAL SUMMARY MEDICAL DECISION MAKING FREE TEXT BOX
89 y/p m p/w unwitnessed fall at [facility]. No onvious signs of trauma. Not on AC. Vitals stable. exam as above. Given age and unknown mechanism, will get basics, ck, ekg, cxr, hop/pelvis, ct head and cervical spine. Collateral from [facility].

## 2022-08-01 NOTE — ED PROVIDER NOTE - NSFOLLOWUPCLINICS_GEN_ALL_ED_FT
[facility] - Primary Care  Primary Care  [address] [place] [address]  Phone: [phone]  Fax:

## 2022-08-01 NOTE — ED PROVIDER NOTE - PATIENT PORTAL LINK FT
You can access the FollowMyHealth Patient Portal offered by [facility] by registering at the following website: [email or web address] By joining [email or web address] [email or web address] portal, you will also be able to view your health information using other applications (apps) compatible with our system.

## 2022-08-01 NOTE — ED ADULT NURSE NOTE - OBJECTIVE STATEMENT
Male 89 years old history of Dementia, CHF, alert and oriented only to his name, resident of [facility] brought in by EMS for unwitnessed fall. Not on blood thinner, staff doesn't know if pt loss consciousness. Pt denies any pain, states 'I fell". Skin clean and intact. No bruising, abrasion or laceration noted. Moves all extremities. Safety and comfort maintained. Call bell within easy reach. Will continue to monitor.

## 2022-08-01 NOTE — ED PROVIDER NOTE - PHYSICAL EXAMINATION
General: Alert and Orientated x 1. No apparent distress.  Head: Normocephalic and atraumatic.  Eyes: PERRLA with EOMI.  Neck: Supple. Trachea midline.   Cardiac: Normal S1 and S2 w/ RRR. No murmurs appreciated. No JVD appreciated.  Pulmonary:CTA  bilaterally. No increased WOB. No wheezes or crackles.  Abdominal: Soft, non-tender. (+) bowel sounds appreciated in all 4 quadrants. No hepatosplenomegaly.   Neurologic: No focal sensory or motor deficits. Unable to assess gait   Musculoskeletal: Strength appropriate in all 4 extremities for age with no limited ROM.  Skin: Color appropriate for race. Intact, warm, and well-perfused.  Lymphatic: No cervical or inguinal adenopathy appreciated.  Psychiatric: dementia

## 2022-08-01 NOTE — ED PROVIDER NOTE - PROGRESS NOTE DETAILS
Sophie Valdez MD (PGY3) -  called [facility] x2, per staff patient "just came to facility" they do not know what his ambulatory status is at baseline, spent a long time trying to speak to staff re this, spoke to 3 different staff members, no one knows what his ambulatory status is at baseline. Sophie Valdez MD (PGY3) -  Spoke to additional staff member who states patient is at [facility], not [facility]. Sophie Valdez MD (PGY3) -  LVM for [facility] Sophie Valdez MD (PGY3) -  spoke to daughter who states pt is wheelchair bound at baseline aox1 at baseline

## 2022-08-01 NOTE — ED ADULT NURSE REASSESSMENT NOTE - NS ED NURSE REASSESS COMMENT FT1
Report received from SAMANTHA Forrester. Patient resting in stretcher in gold room 5. A&Ox1 (to self). VSS. IV patent and flushes without difficulty. Denies any pain at this time. Stretcher locked and in lowest position, appropriate side rails up. Pt instructed to notify RN if assistance is needed. Pending results and dispo.

## 2022-08-01 NOTE — ED PROVIDER NOTE - ATTENDING CONTRIBUTION TO CARE
attending Daniel: 89yM h/o CHF, dementia, depression, DM BIBEMS from assisted living facility after  unwitnessed fall. Patient unable to provide details secondary to dementia. Exam as above. Will obtain labs, imaging eval traumatic injury, reassess

## 2022-08-01 NOTE — ED ADULT NURSE NOTE - NSIMPLEMENTINTERV_GEN_ALL_ED
Implemented All Fall with Harm Risk Interventions:  [place] to call system. Call bell, personal items and telephone within reach. Instruct patient to call for assistance. Room bathroom lighting operational. Non-slip footwear when patient is off stretcher. Physically safe environment: no spills, clutter or unnecessary equipment. Stretcher in lowest position, wheels locked, appropriate side rails in place. Provide visual cue, wrist band, yellow gown, etc. Monitor gait and stability. Monitor for mental status changes and reorient to person, place, and time. Review medications for side effects contributing to fall risk. Reinforce activity limits and safety measures with patient and family. Provide visual clues: red socks.

## 2022-08-01 NOTE — ED PROVIDER NOTE - NSFOLLOWUPINSTRUCTIONS_ED_ALL_ED_FT
You were seen in the emergency department for: fall    Please return to the Emergency Department if you experience any of the following symptoms:   - Shortness of breath or trouble breathing  - Pressure, pain or tightness in the chest  - Face drooping, arm weakness or speech difficulty  - Persistence of severe vomiting  - Head injury or loss of consciousness  - Nonstop bleeding or an open wound    (1) Follow up with your primary care physician within the next 24-48 hours as discussed. In addition, we did not find evidence of a life threatening illness on your testing here today, but listed below are the specialists that will be necessary to see as an outpatient to continue the workup.  Please call the numbers listed below or [phone] to set up the necessary appointments.  (2) Take Tylenol (up to 1000mg or 1 g) up to every 6 hours as needed for pain.   (3) If you had an IV (intravenous) line placed, it was removed. Sometimes, after IV removal, that area can be tender for a few days; if it develops redness and swelling, those could be signs of infection; in which case, return to the Emergency Department for assessment.  (4) Please continue taking all of your home medications as directed.

## 2022-08-02 NOTE — ED ADULT NURSE REASSESSMENT NOTE - NS ED NURSE REASSESS COMMENT FT1
Received patient from Lisa RN, patient at baseline mental status, able to make needs known, NAD, VSS, patient agreeable to plan of care, pending nonemergent.

## 2022-08-03 PROBLEM — K59.00 CONSTIPATION: Status: ACTIVE | Noted: 2022-01-01

## 2022-08-03 PROBLEM — R26.81 GAIT INSTABILITY: Status: ACTIVE | Noted: 2019-09-09

## 2022-08-03 NOTE — HISTORY OF PRESENT ILLNESS
[FreeTextEntry1] : 89 yoM with vascular dementia with BD, HF, Afib, CAD s/p PCI, DM2, hx of watchman and TAVR, HLD, spinal stenosis, HTN seen post f/u ER s/p fall.\par imaging and labwork reviewed.\par no fxr or trauma.\par labwork stable\par \par patient denies any pain.\par wheelchair\par \par dementia:\par was seeing neurology in past 2020 with memory loss and depression\par needed help with with ADL;s then\par \par functional status:\par hx of falls\par \par CAD s/p AICD / Afib s/p watchman (not on AC) / HTN:\par Cardiologist Dr. Schneider\par \par echo 5/2022: infiltrative cardiomyopathy, severe conc LVH, LVEF 50%, diffuse hypokinesis\par mild mitral stenosis, TAVR with normal fxn, mild/mod pHTN\par \par hospitalization may 2022 with acute heart failure adjustment in bumex 0.5mg TIW and fluid restriction, also had hematuria\par \par labs from 5/2022:\par EGFR 79 with SCr0.90\par anemia of 11.5 stable\par a1c 6.4%-- not on meds\par

## 2022-08-03 NOTE — ASSESSMENT
[FreeTextEntry1] : vascular dementia with behavioral disturbances:\par [facility] 14/30 7/2020 with Neurology\par assistance with ADL's and IADL's\par -c/w mirtaz and seroquel \par c/w aricept and namenda\par \par HF: appears euvolemic today\par CAD\par Afib\par HTN\par -bp controlled, cw/ current medications\par -cw bumex TIW \par \par DM2: not on medications\par Gait instability:wheelchair, high risk of falls due to poor judgement due to dementia and unsteady gait\par multifactorial

## 2022-08-03 NOTE — PHYSICAL EXAM
[Sclera] : the sclera and conjunctiva were normal [EOMI] : extraocular movements were intact [Normal Appearance] : the appearance of the neck was normal [No Respiratory Distress] : no respiratory distress [No Acc Muscle Use] : no accessory muscle use [Respiration, Rhythm And Depth] : normal respiratory rhythm and effort [Auscultation Breath Sounds / Voice Sounds] : lungs were clear to auscultation bilaterally [Normal S1, S2] : normal S1 and S2 [Heart Rate And Rhythm] : heart rate was normal and rhythm regular [Edema] : edema was not present [Bowel Sounds] : normal bowel sounds [Abdomen Tenderness] : non-tender [Abdomen Soft] : soft [Normal Color / Pigmentation] : normal skin color and pigmentation [de-identified] : frail appearing older man [de-identified] : hard of hearing [de-identified] : wheelchair [de-identified] : difficulty following commands, grinding of teeth

## 2022-08-06 NOTE — H&P ADULT - PROBLEM SELECTOR PLAN 2
- pt appears hypervolemic on exam, BNP 4900, trop 93, EKG similar to prior  - CXR showing moderate pulm edema, small b/l pleural effusions  - home dose bumex: 0.5mg M/W/F  - s/p 1mg IV bumex in the ED  - c/w bumex 1mg IV qd for now  - trend trops to peak  - monitor on tele  - measure I/Os, daily weight - pt appears hypervolemic on exam, BNP 4900, trop 93, EKG similar to prior  - CXR showing moderate pulm edema, small b/l pleural effusions  - Echo 5/2022: EF 50%, diffuse hypokinesis, infiltrative disease  - home dose bumex: 0.5mg PO M/W/F  - s/p 1mg IV bumex in the ED  - c/w bumex 1mg IV qd for now  - trend trops to peak  - repeat TTE  - monitor on tele  - measure I/Os, daily weight

## 2022-08-06 NOTE — ED PROVIDER NOTE - PHYSICAL EXAMINATION
GENERAL: NAD  HEENT:  Atraumatic  CHEST/LUNG: Chest rise equal bilaterally  HEART: Regular rate and rhythm  ABDOMEN: Soft, Nontender, Nondistended  EXTREMITIES:  Extremities warm  SKIN: No obvious acute rashes or lesions  MSK: No cervical spine TTP  NEUROLOGY: Pt is moving all 4 extremities

## 2022-08-06 NOTE — ED PROVIDER NOTE - ATTENDING CONTRIBUTION TO CARE
Attending MD Whiteside:  I personally have seen and examined this patient. I have performed a substantive portion of the visit including all aspects of the medical decision making.  Resident note reviewed and agree on plan of care and except where noted.    89M with dementia, CHF presenting from [facility] facility with reported fall, patient provides history of feeling "cold" but no other endorsed symptoms. +abrasion to left forehead. Exam otherwise notable for increased wob and  +JVD, no other trauma on examination. Plan for CT head and C spine, labs, ECG, CXR, likely admission for suspected acute HF           *The above represents an initial assessment/impression. Please refer to progress notes for potential changes in patient clinical course*

## 2022-08-06 NOTE — ED PROVIDER NOTE - NSFOLLOWUPINSTRUCTIONS_ED_ALL_ED_FT
Fall Prevention    WHAT YOU NEED TO KNOW:    Fall prevention includes ways to make your home and other areas safer. It also includes ways you can move more carefully to prevent a fall. Health conditions that cause changes in your blood pressure, vision, or muscle strength and coordination may increase your risk for falls. Medicines may also increase your risk for falls if they make you dizzy, weak, or sleepy.     DISCHARGE INSTRUCTIONS:    Call 911 or have someone else call if:     You have fallen and are unconscious.      You have fallen and cannot move part of your body.    Contact your healthcare provider if:     You have fallen and have pain or a headache.      You have questions or concerns about your condition or care.    Fall prevention tips:     Stand or sit up slowly. This may help you keep your balance and prevent falls.      Use assistive devices as directed. Your healthcare provider may suggest that you use a cane or walker to help you keep your balance. You may need to have grab bars put in your bathroom near the toilet or in the shower.      Wear shoes that fit well and have soles that . Wear shoes both inside and outside. Use slippers with good . Do not wear shoes with high heels.      Wear a personal alarm. This is a device that allows you to call 911 if you fall and need help. Ask your healthcare provider for more information.      Stay active. Exercise can help strengthen your muscles and improve your balance. Your healthcare provider may recommend water aerobics or walking. He or she may also recommend physical therapy to improve your coordination. Never start an exercise program without talking to your healthcare provider first.       Manage your medical conditions. Keep all appointments with your healthcare providers. Visit your eye doctor as directed.    Home safety tips:     Add items to prevent falls in the bathroom. Put nonslip strips on your bath or shower floor to prevent you from slipping. Use a bath mat if you do not have carpet in the bathroom. This will prevent you from falling when you step out of the bath or shower. Use a shower seat so you do not need to stand while you shower. Sit on the toilet or a chair in your bathroom to dry yourself and put on clothing. This will prevent you from losing your balance from drying or dressing yourself while you are standing.       Keep paths clear. Remove books, shoes, and other objects from walkways and stairs. Place cords for telephones and lamps out of the way so that you do not need to walk over them. Tape them down if you cannot move them. Remove small rugs. If you cannot remove a rug, secure it with double-sided tape. This will prevent you from tripping.       Install bright lights in your home. Use night lights to help light paths to the bathroom or kitchen. Always turn on the light before you start walking.      Keep items you use often on shelves within reach. Do not use a step stool to help you reach an item.      Paint or place reflective tape on the edges of your stairs. This will help you see the stairs better.    Follow up with your healthcare provider as directed: Write down your questions so you remember to ask them during your visits.

## 2022-08-06 NOTE — H&P ADULT - PROBLEM SELECTOR PLAN 7
Diet: CC/DASH  DVT Prophylaxis: lovenox sq  Full code Diet: CC/DASH  DVT Prophylaxis: lovenox sq  Full code    Signed out to ACP [id number] Diet: CC/DASH  DVT Prophylaxis: lovenox sq  DNR/DNI, MOLST confirmed in chart    Signed out to ACP [id number] Diet: CC/DASH  DVT Prophylaxis: lovenox sq  DNR/DNI, prior MOLST confirmed in chart    Signed out to ACP [id number]

## 2022-08-06 NOTE — H&P ADULT - ASSESSMENT
Patient is a 89y Male with PMH of DM, HTN, HLD, BPH, dementia, depression, frequent falls, chronic afib not on AC (on aspirin), HFpEF, TAVR, SCC, and spinal stenosis, presenting to the ED from his nursing home after an unwitnessed fall, admitted with frequent falls and likely acute on chronic diastolic heart failure exacerbation.

## 2022-08-06 NOTE — ED PROVIDER NOTE - CLINICAL SUMMARY MEDICAL DECISION MAKING FREE TEXT BOX
89 y/o male w/ PMH BPH, DM, HLD, dementia/major depression, chronic AFib on ASA not on other bloodthiners c/o unwitnessed fall, unknown LOC, unknown downtime. Pt is not responding to questions at time of examination.  superficial abrasion on forehead. Will begin syncopal workup, CT head/neck/CAP, and reassess.

## 2022-08-06 NOTE — H&P ADULT - PROBLEM SELECTOR PLAN 3
- AAOx1 at baseline as per prior admissions, currently alert and interactive but not answering questions, not agitated  - c/w Mirtazepine 7.5mg PO qhs  - c/w Seroquel 12.5mg PO qhs  - C/w Donepezil 5mg PO daily   - C/w Memantine 10mg PO BID  - C/w Melatonin 3mg PO qhs

## 2022-08-06 NOTE — H&P ADULT - NSHPLABSRESULTS_GEN_ALL_CORE
EKG personally reviewed by me: afib, RBBB, similar to prior  Imaging personally reviewed by me including:      - CXR: mild/moderate pulmonary edema, bilateral pleural effusions, small on the right and moderate on the left

## 2022-08-06 NOTE — H&P ADULT - NSHPPHYSICALEXAM_GEN_ALL_CORE
T(C): 36.1 (08-06-22 @ 21:00), Max: 36.1 (08-06-22 @ 21:00)  HR: 80 (08-06-22 @ 21:00) (77 - 86)  BP: 120/83 (08-06-22 @ 21:00) (120/83 - 141/92)  RR: 20 (08-06-22 @ 21:00) (16 - 22)  SpO2: 94% (08-06-22 @ 21:00) (94% - 97%)    Constitutional: no acute distress, laying in bed comfortably, alert  ENMT: atraumatic, normocephalic, no lymphadenopathy  Eyes: pupils equally round and reactive to light, extraocular muscles intact, no conjunctival injection  Cardio: regular rate and rhythm, normal S1/S2, no murmurs, rubs, or gallops  Respiratory: diminished breath sounds at bases b/l, minimal rales, no wheezing  GI: soft, nontender, nondistended, BSx4  MSK: extremities atraumatic, no cyanosis or clubbing  Skin: 2+ b/l LE edema to shins R>L, multiple scabs on BLE  Neuro: no focal deficits, moving all extremities, not following commands  Psych: alert and oriented x0-1, normal behavior, appropriate mood and affect

## 2022-08-06 NOTE — H&P ADULT - PROBLEM SELECTOR PLAN 1
- fall at nursing home, unknown hoe long down, unknown LOC, not on AC  - CT head w/ no acute intracranial pathology  - patient does not appear to be in pain  - appears to use a wheelchair at baseline  - PT consulted  - fall precautions - fall at nursing home, unknown hoe long down, unknown LOC, not on AC  - CT head w/ no acute intracranial pathology  - afebrile, no obvious consolidations on CXR, UA pending  - patient does not appear to be in pain  - appears to use a wheelchair at baseline  - f/u UA and UCx  - PT consulted  - fall precautions

## 2022-08-06 NOTE — ED PROVIDER NOTE - OBJECTIVE STATEMENT
89 y/o male w/ PMH BPH, DM, HLD, dementia/major depression, chronic AFib on ASA not on other bloodthiners c/o unwitnessed fall, unknown LOC, unknown downtime. Pt is not responding to questions at time of examination.

## 2022-08-06 NOTE — H&P ADULT - PROBLEM SELECTOR PLAN 5
- not on meds  - low dose admelog correction scale TIDQAC and QHS  - check FSG before meals and QHS, or q6h while NPO  - F/u HgbA1c  - CC diet

## 2022-08-06 NOTE — H&P ADULT - HISTORY OF PRESENT ILLNESS
Patient is a 89y Male with PMH of DM, HTN, HLD, BPH, dementia, depression, frequent falls, chronic afib not on AC (on aspirin), HFpEF, TAVR, SCC, and spinal stenosis, presenting to the ED from his nursing home after an unwitnessed fall. Limited history obtained from chart review as patient is unable to provide a history and numerous attempts were made to contact family overnight with no response. Patient was found down at the [facility], unclear if he lost consciousness, unclear how long he was down. Appears to be AAOx1 at baseline from prior admissions.    In the ED, afebrile, HR 77, /92, SpO2 94% on 2L NC. Labs significant for WBC 4.9, Hgb 12.6, lt 112, BUN 16, Cr 0.7, BNP 4900 (was 50628 on prior admission, trop 93..  Patient is a 89y Male with PMH of DM, HTN, HLD, BPH, dementia, depression, frequent falls, chronic afib not on AC (on aspirin), HFpEF, TAVR, SCC, and spinal stenosis, presenting to the ED from his nursing home after an unwitnessed fall. Limited history obtained from chart review as patient is unable to provide a history and numerous attempts were made to contact family overnight with no response. Patient was found down at the [facility], unclear if he lost consciousness, unclear how long he was down. Appears to be AAOx1 at baseline from prior admissions, uses a wheelchair.    In the ED, afebrile, HR 77, /92, SpO2 94% on 2L NC. Labs significant for WBC 4.9, Hgb 12.6, lt 112, BUN 16, Cr 0.7, BNP 4900 (was 81086 on prior admission), trop 93. CT head demonstrates chronic ischemic changes. CT C/S demonstrates chronic degenerative changes and moderate pleural effusions. CXR demonstrates small bilateral pleural effusions and moderate pulm edema.

## 2022-08-07 NOTE — PATIENT PROFILE ADULT - FALL HARM RISK - HARM RISK INTERVENTIONS

## 2022-08-07 NOTE — PATIENT PROFILE ADULT - VISION (WITH CORRECTIVE LENSES IF THE PATIENT USUALLY WEARS THEM):
Pt AOx0-1, confused at baseline. unable to respond/assess/Normal vision: sees adequately in most situations; can see medication labels, newsprint

## 2022-08-07 NOTE — PATIENT PROFILE ADULT - FUNCTIONAL ASSESSMENT - BASIC MOBILITY 6.
2-calculated by average/Not able to assess (calculate score using [facility] averaging method)

## 2022-08-07 NOTE — PHYSICAL THERAPY INITIAL EVALUATION ADULT - PERTINENT HX OF CURRENT PROBLEM, REHAB EVAL
89y Male with PMH of DM, HTN, HLD, BPH, dementia, depression, frequent falls, chronic afib not on AC (on aspirin), HFpEF, TAVR, SCC, and spinal stenosis, presenting to the ED from his nursing home after an unwitnessed fall.  CT head demonstrates chronic ischemic changes. CT C/S demonstrates chronic degenerative changes and moderate pleural effusions. CXR = small b/l pleural effusions & moderate pulm edema. No fx noted in c/s or hip.

## 2022-08-07 NOTE — PATIENT PROFILE ADULT - NSPRONUTRITIONRISK_GEN_A_NUR
"Patient calling and was seen on 02/21 for possible pink eye. Patient was given drops for pink eye. She states that she was advised if these didn't work that they would try an allergy drop. Patient reports having, \"watery eyes, pink throughout the day, and crusty in the morning\". Denies any itching or pain. Patient is requesting if she can try the allergy drops.   [facility] uses [facility].    Patient's phone number is [phone]    " Pressure injury stage 2 or greater

## 2022-08-07 NOTE — PROGRESS NOTE ADULT - PROBLEM SELECTOR PLAN 1
- fall at nursing home, unknown hoe long down, unknown LOC, not on AC  - CT head w/ no acute intracranial pathology  - afebrile, no obvious consolidations on CXR; Small bilateral pleural effusions and mild pulmonary edema    - F/U UA and UCx  - PT consulted  - fall precautions

## 2022-08-07 NOTE — PROGRESS NOTE ADULT - PROBLEM SELECTOR PLAN 4
- rate controlled  - not on AC, presumably because of frequent falls  - Continue aspirin and metoprolol  - Tele overnight, converted to afib then back to sinus, asymptomatic, rate controlled - rate controlled  - not on AC, presumably because of frequent falls  - Continue aspirin and metoprolol  - Tele overnight, Afib 80-90s

## 2022-08-07 NOTE — PHYSICAL THERAPY INITIAL EVALUATION ADULT - ADDITIONAL COMMENTS
Pt resides at [facility], requires assistance with ADLs and medication management. Additional services are provided for a private pay rate at [facility]. Pt owns walker, shower chair, and grab bars are in place. Ambulates with RW at baseline.

## 2022-08-07 NOTE — ED ADULT NURSE NOTE - NSIMPLEMENTINTERV_GEN_ALL_ED
[Subsequent Evaluation] : a subsequent evaluation for [FreeTextEntry2] : nasal obstruction. Specific interventions were implemented:

## 2022-08-08 NOTE — CONSULT NOTE ADULT - SUBJECTIVE AND OBJECTIVE BOX
Patient is a 89y old  Male who presents with a chief complaint of fall (08 Aug 2022 12:47)      HPI:  Patient is a 89y Male with PMH of DM, HTN, HLD, BPH, dementia, depression, frequent falls, chronic afib not on AC (on aspirin), HFpEF, TAVR, SCC, and spinal stenosis, presenting to the ED from his nursing home after an unwitnessed fall. Limited history obtained from chart review as patient is unable to provide a history and numerous attempts were made to contact family overnight with no response. Patient was found down at the [facility], unclear if he lost consciousness, unclear how long he was down. Appears to be AAOx1 at baseline from prior admissions, uses a wheelchair.    In the ED, afebrile, HR 77, /92, SpO2 94% on 2L NC. Labs significant for WBC 4.9, Hgb 12.6, lt 112, BUN 16, Cr 0.7, BNP 4900 (was 01649 on prior admission), trop 93. CT head demonstrates chronic ischemic changes. CT C/S demonstrates chronic degenerative changes and moderate pleural effusions. CXR demonstrates small bilateral pleural effusions and moderate pulm edema.  (06 Aug 2022 22:39)    Podiatry consulted for bilateral heel DTIs pressure on admission.    PAST MEDICAL & SURGICAL HISTORY:  HTN (hypertension)      HLD (hyperlipidemia)      DM (diabetes mellitus)      BPH (benign prostatic hypertrophy)      Insomnia      RBBB      Chronic atrial fibrillation      Dementia      Unsteady gait      S/P shoulder surgery      S/P knee surgery          MEDICATIONS  (STANDING):  aspirin enteric coated 81 milliGRAM(s) Oral daily  atorvastatin 10 milliGRAM(s) Oral at bedtime  buMETAnide Injectable 1 milliGRAM(s) IV Push daily  chlorhexidine 2% Cloths 1 Application(s) Topical daily  cyanocobalamin 1000 MICROGram(s) Oral daily  dextrose 5%. 1000 milliLiter(s) (50 mL/Hr) IV Continuous <Continuous>  dextrose 5%. 1000 milliLiter(s) (100 mL/Hr) IV Continuous <Continuous>  dextrose 50% Injectable 25 Gram(s) IV Push once  dextrose 50% Injectable 12.5 Gram(s) IV Push once  dextrose 50% Injectable 25 Gram(s) IV Push once  donepezil 5 milliGRAM(s) Oral at bedtime  enoxaparin Injectable 40 milliGRAM(s) SubCutaneous every 24 hours  finasteride 5 milliGRAM(s) Oral daily  glucagon  Injectable 1 milliGRAM(s) IntraMuscular once  insulin lispro (ADMELOG) corrective regimen sliding scale   SubCutaneous three times a day before meals  insulin lispro (ADMELOG) corrective regimen sliding scale   SubCutaneous at bedtime  melatonin 3 milliGRAM(s) Oral at bedtime  memantine 10 milliGRAM(s) Oral two times a day  metoprolol succinate ER 25 milliGRAM(s) Oral daily  mirtazapine 7.5 milliGRAM(s) Oral at bedtime  pantoprazole    Tablet 40 milliGRAM(s) Oral before breakfast  QUEtiapine 12.5 milliGRAM(s) Oral at bedtime  thiamine 100 milliGRAM(s) Oral daily    MEDICATIONS  (PRN):  dextrose Oral Gel 15 Gram(s) Oral once PRN Blood Glucose LESS THAN 70 milliGRAM(s)/deciliter      Allergies    No Known Allergies    Intolerances        VITALS:    Vital Signs Last 24 Hrs  T(C): 36.5 (08 Aug 2022 12:03), Max: 36.5 (08 Aug 2022 12:03)  T(F): 97.7 (08 Aug 2022 12:03), Max: 97.7 (08 Aug 2022 12:03)  HR: 76 (08 Aug 2022 12:03) (44 - 81)  BP: 108/68 (08 Aug 2022 12:03) (99/57 - 118/85)  BP(mean): --  RR: 20 (08 Aug 2022 12:03) (18 - 20)  SpO2: 94% (08 Aug 2022 12:03) (94% - 95%)    Parameters below as of 08 Aug 2022 12:03  Patient On (Oxygen Delivery Method): room air        LABS:                          11.9   4.05  )-----------( 144      ( 08 Aug 2022 06:14 )             36.8       08-08    137  |  103  |  17  ----------------------------<  99  3.9   |  23  |  0.81    Ca    8.5      08 Aug 2022 06:14  Phos  3.6     08-07  Mg     2.0     08-08    TPro  7.2  /  Alb  3.6  /  TBili  0.8  /  DBili  x   /  AST  42<H>  /  ALT  18  /  AlkPhos  96  08-06      CAPILLARY BLOOD GLUCOSE      POCT Blood Glucose.: 132 mg/dL (08 Aug 2022 12:12)  POCT Blood Glucose.: 94 mg/dL (08 Aug 2022 08:01)  POCT Blood Glucose.: 112 mg/dL (07 Aug 2022 21:19)  POCT Blood Glucose.: 95 mg/dL (07 Aug 2022 16:36)          LOWER EXTREMITY PHYSICAL EXAM:    Vasular: DP/PT 0/4, B/L, CFT <3 seconds B/L, Temperature gradient WNL, B/L.   Neuro: unable to assess.  Skin: bilateral heel deep tissue injuries full thickness with no signs of infection secondary to pressure present on admission, right lateral midfoot ulcer to subQ secondary to pressure present on admission with mild surrounding erythema

## 2022-08-08 NOTE — CONSULT NOTE ADULT - ASSESSMENT
89 y/o male pt with bilateral heel DTIs, right foot ulcer with mild cellulitis  - pt seen and evaluated  - heel DTIs stable with no signs of infection  - right lateral midfoot wound to subQ with mild cellulitis noted  - rec daily dressing changes with betadine to heels and iodosorb to right midfoot wound and alleyvn foam  - wound care orders placed  - rec z flow boots in bed at all times   - follow up as outpatient at wound care center at [address] upon discharge    89 y/o male pt with bilateral heel DTIs, right foot ulcer with mild cellulitis  - pt seen and evaluated  - heel DTIs stable with no signs of infection  - right lateral midfoot wound to subQ with mild cellulitis noted - rec course of antibiotics  - rec daily dressing changes with betadine to heels and iodosorb to right midfoot wound and alleyvn foam  - wound care orders placed  - rec z flow boots in bed at all times   - follow up as outpatient at wound care center at [address] upon discharge

## 2022-08-08 NOTE — PROGRESS NOTE ADULT - PROBLEM SELECTOR PLAN 4
- rate controlled  - not on AC, presumably because of frequent falls  - Continue aspirin and metoprolol  - Tele overnight, Afib

## 2022-08-09 NOTE — DIETITIAN INITIAL EVALUATION ADULT - ADD RECOMMEND
- Defer diet/fluid consistencies to medical team/SLP recommendations.   - Will continue to monitor PO intake, weight, labs, skin, GI status, diet.   - Malnutrition sticker placed, RD to follow-up as per protocol  - Nutrition care plan to remain consistent with pt goals of care  - RD remains available to review diet education and adjust diet recommendations as needed.  - Defer diet/fluid consistencies to medical team/SLP recommendations.   - If not medically contraindicated, recommend MVI, vitamin C 500 mg daily + zinc 220 mg and Fabian daily to promote wound healing.   - Will continue to monitor PO intake, weight, labs, skin, GI status, diet.   - Malnutrition sticker placed, RD to follow-up as per protocol  - Nutrition care plan to remain consistent with pt goals of care  - RD remains available to review diet education and adjust diet recommendations as needed.

## 2022-08-09 NOTE — DIETITIAN INITIAL EVALUATION ADULT - ETIOLOGY
inadequate protein energy intake  increased physiological demands of stress factor and wound healing

## 2022-08-09 NOTE — PROGRESS NOTE ADULT - PROBLEM SELECTOR PLAN 4
- AAOx1 at baseline as per prior admissions, currently alert and interactive but not answering questions, not agitated    - Continue home medications Mirtazepine 7.5mg PO qhs, Seroquel 12.5mg PO qhs, Donepezil 5mg PO daily, Memantine 10mg PO BID, Melatonin 3mg PO qhs

## 2022-08-09 NOTE — DIETITIAN INITIAL EVALUATION ADULT - PERTINENT MEDS FT
MEDICATIONS  (STANDING):  aspirin enteric coated 81 milliGRAM(s) Oral daily  atorvastatin 10 milliGRAM(s) Oral at bedtime  buMETAnide Injectable 1 milliGRAM(s) IV Push daily  cadexomer iodine 0.9% Gel 1 Application(s) Topical daily  chlorhexidine 2% Cloths 1 Application(s) Topical daily  cyanocobalamin 1000 MICROGram(s) Oral daily  dextrose 5%. 1000 milliLiter(s) (50 mL/Hr) IV Continuous <Continuous>  dextrose 5%. 1000 milliLiter(s) (100 mL/Hr) IV Continuous <Continuous>  dextrose 5%. 1000 milliLiter(s) (50 mL/Hr) IV Continuous <Continuous>  dextrose 50% Injectable 25 Gram(s) IV Push once  dextrose 50% Injectable 12.5 Gram(s) IV Push once  dextrose 50% Injectable 25 Gram(s) IV Push once  donepezil 5 milliGRAM(s) Oral at bedtime  enoxaparin Injectable 40 milliGRAM(s) SubCutaneous every 24 hours  finasteride 5 milliGRAM(s) Oral daily  glucagon  Injectable 1 milliGRAM(s) IntraMuscular once  insulin lispro (ADMELOG) corrective regimen sliding scale   SubCutaneous three times a day before meals  insulin lispro (ADMELOG) corrective regimen sliding scale   SubCutaneous at bedtime  melatonin 3 milliGRAM(s) Oral at bedtime  memantine 10 milliGRAM(s) Oral two times a day  metoprolol succinate ER 25 milliGRAM(s) Oral daily  mirtazapine 7.5 milliGRAM(s) Oral at bedtime  pantoprazole    Tablet 40 milliGRAM(s) Oral before breakfast  piperacillin/tazobactam IVPB. 3.375 Gram(s) IV Intermittent once  piperacillin/tazobactam IVPB.. 3.375 Gram(s) IV Intermittent every 8 hours  QUEtiapine 12.5 milliGRAM(s) Oral at bedtime  thiamine 100 milliGRAM(s) Oral daily    MEDICATIONS  (PRN):  dextrose Oral Gel 15 Gram(s) Oral once PRN Blood Glucose LESS THAN 70 milliGRAM(s)/deciliter

## 2022-08-09 NOTE — DIETITIAN INITIAL EVALUATION ADULT - OTHER INFO
Home Medications:  Aspirin Enteric Coated 81 mg oral delayed release tablet: 1 tab(s) orally once a day (06 Aug 2022 22:25)  bumetanide 0.5 mg oral tablet: 1 tab(s) orally Monday, Wednesday, and Friday (06 Aug 2022 22:25)  cyanocobalamin 1000 mcg oral tablet: 1 tab(s) orally once a day (06 Aug 2022 22:25)  donepezil 5 mg oral tablet: 1 tab(s) orally once a day (at bedtime) (06 Aug 2022 22:25)  finasteride 5 mg oral tablet: 1 tab(s) orally once a day (06 Aug 2022 22:25)  melatonin 3 mg oral tablet: 1 tab(s) orally once a day (at bedtime) (06 Aug 2022 22:25)  memantine 10 mg oral tablet: 1 tab(s) orally 2 times a day (06 Aug 2022 22:25)  metoprolol succinate 25 mg oral tablet, extended release: 1 tab(s) orally once a day (06 Aug 2022 22:25)  mirtazapine 7.5 mg oral tablet: 1 tab(s) orally once a day (at bedtime) (06 Aug 2022 22:25)  Pravachol 20 mg oral tablet: 1 tab(s) orally once a day (at bedtime) (06 Aug 2022 22:25)  thiamine 100 mg oral tablet: 1 tab(s) orally once a day (06 Aug 2022 22:25)  Tylenol 325 mg oral tablet: 2 tab(s) orally every 6 hours, As Needed - (06 Aug 2022 22:25)

## 2022-08-09 NOTE — DIETITIAN INITIAL EVALUATION ADULT - NSFNSGIIOFT_GEN_A_CORE
08-09-22 @ 07:01  -  08-09-22 @ 12:24  --------------------------------------------------------  OUT:    Stool (mL): 0 mL  Total OUT: 0 mL    Total NET: 0 mL

## 2022-08-09 NOTE — DIETITIAN INITIAL EVALUATION ADULT - NSFNSPHYEXAMSKINFT_GEN_A_CORE
Pressure Injury 1: Left:,heel, Suspected deep tissue injury  Pressure Injury 2: Right:,heel, Suspected deep tissue injury  Pressure Injury 3: none, none  Pressure Injury 4: none, none  Pressure Injury 5: none, none  Pressure Injury 6: none, none  Pressure Injury 7: none, none  Pressure Injury 8: none, none  Pressure Injury 9: none, none  Pressure Injury 10: none, none  Pressure Injury 11: none, none, Pressure Injury 1: Left:,heel, Suspected deep tissue injury  Pressure Injury 2: Right:,heel, Suspected deep tissue injury  Pressure Injury 3: none, none  Pressure Injury 4: none, none  Pressure Injury 5: none, none  Pressure Injury 6: none, none  Pressure Injury 7: none, none  Pressure Injury 8: none, none  Pressure Injury 9: none, none  Pressure Injury 10: none, none  Pressure Injury 11: none, none 101% IBW ( pounds); dosing wt 136.9 pounds    Skin: podiatry following " bilateral heel DTIs, right foot ulcer with mild cellulitis"  Performed nutrition focused physical exam with pt's consent and noted severe signs of muscle/fat loss

## 2022-08-09 NOTE — DIETITIAN INITIAL EVALUATION ADULT - REASON INDICATOR FOR ASSESSMENT
Consult received for pressure injury stage 2/>   Information obtained from , EMR.  Consult received for pressure injury stage 2/>   Information obtained from EMR.   RD attempted to reach family via phone, no response at this time  Consult received for pressure injury stage 2/>   Information obtained from EMR. pt non verbal unable to interview at this time   RD attempted to reach family via phone, no response at this time

## 2022-08-09 NOTE — DIETITIAN INITIAL EVALUATION ADULT - PERTINENT LABORATORY DATA
08-09    144  |  104  |  19  ----------------------------<  100<H>  4.2   |  25  |  0.91    Ca    8.7      09 Aug 2022 09:27  Mg     2.0     08-08    POCT Blood Glucose.: 109 mg/dL (08-09-22 @ 11:39)  A1C with Estimated Average Glucose Result: 6.2 % (08-07-22 @ 12:22)  A1C with Estimated Average Glucose Result: 6.4 % (05-04-22 @ 11:27)  A1C with Estimated Average Glucose Result: 6.6 % (09-27-21 @ 16:39)   08-09    144  |  104  |  19  ----------------------------<  100<H>  4.2   |  25  |  0.91    Ca    8.7      09 Aug 2022 09:27  Mg     2.0     08-08 08-09 @ 09:27: Na 144, BUN 19, Cr 0.91, <H>, K+ 4.2, Phos --, Mg --, Alk Phos --, ALT/SGPT --, AST/SGOT --, HbA1c --    POCT Blood Glucose.: 109 mg/dL (08-09-22 @ 11:39)  POCT Blood Glucose.: 82 mg/dL (08-09-22 @ 09:01)  POCT Blood Glucose.: 70 mg/dL (08-09-22 @ 07:30)  POCT Blood Glucose.: 125 mg/dL (08-08-22 @ 21:54)  POCT Blood Glucose.: 99 mg/dL (08-08-22 @ 17:01)    A1C with Estimated Average Glucose Result: 6.2 % (08-07-22 @ 12:22)  A1C with Estimated Average Glucose Result: 6.4 % (05-04-22 @ 11:27)  A1C with Estimated Average Glucose Result: 6.6 % (09-27-21 @ 16:39)

## 2022-08-09 NOTE — DIETITIAN INITIAL EVALUATION ADULT - NS FNS DIET ORDER
Diet, Consistent Carbohydrate w/Evening Snack:   DASH/TLC {Sodium & Cholesterol Restricted} (DASH)  Supplement Feeding Modality:  Oral  Glucerna Shake Cans or Servings Per Day:  1       Frequency:  Two Times a day (08-07-22)

## 2022-08-09 NOTE — DIETITIAN INITIAL EVALUATION ADULT - PATIENT MEETS CRITERIA FOR MALNUTRITION
[address] [place] [address] DO 10 DNI. [id number] [facility]. [address] [facility], [facility] [address] [facility] CINDY Arias. JOE [place] [id number] [facility],[address].     GEO Jain yes

## 2022-08-09 NOTE — DIETITIAN NUTRITION RISK NOTIFICATION - TREATMENT: THE FOLLOWING DIET HAS BEEN RECOMMENDED
Diet, Consistent Carbohydrate w/Evening Snack:   DASH/TLC {Sodium & Cholesterol Restricted} (DASH)  Supplement Feeding Modality:  Oral  Glucerna Shake Cans or Servings Per Day:  1       Frequency:  Two Times a day (08-07-22 @ 17:47) [Active]

## 2022-08-09 NOTE — DIETITIAN INITIAL EVALUATION ADULT - CONTINUE CURRENT NUTRITION CARE PLAN
- Consider discontinue consistent CHO DASH diet as feasible, to promote adequate PO intake. Monitor tolerance, PO intake, and adjust as needed./yes

## 2022-08-09 NOTE — DIETITIAN INITIAL EVALUATION ADULT - COLLABORATION WITH OTHER PROVIDERS
Consider formal swallowing evaluation with speech pathologist in setting of swallowing difficulty/gagging.

## 2022-08-09 NOTE — PROGRESS NOTE ADULT - PROBLEM SELECTOR PLAN 1
- fall at nursing home, unknown hoe long down, unknown LOC, not on AC  - CT head w/ no acute intracranial pathology  - afebrile, no obvious consolidations on CXR; Small bilateral pleural effusions and mild pulmonary edema    - F/U UA and UCx- not collected yet  - PT consulted  - fall precautions - fall at nursing home, unknown how long down, unknown LOC, not on AC  - CT head w/ no acute intracranial pathology  - afebrile, no obvious consolidations on CXR; Small bilateral pleural effusions and mild pulmonary edema    - F/U UA and UCx- not collected yet  - PT consulted  - fall precautions

## 2022-08-10 NOTE — PROVIDER CONTACT NOTE (OTHER) - SITUATION
FS 71 this morning
pulse ox 87% on room air, patient with poor perfusion to fingers continuous pulse ox not accurate at times
BP 99/57

## 2022-08-10 NOTE — PROVIDER CONTACT NOTE (OTHER) - BACKGROUND
admit for falls, right foot wounds
pt admitted for unspecified injury of head
DNR/DNI, cognitive impairment; cellulitis of right foot, heart failure

## 2022-08-10 NOTE — PROGRESS NOTE ADULT - PROBLEM SELECTOR PLAN 1
Noted to be hypoxic on RA, 8/10 am . Patient asymptomatic   Placed on NC 2L with improvement in sats  RVP 8/6 neg. On Zosyn for treatment of cellulitis     Repeat chest xray urgently  Check ABG  Continue to diurese as below   Continue oxygen supplementation as needed, keep sats >92%  Encourage incentive spirometry. OOBTC with assist

## 2022-08-10 NOTE — PROVIDER CONTACT NOTE (OTHER) - ASSESSMENT
pulse ox range from 87-96 on room air with vitals machine and continuous pulse ox on tele; on 2L nasal canula, pulse ox  on vitals machine; pt is not in respiratory distress, no sob noted

## 2022-08-10 NOTE — PROVIDER CONTACT NOTE (OTHER) - ACTION/TREATMENT ORDERED:
provider made aware, ivelisse meds to 8AM
MD is aware of situation and ordered cxr and ABG, encourage deep breathing and chest PT ordered
hypoglycemia protocol followed, repeat

## 2022-08-11 NOTE — CHART NOTE - NSCHARTNOTEFT_GEN_A_CORE
Wound Care Team Note:    Request for wound care consult for foot wounds received. Wound care consult referred to Wound Care Team Podiatrists, Chuy Blanchard/Miguel/[place]per[place]. Will defer to podiatry for management. Please refer any questions/concerns to Podiatry. Will not follow.    Kacie Benton, RACHAEL-C, CWOCN [id number]
Patient presents with a diagnosis of CHF.  Acute exacerbation is currently resolved and patient will require 2 liters of continuous oxygen at home for CHF. Room air at rest oxygen saturation is 87% and on 2L nasal cannula O2 saturation is 96%.    Mervat Linares  [id number]

## 2022-08-11 NOTE — PROGRESS NOTE ADULT - PROBLEM SELECTOR PLAN 1
Noted to be hypoxic on RA, 8/10 am . Patient asymptomatic   Placed on NC 2L with improvement in sats  RVP 8/6 neg. On Zosyn for treatment of cellulitis     Repeat chest xray looks slightly improved   Recheck ABG  Continue to diurese as below   Continue oxygen supplementation as needed, keep sats >92%  Encourage incentive spirometry. OOBTC with assist. Chest PT

## 2022-08-11 NOTE — PROGRESS NOTE ADULT - PROBLEM SELECTOR PLAN 4
- fall at nursing home, unknown how long down, unknown LOC, not on AC  - CT head w/ no acute intracranial pathology  - afebrile, no obvious consolidations on CXR; Small bilateral pleural effusions and mild pulmonary edema    - F/U UA and UCx- not collected yet  - PT consulted  - fall precautions

## 2022-08-12 NOTE — PROGRESS NOTE ADULT - PROBLEM SELECTOR PLAN 1
Noted to be hypoxic on RA, 8/10 am . Patient asymptomatic   Placed on NC 2L with improvement in sats  RVP 8/6 neg. On Zosyn for treatment of cellulitis     Repeat chest xray looks slightly improved   Continue to diurese as below   Continue oxygen supplementation as needed, keep sats >92%. Patient will need oxygen for home.   Encourage incentive spirometry. OOBTC with assist. Chest PT

## 2022-08-12 NOTE — PROGRESS NOTE ADULT - PROBLEM SELECTOR PLAN 5
- AAOx1 at baseline as per prior admissions, patient is alert when prompted, but not answering questions, calm, cooperative    - Continue home medications Mirtazepine 7.5mg PO qhs, Seroquel 12.5mg PO qhs, Donepezil 5mg PO daily, Memantine 10mg PO BID, Melatonin 3mg PO qhs
- AAOx1 at baseline as per prior admissions, patient is alert when prompted, but not answering questions, calm, cooperative    - Continue home medications Mirtazepine 7.5mg PO qhs, Seroquel 12.5mg PO qhs, Donepezil 5mg PO daily, Memantine 10mg PO BID, Melatonin 3mg PO qhs
- not on any meds  - low dose admelog correction scale TIDQAC and QHS  - check FSG before meals and QHS, or q6h while NPO  - F/U HgbA1c  - Carb consistent diet
- rate controlled  - not on AC, presumably because of frequent falls  - Continue aspirin and metoprolol  - Tele overnight, Afib
- not on any meds  - low dose admelog correction scale TIDQAC and QHS  - check FSG before meals and QHS, or q6h while NPO, goal 100-180  - A1C 6.2  - Carb consistent diet
- AAOx1 at baseline as per prior admissions, patient is alert when prompted, but not answering questions, calm, cooperative    - Continue home medications Mirtazepine 7.5mg PO qhs, Seroquel 12.5mg PO qhs, Donepezil 5mg PO daily, Memantine 10mg PO BID, Melatonin 3mg PO qhs

## 2022-08-12 NOTE — DISCHARGE NOTE PROVIDER - HOSPITAL COURSE
89y Male with PMH of DM, HTN, HLD, BPH, dementia, depression, frequent falls, chronic afib not on AC (on aspirin), HFpEF, TAVR, SCC, and spinal stenosis, presenting to the ED from his Assisted Living after an unwitnessed fall, admitted with frequent falls and likely acute on chronic diastolic heart failure exacerbation.      Dx: 	Frequent fall - CTH neg ICH      	Acute on chronic CHF - Bumex 1 mg IV daily changed to home PO dose for discharge  	Right lateral midfoot wound to subQ with mild cellulitis noted - Per Pod - Abx - Unasyn changed to Zosyn, changed to PO Augmentin on D/c    Patient stable for discharge 8/12/22 back to [facility] as per Dr. Sun.   89y Male with PMH of DM, HTN, HLD, BPH, dementia, depression, frequent falls, chronic afib not on AC (on aspirin), HFpEF, TAVR, SCC, and spinal stenosis, presenting to the ED from his Assisted Living after an unwitnessed fall, admitted with frequent falls and likely acute on chronic diastolic heart failure exacerbation.      Dx: 	>Frequent falls - CTH neg for any acute pathology. Maintain strict fall precautions.       	>Acute on chronic CHF - Diuresed with IV Bumex. Continue taking oral Bumex. Monitor Is/Os, daily weights.   	Right lateral midfoot wound to subQ with mild cellulitis noted - Evaluated by podiatry team. Started on Antibiotics, Unasyn changed to Zosyn, changed to PO Augmentin - finish total ten day course of antibiotics. Continue wound care, offloading meaures.   	>Hypoxia: Chest xray with moderate pulm edema, small b/l pleural effusions, repeat chest xray with improvement. Diuresed as above. RVP negative. Continue oxygen supplementation as needed, maintain sats >92%. Encourage activity, incentive spirometry. Continue chest PT.   	>Dementia: Reportedly AAOx1 at baseline, patient is alert when prompted, but does not answer questions, and nods in response. calm, cooperative. Continue home medications.     Patient stable for discharge 8/12/22 back to [facility] as per Dr. Sun.   89y Male with PMH of DM, HTN, HLD, BPH, dementia, depression, frequent falls, chronic afib not on AC (on aspirin), HFpEF, TAVR, SCC, and spinal stenosis, presenting to the ED from his Assisted Living after an unwitnessed fall, admitted with frequent falls and likely acute on chronic diastolic heart failure exacerbation.      Dx: 	>Frequent falls - CTH neg for any acute pathology. Maintain strict fall precautions.         	>Acute on chronic CHF - Diuresed with IV Bumex. Continue taking oral Bumex. Monitor Is/Os, daily weights.     	>Right lateral midfoot wound to subQ with mild cellulitis noted - Evaluated by podiatry team. Started on Antibiotics, Unasyn changed to Zosyn, changed to PO Augmentin - finish total ten day course of antibiotics. Continue wound care, offloading meaures.     	>Hypoxia: Chest xray with moderate pulm edema, small b/l pleural effusions, repeat chest xray with improvement. Diuresed as above. RVP negative. Continue oxygen supplementation as needed, maintain sats >92%. Encourage activity, incentive spirometry. Continue chest PT.     	>Dementia: Reportedly AAOx1 at baseline, patient is alert when prompted, but does not answer questions, and nods in response. calm, cooperative. Continue home medications.     Patient stable for discharge 8/12/22 back to [facility] as per Dr. Sun.

## 2022-08-12 NOTE — DISCHARGE NOTE PROVIDER - NSDCFUADDAPPT_GEN_ALL_CORE_FT
Patient was provided with follow up request details and was advised to call to schedule follow up within specified time frame.

## 2022-08-12 NOTE — PROVIDER CONTACT NOTE (CHANGE IN STATUS NOTIFICATION) - BACKGROUND
Presented to the ED from his Assisted Living after an unwitnessed fall, admitted with frequent falls and likely acute on chronic diastolic heart failure exacerbation.

## 2022-08-12 NOTE — DISCHARGE NOTE NURSING/CASE MANAGEMENT/SOCIAL WORK - MODE OF TRANSPORTATION
Lynn Tello  OBSTETRICS AND GYNECOLOGY  [address], [address]  [place] [address]  Phone: [phone]  Fax: [phone]  Follow Up Time:    Ambulance

## 2022-08-12 NOTE — DISCHARGE NOTE PROVIDER - PROVIDER TOKENS
FREE:[LAST:[Follow up],PHONE:[(   )    -],FAX:[(   )    -],ADDRESS:[- follow up as outpatient at wound care center at [facility] joey [facility]ista upon discharge]],PROVIDER:[TOKEN:[[id number]],FOLLOWUP:[1 week]]

## 2022-08-12 NOTE — PROGRESS NOTE ADULT - PROBLEM SELECTOR PROBLEM 2
Cellulitis
Acute on chronic diastolic congestive heart failure

## 2022-08-12 NOTE — PROGRESS NOTE ADULT - PROBLEM SELECTOR PLAN 8
- continue metoprolol succinate
DVT Prophylaxis: lovenox subcu  Diet: Carb consistent DASH    DNR/DNI, prior MOLST confirmed in chart    Discharge planning - PT rec [facility], daughter requesting return to [facility]

## 2022-08-12 NOTE — PROGRESS NOTE ADULT - PROBLEM SELECTOR PLAN 6
- continue metoprolol succinate
- rate controlled  - not on AC, presumably because of frequent falls  - Continue aspirin and metoprolol  - Tele overnight, Afib
- c/w metoprolol
- not on any meds  - low dose admelog correction scale TIDQAC and QHS  - check FSG before meals and QHS, or q6h while NPO, goal 100-180  - A1C 6.2  - Carb consistent diet

## 2022-08-12 NOTE — PROGRESS NOTE ADULT - PROBLEM SELECTOR PROBLEM 6
Diabetes
HTN (hypertension)
HTN (hypertension)
Chronic atrial fibrillation

## 2022-08-12 NOTE — DISCHARGE NOTE PROVIDER - NSDCMRMEDTOKEN_GEN_ALL_CORE_FT
amoxicillin-clavulanate 875 mg-125 mg oral tablet: 1 tab(s) orally every 12 hours, continue until all pills are completed  ascorbic acid 500 mg oral tablet: 1 tab(s) orally once a day  aspirin 81 mg oral delayed release tablet: 1 tab(s) orally once a day  bumetanide 0.5 mg oral tablet: 1 tab(s) orally Monday, Wednesday, and Friday  cadexomer iodine 0.9% topical gel: 1 application topically once a day  cyanocobalamin 1000 mcg oral tablet: 1 tab(s) orally once a day  donepezil 5 mg oral tablet: 1 tab(s) orally once a day (at bedtime)  finasteride 5 mg oral tablet: 1 tab(s) orally once a day  melatonin 3 mg oral tablet: 1 tab(s) orally once a day (at bedtime)  memantine 10 mg oral tablet: 1 tab(s) orally 2 times a day  metoprolol succinate 25 mg oral tablet, extended release: 1 tab(s) orally once a day  mirtazapine 7.5 mg oral tablet: 1 tab(s) orally once a day (at bedtime)  Multiple Vitamins oral tablet: 1 tab(s) orally once a day  Oxygen: 2 liters via nasal cannula  Dx: 428.0    Inogen  Setting 2  Duration: 12 months   pantoprazole 40 mg oral delayed release tablet: 1 tab(s) orally once a day (before a meal)  Pravachol 20 mg oral tablet: 1 tab(s) orally once a day (at bedtime)  SEROquel 25 mg oral tablet: 0.5 tab(s) orally once a day (at bedtime)   thiamine 100 mg oral tablet: 1 tab(s) orally once a day  Tylenol 325 mg oral tablet: 2 tab(s) orally every 6 hours, As Needed -  zinc sulfate 220 mg oral capsule: 1 cap(s) orally once a day   aspirin 81 mg oral delayed release tablet: 1 tab(s) orally once a day  bumetanide 0.5 mg oral tablet: 1 tab(s) orally once a day  cadexomer iodine 0.9% topical gel: 1 application topically once a day  cyanocobalamin 1000 mcg oral tablet: 1 tab(s) orally once a day  Daily Pedro oral tablet: 1 tab(s) orally once a day  donepezil 5 mg oral tablet: 1 tab(s) orally once a day (at bedtime)  finasteride 5 mg oral tablet: 1 tab(s) orally once a day  melatonin 3 mg oral tablet: 1 tab(s) orally once a day (at bedtime)  memantine 10 mg oral tablet: 1 tab(s) orally 2 times a day  metoprolol succinate 25 mg oral tablet, extended release: 1 tab(s) orally once a day  MiraLax oral powder for reconstitution: 1 packet(s) orally once a day  mirtazapine 7.5 mg oral tablet: 1 tab(s) orally once a day (at bedtime)  pantoprazole 40 mg oral delayed release tablet: 1 tab(s) orally once a day (before a meal)  Pravachol 20 mg oral tablet: 1 tab(s) orally once a day (at bedtime)  SEROquel 25 mg oral tablet: 0.5 tab(s) orally once a day (at bedtime)   thiamine 100 mg oral tablet: 1 tab(s) orally once a day  Tylenol 325 mg oral tablet: 2 tab(s) orally every 6 hours, As Needed - for mild pain   zinc sulfate 220 mg oral capsule: 1 cap(s) orally once a day

## 2022-08-12 NOTE — DISCHARGE NOTE NURSING/CASE MANAGEMENT/SOCIAL WORK - NSDCVIVACCINE_GEN_ALL_CORE_FT
influenza, injectable, quadrivalent, preservative free; 10-Oct-2019 12:36; Ashley Figueroa (RN); [employer]; [id number] (Exp. Date: 30-Jun-2020); IntraMuscular; Deltoid Right.; 0.5 milliLiter(s); VIS (VIS Published: 15-Aug-2019, VIS Presented: 10-Oct-2019);   Tdap; 30-Jun-2019 18:38; Edith Almaraz (RN); [employer]; [id number] (Exp. Date: 04-Apr-2021); IntraMuscular; Deltoid Right.; 0.5 milliLiter(s); VIS (VIS Published: 09-May-2013, VIS Presented: 30-Jun-2019);   Tdap; 07-Oct-2019 06:56; Negar Burns (RN); [employer]; [id number] (Exp. Date: 08-Aug-2020); IntraMuscular; Deltoid Right.; 0.5 milliLiter(s); VIS (VIS Published: 09-May-2013, VIS Presented: 07-Oct-2019);   Tdap; 14-Jun-2021 20:02; Paige Gore (RN); [employer]; [id number] (Exp. Date: 11-Sep-2022); IntraMuscular; Deltoid Right.; 0.5 milliLiter(s); VIS (VIS Published: 09-May-2013, VIS Presented: 14-Jun-2021);

## 2022-08-12 NOTE — DISCHARGE NOTE PROVIDER - DETAILS OF MALNUTRITION DIAGNOSIS/DIAGNOSES
This patient has been assessed with a concern for Malnutrition and was treated during this hospitalization for the following Nutrition diagnosis/diagnoses:     -  08/09/2022: Severe protein-calorie malnutrition

## 2022-08-12 NOTE — PROGRESS NOTE ADULT - PROBLEM SELECTOR PLAN 3
BL heel DTIs, R midfoot wound with concern for cellulitis  Zosyn IV while inpatient, plan to switch to PO augmentin on discharge for 10 day course  Podiatry consulted, continue wound care, offloading measures
BL heel DTIs, R midfoot wound with concern for cellulitis  Was on Zosyn IV, switched PO augmentin (D 5/10) to finish 10 day course  Podiatry consulted, continue wound care, offloading measures
- pt appears hypervolemic on exam, BNP 4900, trop 93, EKG similar to prior  - CXR showing moderate pulm edema, small b/l pleural effusions  - Echo 5/2022: EF 50%, diffuse hypokinesis, infiltrative disease  - home dose bumex: 0.5mg PO M/W/F. s/p 1mg IV bumex in the ED    - Continue bumex 1mg IV qd  - hs trop downtrended  - F/U TTE -pending, trying to expedite   - monitor on tele  - measure I/Os, daily standing weight
- AAOx1 at baseline as per prior admissions, currently alert and interactive but not answering questions, not agitated  Continue home medications Mirtazepine 7.5mg PO qhs, Seroquel 12.5mg PO qhs, Donepezil 5mg PO daily, Memantine 10mg PO BID, Melatonin 3mg PO qhs
- AAOx1 at baseline as per prior admissions, currently alert and interactive but not answering questions, not agitated    - Continue home medications Mirtazepine 7.5mg PO qhs, Seroquel 12.5mg PO qhs, Donepezil 5mg PO daily, Memantine 10mg PO BID, Melatonin 3mg PO qhs
BL heel DTIs, R midfoot wound with concern for cellulitis  Zosyn IV while inpatient, plan to switch to PO augmentin on discharge for 10 day course  Podiatry consulted, continue wound care, offloading measures

## 2022-08-12 NOTE — PROGRESS NOTE ADULT - PROBLEM SELECTOR PROBLEM 3
Acute on chronic diastolic congestive heart failure
Dementia
Cellulitis
Dementia
Cellulitis
Cellulitis

## 2022-08-12 NOTE — PROGRESS NOTE ADULT - PROBLEM SELECTOR PROBLEM 4
Chronic atrial fibrillation
Frequent falls
Dementia
Chronic atrial fibrillation

## 2022-08-12 NOTE — DISCHARGE NOTE NURSING/CASE MANAGEMENT/SOCIAL WORK - PATIENT PORTAL LINK FT
You can access the FollowMyHealth Patient Portal offered by [facility] by registering at the following website: [email or web address]. By joining [employer]’s FollowMyHealth portal, you will also be able to view your health information using other applications (apps) compatible with our system.

## 2022-08-12 NOTE — PROGRESS NOTE ADULT - NSPROGADDITIONALINFOA_GEN_ALL_CORE
Case and plan discussed with ACP: Ynes
Case and plan discussed with ACP: EMILIA Quick, RN
Case and plan discussed with ACP: Sarah
Case and plan discussed with ACP: Sarah
Case and plan discussed with ACP: Ynes
Case and plan discussed with ACP: SAMANTHA Herring, nurse manager

## 2022-08-12 NOTE — PROGRESS NOTE ADULT - PROBLEM SELECTOR PROBLEM 1
Frequent falls
Acute respiratory failure with hypoxia
Frequent falls
Acute respiratory failure with hypoxia
Acute respiratory failure with hypoxia
Frequent falls

## 2022-08-12 NOTE — PROGRESS NOTE ADULT - NUTRITIONAL ASSESSMENT
This patient has been assessed with a concern for Malnutrition and has been determined to have a diagnosis/diagnoses of Severe protein-calorie malnutrition.    This patient is being managed with:   Diet Regular-  Fabian(7 Gm Arginine/7 Gm Glut/1.2 Gm HMB     Qty per Day:  1  Supplement Feeding Modality:  Oral  Glucerna Shake Cans or Servings Per Day:  1       Frequency:  Two Times a day  Entered: Aug  9 2022  5:32PM    

## 2022-08-12 NOTE — DISCHARGE NOTE PROVIDER - CARE PROVIDER_API CALL
Follow up,   - follow up as outpatient at wound care center at [address] upon discharge  Phone: (   )    -  Fax: (   )    -  Follow Up Time:     Tarah Purdy)  Geriatric Medicine; Internal Medicine  [address] [place] [address]  Phone: [phone]  Fax: [phone]  Follow Up Time: 1 week

## 2022-08-12 NOTE — PROGRESS NOTE ADULT - PROBLEM SELECTOR PLAN 9
DVT Prophylaxis: lovenox subcu  Diet: Carb consistent DASH    DNR/DNI, prior MOLST confirmed in chart    Discharge planning - PT rec [facility], daughter requesting return to [facility].
DVT Prophylaxis: lovenox subcu  Diet: Carb consistent DASH    DNR/DNI, prior MOLST confirmed in chart    Discharge planning - PT rec [facility], daughter requesting return to [facility]
DVT Prophylaxis: lovenox subcu  Diet: Carb consistent DASH    DNR/DNI, prior MOLST confirmed in chart    Discharge planning >40mins - PT rec [facility], daughter requested return to [facility]. Home oxygen arranged.

## 2022-08-12 NOTE — PROGRESS NOTE ADULT - SUBJECTIVE AND OBJECTIVE BOX
[facility] Division of Hospital Medicine  Tiana Sun MD M-F, 8A-5P: MS Teams, Pager: [phone]  Other Times: Ext: [phone], Pager: [phone]      Patient is a 89y old  Male who presents with a chief complaint of fall (11 Aug 2022 12:03)      SUBJECTIVE / OVERNIGHT EVENTS:  Patient was examined this morning. He was lying in bed comfortably. Patient MS exam, ROS is unchanged.     ADDITIONAL REVIEW OF SYSTEMS:    MEDICATIONS  (STANDING):  amoxicillin  875 milliGRAM(s)/clavulanate 1 Tablet(s) Oral every 12 hours  ascorbic acid 500 milliGRAM(s) Oral daily  aspirin enteric coated 81 milliGRAM(s) Oral daily  atorvastatin 10 milliGRAM(s) Oral at bedtime  buMETAnide 1 milliGRAM(s) Oral daily  cadexomer iodine 0.9% Gel 1 Application(s) Topical daily  chlorhexidine 2% Cloths 1 Application(s) Topical daily  cyanocobalamin 1000 MICROGram(s) Oral daily  dextrose 5%. 1000 milliLiter(s) (50 mL/Hr) IV Continuous <Continuous>  dextrose 5%. 1000 milliLiter(s) (100 mL/Hr) IV Continuous <Continuous>  dextrose 5%. 1000 milliLiter(s) (50 mL/Hr) IV Continuous <Continuous>  dextrose 50% Injectable 25 Gram(s) IV Push once  dextrose 50% Injectable 12.5 Gram(s) IV Push once  dextrose 50% Injectable 25 Gram(s) IV Push once  donepezil 5 milliGRAM(s) Oral at bedtime  enoxaparin Injectable 40 milliGRAM(s) SubCutaneous every 24 hours  finasteride 5 milliGRAM(s) Oral daily  glucagon  Injectable 1 milliGRAM(s) IntraMuscular once  insulin lispro (ADMELOG) corrective regimen sliding scale   SubCutaneous three times a day before meals  insulin lispro (ADMELOG) corrective regimen sliding scale   SubCutaneous at bedtime  melatonin 3 milliGRAM(s) Oral at bedtime  memantine 10 milliGRAM(s) Oral two times a day  metoprolol succinate ER 25 milliGRAM(s) Oral daily  mirtazapine 7.5 milliGRAM(s) Oral at bedtime  multivitamin 1 Tablet(s) Oral daily  pantoprazole    Tablet 40 milliGRAM(s) Oral before breakfast  QUEtiapine 12.5 milliGRAM(s) Oral at bedtime  thiamine 100 milliGRAM(s) Oral daily  zinc sulfate 220 milliGRAM(s) Oral daily    MEDICATIONS  (PRN):  dextrose Oral Gel 15 Gram(s) Oral once PRN Blood Glucose LESS THAN 70 milliGRAM(s)/deciliter      CAPILLARY BLOOD GLUCOSE      POCT Blood Glucose.: 100 mg/dL (12 Aug 2022 12:25)  POCT Blood Glucose.: 91 mg/dL (12 Aug 2022 08:07)  POCT Blood Glucose.: 140 mg/dL (11 Aug 2022 21:21)  POCT Blood Glucose.: 120 mg/dL (11 Aug 2022 16:52)      I&O's Summary      Daily     Daily Weight in k.3 (12 Aug 2022 08:33)    PHYSICAL EXAM:  Vital Signs Last 24 Hrs  T(C): 36.4 (12 Aug 2022 12:06), Max: 36.5 (11 Aug 2022 21:13)  T(F): 97.5 (12 Aug 2022 12:06), Max: 97.7 (11 Aug 2022 21:13)  HR: 65 (12 Aug 2022 12:06) (64 - 98)  BP: 115/69 (12 Aug 2022 12:06) (98/65 - 122/69)  BP(mean): --  RR: 18 (12 Aug 2022 12:06) (18 - 18)  SpO2: 98% (12 Aug 2022 12:06) (94% - 100%)    Parameters below as of 12 Aug 2022 12:06  Patient On (Oxygen Delivery Method): nasal cannula  O2 Flow (L/min): 2      Constitutional: no acute distress, laying in bed comfortably  ENMT: atraumatic, normocephalic, no lymphadenopathy  Eyes: pupils equally round and reactive to light, extraocular muscles intact, no conjunctival injection  Cardio: regular rate and rhythm, normal S1/S2, no murmurs, rubs, or gallops  Respiratory: diminished breath sounds at bases b/l, minimal rales, no wheezing  GI: soft, nontender, nondistended, BSx4  MSK: extremities atraumatic, no cyanosis or clubbing, LE in air fluidized boots   Neuro: no focal deficits, moving all extremities, not following commands  Psych: alert and oriented x0-1, normal behavior, appropriate mood and affect, answering by nodding  Skin: b/l LE edema to shins R>L, multiple scabs on BLE      LABS:                        12.9   4.38  )-----------( 143      ( 12 Aug 2022 06:40 )             41.0         141  |  104  |  16  ----------------------------<  97  3.6   |  25  |  1.01    Ca    8.7      12 Aug 2022 06:42  Mg     2.1                       SARS-CoV-2: NotDetec (06 Aug 2022 19:21)  COVID-19 PCR: NotDete (05 May 2022 19:59)      RADIOLOGY & ADDITIONAL TESTS:  Results Reviewed:   Imaging Personally Reviewed:  Electrocardiogram Personally Reviewed:    COORDINATION OF CARE:  Care Discussed with Consultants/Other Providers [Y/N]:  Prior or Outpatient Records Reviewed [Y/N]:  
[facility] Division of Hospital Medicine  Tiana Sun MD M-F, 8A-5P: MS Teams, Pager: [phone]  Other Times: Ext: [phone], Pager: [phone]      Patient is a 89y old  Male who presents with a chief complaint of fall (06 Aug 2022 22:39)      SUBJECTIVE / OVERNIGHT EVENTS:  Patient was examined this morning. He was sleeping comfortably. Arousable. Not answering all ROS questions. No acute complaint.     ADDITIONAL REVIEW OF SYSTEMS:    MEDICATIONS  (STANDING):  aspirin enteric coated 81 milliGRAM(s) Oral daily  atorvastatin 10 milliGRAM(s) Oral at bedtime  buMETAnide Injectable 1 milliGRAM(s) IV Push daily  chlorhexidine 2% Cloths 1 Application(s) Topical daily  cyanocobalamin 1000 MICROGram(s) Oral daily  dextrose 5%. 1000 milliLiter(s) (50 mL/Hr) IV Continuous <Continuous>  dextrose 5%. 1000 milliLiter(s) (100 mL/Hr) IV Continuous <Continuous>  dextrose 50% Injectable 25 Gram(s) IV Push once  dextrose 50% Injectable 12.5 Gram(s) IV Push once  dextrose 50% Injectable 25 Gram(s) IV Push once  donepezil 5 milliGRAM(s) Oral at bedtime  enoxaparin Injectable 40 milliGRAM(s) SubCutaneous every 24 hours  finasteride 5 milliGRAM(s) Oral daily  glucagon  Injectable 1 milliGRAM(s) IntraMuscular once  insulin lispro (ADMELOG) corrective regimen sliding scale   SubCutaneous three times a day before meals  insulin lispro (ADMELOG) corrective regimen sliding scale   SubCutaneous at bedtime  melatonin 3 milliGRAM(s) Oral at bedtime  memantine 10 milliGRAM(s) Oral two times a day  metoprolol succinate ER 25 milliGRAM(s) Oral daily  mirtazapine 7.5 milliGRAM(s) Oral at bedtime  QUEtiapine 12.5 milliGRAM(s) Oral at bedtime  thiamine 100 milliGRAM(s) Oral daily    MEDICATIONS  (PRN):  dextrose Oral Gel 15 Gram(s) Oral once PRN Blood Glucose LESS THAN 70 milliGRAM(s)/deciliter      CAPILLARY BLOOD GLUCOSE      POCT Blood Glucose.: 124 mg/dL (07 Aug 2022 12:09)  POCT Blood Glucose.: 95 mg/dL (07 Aug 2022 08:00)  POCT Blood Glucose.: 121 mg/dL (06 Aug 2022 23:15)      I&O's Summary    06 Aug 2022 07:01  -  07 Aug 2022 07:00  --------------------------------------------------------  IN: 60 mL / OUT: 0 mL / NET: 60 mL        Daily Height in cm: 165.1 (06 Aug 2022 17:43)    Daily Weight in k.5 (07 Aug 2022 11:05)    PHYSICAL EXAM:  Vital Signs Last 24 Hrs  T(C): 36.6 (07 Aug 2022 12:19), Max: 36.6 (07 Aug 2022 12:19)  T(F): 97.8 (07 Aug 2022 12:19), Max: 97.8 (07 Aug 2022 12:19)  HR: 51 (07 Aug 2022 12:19) (51 - 92)  BP: 120/82 (07 Aug 2022 12:19) (119/78 - 145/81)  BP(mean): 101 (06 Aug 2022 20:30) (101 - 101)  RR: 18 (07 Aug 2022 12:19) (16 - 22)  SpO2: 98% (07 Aug 2022 12:19) (94% - 98%)    Parameters below as of 07 Aug 2022 12:19  Patient On (Oxygen Delivery Method): room air      Constitutional: no acute distress, laying in bed comfortably  ENMT: atraumatic, normocephalic, no lymphadenopathy  Eyes: pupils equally round and reactive to light, extraocular muscles intact, no conjunctival injection  Cardio: regular rate and rhythm, normal S1/S2, no murmurs, rubs, or gallops  Respiratory: diminished breath sounds at bases b/l, minimal rales, no wheezing  GI: soft, nontender, nondistended, BSx4  MSK: extremities atraumatic, no cyanosis or clubbing  Skin: 2+ b/l LE edema to shins R>L, multiple scabs on BLE  Neuro: no focal deficits, moving all extremities, not following commands  Psych: alert and oriented x0-1, normal behavior, appropriate mood and affect      LABS:                        12.8   5.00  )-----------( 153      ( 07 Aug 2022 12:22 )             39.3     08-07    138  |  102  |  16  ----------------------------<  136<H>  3.7   |  25  |  0.77    Ca    8.6      07 Aug 2022 12:22  Phos  3.6     08-07  Mg     2.0     08-07    TPro  7.2  /  Alb  3.6  /  TBili  0.8  /  DBili  x   /  AST  42<H>  /  ALT  18  /  AlkPhos  96  08-06    LIVER FUNCTIONS - ( 06 Aug 2022 19:23 )  Alb: 3.6 g/dL / Pro: 7.2 g/dL / ALK PHOS: 96 U/L / ALT: 18 U/L / AST: 42 U/L / GGT: x             CARDIAC MARKERS ( 06 Aug 2022 19:23 )  x     / x     / 94 U/L / x     / x              SARS-CoV-2: NotDetec (06 Aug 2022 19:21)  COVID-19 PCR: NotDetec (05 May 2022 19:59)      RADIOLOGY & ADDITIONAL TESTS:  Results Reviewed:   Imaging Personally Reviewed:  Electrocardiogram Personally Reviewed:    COORDINATION OF CARE:  Care Discussed with Consultants/Other Providers [Y/N]:  Prior or Outpatient Records Reviewed [Y/N]:  
[facility] Division of Hospital Medicine  Tiana Sun MD M-F, 8A-5P: MS Teams, Pager: [phone]  Other Times: Ext: [phone], Pager: [phone]      Patient is a 89y old  Male who presents with a chief complaint of fall (07 Aug 2022 13:03)      SUBJECTIVE / OVERNIGHT EVENTS:  Patient was examined this morning. He is resting comfortably  in bed. He is awake but not answering any question verbally. He is nodding his head to answer questions about ROS and nodded no to ROS.       ADDITIONAL REVIEW OF SYSTEMS:    MEDICATIONS  (STANDING):  aspirin enteric coated 81 milliGRAM(s) Oral daily  atorvastatin 10 milliGRAM(s) Oral at bedtime  buMETAnide Injectable 1 milliGRAM(s) IV Push daily  chlorhexidine 2% Cloths 1 Application(s) Topical daily  cyanocobalamin 1000 MICROGram(s) Oral daily  dextrose 5%. 1000 milliLiter(s) (50 mL/Hr) IV Continuous <Continuous>  dextrose 5%. 1000 milliLiter(s) (100 mL/Hr) IV Continuous <Continuous>  dextrose 50% Injectable 25 Gram(s) IV Push once  dextrose 50% Injectable 12.5 Gram(s) IV Push once  dextrose 50% Injectable 25 Gram(s) IV Push once  donepezil 5 milliGRAM(s) Oral at bedtime  enoxaparin Injectable 40 milliGRAM(s) SubCutaneous every 24 hours  finasteride 5 milliGRAM(s) Oral daily  glucagon  Injectable 1 milliGRAM(s) IntraMuscular once  insulin lispro (ADMELOG) corrective regimen sliding scale   SubCutaneous three times a day before meals  insulin lispro (ADMELOG) corrective regimen sliding scale   SubCutaneous at bedtime  melatonin 3 milliGRAM(s) Oral at bedtime  memantine 10 milliGRAM(s) Oral two times a day  metoprolol succinate ER 25 milliGRAM(s) Oral daily  mirtazapine 7.5 milliGRAM(s) Oral at bedtime  pantoprazole    Tablet 40 milliGRAM(s) Oral before breakfast  QUEtiapine 12.5 milliGRAM(s) Oral at bedtime  thiamine 100 milliGRAM(s) Oral daily    MEDICATIONS  (PRN):  dextrose Oral Gel 15 Gram(s) Oral once PRN Blood Glucose LESS THAN 70 milliGRAM(s)/deciliter      CAPILLARY BLOOD GLUCOSE      POCT Blood Glucose.: 132 mg/dL (08 Aug 2022 12:12)  POCT Blood Glucose.: 94 mg/dL (08 Aug 2022 08:01)  POCT Blood Glucose.: 112 mg/dL (07 Aug 2022 21:19)  POCT Blood Glucose.: 95 mg/dL (07 Aug 2022 16:36)      I&O's Summary    08 Aug 2022 07:01  -  08 Aug 2022 12:48  --------------------------------------------------------  IN: 120 mL / OUT: 0 mL / NET: 120 mL        Daily     Daily Weight in k.8 (08 Aug 2022 07:44)    PHYSICAL EXAM:  Vital Signs Last 24 Hrs  T(C): 36.5 (08 Aug 2022 12:03), Max: 36.5 (08 Aug 2022 12:03)  T(F): 97.7 (08 Aug 2022 12:03), Max: 97.7 (08 Aug 2022 12:03)  HR: 76 (08 Aug 2022 12:03) (44 - 81)  BP: 108/68 (08 Aug 2022 12:03) (99/57 - 118/85)  BP(mean): --  RR: 20 (08 Aug 2022 12:03) (18 - 20)  SpO2: 94% (08 Aug 2022 12:03) (94% - 95%)    Parameters below as of 08 Aug 2022 12:03  Patient On (Oxygen Delivery Method): room air      Constitutional: no acute distress, laying in bed comfortably  ENMT: atraumatic, normocephalic, no lymphadenopathy  Eyes: pupils equally round and reactive to light, extraocular muscles intact, no conjunctival injection  Cardio: regular rate and rhythm, normal S1/S2, no murmurs, rubs, or gallops  Respiratory: diminished breath sounds at bases b/l, minimal rales, no wheezing  GI: soft, nontender, nondistended, BSx4  MSK: extremities atraumatic, no cyanosis or clubbing  Neuro: no focal deficits, moving all extremities, not following commands  Psych: alert and oriented x0-1, normal behavior, appropriate mood and affect, answering by nodding  Skin: 2+ b/l LE edema to shins R>L, multiple scabs on BLE      LABS:                        11.9   4.05  )-----------( 144      ( 08 Aug 2022 06:14 )             36.8     08-    137  |  103  |  17  ----------------------------<  99  3.9   |  23  |  0.81    Ca    8.5      08 Aug 2022 06:14  Phos  3.6     08-  Mg     2.0     -    TPro  7.2  /  Alb  3.6  /  TBili  0.8  /  DBili  x   /  AST  42<H>  /  ALT  18  /  AlkPhos  96  08-06    LIVER FUNCTIONS - ( 06 Aug 2022 19:23 )  Alb: 3.6 g/dL / Pro: 7.2 g/dL / ALK PHOS: 96 U/L / ALT: 18 U/L / AST: 42 U/L / GGT: x             CARDIAC MARKERS ( 06 Aug 2022 19:23 )  x     / x     / 94 U/L / x     / x              SARS-CoV-2: NotDetec (06 Aug 2022 19:21)  COVID-19 PCR: NotDetec (05 May 2022 19:59)      RADIOLOGY & ADDITIONAL TESTS:  Results Reviewed:   Imaging Personally Reviewed:  Electrocardiogram Personally Reviewed:    COORDINATION OF CARE:  Care Discussed with Consultants/Other Providers [Y/N]:  Prior or Outpatient Records Reviewed [Y/N]:  
[facility] Division of Hospital Medicine  Tiana Sun MD M-F, 8A-5P: MS Teams, Pager: [phone]  Other Times: Ext: [phone], Pager: [phone]      Patient is a 89y old  Male who presents with a chief complaint of Unspecified injury of head, initial encounter     (09 Aug 2022 12:24)      SUBJECTIVE / OVERNIGHT EVENTS:  Patient was examined this morning. He is lying in bed comfortably, he opens his eyes when prompted, answers questions by nodding. Denies any pain, dyspnea, chest pain.       ADDITIONAL REVIEW OF SYSTEMS:    MEDICATIONS  (STANDING):  ascorbic acid 500 milliGRAM(s) Oral daily  aspirin enteric coated 81 milliGRAM(s) Oral daily  atorvastatin 10 milliGRAM(s) Oral at bedtime  buMETAnide Injectable 1 milliGRAM(s) IV Push daily  cadexomer iodine 0.9% Gel 1 Application(s) Topical daily  chlorhexidine 2% Cloths 1 Application(s) Topical daily  cyanocobalamin 1000 MICROGram(s) Oral daily  dextrose 5%. 1000 milliLiter(s) (50 mL/Hr) IV Continuous <Continuous>  dextrose 5%. 1000 milliLiter(s) (100 mL/Hr) IV Continuous <Continuous>  dextrose 5%. 1000 milliLiter(s) (50 mL/Hr) IV Continuous <Continuous>  dextrose 50% Injectable 25 Gram(s) IV Push once  dextrose 50% Injectable 12.5 Gram(s) IV Push once  dextrose 50% Injectable 25 Gram(s) IV Push once  donepezil 5 milliGRAM(s) Oral at bedtime  enoxaparin Injectable 40 milliGRAM(s) SubCutaneous every 24 hours  finasteride 5 milliGRAM(s) Oral daily  glucagon  Injectable 1 milliGRAM(s) IntraMuscular once  insulin lispro (ADMELOG) corrective regimen sliding scale   SubCutaneous three times a day before meals  insulin lispro (ADMELOG) corrective regimen sliding scale   SubCutaneous at bedtime  melatonin 3 milliGRAM(s) Oral at bedtime  memantine 10 milliGRAM(s) Oral two times a day  metoprolol succinate ER 25 milliGRAM(s) Oral daily  mirtazapine 7.5 milliGRAM(s) Oral at bedtime  multivitamin 1 Tablet(s) Oral daily  pantoprazole    Tablet 40 milliGRAM(s) Oral before breakfast  piperacillin/tazobactam IVPB.. 3.375 Gram(s) IV Intermittent every 8 hours  QUEtiapine 12.5 milliGRAM(s) Oral at bedtime  thiamine 100 milliGRAM(s) Oral daily  zinc sulfate 220 milliGRAM(s) Oral daily    MEDICATIONS  (PRN):  dextrose Oral Gel 15 Gram(s) Oral once PRN Blood Glucose LESS THAN 70 milliGRAM(s)/deciliter      CAPILLARY BLOOD GLUCOSE      POCT Blood Glucose.: 150 mg/dL (10 Aug 2022 11:58)  POCT Blood Glucose.: 133 mg/dL (10 Aug 2022 08:44)  POCT Blood Glucose.: 71 mg/dL (10 Aug 2022 08:01)  POCT Blood Glucose.: 93 mg/dL (09 Aug 2022 21:39)  POCT Blood Glucose.: 103 mg/dL (09 Aug 2022 16:47)      I&O's Summary    09 Aug 2022 07:01  -  10 Aug 2022 07:00  --------------------------------------------------------  IN: 240 mL / OUT: 0 mL / NET: 240 mL        Daily     Daily     PHYSICAL EXAM:  Vital Signs Last 24 Hrs  T(C): 36.3 (10 Aug 2022 12:18), Max: 36.6 (09 Aug 2022 21:26)  T(F): 97.3 (10 Aug 2022 12:18), Max: 97.8 (09 Aug 2022 21:26)  HR: 64 (10 Aug 2022 12:18) (64 - 85)  BP: 107/68 (10 Aug 2022 12:18) (102/69 - 122/77)  BP(mean): --  RR: 18 (10 Aug 2022 12:18) (18 - 18)  SpO2: 89% (10 Aug 2022 12:18) (89% - 97%)    Parameters below as of 10 Aug 2022 12:18  Patient On (Oxygen Delivery Method): room air      Constitutional: no acute distress, laying in bed comfortably  ENMT: atraumatic, normocephalic, no lymphadenopathy  Eyes: pupils equally round and reactive to light, extraocular muscles intact, no conjunctival injection  Cardio: regular rate and rhythm, normal S1/S2, no murmurs, rubs, or gallops  Respiratory: diminished breath sounds at bases b/l, minimal rales, no wheezing  GI: soft, nontender, nondistended, BSx4  MSK: extremities atraumatic, no cyanosis or clubbing, LE in air fluidized boots   Neuro: no focal deficits, moving all extremities, not following commands  Psych: alert and oriented x0-1, normal behavior, appropriate mood and affect, answering by nodding  Skin: b/l LE edema to shins R>L, multiple scabs on BLE      LABS:                        13.7   4.50  )-----------( 136      ( 09 Aug 2022 08:43 )             43.1     08-09    144  |  104  |  19  ----------------------------<  100<H>  4.2   |  25  |  0.91    Ca    8.7      09 Aug 2022 09:27                  SARS-CoV-2: NotDetec (06 Aug 2022 19:21)  COVID-19 PCR: NotDete (05 May 2022 19:59)      RADIOLOGY & ADDITIONAL TESTS:  Results Reviewed:   Imaging Personally Reviewed:  Electrocardiogram Personally Reviewed:    COORDINATION OF CARE:  Care Discussed with Consultants/Other Providers [Y/N]:  Prior or Outpatient Records Reviewed [Y/N]:  
[facility] Division of Hospital Medicine  Tiana Sun MD M-F, 8A-5P: MS Teams, Pager: [phone]  Other Times: Ext: [phone], Pager: [phone]      Patient is a 89y old  Male who presents with a chief complaint of fall (08 Aug 2022 13:30)      SUBJECTIVE / OVERNIGHT EVENTS:  Patient is pending echocardiogram this morning.     ADDITIONAL REVIEW OF SYSTEMS:    MEDICATIONS  (STANDING):  aspirin enteric coated 81 milliGRAM(s) Oral daily  atorvastatin 10 milliGRAM(s) Oral at bedtime  buMETAnide Injectable 1 milliGRAM(s) IV Push daily  cadexomer iodine 0.9% Gel 1 Application(s) Topical daily  chlorhexidine 2% Cloths 1 Application(s) Topical daily  cyanocobalamin 1000 MICROGram(s) Oral daily  dextrose 5%. 1000 milliLiter(s) (50 mL/Hr) IV Continuous <Continuous>  dextrose 5%. 1000 milliLiter(s) (100 mL/Hr) IV Continuous <Continuous>  dextrose 5%. 1000 milliLiter(s) (50 mL/Hr) IV Continuous <Continuous>  dextrose 50% Injectable 25 Gram(s) IV Push once  dextrose 50% Injectable 12.5 Gram(s) IV Push once  dextrose 50% Injectable 25 Gram(s) IV Push once  donepezil 5 milliGRAM(s) Oral at bedtime  enoxaparin Injectable 40 milliGRAM(s) SubCutaneous every 24 hours  finasteride 5 milliGRAM(s) Oral daily  glucagon  Injectable 1 milliGRAM(s) IntraMuscular once  insulin lispro (ADMELOG) corrective regimen sliding scale   SubCutaneous three times a day before meals  insulin lispro (ADMELOG) corrective regimen sliding scale   SubCutaneous at bedtime  melatonin 3 milliGRAM(s) Oral at bedtime  memantine 10 milliGRAM(s) Oral two times a day  metoprolol succinate ER 25 milliGRAM(s) Oral daily  mirtazapine 7.5 milliGRAM(s) Oral at bedtime  pantoprazole    Tablet 40 milliGRAM(s) Oral before breakfast  QUEtiapine 12.5 milliGRAM(s) Oral at bedtime  thiamine 100 milliGRAM(s) Oral daily    MEDICATIONS  (PRN):  dextrose Oral Gel 15 Gram(s) Oral once PRN Blood Glucose LESS THAN 70 milliGRAM(s)/deciliter      CAPILLARY BLOOD GLUCOSE      POCT Blood Glucose.: 109 mg/dL (09 Aug 2022 11:39)  POCT Blood Glucose.: 82 mg/dL (09 Aug 2022 09:01)  POCT Blood Glucose.: 70 mg/dL (09 Aug 2022 07:30)  POCT Blood Glucose.: 125 mg/dL (08 Aug 2022 21:54)  POCT Blood Glucose.: 99 mg/dL (08 Aug 2022 17:01)      I&O's Summary    08 Aug 2022 07:01  -  09 Aug 2022 07:00  --------------------------------------------------------  IN: 240 mL / OUT: 0 mL / NET: 240 mL    09 Aug 2022 07:01  -  09 Aug 2022 12:20  --------------------------------------------------------  IN: 120 mL / OUT: 0 mL / NET: 120 mL        Daily     Daily Weight in k.1 (09 Aug 2022 07:56)    PHYSICAL EXAM:  Vital Signs Last 24 Hrs  T(C): 36.4 (09 Aug 2022 04:09), Max: 36.5 (08 Aug 2022 20:24)  T(F): 97.5 (09 Aug 2022 04:09), Max: 97.7 (08 Aug 2022 20:24)  HR: 71 (09 Aug 2022 11:49) (70 - 89)  BP: 129/81 (09 Aug 2022 11:49) (110/76 - 129/81)  BP(mean): --  RR: 18 (09 Aug 2022 11:49) (18 - 19)  SpO2: 94% (09 Aug 2022 11:49) (94% - 98%)    Parameters below as of 09 Aug 2022 11:49  Patient On (Oxygen Delivery Method): room air      CONSTITUTIONAL: NAD, well-developed, well-groomed  EYES: PERRLA; conjunctiva and sclera clear  ENMT: Moist oral mucosa, no pharyngeal injection or exudates; normal dentition  NECK: Supple, no palpable masses; no thyromegaly  RESPIRATORY: Normal respiratory effort; lungs are clear to auscultation bilaterally  CARDIOVASCULAR: Regular rate and rhythm, normal S1 and S2, no murmur/rub/gallop; No lower extremity edema; Peripheral pulses are 2+ bilaterally  ABDOMEN: Nontender to palpation, normoactive bowel sounds, no rebound/guarding; No hepatosplenomegaly  MUSCULOSKELETAL:  no clubbing or cyanosis of digits; no joint swelling or tenderness to palpation, moving all extremities   PSYCH: A+O to person, place, and time; affect appropriate  NEUROLOGY: CN 2-12 are intact and symmetric; no gross sensory/motor deficits   SKIN: No rashes; no palpable lesions    LABS:                        13.7   4.50  )-----------( 136      ( 09 Aug 2022 08:43 )             43.1     08-09    144  |  104  |  19  ----------------------------<  100<H>  4.2   |  25  |  0.91    Ca    8.7      09 Aug 2022 09:27  Phos  3.6     08-07  Mg     2.0     08-08                  SARS-CoV-2: NotDetec (06 Aug 2022 19:21)  COVID-19 PCR: NotDetec (05 May 2022 19:59)      RADIOLOGY & ADDITIONAL TESTS:  Results Reviewed:   Imaging Personally Reviewed:  Electrocardiogram Personally Reviewed:    COORDINATION OF CARE:  Care Discussed with Consultants/Other Providers [Y/N]:  Prior or Outpatient Records Reviewed [Y/N]:  
[facility] Division of Hospital Medicine  Tiana Sun MD M-F, 8A-5P: MS Teams, Pager: [phone]  Other Times: Ext: [phone], Pager: [phone]      Patient is a 89y old  Male who presents with a chief complaint of fall (10 Aug 2022 12:42)      SUBJECTIVE / OVERNIGHT EVENTS:  Patient was examined this morning. He is awake, but not verbalizing any response. He is responding by nodding only. Nods no to having any pain, chest pain, dyspnea, cough, abdominal pain.      ADDITIONAL REVIEW OF SYSTEMS:    MEDICATIONS  (STANDING):  ascorbic acid 500 milliGRAM(s) Oral daily  aspirin enteric coated 81 milliGRAM(s) Oral daily  atorvastatin 10 milliGRAM(s) Oral at bedtime  buMETAnide Injectable 1 milliGRAM(s) IV Push daily  cadexomer iodine 0.9% Gel 1 Application(s) Topical daily  chlorhexidine 2% Cloths 1 Application(s) Topical daily  cyanocobalamin 1000 MICROGram(s) Oral daily  dextrose 5%. 1000 milliLiter(s) (50 mL/Hr) IV Continuous <Continuous>  dextrose 5%. 1000 milliLiter(s) (100 mL/Hr) IV Continuous <Continuous>  dextrose 5%. 1000 milliLiter(s) (50 mL/Hr) IV Continuous <Continuous>  dextrose 50% Injectable 25 Gram(s) IV Push once  dextrose 50% Injectable 12.5 Gram(s) IV Push once  dextrose 50% Injectable 25 Gram(s) IV Push once  donepezil 5 milliGRAM(s) Oral at bedtime  enoxaparin Injectable 40 milliGRAM(s) SubCutaneous every 24 hours  finasteride 5 milliGRAM(s) Oral daily  glucagon  Injectable 1 milliGRAM(s) IntraMuscular once  insulin lispro (ADMELOG) corrective regimen sliding scale   SubCutaneous three times a day before meals  insulin lispro (ADMELOG) corrective regimen sliding scale   SubCutaneous at bedtime  melatonin 3 milliGRAM(s) Oral at bedtime  memantine 10 milliGRAM(s) Oral two times a day  metoprolol succinate ER 25 milliGRAM(s) Oral daily  mirtazapine 7.5 milliGRAM(s) Oral at bedtime  multivitamin 1 Tablet(s) Oral daily  pantoprazole    Tablet 40 milliGRAM(s) Oral before breakfast  piperacillin/tazobactam IVPB.. 3.375 Gram(s) IV Intermittent every 8 hours  QUEtiapine 12.5 milliGRAM(s) Oral at bedtime  thiamine 100 milliGRAM(s) Oral daily  zinc sulfate 220 milliGRAM(s) Oral daily    MEDICATIONS  (PRN):  dextrose Oral Gel 15 Gram(s) Oral once PRN Blood Glucose LESS THAN 70 milliGRAM(s)/deciliter      CAPILLARY BLOOD GLUCOSE      POCT Blood Glucose.: 140 mg/dL (11 Aug 2022 11:58)  POCT Blood Glucose.: 148 mg/dL (11 Aug 2022 07:52)  POCT Blood Glucose.: 146 mg/dL (10 Aug 2022 21:42)  POCT Blood Glucose.: 113 mg/dL (10 Aug 2022 16:32)      I&O's Summary    10 Aug 2022 07:01  -  11 Aug 2022 07:00  --------------------------------------------------------  IN: 160 mL / OUT: 0 mL / NET: 160 mL        Daily     Daily Weight in k.1 (11 Aug 2022 08:25)    PHYSICAL EXAM:  Vital Signs Last 24 Hrs  T(C): 36.3 (11 Aug 2022 04:05), Max: 36.4 (10 Aug 2022 21:25)  T(F): 97.3 (11 Aug 2022 04:05), Max: 97.6 (10 Aug 2022 21:25)  HR: 70 (11 Aug 2022 04:05) (64 - 76)  BP: 118/78 (11 Aug 2022 04:05) (107/68 - 133/67)  BP(mean): --  RR: 18 (11 Aug 2022 04:05) (18 - 19)  SpO2: 96% (11 Aug 2022 08:18) (87% - 97%)    Parameters below as of 11 Aug 2022 08:18  Patient On (Oxygen Delivery Method): nasal cannula  O2 Flow (L/min): 2    Constitutional: no acute distress, laying in bed comfortably  ENMT: atraumatic, normocephalic, no lymphadenopathy  Eyes: pupils equally round and reactive to light, extraocular muscles intact, no conjunctival injection  Cardio: regular rate and rhythm, normal S1/S2, no murmurs, rubs, or gallops  Respiratory: diminished breath sounds at bases b/l, minimal rales, no wheezing  GI: soft, nontender, nondistended, BSx4  MSK: extremities atraumatic, no cyanosis or clubbing, LE in air fluidized boots   Neuro: no focal deficits, moving all extremities, not following commands  Psych: alert and oriented x0-1, normal behavior, appropriate mood and affect, answering by nodding  Skin: b/l LE edema to shins R>L, multiple scabs on BLE      LABS:                        12.9   4.96  )-----------( 151      ( 11 Aug 2022 06:14 )             40.3                       SARS-CoV-2: NotDetec (06 Aug 2022 19:21)  COVID-19 PCR: NotDetec (05 May 2022 19:59)      RADIOLOGY & ADDITIONAL TESTS:  Results Reviewed:   Imaging Personally Reviewed:  Electrocardiogram Personally Reviewed:    COORDINATION OF CARE:  Care Discussed with Consultants/Other Providers [Y/N]:  Prior or Outpatient Records Reviewed [Y/N]:

## 2022-08-12 NOTE — DISCHARGE NOTE PROVIDER - NSDCCPCAREPLAN_GEN_ALL_CORE_FT
PRINCIPAL DISCHARGE DIAGNOSIS  Diagnosis: Head trauma  Assessment and Plan of Treatment: If you develop any headache, weakness or confusion seek medical care immediatley.      SECONDARY DISCHARGE DIAGNOSES  Diagnosis: Acute on chronic diastolic congestive heart failure  Assessment and Plan of Treatment: Cotninue your medications as perscribed and seek medical care if you have shortness of breath or swelling.    Diagnosis: Cellulitis  Assessment and Plan of Treatment: Take your antibiotic until all the pills are completed.  Continue with the wound care daily and follow up with Podiatry within 1-2 weeks.    Diagnosis: Frequent falls  Assessment and Plan of Treatment: Be aware of your surroundings and careful not to slip or fall and always have the ability to hold onto something if needed to prevent a fall.

## 2022-08-12 NOTE — PROGRESS NOTE ADULT - PROBLEM SELECTOR PLAN 2
- pt appears hypervolemic on exam, BNP 4900, trop 93, EKG similar to prior  - CXR showing moderate pulm edema, small b/l pleural effusions  - Echo 5/2022: EF 50%, diffuse hypokinesis, infiltrative disease  - home dose bumex: 0.5mg PO M/W/F. s/p 1mg IV bumex in the ED    - Continue bumex 1mg IV qd  - hs trop downtrended  - F/U TTE  - monitor on tele  - measure I/Os, daily standing weight
- pt appears hypervolemic on exam, BNP 4900, trop 93, EKG similar to prior  - CXR showing moderate pulm edema, small b/l pleural effusions  - Echo 5/2022: EF 50%, diffuse hypokinesis, infiltrative disease  - home dose bumex: 0.5mg PO M/W/F. s/p 1mg IV bumex in the ED  - TTE reviewed: unchanged from previous     - Switch to PO bumex   - hs trop downtrended  - monitor on tele  - measure I/Os, daily standing weights > not being done as patient is incontinent and not able to stand. Bed weight has gone down from 140lb to 132lb since admission
BL heel DTIs, R midfoot wound with concern for cellulitis  Zosyn IV while inpatient, plan to switch to PO augmentin on discharge for 10 day course  Podiatry consulted, continue wound care, offloading measures
- pt appears hypervolemic on exam, BNP 4900, trop 93, EKG similar to prior  - CXR showing moderate pulm edema, small b/l pleural effusions  - Echo 5/2022: EF 50%, diffuse hypokinesis, infiltrative disease  - home dose bumex: 0.5mg PO M/W/F. s/p 1mg IV bumex in the ED    - Continue bumex 1mg IV qd  - hs trop downtrended  - F/U TTE  - monitor on tele  - measure I/Os, daily standing weight
- pt appears hypervolemic on exam, BNP 4900, trop 93, EKG similar to prior  - CXR showing moderate pulm edema, small b/l pleural effusions  - Echo 5/2022: EF 50%, diffuse hypokinesis, infiltrative disease  - home dose bumex: 0.5mg PO M/W/F. s/p 1mg IV bumex in the ED  - TTE reviewed: unchanged from previous     - Continue bumex 1mg IV qd  - hs trop downtrended  - monitor on tele  - measure I/Os, daily standing weights
- pt appears hypervolemic on exam, BNP 4900, trop 93, EKG similar to prior  - CXR showing moderate pulm edema, small b/l pleural effusions  - Echo 5/2022: EF 50%, diffuse hypokinesis, infiltrative disease  - home dose bumex: 0.5mg PO M/W/F. s/p 1mg IV bumex in the ED  - TTE reviewed: unchanged from previous     - Continue bumex 1mg IV qd, will switch to PO bumex on discharge   - hs trop downtrended  - monitor on tele  - measure I/Os, daily standing weights > not being done as patient is incontinent and not able to stand. Bed weight has gone down from 140lb to 132lb since admission

## 2022-08-12 NOTE — PROGRESS NOTE ADULT - PROBLEM SELECTOR PLAN 7
- not on any meds  - low dose admelog correction scale TIDQAC and QHS  - check FSG before meals and QHS, or q6h while NPO, goal 100-180  - A1C 6.2  - Carb consistent diet
- continue metoprolol succinate
Diet: Carb consistent DASH  DVT Prophylaxis: lovenox sq  DNR/DNI, prior MOLST confirmed in chart
DVT Prophylaxis: lovenox subcu  Diet: Carb consistent DASH    DNR/DNI, prior MOLST confirmed in chart    Discharge planning - PT rec [facility], daughter requesting return to [facility]
- not on any meds  - low dose admelog correction scale TIDQAC and QHS  - check FSG before meals and QHS, or q6h while NPO, goal 100-180  - A1C 6.2  - Carb consistent diet
- not on any meds  - low dose admelog correction scale TIDQAC and QHS  - check FSG before meals and QHS, or q6h while NPO, goal 100-180  - A1C 6.2  - Carb consistent diet

## 2022-08-12 NOTE — DISCHARGE NOTE PROVIDER - NSDCFUADDINST_GEN_ALL_CORE_FT
-  daily dressing changes with betadine to heels and iodosorb to right midfoot wound and alleyvn foam  -  z flow boots in bed at all times     Fabian 1 can daily  Glucerna 1 can BID

## 2022-08-12 NOTE — DISCHARGE NOTE PROVIDER - NSFOLLOWUPCLINICS_GEN_ALL_ED_FT
[facility]  Wound Care  [address] [place] [address]  Phone: [phone]  Fax:     [facility]  Podiatry  [address] [place] [id number]  Phone: [phone]  Fax:   Follow Up Time: 2 weeks

## 2022-08-18 PROBLEM — L89.611 PRESSURE INJURY OF RIGHT HEEL, STAGE 1: Status: ACTIVE | Noted: 2022-01-01

## 2022-08-18 PROBLEM — S91.301A WOUND OF RIGHT FOOT: Status: ACTIVE | Noted: 2022-01-01

## 2022-08-21 NOTE — ED PROVIDER NOTE - NS_EDPROVIDERDISPOUSERTYPE_ED_A_ED
Received request from [facility]  - faxed to [phone] for processing on 08/04/2021. For status update, call [phone] option 1.
Attending Attestation (For Attendings USE Only)...

## 2022-08-21 NOTE — ED PROVIDER NOTE - OBJECTIVE STATEMENT
This is a 89 year old male with PMH of chronic afib not on AC (on ASA), HFpEF, TAVR, HTN, DM, HLD, Dementia, BPH, SCC, and Spinal stenosis presenting from [facility] for low o2 saturation at 70s per staff. Unknown duration of the low o2 sat per staff. Increased lethargy per staff but A&O x1 at baseline.

## 2022-08-21 NOTE — ED PROVIDER NOTE - CHIEF COMPLAINT
The patient is a 89y Male complaining of  The patient is a 89y Male complaining of low o2 saturation

## 2022-08-21 NOTE — ED ADULT NURSE NOTE - CAS TRG GENERAL AIRWAY, MLM
[General Appearance - Well Developed] : well developed [Normal Appearance] : normal appearance [Well Groomed] : well groomed [General Appearance - Well Nourished] : well nourished [No Deformities] : no deformities [General Appearance - In No Acute Distress] : no acute distress [Normal Conjunctiva] : the conjunctiva exhibited no abnormalities [Eyelids - No Xanthelasma] : the eyelids demonstrated no xanthelasmas [Normal Oral Mucosa] : normal oral mucosa [No Oral Pallor] : no oral pallor [No Oral Cyanosis] : no oral cyanosis [Normal Jugular Venous A Waves Present] : normal jugular venous A waves present [Normal Jugular Venous V Waves Present] : normal jugular venous V waves present [No Jugular Venous Salguero A Waves] : no jugular venous salguero A waves [Respiration, Rhythm And Depth] : normal respiratory rhythm and effort [Exaggerated Use Of Accessory Muscles For Inspiration] : no accessory muscle use [Auscultation Breath Sounds / Voice Sounds] : lungs were clear to auscultation bilaterally [Heart Rate And Rhythm] : heart rate and rhythm were normal [Heart Sounds] : normal S1 and S2 [Murmurs] : no murmurs present [Arterial Pulses Normal] : the arterial pulses were normal [Edema] : no peripheral edema present [Abdomen Soft] : soft [Abdomen Tenderness] : non-tender [Abdomen Mass (___ Cm)] : no abdominal mass palpated [Nail Clubbing] : no clubbing of the fingernails [Cyanosis, Localized] : no localized cyanosis [Petechial Hemorrhages (___cm)] : no petechial hemorrhages Patent [Skin Color & Pigmentation] : normal skin color and pigmentation [] : no rash [No Venous Stasis] : no venous stasis [Skin Lesions] : no skin lesions [No Skin Ulcers] : no skin ulcer [No Xanthoma] : no  xanthoma was observed [Oriented To Time, Place, And Person] : oriented to person, place, and time [Affect] : the affect was normal [Mood] : the mood was normal [No Anxiety] : not feeling anxious Pt was seen by intake MD and agree with HPI/ROS/PE/Plan. Pt is eating drinking and appears well. Will revital and dc with peds f/u. CXR negative. Samson: pt re-evaluated. lungs clear on exam. No increased resp effort, no tachypnea. Pt well appearing, active and playful, and tolerating cookies, cereal, and juice in dept. Stable for dc

## 2022-08-21 NOTE — ED ADULT NURSE REASSESSMENT NOTE - NS ED NURSE REASSESS COMMENT FT1
Pt found to be soiled. Perineal cleansed with cleansing solution. Clean diaper and abram applied underneath perineum. Clean linen provided to patient. Stage 1 - nonblanchable redness visualized on sacral area. Cavalon skin protectant applied to wound area, and pressure ulcer dressing applied to pt's sacral area. Pt had wounds to bilateral heels. Stage 2, as well as deep tissue injury to the left heel, area cleansed, Cavalon skin protectant applied, and pressure ulcer dressing applied. Stage 2 on the right heel, as well as wound visualized to the lateral side of right heel, areas cleansed, Cavalon skin protectant applied, and two pressure ulcer dressings applied.

## 2022-08-21 NOTE — ED PROVIDER NOTE - ATTENDING CONTRIBUTION TO CARE
I, Ashanti Snider, performed a history and physical exam of the patient and discussed their management with the resident and /or advanced care provider. I reviewed the resident and /or ACP's note and agree with the documented findings and plan of care except where otherwise noted in my note. I was present and available for all procedures.     89 yo M PMH of DM, HTN, HLD, BPH, dementia, depression, frequent falls, chronic afib not on AC (on aspirin), HFpEF, TAVR, SCC, and spinal stenosis (per chart review given patient unable to provide history), presenting to the ED from his Assisted Living ([facility]) presents for lethargy and hypoxia  Per dr. contreras discussion with facility, patient had 02 sat 70s for unknown amt of time and more lethargic, baseline a&o x 1. no falls, no infectious symptoms  95-96% RA per EMS I, Ashanti Snider, performed a history and physical exam of the patient and discussed their management with the resident and /or advanced care provider. I reviewed the resident and /or ACP's note and agree with the documented findings and plan of care except where otherwise noted in my note. I was present and available for all procedures.     89 yo M PMH of DM, HTN, HLD, BPH, dementia, depression, frequent falls, chronic afib not on AC (on aspirin), HFpEF, TAVR, SCC, and spinal stenosis (per chart review given patient unable to provide history), presenting to the ED from his Assisted Living ([facility]) presents for lethargy and hypoxia  Per dr. contreras discussion with facility, patient had 02 sat 70s for unknown amt of time and more lethargic, baseline a&o x 1. no falls, no infectious symptoms. of note, patient was admitted with fall 8/6 discharged 8/12, for acute on chronic chf exacerbation  95-96% RA per EMS    PHYSICAL EXAM:   General: chronically ill appearing  HEENT: NC/AT, PERRLA, airway patent  Cardiovascular: regular rate and irregularly irregular rhythm  Respiratory: diffusely diminished breath sounds 2/2 poor effort  Abdominal: soft, nontender, nondistended, no rebound, guarding or rigidity. RLQ ecchymosis with some yellow/purple discoloration  Back: no rashes or ecchymosis, no midline spinal tenderness  Rectal: chaperoned by rn zack: brown stool, rectally afebrile  Extremities: trace LE edema b/l right more than left.   Neuro: Alert and oriented x1, baseline, eyes open to verbal stimuli  -Ashanti Snider MD Attending Physician     concern for possible infectious/metabolic vs intracranial / ich etiology of ams. also consider chf exacerbation. rlq ecchymosis found on exam, possibly evolving/old since fall last week 8/6 but appears patient did not have abdominal imaging at that time per chart review. Will image today. will admit

## 2022-08-21 NOTE — ED ADULT NURSE NOTE - OBJECTIVE STATEMENT
89 YO Male with PMH unsteady gait, dementia, chronic a fib, RBBB, insomnia, BPH, DM, HTN, HLD, sp knee surgery, and sp shoulder surgery via EMS from the [facility], presenting with complaints of lethargy. As per EMS, pt has been lethargic and had low pulse ox, stating "low 80s," but EMS states that pt has poor perfusion in his hands. EMS states that at the [facility], their pulse oximetry did not show a "pleth" and waveform. EMS reports their pulse oximetry was "high 90s." Pt denies any pain or discomfort. Pt states that he is "cold." 89 YO Male with PMH unsteady gait, dementia, chronic a fib (not on blood thinners), HF, TAVR, RBBB, insomnia, BPH, DM, HTN, HLD, spinal stenosis, sp knee surgery, and sp shoulder surgery via EMS from the [facility], presenting with complaints of lethargy. As per EMS, pt has been lethargic and had low pulse ox, stating "low 80s," but EMS states that pt has poor perfusion in his hands. EMS states that at the [facility], their pulse oximetry did not show a "pleth" and waveform. EMS reports their pulse oximetry was "high 90s." Pt denies any pain or discomfort. Pt states that he is "cold." EMS states pt is AxOx1 at baseline

## 2022-08-21 NOTE — ED PROVIDER NOTE - PHYSICAL EXAMINATION
General: NAD  HEENT: NCAT  Cardiac: RRR, 2+ radial pulses  Chest: CTA  Abdomen: soft, non-distended, no ttp, no rebound or guarding  Extremities: no peripheral edema, calf tenderness, or leg size discrepancies  Skin: no rashes  Neuro: AAOx1, moving all extremities.

## 2022-08-21 NOTE — ED PROVIDER NOTE - PROGRESS NOTE DETAILS
xray consistent with fluid overload. patient is in no resp distress. saturating well on RA. will diurese after cmp to ensure no electrolyte abnormalities Rayna, PGY1 - trop elevated at 113. Baseline trop around 80s-90s. Chronic afib on ekg with no significant ST elevation/depression. BNP >5000. Chest x-ray consistent with HF exacerbation. Talked to cardiology,

## 2022-08-21 NOTE — ED ADULT NURSE REASSESSMENT NOTE - NS ED NURSE REASSESS COMMENT FT1
Pt admitted to hospital. Urine samples still pending. As per ED Attending Tylor, no need for straight cath for urine. Can obtain urine at later date.

## 2022-08-21 NOTE — ED ADULT NURSE REASSESSMENT NOTE - NS ED NURSE REASSESS COMMENT FT1
Pt taken to CT scan without primary (writing) nurse aware of transport. Writing nurse looking for pt and went to CT scan. Pt in CT scan. Peripheral access device placed, see flow sheet.

## 2022-08-21 NOTE — ED PROVIDER NOTE - CLINICAL SUMMARY MEDICAL DECISION MAKING FREE TEXT BOX
This is a 89 year old male with PMH of chronic afib not on AC (on ASA), HFpEF, TAVR, HTN, DM, HLD, Dementia, BPH, SCC, and Spinal stenosis presenting from [facility] for low o2 saturation at 70s per staff. Per EMS, he had 96-97% o2 sat on room air during the transfer. On presentation he is saturating at 96% on room air with blood pressure of 110s/80s and pulse rate of 70s-80s. Not tachypneic or increased work of breathing. He was just discharged from recent hospitalization on 08/12. This is a 89 year old male with PMH of chronic afib not on AC (on ASA), HFpEF, TAVR, HTN, DM, HLD, Dementia, BPH, SCC, and Spinal stenosis presenting from [facility] for low o2 saturation at 70s per staff. Per EMS, he had 96-97% o2 sat on room air during the transfer. On presentation he is saturating at 96% on room air with blood pressure of 110s/80s and pulse rate of 70s-80s. Not tachypneic or increased work of breathing. He was just discharged from recent hospitalization on 08/12 for heart failure exacerbation and falls. Will work up for HF exacerbation, acs, infection.

## 2022-08-21 NOTE — ED ADULT NURSE NOTE - ED STAT RN HANDOFF DETAILS
Handoff report provided to oncoming nurse Karen Jose RN. Understands pmh, medications given, and plan of care for patient. Patient in stable condition, vital signs updated, and patient has no complaints at this time and has been updated on care plan.

## 2022-08-22 NOTE — ED ADULT NURSE REASSESSMENT NOTE - NS ED NURSE REASSESS COMMENT FT1
Patient admitted to [facility]. Admission band applied to patient utilizing two patient identifiers. Patient updated on plan of care.

## 2022-08-22 NOTE — H&P ADULT - PROBLEM SELECTOR PLAN 1
Hx of HFpEF, recent admission earlier in month for CHF exacerbation. Recently on Bumex 0.5mg PO M/W/F/Sat at home. Presented this admission for episode of hypoxia to 70s at assisted living facility. CXR showing evidence of pulm edema and b/l pleural effusions. hsTrop 113 (previously 93->75), serum pro-BNP 5631. EKG similar to prior. Last TTE (8/9): EF 50%; global hypokinesis of LV; severe concentric LVH; decreased RV function. EKG   - strict I/Os, daily weights, fluid restriction  - f/u repeat hsTrop  - c/w Bumex 1mg IVP daily for now  - f/u repeat TTE  - f/u Cardiology recs Hx of HFpEF, recent admission earlier in month for CHF exacerbation. Recently on Bumex 0.5mg PO M/W/F/Sat at home. Presented this admission for episode of hypoxia to 70s at assisted living facility. CXR showing evidence of pulm edema and b/l pleural effusions. hsTrop 113 (previously 93->75), serum pro-BNP 5631. EKG similar to prior. Last TTE (8/9): EF 50%; global hypokinesis of LV; severe concentric LVH; decreased RV function. EKG   - Likely ADHF, possibly reason for pt's presenting symptoms  - strict I/Os, daily weights, fluid restriction  - f/u repeat hsTrop  - c/w Bumex 1mg IVP daily for now  - f/u repeat TTE  - f/u Cardiology recs

## 2022-08-22 NOTE — H&P ADULT - PROBLEM SELECTOR PLAN 4
Hx of frequent falls, last fall appears to be earlier in the month that prompted recent admission. Ambulates via wheelchair at [facility].  - likely etiology of multiple ecchymoses   - PT eval  - fall precautions Pt with lateral R foot wound; per recent assessment by PCP/Dana on 8/18/22, 2cm x 2cm wound on lateral right foot with "drainage and redness" and was ordered for Augmentin (8/18-8/25).  - possibly sustained from recent fall and poor wound healing  - c/w Augmentin for now to complete 7-day course as ordered by PCP/Dana  - c/w wound care

## 2022-08-22 NOTE — H&P ADULT - PROBLEM SELECTOR PLAN 6
- c/w home metoprolol succinate Hx of Dementia, believed to be vascular dementia per outpt records.   - Baseline AAOx1, appears to be at baseline  - c/w home memantine and donepezil  - fall/aspiration precautions

## 2022-08-22 NOTE — H&P ADULT - PROBLEM SELECTOR PLAN 3
Pt with lateral R foot wound; per recent assessment by PCP/Dana on 8/18/22, 2cm x 2cm wound on lateral right foot with "drainage and redness" and was ordered for Augmentin (8/18-8/25).  - possibly sustained from recent fall and poor wound healing  - c/w Augmentin for now to complete 7-day course as ordered by PCP/Dana  - c/w wound care Hx of chronic afib s/p watchman; rate controlled  - CHADSVASC score 5; not on AC, likely 2/2 frequent falls  - EKG (8/21): Afib, RBBB, HR 78, QTc 522; similar to previous EKG  - c/w home metoprolol

## 2022-08-22 NOTE — H&P ADULT - NSHPPHYSICALEXAM_GEN_ALL_CORE
Vital Signs Last 24 Hrs  T(C): 36.3 (21 Aug 2022 22:35), Max: 37 (21 Aug 2022 20:50)  T(F): 97.4 (21 Aug 2022 22:35), Max: 98.6 (21 Aug 2022 20:50)  HR: 84 (21 Aug 2022 22:35) (82 - 84)  BP: 154/87 (21 Aug 2022 22:35) (115/78 - 154/87)  BP(mean): 110 (21 Aug 2022 22:35) (90 - 110)  RR: 20 (21 Aug 2022 22:35) (18 - 20)  SpO2: 95% (21 Aug 2022 22:35) (95% - 98%)    Parameters above as of 21 Aug 2022 22:35  Patient On (Oxygen Delivery Method): room air        CONSTITUTIONAL: NAD, well-developed, elderly man laying comfortably in stretcher  EYES: PERRL; sclera clear  ENMT: Moist oral mucosa, no pharyngeal injection or exudates; normal dentition  NECK: Supple, no palpable masses  RESPIRATORY: diminished breath sounds 2/2 poor respiratory effort; mild diffuse crackles bilaterally; No wheezes  CARDIOVASCULAR: Irregularly irregular; Normal S1 and S2; No murmurs, rubs, or gallops; 1+ b/l pitting edema in b/l LEs (R>L); Peripheral pulses are 2+ bilaterally  ABDOMEN: Soft, Nontender, Nondistended; Bowel sounds present  MUSCULOSKELETAL: No clubbing or cyanosis of digits; No joint swelling or tenderness to palpation  PSYCH: AAOx1 (oriented to name only); affect appropriate  NEUROLOGY: CN II-XII are intact and symmetric; no gross sensory deficits  SKIN: R lateral foot wound in dressing c/d/i; ecchymosis on RLQ abdominal area as well as extremities; No rashes; No palpable lesions

## 2022-08-22 NOTE — CONSULT NOTE ADULT - SUBJECTIVE AND OBJECTIVE BOX
Patient is a 89y old  Male who presents with a chief complaint of hypoxia, lethargy (22 Aug 2022 00:07)      HPI:  89M hard of hearing w/ hx of dementia (baseline AAOx1), chronic Afib (not on AC, only ASA) s/p watchman, HFpEF s/p AICD, s/p TAVR, HTN, HLD, DM2, BPH, SCC, spinal stenosis, presenting from [facility] senior living after episode of hypoxia to O2 sat 70s and lethargy. Unable to obtain meaningful history from pt given dementia. At time of encounter, pt awake and alert, but only minimally conversive and not appropriately answering most questions. Attempted to call [facility] in [place] for collateral information, but night staff there was unclear of events. Per ED documentation, pt with unknown duration of low O2 sat with increased lethargy per [facility] staff and pt was sating 96-97% on RA during transport by EMS. Of note, pt had recent hospitalization (8/6-8/12) for fall and CHF exacerbation. Reviewed recent outpt PCP/Dana records on [employer]; pt noted to have been on 2L O2NC at [facility] since recent admission and was to start trial off O2 with goal O2 sat 92-94%. Pt also undergoing 7-day course of Augmentin (8/18-8/25) for R foot wound/cellulitis due to "drainage and redness."    In ED: Afebrile, HR 80s, SBP 110s-140s, RR 18-20, sating 95-98% on RA. hsTrop 113, serum pro-BNP 5631. CXR with pulm edema and b/l pleural effusions. CTH and CT A/P unremarkable. Given bumex 1mg IVP x1. Admitted to Medicine for further management. (22 Aug 2022 00:07)    Podiatry consulted for bilateral heel DTIs secondary to pressure on admission and right foot ulcer    PAST MEDICAL & SURGICAL HISTORY:  HTN (hypertension)      HLD (hyperlipidemia)      DM (diabetes mellitus)      BPH (benign prostatic hypertrophy)      Insomnia      RBBB      Chronic atrial fibrillation      Dementia      Unsteady gait      S/P shoulder surgery      S/P knee surgery          MEDICATIONS  (STANDING):  amoxicillin  875 milliGRAM(s)/clavulanate 1 Tablet(s) Oral every 12 hours  ascorbic acid 500 milliGRAM(s) Oral daily  aspirin enteric coated 81 milliGRAM(s) Oral daily  atorvastatin 10 milliGRAM(s) Oral at bedtime  buMETAnide Injectable 1 milliGRAM(s) IV Push daily  cadexomer iodine 0.9% Gel 1 Application(s) Topical daily  cyanocobalamin 1000 MICROGram(s) Oral daily  donepezil 5 milliGRAM(s) Oral at bedtime  enoxaparin Injectable 40 milliGRAM(s) SubCutaneous every 24 hours  finasteride 5 milliGRAM(s) Oral daily  melatonin 3 milliGRAM(s) Oral at bedtime  memantine 10 milliGRAM(s) Oral two times a day  metoprolol succinate ER 25 milliGRAM(s) Oral daily  mirtazapine 7.5 milliGRAM(s) Oral at bedtime  multivitamin 1 Tablet(s) Oral daily  pantoprazole    Tablet 40 milliGRAM(s) Oral before breakfast  polyethylene glycol 3350 17 Gram(s) Oral daily  QUEtiapine 25 milliGRAM(s) Oral at bedtime  thiamine 100 milliGRAM(s) Oral daily  zinc sulfate 220 milliGRAM(s) Oral daily    MEDICATIONS  (PRN):  acetaminophen     Tablet .. 650 milliGRAM(s) Oral every 6 hours PRN Temp greater or equal to 38C (100.4F), Mild Pain (1 - 3)      Allergies    No Known Allergies    Intolerances        VITALS:    Vital Signs Last 24 Hrs  T(C): 36.4 (22 Aug 2022 19:20), Max: 37 (21 Aug 2022 20:50)  T(F): 97.6 (22 Aug 2022 19:20), Max: 98.6 (21 Aug 2022 20:50)  HR: 93 (22 Aug 2022 19:20) (66 - 93)  BP: 96/61 (22 Aug 2022 19:20) (96/61 - 154/87)  BP(mean): 82 (22 Aug 2022 01:50) (82 - 110)  RR: 18 (22 Aug 2022 19:20) (18 - 20)  SpO2: 94% (22 Aug 2022 19:20) (94% - 98%)    Parameters below as of 22 Aug 2022 19:20  Patient On (Oxygen Delivery Method): room air        LABS:                          12.5   6.15  )-----------( 155      ( 21 Aug 2022 21:40 )             39.2       08-22    141  |  101  |  19  ----------------------------<  102<H>  3.5   |  27  |  0.91    Ca    9.0      22 Aug 2022 17:06  Phos  3.9     08-22  Mg     2.0     08-22    TPro  6.9  /  Alb  3.5  /  TBili  0.9  /  DBili  x   /  AST  25  /  ALT  16  /  AlkPhos  95  08-22      CAPILLARY BLOOD GLUCOSE      POCT Blood Glucose.: 101 mg/dL (21 Aug 2022 22:34)          LOWER EXTREMITY PHYSICAL EXAM:    Vasular: DP/PT 0/4, B/L, CFT <3 seconds B/L, Temperature gradient WNL, B/L.   Neuro: unable to assess.  Skin: bilateral heel deep tissue injuries full thickness with no signs of infection secondary to pressure present on admission, right lateral midfoot ulcer to subQ secondary to pressure present on admission with no signs of infection  
· Subjective and Objective:   DATE OF SERVICE: 08-22-22     CHIEF COMPLAINT:Patient is a 89y old  Male who presents with a chief complaint of hypoxia, lethargy (22 Aug 2022 19:42)      HISTORY OF PRESENT ILLNESS:  89M hard of hearing w/ hx of dementia (baseline AAOx1), chronic Afib (not on AC, only ASA) s/p watchman, HFpEF s/p AICD, s/p TAVR, HTN, HLD, DM2, BPH, SCC, spinal stenosis, presenting from [facility] after episode of hypoxia to O2 sat 70s and lethargy. Unable to obtain meaningful history from pt given dementia. At time of encounter, pt awake and alert, but only minimally conversive and not appropriately answering most questions. Attempted to call [facility] in [place] for collateral information, but night staff there was unclear of events. Per ED documentation, pt with unknown duration of low O2 sat with increased lethargy per [facility] staff and pt was sating 96-97% on RA during transport by EMS. Of note, pt had recent hospitalization (8/6-8/12) for fall and CHF exacerbation. Reviewed recent outpt PCP/Dana records on [employer]; pt noted to have been on 2L O2NC at [facility] since recent admission and was to start trial off O2 with goal O2 sat 92-94%. Pt also undergoing 7-day course of Augmentin (8/18-8/25) for R foot wound/cellulitis due to "drainage and redness."    In ED: Afebrile, HR 80s, SBP 110s-140s, RR 18-20, sating 95-98% on RA. hsTrop 113, serum pro-BNP 5631. CXR with pulm edema and b/l pleural effusions. CTH and CT A/P unremarkable. Given bumex 1mg IVP x1. Admitted to Medicine for further management. (22 Aug 2022 00:07)      PAST MEDICAL & SURGICAL HISTORY:  HTN (hypertension)      HLD (hyperlipidemia)      DM (diabetes mellitus)      BPH (benign prostatic hypertrophy)      Insomnia      RBBB      Chronic atrial fibrillation      Dementia      Unsteady gait      S/P shoulder surgery      S/P knee surgery              MEDICATIONS:  aspirin enteric coated 81 milliGRAM(s) Oral daily  buMETAnide Injectable 1 milliGRAM(s) IV Push daily  enoxaparin Injectable 40 milliGRAM(s) SubCutaneous every 24 hours  metoprolol succinate ER 25 milliGRAM(s) Oral daily    amoxicillin  875 milliGRAM(s)/clavulanate 1 Tablet(s) Oral every 12 hours      acetaminophen     Tablet .. 650 milliGRAM(s) Oral every 6 hours PRN  donepezil 5 milliGRAM(s) Oral at bedtime  melatonin 3 milliGRAM(s) Oral at bedtime  memantine 10 milliGRAM(s) Oral two times a day  mirtazapine 7.5 milliGRAM(s) Oral at bedtime  QUEtiapine 25 milliGRAM(s) Oral at bedtime    pantoprazole    Tablet 40 milliGRAM(s) Oral before breakfast  polyethylene glycol 3350 17 Gram(s) Oral daily    atorvastatin 10 milliGRAM(s) Oral at bedtime  finasteride 5 milliGRAM(s) Oral daily    ascorbic acid 500 milliGRAM(s) Oral daily  cadexomer iodine 0.9% Gel 1 Application(s) Topical daily  chlorhexidine 2% Cloths 1 Application(s) Topical daily  cyanocobalamin 1000 MICROGram(s) Oral daily  multivitamin 1 Tablet(s) Oral daily  thiamine 100 milliGRAM(s) Oral daily  zinc sulfate 220 milliGRAM(s) Oral daily      FAMILY HISTORY:  Family history of diabetes mellitus        Non-contributory    SOCIAL HISTORY:    [ ] not a smoker    Allergies    No Known Allergies    Intolerances    	    REVIEW OF SYSTEMS: Non-contributory -- very poor historian 2/ dementia  CONSTITUTIONAL: No fever  EYES: No eye pain, visual disturbances, or discharge  ENMT:  No difficulty hearing, tinnitus  NECK: No pain or stiffness  RESPIRATORY: No cough, wheezing,  CARDIOVASCULAR: No chest pain, palpitations, passing out, dizziness, or leg swelling  GASTROINTESTINAL:  No nausea, vomiting, diarrhea or constipation. No melena.  GENITOURINARY: No dysuria, hematuria  NEUROLOGICAL: No stroke like symptoms  SKIN: No burning or lesions   ENDOCRINE: No heat or cold intolerance  MUSCULOSKELETAL: No joint pain or swelling  PSYCHIATRIC: No  anxiety, mood swings  HEME/LYMPH: No bleeding gums  ALLERGY AND IMMUNOLOGIC: No hives or eczema	    All other ROS negative    PHYSICAL EXAM:  T(C): 36.6 (08-23-22 @ 04:09), Max: 36.9 (08-22-22 @ 20:43)  HR: 95 (08-23-22 @ 04:09) (66 - 95)  BP: 100/66 (08-23-22 @ 04:09) (96/61 - 111/70)  RR: 18 (08-23-22 @ 04:09) (18 - 20)  SpO2: 94% (08-23-22 @ 04:09) (94% - 97%)  Wt(kg): --  I&O's Summary    22 Aug 2022 07:01  -  23 Aug 2022 07:00  --------------------------------------------------------  IN: 240 mL / OUT: 1250 mL / NET: -1010 mL        Appearance: Normal	  HEENT:   Normal oral mucosa, EOMI	  Cardiovascular:  S1 S2, No JVD,    Respiratory: Lungs clear to auscultation	  Psychiatry: Alert  Gastrointestinal:  Soft, Non-tender, + BS	  Skin: No rashes   Neurologic: Non-focal  Extremities:  No edema  Vascular: Peripheral pulses palpable    	    	  	  CARDIAC MARKERS:  Labs personally reviewed by me                                  11.3   4.16  )-----------( 148      ( 23 Aug 2022 06:07 )             35.2     08-23    139  |  104  |  21  ----------------------------<  105<H>  3.4<L>   |  25  |  0.79    Ca    8.6      23 Aug 2022 06:07  Phos  3.6     08-23  Mg     1.9     08-23    TPro  6.3  /  Alb  3.4  /  TBili  0.8  /  DBili  x   /  AST  23  /  ALT  12  /  AlkPhos  88  08-23          EKG: Personally reviewed by me - AF with RBBB   Radiology: Personally reviewed by me -     Xray Chest 1 View- PORTABLE-Urgent (Xray Chest 1 View- PORTABLE-Urgent .) (08.21.22 @ 20:11) >  Bilateral pleural effusions. Increased interstitial opacities. No   pneumothorax. Heart size not well evaluated on this projection. Status   post TAVR. Right chest wall cardiac device. No acute osseous findings.    IMPRESSION:  Pulmonary edema and bilateral pleural effusions.    Transthoracic Echocardiogram (08.09.22 @ 11:44) >  EF 50%  1. Mitral annular calcification and calcified mitral  leaflets with decreased diastolic opening. Mild-moderate  mitral regurgitation. Peak mitral valve gradient equals 8  mm Hg, mean transmitral valve gradient equals 3 mmHg,  consistent with mild mitral stenosis.  2. Transcatheter aortic valve replacement. Peak transaortic  valve gradient equals 7 mm Hg, mean transaortic valve  gradient equals 4 mm Hg, which is probably normal in the  presence of a transcatheter aortic valve replacement.  3. Severe concentric left ventricular hypertrophy.  4. Global hypokinesis of the left ventricle.  5. Strain imaging was performed with a HR of 67 bpm and a  BP of 122/77 mm Hg. Global L. Strain= -7.6%.  6. Severe reversible diastolic dysfunction (Stage III).  7. Normal right ventricular size with decrease right  ventricular function.  8. Estimated pulmonary artery systolic pressure equals 39  mm Hg, assuming right atrial pressure equals 8 mm Hg,  consistent with borderline pulmonary pressures.  *** Compared with echocardiogram of 5/3/2022, no  significant changes noted.    Cardiac Cath Lab - Adult (06.16.14 @ 16:21) >  VENTRICLES: Global left ventricular function was normal. EF estimated was  70 %.  CORONARY VESSELS: The coronary circulation is right dominant.  LM:   --  LM: Normal.  LAD:   --  Proximal LAD: There was a 40 % stenosis.  CX:   --  Circumflex: Normal.  RCA:   --  Proximal RCA: There was a 20 % stenosis.  --  RPDA: There was a 90 % stenosis.          Assessment /Plan:     89M hard of hearing w/ hx of dementia (baseline AAOx1), chronic Afib (not on AC, only ASA) s/p watchman, HFpEF s/p AICD, s/p TAVR, HTN, HLD, DM2, BPH, SCC, spinal stenosis, presenting from [facility] senior living after episode of hypoxia to O2 sat 70s and lethargy. Unable to obtain meaningful history from pt given dementia. At time of encounter, pt awake and alert, but only minimally conversive and not appropriately answering most questions. Attempted to call [facility] in [place] for collateral information, but night staff there was unclear of events. Per ED documentation, pt with unknown duration of low O2 sat with increased lethargy per [facility] staff and pt was sating 96-97% on RA during transport by EMS. Of note, pt had recent hospitalization (8/6-8/12) for fall and CHF exacerbation. Reviewed recent outpt PCP/Dana records on [employer]; pt noted to have been on 2L O2NC at [facility] since recent admission and was to start trial off O2 with goal O2 sat 92-94%. Pt also undergoing 7-day course of Augmentin (8/18-8/25) for R foot wound/cellulitis due to "drainage and redness."      Problem/Plan -1  Problem: Acute diastolic HF  Plan:  - Follows with Dr. Trevor Schneider for OP Cardiology  - CXR shows pulmonary edema wit B/L pleural effusions  - Echo from 8/2022 shows mild mitral stenosis, Severe concentric left ventricular hypertrophy, Global hypokinesis of the left ventricle,  Severe reversible diastolic dysfunction (Stage III),  Normal right ventricular size with decrease right  ventricular function, borderline pulmonary pressures.  - Patient with preserved EF 50%, ICD in place   - BNP 5631 (down from previous admission)  - Takes Bumex 0.5mg PO three times per week  - Responding well to IV diuresis, will monitor and transition to PO Tuesday AM likely     Problem/Plan -2  Problem: AF  Plan:  - s/p Watchman, not on AC  - c/w Metoprolol    Problem/Plan -3  Problem: HTN  Plan:  - c/w Toprol XL 25mg PO daily    Problem/Plan -4  Problem: CAD  Plan:  - Denies chest pain although limited historian  - Troponin elevated likely in the setting of ADHF exacerbation  - EKG with no ischemic changes noted  - c/w ASA    Differential diagnosis and plan of care discussed with patient after the evaluation. Counseling on diet, nutritional counseling, weight management, exercise and medication compliance was done.   Advanced care planning/advanced directives discussed with patient/family. DNR status including forceful chest compressions to attempt to restart the heart, ventilator support/artificial breathing, electric shock, artificial nutrition, health care proxy, Molst form all discussed with pt. Pt wishes to consider. More than fifteen minutes spent on discussing advanced directives.       Clarence Vazquez DO [facility]  Cardiovascular Medicine  [address] [place] [address], [address]  Office [phone]  Cell [phone]  
DATE OF SERVICE: 08-23-22 @ 07:21    CHIEF COMPLAINT:Patient is a 89y old  Male who presents with a chief complaint of hypoxia, lethargy (22 Aug 2022 19:42)      HISTORY OF PRESENT ILLNESS:  89M hard of hearing w/ hx of dementia (baseline AAOx1), chronic Afib (not on AC, only ASA) s/p watchman, HFpEF s/p AICD, s/p TAVR, HTN, HLD, DM2, BPH, SCC, spinal stenosis, presenting from [facility] senior living after episode of hypoxia to O2 sat 70s and lethargy. Unable to obtain meaningful history from pt given dementia. At time of encounter, pt awake and alert, but only minimally conversive and not appropriately answering most questions. Attempted to call [facility] in [place] for collateral information, but night staff there was unclear of events. Per ED documentation, pt with unknown duration of low O2 sat with increased lethargy per [facility] staff and pt was sating 96-97% on RA during transport by EMS. Of note, pt had recent hospitalization (8/6-8/12) for fall and CHF exacerbation. Reviewed recent outpt PCP/Dana records on [employer]; pt noted to have been on 2L O2NC at [facility] since recent admission and was to start trial off O2 with goal O2 sat 92-94%. Pt also undergoing 7-day course of Augmentin (8/18-8/25) for R foot wound/cellulitis due to "drainage and redness."    In ED: Afebrile, HR 80s, SBP 110s-140s, RR 18-20, sating 95-98% on RA. hsTrop 113, serum pro-BNP 5631. CXR with pulm edema and b/l pleural effusions. CTH and CT A/P unremarkable. Given bumex 1mg IVP x1. Admitted to Medicine for further management. (22 Aug 2022 00:07)      PAST MEDICAL & SURGICAL HISTORY:  HTN (hypertension)      HLD (hyperlipidemia)      DM (diabetes mellitus)      BPH (benign prostatic hypertrophy)      Insomnia      RBBB      Chronic atrial fibrillation      Dementia      Unsteady gait      S/P shoulder surgery      S/P knee surgery              MEDICATIONS:  aspirin enteric coated 81 milliGRAM(s) Oral daily  buMETAnide Injectable 1 milliGRAM(s) IV Push daily  enoxaparin Injectable 40 milliGRAM(s) SubCutaneous every 24 hours  metoprolol succinate ER 25 milliGRAM(s) Oral daily    amoxicillin  875 milliGRAM(s)/clavulanate 1 Tablet(s) Oral every 12 hours      acetaminophen     Tablet .. 650 milliGRAM(s) Oral every 6 hours PRN  donepezil 5 milliGRAM(s) Oral at bedtime  melatonin 3 milliGRAM(s) Oral at bedtime  memantine 10 milliGRAM(s) Oral two times a day  mirtazapine 7.5 milliGRAM(s) Oral at bedtime  QUEtiapine 25 milliGRAM(s) Oral at bedtime    pantoprazole    Tablet 40 milliGRAM(s) Oral before breakfast  polyethylene glycol 3350 17 Gram(s) Oral daily    atorvastatin 10 milliGRAM(s) Oral at bedtime  finasteride 5 milliGRAM(s) Oral daily    ascorbic acid 500 milliGRAM(s) Oral daily  cadexomer iodine 0.9% Gel 1 Application(s) Topical daily  chlorhexidine 2% Cloths 1 Application(s) Topical daily  cyanocobalamin 1000 MICROGram(s) Oral daily  multivitamin 1 Tablet(s) Oral daily  thiamine 100 milliGRAM(s) Oral daily  zinc sulfate 220 milliGRAM(s) Oral daily      FAMILY HISTORY:  Family history of diabetes mellitus        Non-contributory    SOCIAL HISTORY:    [ ] Tobacco  [ ] Drugs  [ ] Alcohol    Allergies    No Known Allergies    Intolerances    	    REVIEW OF SYSTEMS: Non-contributory  CONSTITUTIONAL: No fever  EYES: No eye pain, visual disturbances, or discharge  ENMT:  No difficulty hearing, tinnitus  NECK: No pain or stiffness  RESPIRATORY: No cough, wheezing,  CARDIOVASCULAR: No chest pain, palpitations, passing out, dizziness, or leg swelling  GASTROINTESTINAL:  No nausea, vomiting, diarrhea or constipation. No melena.  GENITOURINARY: No dysuria, hematuria  NEUROLOGICAL: No stroke like symptoms  SKIN: No burning or lesions   ENDOCRINE: No heat or cold intolerance  MUSCULOSKELETAL: No joint pain or swelling  PSYCHIATRIC: No  anxiety, mood swings  HEME/LYMPH: No bleeding gums  ALLERGY AND IMMUNOLOGIC: No hives or eczema	    All other ROS negative    PHYSICAL EXAM:  T(C): 36.6 (08-23-22 @ 04:09), Max: 36.9 (08-22-22 @ 20:43)  HR: 95 (08-23-22 @ 04:09) (66 - 95)  BP: 100/66 (08-23-22 @ 04:09) (96/61 - 111/70)  RR: 18 (08-23-22 @ 04:09) (18 - 20)  SpO2: 94% (08-23-22 @ 04:09) (94% - 97%)  Wt(kg): --  I&O's Summary    22 Aug 2022 07:01  -  23 Aug 2022 07:00  --------------------------------------------------------  IN: 240 mL / OUT: 1250 mL / NET: -1010 mL        Appearance: Normal	  HEENT:   Normal oral mucosa, EOMI	  Cardiovascular:  S1 S2, No JVD,    Respiratory: Lungs clear to auscultation	  Psychiatry: Alert  Gastrointestinal:  Soft, Non-tender, + BS	  Skin: No rashes   Neurologic: Non-focal  Extremities:  No edema  Vascular: Peripheral pulses palpable    	    	  	  CARDIAC MARKERS:  Labs personally reviewed by me                                  11.3   4.16  )-----------( 148      ( 23 Aug 2022 06:07 )             35.2     08-23    139  |  104  |  21  ----------------------------<  105<H>  3.4<L>   |  25  |  0.79    Ca    8.6      23 Aug 2022 06:07  Phos  3.6     08-23  Mg     1.9     08-23    TPro  6.3  /  Alb  3.4  /  TBili  0.8  /  DBili  x   /  AST  23  /  ALT  12  /  AlkPhos  88  08-23          EKG: Personally reviewed by me - AF with RBBB   Radiology: Personally reviewed by me -     Xray Chest 1 View- PORTABLE-Urgent (Xray Chest 1 View- PORTABLE-Urgent .) (08.21.22 @ 20:11) >  Bilateral pleural effusions. Increased interstitial opacities. No   pneumothorax. Heart size not well evaluated on this projection. Status   post TAVR. Right chest wall cardiac device. No acute osseous findings.    IMPRESSION:  Pulmonary edema and bilateral pleural effusions.    Transthoracic Echocardiogram (08.09.22 @ 11:44) >  EF 50%  1. Mitral annular calcification and calcified mitral  leaflets with decreased diastolic opening. Mild-moderate  mitral regurgitation. Peak mitral valve gradient equals 8  mm Hg, mean transmitral valve gradient equals 3 mmHg,  consistent with mild mitral stenosis.  2. Transcatheter aortic valve replacement. Peak transaortic  valve gradient equals 7 mm Hg, mean transaortic valve  gradient equals 4 mm Hg, which is probably normal in the  presence of a transcatheter aortic valve replacement.  3. Severe concentric left ventricular hypertrophy.  4. Global hypokinesis of the left ventricle.  5. Strain imaging was performed with a HR of 67 bpm and a  BP of 122/77 mm Hg. Global L. Strain= -7.6%.  6. Severe reversible diastolic dysfunction (Stage III).  7. Normal right ventricular size with decrease right  ventricular function.  8. Estimated pulmonary artery systolic pressure equals 39  mm Hg, assuming right atrial pressure equals 8 mm Hg,  consistent with borderline pulmonary pressures.  *** Compared with echocardiogram of 5/3/2022, no  significant changes noted.    Cardiac Cath Lab - Adult (06.16.14 @ 16:21) >  VENTRICLES: Global left ventricular function was normal. EF estimated was  70 %.  CORONARY VESSELS: The coronary circulation is right dominant.  LM:   --  LM: Normal.  LAD:   --  Proximal LAD: There was a 40 % stenosis.  CX:   --  Circumflex: Normal.  RCA:   --  Proximal RCA: There was a 20 % stenosis.  --  RPDA: There was a 90 % stenosis.          Assessment /Plan:     89M hard of hearing w/ hx of dementia (baseline AAOx1), chronic Afib (not on AC, only ASA) s/p watchman, HFpEF s/p AICD, s/p TAVR, HTN, HLD, DM2, BPH, SCC, spinal stenosis, presenting from [facility] senior living after episode of hypoxia to O2 sat 70s and lethargy. Unable to obtain meaningful history from pt given dementia. At time of encounter, pt awake and alert, but only minimally conversive and not appropriately answering most questions. Attempted to call [facility] in [place] for collateral information, but night staff there was unclear of events. Per ED documentation, pt with unknown duration of low [facility] sat [facility] and pt was sating 96-97% on RA during transport by EMS. Of note, pt had recent hospitalization (8/6-8/12) for fall and CHF exacerbation. Reviewed recent outpt PCP/Dana records on [employer]; pt noted to have been on 2L O2NC at [facility] since recent admission and was to start trial off O2 with goal O2 sat 92-94%. Pt also undergoing 7-day course of Augmentin (8/18-8/25) for R foot wound/cellulitis due to "drainage and redness."      Problem/Plan -1  Problem: ADHF  Plan:  - Follows with Dr. Trevor Schneider for OP Cardiology  - CXR shows pulmonary edema wit B/L pleural effusions  - Echo from 8/2022 shows mild mitral stenosis, Severe concentric left ventricular hypertrophy, Global hypokinesis of the left ventricle,  Severe reversible diastolic dysfunction (Stage III),  Normal right ventricular size with decrease right  ventricular function, borderline pulmonary pressures.  - Patient with preserved EF 50%, ICD in place   - BNP 5631 (down from previous admission)  - Takes Bumex 0.5mg PO three times per week  - Responding well to IV diuresis, will monitor and transition to PO soon  - I&Os, 1.2 L Fluid restriction    Problem/Plan -2  Problem: AF  Plan:  - s/p Watchman, not on AC  - c/w Metoprolol    Problem/Plan -3  Problem: HTN  Plan:  - c/w Toprol XL 25mg PO daily    Problem/Plan -4  Problem: CAD  Plan:  - Denies chest pain although limited historian  - Troponin elevated likely in the setting of ADHF exacerbation  - EKG with no ischemic changes noted  - c/w ASA    Differential diagnosis and plan of care discussed with patient after the evaluation. Counseling on diet, nutritional counseling, weight management, exercise and medication compliance was done.   Advanced care planning/advanced directives discussed with patient/family. DNR status including forceful chest compressions to attempt to restart the heart, ventilator support/artificial breathing, electric shock, artificial nutrition, health care proxy, Molst form all discussed with pt. Pt wishes to consider. More than fifteen minutes spent on discussing advanced directives.       Clarence Vazquez DO [facility]  Cardiovascular Medicine  [address] [place] [address], [address]  Office [phone]  Cell [phone]

## 2022-08-22 NOTE — H&P ADULT - NSHPLABSRESULTS_GEN_ALL_CORE
LABS:                        12.5   6.15  )-----------( 155      ( 21 Aug 2022 21:40 )             39.2     08-21    142  |  105  |  20  ----------------------------<  117<H>  4.1   |  23  |  0.85    Ca    9.2      21 Aug 2022 21:50  Phos  3.4     08-21  Mg     2.2     08-21    TPro  7.5  /  Alb  4.1  /  TBili  0.7  /  DBili  x   /  AST  30  /  ALT  17  /  AlkPhos  107  08-21              SARS-CoV-2: NotDetec (21 Aug 2022 21:39)  SARS-CoV-2: NotDetec (06 Aug 2022 19:21)  COVID-19 PCR: NotDetec (05 May 2022 19:59)    EKG (8/21) personally reviewed: Afib, RBBB, HR 78, QTc 522; similar to previous EKG    CXR (8/21) personally reviewed: evidence of pulm edema with b/l pleural effusions appreciated

## 2022-08-22 NOTE — PHYSICAL THERAPY INITIAL EVALUATION ADULT - ADDITIONAL COMMENTS
Information provided by son at bedside. Pt lives at [facility]. Since recent hospitalization, has not been ambulatory, is mostly in a wheelchair at the [facility] facility since hospitalization earlier this month. Son has observed staff transferring pt onto toilet in the bathroom, 2 person assist. As per last PT note during last hospitalization, pt required max assist for bed mobility supine<->sit. Pt was d/c'd to [facility] with PT services.

## 2022-08-22 NOTE — H&P ADULT - PROBLEM SELECTOR PLAN 10
- DVT ppx: Lovenox  - Diet: DASH/CC, 1L fluid restriction for now  - Dispo: pending medical optimization  - GOC: MOLST and HCP documents from [facility] in physical chart reviewed; pt is DNR/DNI Hx of DM2, not on meds at home. HbA1c (8/7): 6.2%  - monitor BG on BMPs  - CC diet

## 2022-08-22 NOTE — H&P ADULT - NSHPSOCIALHISTORY_GEN_ALL_CORE
Per outpatient chart review, never smoker or illicit/recreational drug user; has occasionally consumed alcohol in the past. Lives at [facility]. Ambulated via wheelchair and dependent on all ADLs/iADLs.

## 2022-08-22 NOTE — ED ADULT NURSE REASSESSMENT NOTE - NS ED NURSE REASSESS COMMENT FT1
Pt found to have urinated in diaper and had small soft formed bowel movement. Pt's penis and perineal cleansed with cleansing solution. Clean diaper and abram applied underneath perineum. Clean linen provided to patient. Condom catheter applied to patient's penis and attached to urometer. pt boosted in stretcher. Pt eyes found to be "crusty," and cleansed with warm water soaked gauze and dried with dry gauze. Pt placed in position of comfort. Pt educated on call bell system and provided call bell. Bed in lowest position, wheels locked, appropriate side rails raised. Pt denies needs at this time.

## 2022-08-22 NOTE — PHYSICAL THERAPY INITIAL EVALUATION ADULT - WEIGHT-BEARING RESTRICTIONS: SIT/STAND, REHAB EVAL
[FreeTextEntry1] : 58 
full weight-bearing

## 2022-08-22 NOTE — CHART NOTE - NSCHARTNOTEFT_GEN_A_CORE
Patient seen and examined. Plan as discussed w/ ACP Juarez Dickens: continue O2 supplementation to maintain SpO2 > 95%, weaning as tolerated; etiology of acute respiratory failure likely related to ADHF given recent TTE findings and clinical improvement w/ IV diuretics since presentation consistent w/ recent course for similar complaints. Discussed w/ cardiology, Dr. Vazquez, to whom the patient is familiar -- will appreciate their further recommendations. Continue to monitor fluid status closely (strict Is/Os, daily standing weight), renal function, and electrolytes; trend troponins.     Christy Byers M.D.  Division of Hospital Medicine  Available via TEAMS Patient seen and examined. Plan as discussed w/ ACP Juarez Dickens: continue O2 supplementation to maintain SpO2 > 95%, weaning as tolerated; etiology of acute respiratory failure likely related to ADHF given recent TTE findings and clinical improvement w/ IV diuretics since presentation consistent w/ recent course for similar complaints. Discussed w/ cardiology, Dr. Vazquez, to whom the patient is familiar -- will appreciate their further recommendations. Continue to monitor fluid status closely (strict Is/Os, daily standing weight), renal function, and electrolytes; trend troponins. Code status = DNR/DNI.     Christy Byers M.D.  Division of Hospital Medicine  Available via TEAMS

## 2022-08-22 NOTE — H&P ADULT - PROBLEM SELECTOR PLAN 11
- DVT ppx: Lovenox  - Diet: DASH/CC, 1L fluid restriction for now  - Dispo: pending medical optimization  - GOC: MOLST and HCP documents from [facility] in physical chart reviewed; pt is DNR/DNI

## 2022-08-22 NOTE — PHYSICAL THERAPY INITIAL EVALUATION ADULT - GENERAL OBSERVATIONS, REHAB EVAL
Received pt on stretcher in ED. /72, HR 69, O2 sats 97% on room air. +tele, SORAYA collazo foot dressings, one  from foot, reapplied, nurse Shola holt

## 2022-08-22 NOTE — PHYSICAL THERAPY INITIAL EVALUATION ADULT - ASR WT BEARING STATUS EVAL
MRI shows very similar appearance to previous MRI.  Please schedule f/u with neurology and/or neurosurgery
no weight-bearing restrictions

## 2022-08-22 NOTE — H&P ADULT - PROBLEM SELECTOR PLAN 5
Hx of Dementia, believed to be vascular dementia per outpt records.   - Baseline AAOx1, appears to be at baseline  - c/w home memantine and donepezil  - fall/aspiration precautions Hx of frequent falls, last fall appears to be earlier in the month that prompted recent admission. Ambulates via wheelchair at [facility].  - likely etiology of multiple ecchymoses   - PT eval  - fall precautions

## 2022-08-22 NOTE — H&P ADULT - PROBLEM SELECTOR PLAN 2
Hx of chronic afib s/p watchman; rate controlled  - CHADSVASC score 5; not on AC, likely 2/2 frequent falls  - EKG (8/21): Afib, RBBB, HR 78, QTc 522; similar to previous EKG  - c/w home metoprolol Pt presented after episode of hypoxia to 70s on RA at assisted living facility. However, has been sating well on RA during transport by EMS and in ED.  - Unclear etiology, possibly in setting of ADHF vs false reading  - was on 2L O2NC after recent admission earlier in Aug 2022 for CHF exacerbation  - continue to monitor respiratory status, currently sating well on RA  - plan for ADHF as above

## 2022-08-22 NOTE — CONSULT NOTE ADULT - ASSESSMENT
89 y/o male pt with bilateral heel DTIs, right foot ulcer  - pt seen and evaluated  - heel DTIs stable with no signs of infection  - right lateral midfoot wound to subQ with no signs of infection  - rec daily dressing changes with betadine to heels and iodosorb to right midfoot wound and alleyvn foam  - wound care orders placed  - rec z flow boots in bed at all times   - follow up as outpatient at wound care center at [address] upon discharge

## 2022-08-22 NOTE — H&P ADULT - ASSESSMENT
89M hard of hearing w/ hx of dementia (baseline AAOx1), chronic Afib (not on AC, only ASA) s/p watchman, HFpEF s/p AICD, s/p TAVR, HTN, HLD, DM2, BPH, SCC, spinal stenosis, presenting from [facility] after episode of hypoxia to O2 sat 70s and lethargy. Imaging with pulm edema and b/l pleural effusions. Likely 2/2 ADHF.

## 2022-08-22 NOTE — H&P ADULT - HISTORY OF PRESENT ILLNESS
89M w/ hx of dementia, chronic Afib (not on AC), HFpEF, TAVR, HTN, HLD, DM2, BPH, SCC, spinal stenosis, presenting from [facility] after being found to be hypoxic to O2 sat 70s.  89M w/ hx of dementia (baseline AAOx1), chronic Afib (not on AC), HFpEF, TAVR, HTN, HLD, DM2, BPH, SCC, spinal stenosis, presenting from [facility] senior living after being found to be hypoxic to O2 sat 70s with lethargy. Per EMS, was sating 96-97% on RA during transport. Of note, pt had recent hospitalization (8/6-8/12) for fall and CHF exacerbation.       In ED: Afebrile, HR 80s, SBP 110s-140s, RR 18-20, sating 95-98% on RA. hsTrop 113, serum pro-BNP 5631. CXR with pulm edema and b/l pleural effusions. CTH and CT A/P unremarkable. Given bumex 1mg IVP x1. Admitted to Medicine for further management. 89M hard of hearing w/ hx of dementia (baseline AAOx1), chronic Afib (not on AC, only ASA), HFpEF, TAVR, HTN, HLD, DM2, BPH, SCC, spinal stenosis, presenting from [facility] senior living after episode of hypoxia to O2 sat 70s and lethargy. Unable to obtain meaningful history from pt given dementia. Attempted to call [facility] in [place] for collateral information, but night staff there was unclear of events. Per ED documentation, pt with unknown duration of low O2 sat with increased [facility] and pt was sating 96-97% on RA during transport by EMS. Of note, pt had recent hospitalization (8/6-8/12) for fall and CHF exacerbation. At time of encounter, pt awake and alert, but only minimally conversive and not answering most questions.    In ED: Afebrile, HR 80s, SBP 110s-140s, RR 18-20, sating 95-98% on RA. hsTrop 113, serum pro-BNP 5631. CXR with pulm edema and b/l pleural effusions. CTH and CT A/P unremarkable. Given bumex 1mg IVP x1. Admitted to Medicine for further management. 89M hard of hearing w/ hx of dementia (baseline AAOx1), chronic Afib (not on AC, only ASA), HFpEF, TAVR, HTN, HLD, DM2, BPH, SCC, spinal stenosis, presenting from [facility] senior living after episode of hypoxia to O2 sat 70s and lethargy. Unable to obtain meaningful history from pt given dementia. Attempted to call [facility] in [place] for collateral information, but night staff there was unclear of events. Per ED documentation, pt with unknown duration of low O2 sat with increased lethargy per [facility] staff and pt was sating 96-97% on RA during transport by EMS. Of note, pt had recent hospitalization (8/6-8/12) for fall and CHF exacerbation. Reviewed recent outpt PCP/[facility] records on [employer]; pt noted to have been on 2L O2NC at [facility] since recent admission and was to start trial off O2 with goal O2 sat 92-94%. Pt also undergoing 7-day course of Augmentin (8/18-8/25) for R foot wound/cellulitis due to "drainage and redness." At time of encounter, pt awake and alert, but only minimally conversive and not answering most questions.     In ED: Afebrile, HR 80s, SBP 110s-140s, RR 18-20, sating 95-98% on RA. hsTrop 113, serum pro-BNP 5631. CXR with pulm edema and b/l pleural effusions. CTH and CT A/P unremarkable. Given bumex 1mg IVP x1. Admitted to Medicine for further management. 89M hard of hearing w/ hx of dementia (baseline AAOx1), chronic Afib (not on AC, only ASA) s/p watchman, HFpEF s/p AICD, s/p TAVR, HTN, HLD, DM2, BPH, SCC, spinal stenosis, presenting from [facility] senior living after episode of hypoxia to O2 sat 70s and lethargy. Unable to obtain meaningful history from pt given dementia. At time of encounter, pt awake and alert, but only minimally conversive and not appropriately answering most questions. Attempted to call [facility] in [place] for collateral information, but night staff there was unclear of events. Per ED documentation, pt with unknown duration of low O2 sat with increased lethargy per [facility] staff and pt was sating 96-97% on RA during transport by EMS. Of note, pt had recent hospitalization (8/6-8/12) for fall and CHF exacerbation. Reviewed recent outpt PCP/[facility] records on [employer]; pt noted to have been on 2L O2NC at [facility] since recent admission and was to start trial off O2 with goal O2 sat 92-94%. Pt also undergoing 7-day course of Augmentin (8/18-8/25) for R foot wound/cellulitis due to "drainage and redness."    In ED: Afebrile, HR 80s, SBP 110s-140s, RR 18-20, sating 95-98% on RA. hsTrop 113, serum pro-BNP 5631. CXR with pulm edema and b/l pleural effusions. CTH and CT A/P unremarkable. Given bumex 1mg IVP x1. Admitted to Medicine for further management.

## 2022-08-22 NOTE — H&P ADULT - PROBLEM SELECTOR PLAN 9
Hx of DM2, not on meds at home. HbA1c (8/7): 6.2%  - monitor BG on BMPs  - CC diet - c/w home finasteride

## 2022-08-22 NOTE — PHYSICAL THERAPY INITIAL EVALUATION ADULT - PERTINENT HX OF CURRENT PROBLEM, REHAB EVAL
89 y/oM PMH [place] with h/o of dementia (baseline AAOx1), chronic Afib (not on AC, only ASA) s/p watchman, HFpEF s/p AICD, s/p TAVR, HTN, HLD, DM2, BPH, SCC, spinal stenosis, presenting from [facility] senior living after episode of hypoxia to O2 sat 70s and lethargy. As per H&P, Unable to obtain meaningful history from pt given dementia. Of note, pt had recent hospitalization (8/6-8/12) for fall and CHF exacerbation. Reviewed recent outpt PCP/[facility] records on [employer]; pt noted to have been on 2L O2NC at [facility] since recent admission and was to start trial off O2 with goal O2 sat 92-94%. Pt also undergoing 7-day course of Augmentin (8/18-8/25) for R foot wound/cellulitis due to "drainage and redness." In ED: Afebrile, HR 80s, SBP 110s-140s, RR 18-20, sating 95-98% on RA. hsTrop 113, serum pro-BNP 5631. CXR with pulm edema and b/l pleural effusions. CTH and CT A/P unremarkable. Given bumex 1mg IVP x1. Symptoms likely 2/2 ADHF.

## 2022-08-23 NOTE — DIETITIAN INITIAL EVALUATION ADULT - PERTINENT LABORATORY DATA
Writer spoke with patient. Patient is scheduled to come in on 05/17/2022 for OV and stress echo @12:30pm for further cardiac evaluation and treatment . Patient was given date, time, and location of appointments. Telephone number was also provided to our clinic.Test instructions was given. Patient verbalized understanding.  
08-23    139  |  104  |  21  ----------------------------<  105<H>  3.4<L>   |  25  |  0.79    Ca    8.6      23 Aug 2022 06:07  Phos  3.6     08-23  Mg     1.9     08-23    TPro  6.3  /  Alb  3.4  /  TBili  0.8  /  DBili  x   /  AST  23  /  ALT  12  /  AlkPhos  88  08-23 08-23 @ 06:07: Na 139, BUN 21, Cr 0.79, <H>, K+ 3.4<L>, Phos 3.6, Mg 1.9, Alk Phos 88, ALT/SGPT 12, AST/SGOT 23, HbA1c --  08-22 @ 17:06: Na 141, BUN 19, Cr 0.91, <H>, K+ 3.5, Phos 3.9, Mg 2.0, Alk Phos --, ALT/SGPT --, AST/SGOT --, HbA1c --    A1C with Estimated Average Glucose Result: 6.2 % (08-07-22 @ 12:22)  A1C with Estimated Average Glucose Result: 6.4 % (05-04-22 @ 11:27)  A1C with Estimated Average Glucose Result: 6.6 % (09-27-21 @ 16:39)

## 2022-08-23 NOTE — DIETITIAN INITIAL EVALUATION ADULT - OTHER INFO
Home Medications:  aspirin 81 mg oral delayed release tablet: 1 tab(s) orally once a day (22 Aug 2022 08:12)  bumetanide 0.5 mg oral tablet: 1 tab(s) orally Monday, Wednesday, Friday and Saturday (22 Aug 2022 08:12)  cyanocobalamin 1000 mcg oral tablet: 1 tab(s) orally once a day (22 Aug 2022 08:12)  Daily Pedro oral tablet: 1 tab(s) orally once a day (22 Aug 2022 08:12)  donepezil 5 mg oral tablet: 1 tab(s) orally once a day (at bedtime) (22 Aug 2022 08:12)  finasteride 5 mg oral tablet: 1 tab(s) orally once a day (22 Aug 2022 08:12)  melatonin 3 mg oral tablet: 1 tab(s) orally once a day (at bedtime) (22 Aug 2022 08:12)  memantine 10 mg oral tablet: 1 tab(s) orally 2 times a day (22 Aug 2022 08:12)  metoprolol succinate 25 mg oral tablet, extended release: 1 tab(s) orally once a day (22 Aug 2022 08:12)  MiraLax oral powder for reconstitution: 1 packet(s) orally once a day (22 Aug 2022 08:12)  mirtazapine 7.5 mg oral tablet: 1 tab(s) orally once a day (at bedtime) (22 Aug 2022 08:12)  Pravachol 20 mg oral tablet: 1 tab(s) orally once a day (at bedtime) (22 Aug 2022 08:12)  thiamine 100 mg oral tablet: 1 tab(s) orally once a day (22 Aug 2022 08:12)  Tylenol 325 mg oral tablet: 2 tab(s) orally every 6 hours, As Needed - (22 Aug 2022 08:12)  zinc sulfate 220 mg oral capsule: 1 cap(s) orally once a day (22 Aug 2022 08:12)

## 2022-08-23 NOTE — DIETITIAN INITIAL EVALUATION ADULT - ADD RECOMMEND
- Will continue to monitor PO intake, weight, labs, skin, GI status, diet.   - If not medically contraindicated, recommend Multivitamin daily ; noted  K+ 3.4<L> defer replenishing to team  - Malnutrition sticker placed, RD to follow-up as per protocol   - RD remains available to review diet education and adjust diet recommendations as needed.  - Will continue to monitor PO intake, weight, labs, skin, GI status, diet.   - noted  K+ 3.4<L> defer replenishing to team  - continue Multivitamin and vitamin c and zinc supplements for promoting wound healing   - Malnutrition sticker placed, RD to follow-up as per protocol   - RD remains available to review diet education and adjust diet recommendations as needed.

## 2022-08-23 NOTE — DIETITIAN INITIAL EVALUATION ADULT - CONTINUE CURRENT NUTRITION CARE PLAN
- recommend to liberalize diet to low Na diet, as able/feasible. defer fluid restriction per team.   recommend adding easy to chew diet until pt evaluated by speech per pt's daughter report.   - Consider formal swallowing evaluation. Defer diet/fluid consistencies to medical team/SLP recommendations.

## 2022-08-23 NOTE — DIETITIAN INITIAL EVALUATION ADULT - SIGNS/SYMPTOMS
<75% EER >/=7 days, severe muscle and fat loss pt with multiple pressure injuries as per flowsheets

## 2022-08-23 NOTE — DIETITIAN INITIAL EVALUATION ADULT - NS FNS DIET ORDER
Diet, Regular:   Consistent Carbohydrate {No Snacks} (CSTCHO)  DASH/TLC {Sodium & Cholesterol Restricted} (DASH)  1000mL Fluid Restriction ([id number]) (08-22-22)

## 2022-08-23 NOTE — DIETITIAN NUTRITION RISK NOTIFICATION - TREATMENT: THE FOLLOWING DIET HAS BEEN RECOMMENDED
Diet, Regular:   Consistent Carbohydrate {No Snacks} (CSTCHO)  DASH/TLC {Sodium & Cholesterol Restricted} (DASH)  1000mL Fluid Restriction ([id number]) (08-22-22 @ 00:27) [Active]

## 2022-08-23 NOTE — DIETITIAN INITIAL EVALUATION ADULT - ORAL INTAKE PTA/DIET HISTORY
pt lived in [facility] senior living per daughter and chart review  pt's daughter reports pt with No known food allergies

## 2022-08-23 NOTE — DIETITIAN INITIAL EVALUATION ADULT - ETIOLOGY
increased physiological demands of stress factor and wound healing  inadequate protein energy intake

## 2022-08-23 NOTE — DIETITIAN INITIAL EVALUATION ADULT - PERTINENT MEDS FT
MEDICATIONS  (STANDING):  amoxicillin  875 milliGRAM(s)/clavulanate 1 Tablet(s) Oral every 12 hours  ascorbic acid 500 milliGRAM(s) Oral daily  aspirin enteric coated 81 milliGRAM(s) Oral daily  atorvastatin 10 milliGRAM(s) Oral at bedtime  buMETAnide 0.5 milliGRAM(s) Oral daily  cadexomer iodine 0.9% Gel 1 Application(s) Topical daily  chlorhexidine 2% Cloths 1 Application(s) Topical daily  cyanocobalamin 1000 MICROGram(s) Oral daily  donepezil 5 milliGRAM(s) Oral at bedtime  enoxaparin Injectable 40 milliGRAM(s) SubCutaneous every 24 hours  finasteride 5 milliGRAM(s) Oral daily  melatonin 3 milliGRAM(s) Oral at bedtime  memantine 10 milliGRAM(s) Oral two times a day  metoprolol succinate ER 25 milliGRAM(s) Oral daily  mirtazapine 7.5 milliGRAM(s) Oral at bedtime  multivitamin 1 Tablet(s) Oral daily  pantoprazole    Tablet 40 milliGRAM(s) Oral before breakfast  polyethylene glycol 3350 17 Gram(s) Oral daily  QUEtiapine 25 milliGRAM(s) Oral at bedtime  thiamine 100 milliGRAM(s) Oral daily  zinc sulfate 220 milliGRAM(s) Oral daily    MEDICATIONS  (PRN):  acetaminophen     Tablet .. 650 milliGRAM(s) Oral every 6 hours PRN Temp greater or equal to 38C (100.4F), Mild Pain (1 - 3)

## 2022-08-23 NOTE — PATIENT PROFILE ADULT - FALL HARM RISK - HARM RISK INTERVENTIONS

## 2022-08-23 NOTE — DIETITIAN INITIAL EVALUATION ADULT - REASON INDICATOR FOR ASSESSMENT
Consult received for nutrition assessment and education   Information obtained from pt's daughter on the phone, EMR.   pt not arousal/ unable to interview upon RD visit

## 2022-08-23 NOTE — DIETITIAN INITIAL EVALUATION ADULT - NSFNSPHYEXAMSKINFT_GEN_A_CORE
Pressure Injury 1: sacrum, Stage I  Pressure Injury 2: Left:,heel, Unstageable  Pressure Injury 3: Right:,heel, Unstageable    Pt's daughter states UBW ~140 pounds; noted dosing wt 134.9 pounds   % IBW (IBW pounds)  Skin:   Performed nutrition focused physical exam with pt's consent and noted xx signs of muscle/fat loss  Pressure Injury 1: sacrum, Stage I  Pressure Injury 2: Left:,heel, Unstageable  Pressure Injury 3: Right:,heel, Unstageable    Pt's daughter states UBW ~140 pounds; noted dosing wt 134.9 pounds; RD checked [place] wt (8/23/22) ~131 pounds   99% IBW ( pounds)  Performed nutrition focused physical exam with pt's consent and noted severe signs of muscle/fat loss

## 2022-08-23 NOTE — PROGRESS NOTE ADULT - SUBJECTIVE AND OBJECTIVE BOX
DATE OF SERVICE: 08-23-22 @ 14:55    Patient is a 89y old  Male who presents with a chief complaint of hypoxia, lethargy (23 Aug 2022 09:43)      INTERVAL HISTORY: Arousable and in no acute distress. Lying flat in bed.     REVIEW OF SYSTEMS:  CONSTITUTIONAL: No weakness  EYES/ENT: No visual changes;  No throat pain   NECK: No pain or stiffness  RESPIRATORY: No cough, wheezing; No shortness of breath  CARDIOVASCULAR: No chest pain or palpitations  GASTROINTESTINAL: No abdominal  pain. No nausea, vomiting, or hematemesis  GENITOURINARY: No dysuria, frequency or hematuria  NEUROLOGICAL: No stroke like symptoms  SKIN: No rashes    	  MEDICATIONS:  buMETAnide 0.5 milliGRAM(s) Oral daily  metoprolol succinate ER 25 milliGRAM(s) Oral daily        PHYSICAL EXAM:  T(C): 36.4 (08-23-22 @ 12:14), Max: 36.9 (08-22-22 @ 20:43)  HR: 69 (08-23-22 @ 12:14) (66 - 95)  BP: 111/72 (08-23-22 @ 12:14) (96/61 - 111/72)  RR: 18 (08-23-22 @ 12:14) (18 - 18)  SpO2: 95% (08-23-22 @ 12:14) (94% - 96%)  Wt(kg): --  I&O's Summary    22 Aug 2022 07:01  -  23 Aug 2022 07:00  --------------------------------------------------------  IN: 240 mL / OUT: 1250 mL / NET: -1010 mL          Appearance: In no distress	  HEENT:    PERRL, EOMI	  Cardiovascular:  S1 S2, No JVD  Respiratory: Lungs clear to auscultation	  Gastrointestinal:  Soft, Non-tender, + BS	  Vascularature:  No edema of LE  Psychiatric: Appropriate affect   Neuro: no acute focal deficits                               11.3   4.16  )-----------( 148      ( 23 Aug 2022 06:07 )             35.2     08-23    139  |  104  |  21  ----------------------------<  105<H>  3.4<L>   |  25  |  0.79    Ca    8.6      23 Aug 2022 06:07  Phos  3.6     08-23  Mg     1.9     08-23    TPro  6.3  /  Alb  3.4  /  TBili  0.8  /  DBili  x   /  AST  23  /  ALT  12  /  AlkPhos  88  08-23        Labs personally reviewed      ASSESSMENT/PLAN: 	    89M hard of hearing w/ hx of dementia (baseline AAOx1), chronic Afib (not on AC, only ASA) s/p watchman, HFpEF s/p AICD, s/p TAVR, HTN, HLD, DM2, BPH, SCC, spinal stenosis, presenting from [facility] senior living after episode of hypoxia to O2 sat 70s and lethargy. Unable to obtain meaningful history from pt given dementia. At time of encounter, pt awake and alert, but only minimally conversive and not appropriately answering most questions. Attempted to call [facility] in [place] for collateral information, but night staff there was unclear of events. Per ED documentation, pt with unknown duration of low O2 sat with increased lethargy per [facility] staff and pt was sating 96-97% on RA during transport by EMS. Of note, pt had recent hospitalization (8/6-8/12) for fall and CHF exacerbation. Reviewed recent outpt PCP/[facility] records on [employer]; pt noted to have been on 2L O2NC at [facility] since recent admission and was to start trial off O2 with goal O2 sat 92-94%. Pt also undergoing 7-day course of Augmentin (8/18-8/25) for R foot wound/cellulitis due to "drainage and redness."      Problem/Plan -1  Problem: Acute diastolic HF  Plan:  - Follows with Dr. Trevor Schneider for OP Cardiology  - CXR shows pulmonary edema wit B/L pleural effusions  - Echo from 8/2022 shows mild mitral stenosis, Severe concentric left ventricular hypertrophy, Global hypokinesis of the left ventricle,  Severe reversible diastolic dysfunction (Stage III),  Normal right ventricular size with decrease right  ventricular function, borderline pulmonary pressures.  - Patient with preserved EF 50%, ICD in place   - BNP 5631 (down from previous admission)  - Takes Bumex 0.5mg PO three times per week  - s/p IV diuresis now on Bumex 0.5mg PO daily  - Strict I&Os, daily weights     Problem/Plan -2  Problem: AF  Plan:  - s/p Watchman, not on AC  - c/w Metoprolol    Problem/Plan -3  Problem: HTN  Plan:  - c/w Toprol XL 25mg PO daily    Problem/Plan -4  Problem: CAD  Plan:  - Denies chest pain although limited historian  - Troponin elevated likely in the setting of ADHF exacerbation  - EKG with no ischemic changes noted  - c/w ASA        Cyn Dodson, AG-NP   Clarence Vazquez DO [facility]  Cardiovascular Medicine  [address] [place] [address], [address]  Office: [phone]  Cell: [phone] DATE OF SERVICE: 08-23-22 @ 14:55    Patient is a 89y old  Male who presents with a chief complaint of hypoxia, lethargy (23 Aug 2022 09:43)      INTERVAL HISTORY: Arousable and in no acute distress. Lying flat in bed.     REVIEW OF SYSTEMS:  CONSTITUTIONAL: No weakness  EYES/ENT: No visual changes;  No throat pain   NECK: No pain or stiffness  RESPIRATORY: No cough, wheezing; No shortness of breath  CARDIOVASCULAR: No chest pain or palpitations  GASTROINTESTINAL: No abdominal  pain. No nausea, vomiting, or hematemesis  GENITOURINARY: No dysuria, frequency or hematuria  NEUROLOGICAL: No stroke like symptoms  SKIN: No rashes    	  MEDICATIONS:  buMETAnide 0.5 milliGRAM(s) Oral daily  metoprolol succinate ER 25 milliGRAM(s) Oral daily        PHYSICAL EXAM:  T(C): 36.4 (08-23-22 @ 12:14), Max: 36.9 (08-22-22 @ 20:43)  HR: 69 (08-23-22 @ 12:14) (66 - 95)  BP: 111/72 (08-23-22 @ 12:14) (96/61 - 111/72)  RR: 18 (08-23-22 @ 12:14) (18 - 18)  SpO2: 95% (08-23-22 @ 12:14) (94% - 96%)  Wt(kg): --  I&O's Summary    22 Aug 2022 07:01  -  23 Aug 2022 07:00  --------------------------------------------------------  IN: 240 mL / OUT: 1250 mL / NET: -1010 mL          Appearance: In no distress	  HEENT:    PERRL, EOMI	  Cardiovascular:  S1 S2, No JVD  Respiratory: Lungs clear to auscultation	  Gastrointestinal:  Soft, Non-tender, + BS	  Vascularature:  No edema of LE  Psychiatric: Appropriate affect   Neuro: no acute focal deficits                               11.3   4.16  )-----------( 148      ( 23 Aug 2022 06:07 )             35.2     08-23    139  |  104  |  21  ----------------------------<  105<H>  3.4<L>   |  25  |  0.79    Ca    8.6      23 Aug 2022 06:07  Phos  3.6     08-23  Mg     1.9     08-23    TPro  6.3  /  Alb  3.4  /  TBili  0.8  /  DBili  x   /  AST  23  /  ALT  12  /  AlkPhos  88  08-23        Labs personally reviewed      ASSESSMENT/PLAN: 	    89M hard of hearing w/ hx of dementia (baseline AAOx1), chronic Afib (not on AC, only ASA) s/p watchman, HFpEF s/p AICD, s/p TAVR, HTN, HLD, DM2, BPH, SCC, spinal stenosis, presenting from [facility] senior living after episode of hypoxia to O2 sat 70s and lethargy. Unable to obtain meaningful history from pt given dementia. At time of encounter, pt awake and alert, but only minimally conversive and not appropriately answering most questions. Attempted to call [facility] in [place] for collateral information, but night staff there was unclear of events. Per ED documentation, pt with unknown duration of low O2 sat with increased lethargy per [facility] staff and pt was sating 96-97% on RA during transport by EMS. Of note, pt had recent hospitalization (8/6-8/12) for fall and CHF exacerbation. Reviewed recent outpt PCP/[facility] records on [employer]; pt noted to have been on 2L O2NC at [facility] since recent admission and was to start trial off O2 with goal O2 sat 92-94%. Pt also undergoing 7-day course of Augmentin (8/18-8/25) for R foot wound/cellulitis due to "drainage and redness."      Problem/Plan -1  Problem: Acute diastolic HF  Plan:  - Follows with Dr. Trevor Schneider for OP Cardiology  - CXR shows pulmonary edema wit B/L pleural effusions  - Echo from 8/2022 shows mild mitral stenosis, Severe concentric left ventricular hypertrophy, Global hypokinesis of the left ventricle,  Severe reversible diastolic dysfunction (Stage III),  Normal right ventricular size with decrease right  ventricular function, borderline pulmonary pressures.  - Patient with preserved EF 50%, ICD in place   - BNP 5631 (down from previous admission)  - Takes Bumex 0.5mg PO three times per week  - s/p IV diuresis now,, appears well diuresed, on Bumex 0.5mg PO daily  - Strict I&Os, daily weights     Problem/Plan -2  Problem: AF  Plan:  - s/p Watchman, not on AC  - c/w Metoprolol    Problem/Plan -3  Problem: HTN  Plan:  - c/w Toprol XL 25mg PO daily    Problem/Plan -4  Problem: CAD  Plan:  - Denies chest pain although limited historian  - Troponin elevated likely in the setting of ADHF exacerbation  - EKG with no ischemic changes noted  - c/w ASA        Cyn Dodson, CIERRA-NP   Clarence Vazquez DO [facility]  Cardiovascular Medicine  [address] [place] [address], [address]  Office: [phone]  Cell: [phone]

## 2022-08-23 NOTE — DIETITIAN INITIAL EVALUATION ADULT - PROBLEM SELECTOR PROBLEM 8
Spoke with patient who states she tried calling her insurance company and was on hold for an extended period of time and was unable to hold any longer. She also went onto [employer] and was able to print off a coupon for Advair which states first prescription would be $10.   Patient is willing to pay anything less than $100. She would like writer to work with the pharmacy to check pricing of the options [facility] offered and if those are over $100, she would go with the budesonide and montelukast.  Writer called pharmacy to review pricing of other options [facility] has offered.    Advair $395.72    Generic form of Advair - Fluticasone salmetrol 232-14 $85.86 ty ramirez  Breo Ellipta $408.16 (insurance rejecting, this is cash price)  Symbicort $328.00  Budesonide inhalation 1mg/2ml over $400 plus montelukast 10mg $9.18    Patient curious what proair would cost at time of  $60.69    Patient was called and made aware of the cash price being her best financial option (Fluticasone salmetrol 232-14 $85.86 cash price) and proair cost. She agrees to pay these prices. Med list updated.     HLD (hyperlipidemia)

## 2022-08-23 NOTE — PROGRESS NOTE ADULT - SUBJECTIVE AND OBJECTIVE BOX
INCOMPLETE NOTE    Christy Byers M.D.  Division of Hospital Medicine  Available on TEAMS    Patient is a 89y old  Male who presents with a chief complaint of hypoxia, lethargy (22 Aug 2022 19:42)    SUBJECTIVE / OVERNIGHT EVENTS: Patient seen and examined. No acute overnight events, no subjective complaints.    OBJECTIVE:  Vital Signs Last 24 Hrs  T(F): 97.9 (23 Aug 2022 04:09), Max: 98.4 (22 Aug 2022 20:43)  HR: 95 (23 Aug 2022 04:09) (66 - 95)  BP: 100/66 (23 Aug 2022 04:09) (96/61 - 111/70)  RR: 18 (23 Aug 2022 04:09) (18 - 20)  SpO2: 94% (23 Aug 2022 04:09) (94% - 97%)    Parameters below as of 23 Aug 2022 04:09  Patient On (Oxygen Delivery Method): room air    I&O's Summary  22 Aug 2022 07:01  -  23 Aug 2022 07:00  --------------------------------------------------------  IN: 240 mL / OUT: 1250 mL / NET: -1010 mL    Physical Examination:  GEN: elderly man, laying in bed in NAD  PSYCH: A&Ox1, mood and affect appear calm  SKIN: B/L heel wounds, no e/o rash  NEURO: no focal neurologic deficits appreciated  NECK: supple, no e/o elevated JVP  RESPI: no accessory muscle use, B/L air entry, CTAB   CARDIO: irregular rate/rhythm, trace LE edema B/L  ABD: soft, NT, ND  EXT: patient able to move all extremities spontaneously  VASC: peripheral pulses palpated    Labs:                     11.3   4.16  )-----------( 148      ( 23 Aug 2022 06:07 )             35.2     08-23  139  |  104  |  21  ----------------------------<  105<H>  3.4<L>   |  25  |  0.79    Ca    8.6      23 Aug 2022 06:07  Phos  3.6     08-23  Mg     1.9         TPro  6.3  /  Alb  3.4  /  TBili  0.8  /  DBili  x   /  AST  23  /  ALT  12  /  AlkPhos  88      Urinalysis Basic - ( 22 Aug 2022 02:43 )  Color: Light Yellow / Appearance: Clear / S.017 / pH: x  Gluc: x / Ketone: Negative  / Bili: Negative / Urobili: Negative   Blood: x / Protein: Negative / Nitrite: Negative   Leuk Esterase: Negative / RBC: 1 /hpf / WBC 0 /HPF   Sq Epi: x / Non Sq Epi: 0 /hpf / Bacteria: Negative    Culture - Urine (collected 22 Aug 2022 02:43)  Source: Clean Catch Clean Catch (Midstream)  Final Report (22 Aug 2022 23:35):    No growth    Consultant(s) Notes Reviewed: Podiatry, Cardiology  Care Discussed with Consultants/Other Providers: PJ Ewing    MEDICATIONS  (STANDING):  amoxicillin  875 milliGRAM(s)/clavulanate 1 Tablet(s) Oral every 12 hours  ascorbic acid 500 milliGRAM(s) Oral daily  aspirin enteric coated 81 milliGRAM(s) Oral daily  atorvastatin 10 milliGRAM(s) Oral at bedtime  buMETAnide Injectable 1 milliGRAM(s) IV Push daily  cadexomer iodine 0.9% Gel 1 Application(s) Topical daily  chlorhexidine 2% Cloths 1 Application(s) Topical daily  cyanocobalamin 1000 MICROGram(s) Oral daily  donepezil 5 milliGRAM(s) Oral at bedtime  enoxaparin Injectable 40 milliGRAM(s) SubCutaneous every 24 hours  finasteride 5 milliGRAM(s) Oral daily  melatonin 3 milliGRAM(s) Oral at bedtime  memantine 10 milliGRAM(s) Oral two times a day  metoprolol succinate ER 25 milliGRAM(s) Oral daily  mirtazapine 7.5 milliGRAM(s) Oral at bedtime  multivitamin 1 Tablet(s) Oral daily  pantoprazole    Tablet 40 milliGRAM(s) Oral before breakfast  polyethylene glycol 3350 17 Gram(s) Oral daily  potassium chloride    Tablet ER 40 milliEquivalent(s) Oral once  QUEtiapine 25 milliGRAM(s) Oral at bedtime  thiamine 100 milliGRAM(s) Oral daily  zinc sulfate 220 milliGRAM(s) Oral daily    MEDICATIONS  (PRN):  acetaminophen     Tablet .. 650 milliGRAM(s) Oral every 6 hours PRN Temp greater or equal to 38C (100.4F), Mild Pain (1 - 3) Christy Byers M.D.  Division of Hospital Medicine  Available on TEAMS    Patient is a 89y old  Male who presents with a chief complaint of hypoxia, lethargy (22 Aug 2022 19:42)    SUBJECTIVE / OVERNIGHT EVENTS: Patient seen and examined. No acute overnight events, no subjective complaints. Spoke with patient' s daughter via telephone to provide clinical update and plan for continued management w/ transition to PO diuretic tomorrow (will dose Bumex 0.5mg daily instead of 3x/a week for now, pending further recommendations from cardiology) and monitor prior to D/C, hopeful plan for .     OBJECTIVE:  Vital Signs Last 24 Hrs  T(F): 97.9 (23 Aug 2022 04:09), Max: 98.4 (22 Aug 2022 20:43)  HR: 95 (23 Aug 2022 04:09) (66 - 95)  BP: 100/66 (23 Aug 2022 04:09) (96/61 - 111/70)  RR: 18 (23 Aug 2022 04:09) (18 - 20)  SpO2: 94% (23 Aug 2022 04:09) (94% - 97%)    Parameters below as of 23 Aug 2022 04:09  Patient On (Oxygen Delivery Method): room air    I&O's Summary  22 Aug 2022 07:01  -  23 Aug 2022 07:00  --------------------------------------------------------  IN: 240 mL / OUT: 1250 mL / NET: -1010 mL    Physical Examination:  GEN: elderly man, laying in bed in NAD  PSYCH: A&Ox1, mood and affect appear calm  SKIN: B/L heel wounds, no e/o rash  NEURO: no focal neurologic deficits appreciated  NECK: supple, no e/o elevated JVP  RESPI: no accessory muscle use, B/L air entry, CTAB   CARDIO: irregular rate/rhythm, trace LE edema B/L  ABD: soft, NT, ND  EXT: patient able to move all extremities spontaneously  VASC: peripheral pulses palpated    Labs:                     11.3   4.16  )-----------( 148      ( 23 Aug 2022 06:07 )             35.2     08-23  139  |  104  |  21  ----------------------------<  105<H>  3.4<L>   |  25  |  0.79    Ca    8.6      23 Aug 2022 06:07  Phos  3.6       Mg     1.9         TPro  6.3  /  Alb  3.4  /  TBili  0.8  /  DBili  x   /  AST  23  /  ALT  12  /  AlkPhos  88      Urinalysis Basic - ( 22 Aug 2022 02:43 )  Color: Light Yellow / Appearance: Clear / S.017 / pH: x  Gluc: x / Ketone: Negative  / Bili: Negative / Urobili: Negative   Blood: x / Protein: Negative / Nitrite: Negative   Leuk Esterase: Negative / RBC: 1 /hpf / WBC 0 /HPF   Sq Epi: x / Non Sq Epi: 0 /hpf / Bacteria: Negative    Culture - Urine (collected 22 Aug 2022 02:43)  Source: Clean Catch Clean Catch (Midstream)  Final Report (22 Aug 2022 23:35):    No growth    Consultant(s) Notes Reviewed: Podiatry, Cardiology  Care Discussed with Consultants/Other Providers: PJ Ewing    MEDICATIONS  (STANDING):  amoxicillin  875 milliGRAM(s)/clavulanate 1 Tablet(s) Oral every 12 hours  ascorbic acid 500 milliGRAM(s) Oral daily  aspirin enteric coated 81 milliGRAM(s) Oral daily  atorvastatin 10 milliGRAM(s) Oral at bedtime  buMETAnide Injectable 1 milliGRAM(s) IV Push daily  cadexomer iodine 0.9% Gel 1 Application(s) Topical daily  chlorhexidine 2% Cloths 1 Application(s) Topical daily  cyanocobalamin 1000 MICROGram(s) Oral daily  donepezil 5 milliGRAM(s) Oral at bedtime  enoxaparin Injectable 40 milliGRAM(s) SubCutaneous every 24 hours  finasteride 5 milliGRAM(s) Oral daily  melatonin 3 milliGRAM(s) Oral at bedtime  memantine 10 milliGRAM(s) Oral two times a day  metoprolol succinate ER 25 milliGRAM(s) Oral daily  mirtazapine 7.5 milliGRAM(s) Oral at bedtime  multivitamin 1 Tablet(s) Oral daily  pantoprazole    Tablet 40 milliGRAM(s) Oral before breakfast  polyethylene glycol 3350 17 Gram(s) Oral daily  potassium chloride    Tablet ER 40 milliEquivalent(s) Oral once  QUEtiapine 25 milliGRAM(s) Oral at bedtime  thiamine 100 milliGRAM(s) Oral daily  zinc sulfate 220 milliGRAM(s) Oral daily    MEDICATIONS  (PRN):  acetaminophen     Tablet .. 650 milliGRAM(s) Oral every 6 hours PRN Temp greater or equal to 38C (100.4F), Mild Pain (1 - 3)

## 2022-08-23 NOTE — DIETITIAN INITIAL EVALUATION ADULT - NSFNSNUTRCHEWSWALLOWFT_GEN_A_CORE
per last visit, pt was noted with some possible difficulties; Consider formal swallowing evaluation with speech pathologist

## 2022-08-24 NOTE — PROGRESS NOTE ADULT - SUBJECTIVE AND OBJECTIVE BOX
Christy Byers M.D.  Division of Hospital Medicine  Available on TEAMS    Patient is a 89y old  Male who presents with a chief complaint of hypoxia, lethargy (24 Aug 2022 10:58)    SUBJECTIVE / OVERNIGHT EVENTS: Patient seen and examined. No acute overnight events, no subjective complaints but patient w/ labored breathing -- appreciate cardiology's evauation -- resumed IV diuresis today. Spoke with patient's daughter, Evangelina, also available via cell at [phone], to update her on this change in clinical course.     OBJECTIVE:  Vital Signs Last 24 Hrs  T(F): 97.8 (24 Aug 2022 12:01), Max: 97.8 (24 Aug 2022 12:01)  HR: 78 (24 Aug 2022 12:01) (78 - 99)  BP: 116/77 (24 Aug 2022 12:01) (105/66 - 130/86)  RR: 18 (24 Aug 2022 12:01) (18 - 18)  SpO2: 96% (24 Aug 2022 12:01) (95% - 98%)    Parameters below as of 24 Aug 2022 12:01  Patient On (Oxygen Delivery Method): room air    I&O's Summary  24 Aug 2022 07:01  -  24 Aug 2022 16:41  --------------------------------------------------------  IN: 0 mL / OUT: 0 mL / NET: 0 mL    Physical Examination:  GEN: elderly man, laying in bed in NAD  PSYCH: A&Ox1, hard of hearing, mood and affect appear calm  SKIN: B/L heel wounds, no e/o rash  NEURO: no focal neurologic deficits appreciated  NECK: supple, no e/o elevated JVP  RESPI: no accessory muscle use, B/L air entry, CTAB   CARDIO: irregular rate/rhythm, trace LE edema B/L  ABD: soft, NT, ND  EXT: patient able to move all extremities spontaneously  VASC: peripheral pulses palpated    Labs:                      12.6   4.07  )-----------( 174      ( 24 Aug 2022 06:36 )             39.5     08-24  143  |  107  |  22  ----------------------------<  99  4.3   |  25  |  0.82    Ca    9.0      24 Aug 2022 06:36  Phos  3.2     08-24  Mg     2.1     08-24    TPro  6.3  /  Alb  3.4  /  TBili  0.8  /  DBili  x   /  AST  23  /  ALT  12  /  AlkPhos  88  08-23    Culture - Urine (collected 22 Aug 2022 02:43)  Source: Clean Catch Clean Catch (Midstream)  Final Report (22 Aug 2022 23:35):    No growth    Consultant(s) Notes Reviewed: Cardiology  Care Discussed with Consultants/Other Providers: PJ Ewing    MEDICATIONS  (STANDING):  amoxicillin  875 milliGRAM(s)/clavulanate 1 Tablet(s) Oral every 12 hours  ascorbic acid 500 milliGRAM(s) Oral daily  aspirin enteric coated 81 milliGRAM(s) Oral daily  atorvastatin 10 milliGRAM(s) Oral at bedtime  buMETAnide Injectable 1 milliGRAM(s) IV Push daily  cadexomer iodine 0.9% Gel 1 Application(s) Topical daily  chlorhexidine 2% Cloths 1 Application(s) Topical daily  cyanocobalamin 1000 MICROGram(s) Oral daily  donepezil 5 milliGRAM(s) Oral at bedtime  enoxaparin Injectable 40 milliGRAM(s) SubCutaneous every 24 hours  finasteride 5 milliGRAM(s) Oral daily  melatonin 3 milliGRAM(s) Oral at bedtime  memantine 10 milliGRAM(s) Oral two times a day  metoprolol succinate ER 25 milliGRAM(s) Oral daily  mirtazapine 7.5 milliGRAM(s) Oral at bedtime  multivitamin 1 Tablet(s) Oral daily  pantoprazole    Tablet 40 milliGRAM(s) Oral before breakfast  polyethylene glycol 3350 17 Gram(s) Oral daily  QUEtiapine 25 milliGRAM(s) Oral at bedtime  thiamine 100 milliGRAM(s) Oral daily  zinc sulfate 220 milliGRAM(s) Oral daily    MEDICATIONS  (PRN):  acetaminophen     Tablet .. 650 milliGRAM(s) Oral every 6 hours PRN Temp greater or equal to 38C (100.4F), Mild Pain (1 - 3)

## 2022-08-24 NOTE — PROGRESS NOTE ADULT - SUBJECTIVE AND OBJECTIVE BOX
DATE OF SERVICE: 08-24-22 @ 10:59    Patient is a 89y old  Male who presents with a chief complaint of Heart failure     (23 Aug 2022 15:53)      INTERVAL HISTORY: In no acute distress.     REVIEW OF SYSTEMS: Non-contributory  CONSTITUTIONAL: No weakness  EYES/ENT: No visual changes;  No throat pain   NECK: No pain or stiffness  RESPIRATORY: No cough, wheezing; No shortness of breath  CARDIOVASCULAR: No chest pain or palpitations  GASTROINTESTINAL: No abdominal  pain. No nausea, vomiting, or hematemesis  GENITOURINARY: No dysuria, frequency or hematuria  NEUROLOGICAL: No stroke like symptoms  SKIN: No rashes    TELEMETRY Personally reviewed: AF/ 60-90  	  MEDICATIONS:  buMETAnide Injectable 1 milliGRAM(s) IV Push daily  metoprolol succinate ER 25 milliGRAM(s) Oral daily        PHYSICAL EXAM:  T(C): 36.4 (08-24-22 @ 04:04), Max: 36.5 (08-23-22 @ 20:46)  HR: 99 (08-24-22 @ 04:04) (69 - 99)  BP: 130/86 (08-24-22 @ 04:04) (105/66 - 130/86)  RR: 18 (08-24-22 @ 04:04) (18 - 18)  SpO2: 98% (08-24-22 @ 04:04) (95% - 98%)  Wt(kg): --  I&O's Summary        Appearance: In no distress	  HEENT:    PERRL, EOMI	  Cardiovascular:  S1 S2, + JVD  Respiratory: diminished B/L  Gastrointestinal:  Soft, Non-tender, + BS	  Vascularature:  No edema of LE  Psychiatric: Appropriate affect   Neuro: no acute focal deficits                               12.6   4.07  )-----------( 174      ( 24 Aug 2022 06:36 )             39.5     08-24    143  |  107  |  22  ----------------------------<  99  4.3   |  25  |  0.82    Ca    9.0      24 Aug 2022 06:36  Phos  3.2     08-24  Mg     2.1     08-24    TPro  6.3  /  Alb  3.4  /  TBili  0.8  /  DBili  x   /  AST  23  /  ALT  12  /  AlkPhos  88  08-23        Labs personally reviewed      ASSESSMENT/PLAN: 	      89M hard of hearing w/ hx of dementia (baseline AAOx1), chronic Afib (not on AC, only ASA) s/p watchman, HFpEF s/p AICD, s/p TAVR, HTN, HLD, DM2, BPH, SCC, spinal stenosis, presenting from [facility] senior living after episode of hypoxia to O2 sat 70s and lethargy. Unable to obtain meaningful history from pt given dementia. At time of encounter, pt awake and alert, but only minimally conversive and not appropriately answering most questions. Attempted to call [facility] in [place] for collateral information, but night staff there was unclear of events. Per ED documentation, pt with unknown duration of low O2 sat with increased lethargy per [facility] staff and pt was sating 96-97% on RA during transport by EMS. Of note, pt had recent hospitalization (8/6-8/12) for fall and CHF exacerbation. Reviewed recent outpt PCP/Dana records on [employer]; pt noted to have been on 2L O2NC at [facility] since recent admission and was to start trial off O2 with goal O2 sat 92-94%. Pt also undergoing 7-day course of Augmentin (8/18-8/25) for R foot wound/cellulitis due to "drainage and redness."      Problem/Plan -1  Problem: Acute diastolic HF  Plan:  - Follows with Dr. Trevor Schneider for OP Cardiology  - CXR shows pulmonary edema wit B/L pleural effusions  - Echo from 8/2022 shows mild mitral stenosis, Severe concentric left ventricular hypertrophy, Global hypokinesis of the left ventricle,  Severe reversible diastolic dysfunction (Stage III),  Normal right ventricular size with decrease right  ventricular function, borderline pulmonary pressures.  - Patient with preserved EF 50%, ICD in place   - BNP 5631 (down from previous admission)  - Takes Bumex 0.5mg PO three times per week  - Strict I&Os, daily weights  - Transitioned to PO Bumex yesterday although today appears slightly more labored and with + JVD; will resume Bumex 1mg IVP and reassess daily    Problem/Plan -2  Problem: AF  Plan:  - s/p Watchman, not on AC  - c/w Metoprolol    Problem/Plan -3  Problem: HTN  Plan:  - c/w Toprol XL 25mg PO daily    Problem/Plan -4  Problem: CAD  Plan:  - Denies chest pain although limited historian  - Troponin elevated likely in the setting of ADHF exacerbation  - EKG with no ischemic changes noted  - c/w CIERRA White-RACHAEL Vazquez DO [facility]  Cardiovascular Medicine  [address] [place] [address], [address]  Office: [phone]  Cell: [phone]

## 2022-08-25 NOTE — PROGRESS NOTE ADULT - CONVERSATION DETAILS
GOC: MOLST and HCP documents from [facility] in physical chart reviewed; pt is DNR/DNI.

## 2022-08-25 NOTE — PROGRESS NOTE ADULT - SUBJECTIVE AND OBJECTIVE BOX
INCOMPLETE NOTE    Christy Byers M.D.  Division of Hospital Medicine  Available on TEAMS    Patient is a 89y old  Male who presents with a chief complaint of hypoxia, lethargy (24 Aug 2022 16:41)    SUBJECTIVE / OVERNIGHT EVENTS: Patient seen and examined. No acute overnight events, no subjective complaints.    OBJECTIVE:  Vital Signs Last 24 Hrs  T(F): 97.7 (25 Aug 2022 04:30), Max: 97.8 (24 Aug 2022 12:01)  HR: 71 (25 Aug 2022 05:51) (63 - 78)  BP: 121/75 (25 Aug 2022 05:51) (99/63 - 121/75)  RR: 18 (25 Aug 2022 04:30) (17 - 18)  SpO2: 96% (25 Aug 2022 04:30) (96% - 96%)    Parameters below as of 25 Aug 2022 04:30  Patient On (Oxygen Delivery Method): room air    I&O's Summary  24 Aug 2022 07:01  -  25 Aug 2022 07:00  --------------------------------------------------------  IN: 280 mL / OUT: 100 mL / NET: 180 mL    Physical Examination:  GEN: elderly man, laying in bed in NAD  PSYCH: A&Ox1, hard of hearing, mood and affect appear calm  SKIN: B/L heel wounds, no e/o rash  NEURO: no focal neurologic deficits appreciated  NECK: supple, no e/o elevated JVP  RESPI: no accessory muscle use, B/L air entry, CTAB   CARDIO: irregular rate/rhythm, trace LE edema B/L  ABD: soft, NT, ND  EXT: patient able to move all extremities spontaneously  VASC: peripheral pulses palpated    Labs:                     12.6   4.07  )-----------( 174      ( 24 Aug 2022 06:36 )             39.5     08-25  141  |  106  |  23  ----------------------------<  93  4.1   |  24  |  0.81    Ca    8.7      25 Aug 2022 06:02  Phos  3.2     08-25  Mg     2.1     08-25    Consultant(s) Notes Reviewed: Cardiology  Care Discussed with Consultants/Other Providers: PJ Ewing    MEDICATIONS  (STANDING):  amoxicillin  875 milliGRAM(s)/clavulanate 1 Tablet(s) Oral every 12 hours  ascorbic acid 500 milliGRAM(s) Oral daily  aspirin enteric coated 81 milliGRAM(s) Oral daily  atorvastatin 10 milliGRAM(s) Oral at bedtime  buMETAnide Injectable 1 milliGRAM(s) IV Push daily  cadexomer iodine 0.9% Gel 1 Application(s) Topical daily  chlorhexidine 2% Cloths 1 Application(s) Topical daily  cyanocobalamin 1000 MICROGram(s) Oral daily  donepezil 5 milliGRAM(s) Oral at bedtime  enoxaparin Injectable 40 milliGRAM(s) SubCutaneous every 24 hours  finasteride 5 milliGRAM(s) Oral daily  melatonin 3 milliGRAM(s) Oral at bedtime  memantine 10 milliGRAM(s) Oral two times a day  metoprolol succinate ER 25 milliGRAM(s) Oral daily  mirtazapine 7.5 milliGRAM(s) Oral at bedtime  multivitamin 1 Tablet(s) Oral daily  pantoprazole    Tablet 40 milliGRAM(s) Oral before breakfast  polyethylene glycol 3350 17 Gram(s) Oral daily  QUEtiapine 25 milliGRAM(s) Oral at bedtime  thiamine 100 milliGRAM(s) Oral daily  zinc sulfate 220 milliGRAM(s) Oral daily    MEDICATIONS  (PRN):  acetaminophen     Tablet .. 650 milliGRAM(s) Oral every 6 hours PRN Temp greater or equal to 38C (100.4F), Mild Pain (1 - 3) Christy Byers M.D.  Division of Hospital Medicine  Available on TEAMS    Patient is a 89y old  Male who presents with a chief complaint of hypoxia, lethargy (24 Aug 2022 16:41)    SUBJECTIVE / OVERNIGHT EVENTS: Patient seen and examined. No acute overnight events, no subjective complaints. Patient received IV diuretic this AM -- will continue to closely monitor and f/u cardiology.     OBJECTIVE:  Vital Signs Last 24 Hrs  T(F): 97.7 (25 Aug 2022 04:30), Max: 97.8 (24 Aug 2022 12:01)  HR: 71 (25 Aug 2022 05:51) (63 - 78)  BP: 121/75 (25 Aug 2022 05:51) (99/63 - 121/75)  RR: 18 (25 Aug 2022 04:30) (17 - 18)  SpO2: 96% (25 Aug 2022 04:30) (96% - 96%)    Parameters below as of 25 Aug 2022 04:30  Patient On (Oxygen Delivery Method): room air    I&O's Summary  24 Aug 2022 07:01  -  25 Aug 2022 07:00  --------------------------------------------------------  IN: 280 mL / OUT: 100 mL / NET: 180 mL    Physical Examination:  GEN: elderly man, laying in bed in NAD  PSYCH: A&Ox1, hard of hearing, mood and affect appear calm  SKIN: B/L heel wounds, no e/o rash  NEURO: no focal neurologic deficits appreciated  NECK: supple, no e/o elevated JVP  RESPI: no accessory muscle use, B/L air entry, CTAB   CARDIO: irregular rate/rhythm, trace LE edema B/L  ABD: soft, NT, ND  EXT: patient able to move all extremities spontaneously  VASC: peripheral pulses palpated    Labs:                     12.6   4.07  )-----------( 174      ( 24 Aug 2022 06:36 )             39.5     08-25  141  |  106  |  23  ----------------------------<  93  4.1   |  24  |  0.81    Ca    8.7      25 Aug 2022 06:02  Phos  3.2     08-25  Mg     2.1     08-25    Consultant(s) Notes Reviewed: Cardiology  Care Discussed with Consultants/Other Providers: PJ Ewing    MEDICATIONS  (STANDING):  amoxicillin  875 milliGRAM(s)/clavulanate 1 Tablet(s) Oral every 12 hours  ascorbic acid 500 milliGRAM(s) Oral daily  aspirin enteric coated 81 milliGRAM(s) Oral daily  atorvastatin 10 milliGRAM(s) Oral at bedtime  buMETAnide Injectable 1 milliGRAM(s) IV Push daily  cadexomer iodine 0.9% Gel 1 Application(s) Topical daily  chlorhexidine 2% Cloths 1 Application(s) Topical daily  cyanocobalamin 1000 MICROGram(s) Oral daily  donepezil 5 milliGRAM(s) Oral at bedtime  enoxaparin Injectable 40 milliGRAM(s) SubCutaneous every 24 hours  finasteride 5 milliGRAM(s) Oral daily  melatonin 3 milliGRAM(s) Oral at bedtime  memantine 10 milliGRAM(s) Oral two times a day  metoprolol succinate ER 25 milliGRAM(s) Oral daily  mirtazapine 7.5 milliGRAM(s) Oral at bedtime  multivitamin 1 Tablet(s) Oral daily  pantoprazole    Tablet 40 milliGRAM(s) Oral before breakfast  polyethylene glycol 3350 17 Gram(s) Oral daily  QUEtiapine 25 milliGRAM(s) Oral at bedtime  thiamine 100 milliGRAM(s) Oral daily  zinc sulfate 220 milliGRAM(s) Oral daily    MEDICATIONS  (PRN):  acetaminophen     Tablet .. 650 milliGRAM(s) Oral every 6 hours PRN Temp greater or equal to 38C (100.4F), Mild Pain (1 - 3)

## 2022-08-25 NOTE — PROGRESS NOTE ADULT - SUBJECTIVE AND OBJECTIVE BOX
DATE OF SERVICE: 08-25-22     Patient is a 89y old  Male who presents with a chief complaint of hypoxia, lethargy (25 Aug 2022 10:11)      INTERVAL HISTORY: feels ok    TELEMETRY Personally reviewed: no events  	  MEDICATIONS:  metoprolol succinate ER 25 milliGRAM(s) Oral daily        PHYSICAL EXAM:  T(C): 36.4 (08-25-22 @ 20:52), Max: 36.5 (08-25-22 @ 04:30)  HR: 64 (08-25-22 @ 20:52) (63 - 71)  BP: 111/73 (08-25-22 @ 20:52) (99/63 - 121/75)  RR: 18 (08-25-22 @ 20:52) (18 - 18)  SpO2: 98% (08-25-22 @ 20:52) (96% - 98%)  Wt(kg): --  I&O's Summary    24 Aug 2022 07:01  -  25 Aug 2022 07:00  --------------------------------------------------------  IN: 280 mL / OUT: 100 mL / NET: 180 mL    25 Aug 2022 07:01  -  26 Aug 2022 02:08  --------------------------------------------------------  IN: 480 mL / OUT: 0 mL / NET: 480 mL          Appearance: In no distress	  HEENT:    PERRL, EOMI	  Cardiovascular:  S1 S2, No JVD  Respiratory: Lungs clear to auscultation	  Gastrointestinal:  Soft, Non-tender, + BS	  Vascularature:  No edema of LE  Psychiatric: Appropriate affect   Neuro: no acute focal deficits                               12.6   4.07  )-----------( 174      ( 24 Aug 2022 06:36 )             39.5     08-25    141  |  106  |  23  ----------------------------<  93  4.1   |  24  |  0.81    Ca    8.7      25 Aug 2022 06:02  Phos  3.2     08-25  Mg     2.1     08-25          Labs personally reviewed      ASSESSMENT/PLAN: 	      89M hard of hearing w/ hx of dementia (baseline AAOx1), chronic Afib (not on AC, only ASA) s/p watchman, HFpEF s/p AICD, s/p TAVR, HTN, HLD, DM2, BPH, SCC, spinal stenosis, presenting from [facility] senior living after episode of hypoxia to O2 sat 70s and lethargy. Unable to obtain meaningful history from pt given dementia. At time of encounter, pt awake and alert, but only minimally conversive and not appropriately answering most questions. Attempted to call [facility] in [place] for collateral information, but night staff there was unclear of events. Per ED documentation, pt with unknown duration of low O2 sat with increased lethargy per [facility] staff and pt was sating 96-97% on RA during transport by EMS. Of note, pt had recent hospitalization (8/6-8/12) for fall and CHF exacerbation. Reviewed recent outpt PCP/Dana records on [employer]; pt noted to have been on 2L O2NC at [facility] since recent admission and was to start trial off O2 with goal O2 sat 92-94%. Pt also undergoing 7-day course of Augmentin (8/18-8/25) for R foot wound/cellulitis due to "drainage and redness."      Problem/Plan -1  Problem: Acute diastolic HF  Plan:  - Follows with Dr. Trevor Schneider for OP Cardiology  - CXR shows pulmonary edema wit B/L pleural effusions  - Echo from 8/2022 shows mild mitral stenosis, Severe concentric left ventricular hypertrophy, Global hypokinesis of the left ventricle,  Severe reversible diastolic dysfunction (Stage III),  Normal right ventricular size with decrease right  ventricular function, borderline pulmonary pressures.  - Patient with preserved EF 50%, ICD in place   - BNP 5631 (down from previous admission)  - Takes Bumex 0.5mg PO three times per week  - Strict I&Os, daily weights  - appears well diuresed, will likely switch to PO Bumex Friday    Problem/Plan -2  Problem: AF  Plan:  - s/p Watchman, not on AC  - c/w Metoprolol    Problem/Plan -3  Problem: HTN  Plan:  - c/w Toprol XL 25mg PO daily    Problem/Plan -4  Problem: CAD  Plan:  - Denies chest pain although limited historian  - Troponin elevated likely in the setting of ADHF exacerbation  - EKG with no ischemic changes noted  - c/w STEPHANIE Vazquez DO [facility]  Cardiovascular Medicine  [address] [place] [address], [address]  Office: [phone]  Cell: [phone]

## 2022-08-26 NOTE — DISCHARGE NOTE PROVIDER - CARE PROVIDERS DIRECT ADDRESSES
,DirectAddress_Unknown ,DirectAddress_Unknown,[email or web address] ,DirectAddress_Unknown,[email or web address],DirectAddress_Unknown

## 2022-08-26 NOTE — DISCHARGE NOTE PROVIDER - CARE PROVIDER_API CALL
Clarence Vazquez (DO)  Cardiovascular Disease; Internal Medicine; Nuclear Cardiology  [address] [place] [address], [place] [address]  Phone: [phone]  Fax: [phone]  Follow Up Time: 2 weeks   Clarence Vazquez (DO)  Cardiovascular Disease; Internal Medicine; Nuclear Cardiology  [address], [address]  [place] [address]  Phone: [phone]  Fax: [phone]  Follow Up Time: 2 weeks    Tarah Purdy)  Geriatric Medicine; Internal Medicine  [address], [address]  [place] [address]  Phone: [phone]  Fax: [phone]  Follow Up Time:    Clarence Vazquez (DO)  Cardiovascular Disease; Internal Medicine; Nuclear Cardiology  [address], [address]  [place] [address]  Phone: [phone]  Fax: [phone]  Follow Up Time: 2 weeks    Tarah Purdy)  Geriatric Medicine; Internal Medicine  [address] [facility] [address], [address]  [place] [address]  Phone: [phone]  Fax: [phone]  Follow Up Time:     Liam Tilley (MD)  Surgery  [facility] [address], [address]  [place] [address]  Phone: [phone]  Fax: [phone]  Follow Up Time:     Lo Baxter (DPM)  Surgery  [address], [address] [place] [address]  Phone: [phone]  Fax: [phone]  Follow Up Time:

## 2022-08-26 NOTE — DISCHARGE NOTE PROVIDER - PROVIDER TOKENS
PROVIDER:[TOKEN:[[id number]],FOLLOWUP:[2 weeks]] PROVIDER:[TOKEN:[[id number]],FOLLOWUP:[2 weeks]],PROVIDER:[TOKEN:[[id number]]] PROVIDER:[TOKEN:[[id number]],FOLLOWUP:[2 weeks]],PROVIDER:[TOKEN:[[id number]]],PROVIDER:[TOKEN:[[id number]]],PROVIDER:[TOKEN:[[id number]]]

## 2022-08-26 NOTE — DISCHARGE NOTE PROVIDER - NSDCMRMEDTOKEN_GEN_ALL_CORE_FT
amoxicillin-clavulanate 875 mg-125 mg oral tablet: 1 tab(s) orally every 12 hours  aspirin 81 mg oral delayed release tablet: 1 tab(s) orally once a day  bumetanide 0.5 mg oral tablet: 1 tab(s) orally Monday, Wednesday, Friday and Saturday  cadexomer iodine 0.9% topical gel: 1 application topically once a day  cyanocobalamin 1000 mcg oral tablet: 1 tab(s) orally once a day  Daily Pedro oral tablet: 1 tab(s) orally once a day  donepezil 5 mg oral tablet: 1 tab(s) orally once a day (at bedtime)  finasteride 5 mg oral tablet: 1 tab(s) orally once a day  melatonin 3 mg oral tablet: 1 tab(s) orally once a day (at bedtime)  memantine 10 mg oral tablet: 1 tab(s) orally 2 times a day  metoprolol succinate 25 mg oral tablet, extended release: 1 tab(s) orally once a day  MiraLax oral powder for reconstitution: 1 packet(s) orally once a day  mirtazapine 7.5 mg oral tablet: 1 tab(s) orally once a day (at bedtime)  Oxygen: 2 liters via nasal cannula  Dx: 428.0    Inogen  Setting 2  Duration: 12 months   pantoprazole 40 mg oral delayed release tablet: 1 tab(s) orally once a day (before a meal)  Pravachol 20 mg oral tablet: 1 tab(s) orally once a day (at bedtime)  SEROquel 25 mg oral tablet: 0.5 tab(s) orally once a day (at bedtime)   thiamine 100 mg oral tablet: 1 tab(s) orally once a day  Tylenol 325 mg oral tablet: 2 tab(s) orally every 6 hours, As Needed -  zinc sulfate 220 mg oral capsule: 1 cap(s) orally once a day   aspirin 81 mg oral delayed release tablet: 1 tab(s) orally once a day  bumetanide 0.5 mg oral tablet: 1 tab(s) orally once a day  cadexomer iodine 0.9% topical gel: 1 application topically once a day  cyanocobalamin 1000 mcg oral tablet: 1 tab(s) orally once a day  Daily Pedro oral tablet: 1 tab(s) orally once a day  donepezil 5 mg oral tablet: 1 tab(s) orally once a day (at bedtime)  finasteride 5 mg oral tablet: 1 tab(s) orally once a day  melatonin 3 mg oral tablet: 1 tab(s) orally once a day (at bedtime)  memantine 10 mg oral tablet: 1 tab(s) orally 2 times a day  metoprolol succinate 25 mg oral tablet, extended release: 1 tab(s) orally once a day  MiraLax oral powder for reconstitution: 1 packet(s) orally once a day  mirtazapine 7.5 mg oral tablet: 1 tab(s) orally once a day (at bedtime)  pantoprazole 40 mg oral delayed release tablet: 1 tab(s) orally once a day (before a meal)  Pravachol 20 mg oral tablet: 1 tab(s) orally once a day (at bedtime)  SEROquel 25 mg oral tablet: 0.5 tab(s) orally once a day (at bedtime)   thiamine 100 mg oral tablet: 1 tab(s) orally once a day  Tylenol 325 mg oral tablet: 2 tab(s) orally every 6 hours, As Needed - for mild pain   zinc sulfate 220 mg oral capsule: 1 cap(s) orally once a day

## 2022-08-26 NOTE — DISCHARGE NOTE PROVIDER - NSDCCPCAREPLAN_GEN_ALL_CORE_FT
PRINCIPAL DISCHARGE DIAGNOSIS  Diagnosis: Worsening heart failure  Assessment and Plan of Treatment: Follow up with your Cardiologist, Dr. Vazquez, within 1-2 weeks of discharge - please call to make an appointment.  Weigh yourself daily.  If you gain 3lbs in 3 days, or 5lbs in a week call your Health Care Provider.  Do not eat or drink foods containing more than 2000mg of salt (sodium) in your diet every day.  Call your Health Care Provider if you have any swelling or increased swelling in your feet, ankles, and/or stomach.  Take all of your medication as directed.  If you become dizzy call your Health Care Provider.        SECONDARY DISCHARGE DIAGNOSES  Diagnosis: Chronic atrial fibrillation  Assessment and Plan of Treatment: Atrial fibrillation is the most common heart rhythm problem.  The condition puts you at risk for has stroke and heart attack  It helps if you control your blood pressure, not drink more than 1-2 alcohol drinks per day, cut down on caffeine, getting treatment for over active thyroid gland, and get regular exercise  Call your doctor if you feel your heart racing or beating unusually, chest tightness or pain, lightheaded, faint, shortness of breath especially with exercise  It is important to take your heart medication as prescribed      Diagnosis: Wound of right foot  Assessment and Plan of Treatment: Right foot wound - cleanse with saline daily, apply iodosorb to lateral foot wound and cover with allevn foam.  Daily dressing changes with betadine to bilateral heels.  [facility] at [address] [place] [address].  Follow up with your podiatrist upon discharge.    Diagnosis: HTN (hypertension)  Assessment and Plan of Treatment: Low salt diet  Activity as tolerated.  Take all medication as prescribed.  Follow up with your medical doctor for routine blood pressure monitoring at your next visit.  Notify your doctor if you have any of the following symptoms:   Dizziness, Lightheadedness, Blurry vision, Headache, Chest pain, Shortness of breath      Diagnosis: Dementia  Assessment and Plan of Treatment: Fall precautions  Aspiration precautions    Diagnosis: DM2 (diabetes mellitus, type 2)  Assessment and Plan of Treatment: Make sure you get your HgA1c checked every three months.  If you take oral diabetes medications, check your blood glucose two times a day.  If you take insulin, check your blood glucose before meals and at bedtime.  It's important not to skip any meals.  Keep a log of your blood glucose results and always take it with you to your doctor appointments.  Keep a list of your current medications including injectables and over the counter medications and bring this medication list with you to all your doctor appointments.  If you have not seen your ophthalmologist this year call for appointment.  Check your feet daily for redness, sores, or openings. Do not self treat. If no improvement in two days call your primary care physician for an appointment.  Low blood sugar (hypoglycemia) is a blood sugar below 70mg/dl. Check your blood sugar if you feel signs/symptoms of hypoglycemia. If your blood sugar is below 70 take 15 grams of carbohydrates (ex 4 oz of apple juice, 3-4 glucose tablets, or 4-6 oz of regular soda) wait 15 minutes and repeat blood sugar to make sure it comes up above 70.  If your blood sugar is above 70 and you are due for a meal, have a meal.  If you are not due for a meal have a snack.  This snack helps keeps your blood sugar at a safe range.       PRINCIPAL DISCHARGE DIAGNOSIS  Diagnosis: Worsening heart failure  Assessment and Plan of Treatment: Your Bumex was increased to 0.5mg oral daily.  Follow up with your Cardiologist, Dr. Vazquez, within 1-2 weeks of discharge - please call to make an appointment.  Weigh yourself daily.  If you gain 3lbs in 3 days, or 5lbs in a week call your Health Care Provider.  Do not eat or drink foods containing more than 2000mg of salt (sodium) in your diet every day.  Call your Health Care Provider if you have any swelling or increased swelling in your feet, ankles, and/or stomach.  Take all of your medication as directed.  If you become dizzy call your Health Care Provider.        SECONDARY DISCHARGE DIAGNOSES  Diagnosis: Chronic atrial fibrillation  Assessment and Plan of Treatment: Atrial fibrillation is the most common heart rhythm problem.  The condition puts you at risk for has stroke and heart attack  It helps if you control your blood pressure, not drink more than 1-2 alcohol drinks per day, cut down on caffeine, getting treatment for over active thyroid gland, and get regular exercise  Call your doctor if you feel your heart racing or beating unusually, chest tightness or pain, lightheaded, faint, shortness of breath especially with exercise  It is important to take your heart medication as prescribed      Diagnosis: Wound of right foot  Assessment and Plan of Treatment: Right foot wound - cleanse with saline daily, apply iodosorb to lateral foot wound and cover with allevn foam.  Daily dressing changes with betadine to bilateral heels.  [facility] at [address] [place] [address].  Follow up with your podiatrist upon discharge.    Diagnosis: HTN (hypertension)  Assessment and Plan of Treatment: Low salt diet  Activity as tolerated.  Take all medication as prescribed.  Follow up with your medical doctor for routine blood pressure monitoring at your next visit.  Notify your doctor if you have any of the following symptoms:   Dizziness, Lightheadedness, Blurry vision, Headache, Chest pain, Shortness of breath      Diagnosis: Dementia  Assessment and Plan of Treatment: Fall precautions  Aspiration precautions    Diagnosis: DM2 (diabetes mellitus, type 2)  Assessment and Plan of Treatment: Make sure you get your HgA1c checked every three months.  If you take oral diabetes medications, check your blood glucose two times a day.  If you take insulin, check your blood glucose before meals and at bedtime.  It's important not to skip any meals.  Keep a log of your blood glucose results and always take it with you to your doctor appointments.  Keep a list of your current medications including injectables and over the counter medications and bring this medication list with you to all your doctor appointments.  If you have not seen your ophthalmologist this year call for appointment.  Check your feet daily for redness, sores, or openings. Do not self treat. If no improvement in two days call your primary care physician for an appointment.  Low blood sugar (hypoglycemia) is a blood sugar below 70mg/dl. Check your blood sugar if you feel signs/symptoms of hypoglycemia. If your blood sugar is below 70 take 15 grams of carbohydrates (ex 4 oz of apple juice, 3-4 glucose tablets, or 4-6 oz of regular soda) wait 15 minutes and repeat blood sugar to make sure it comes up above 70.  If your blood sugar is above 70 and you are due for a meal, have a meal.  If you are not due for a meal have a snack.  This snack helps keeps your blood sugar at a safe range.       PRINCIPAL DISCHARGE DIAGNOSIS  Diagnosis: Worsening heart failure  Assessment and Plan of Treatment: Your Bumex was increased to 0.5mg oral daily.  Follow up with your Cardiologist, Dr. Vazquez, within 1-2 weeks of discharge - please call to make an appointment.  Weigh yourself daily.  If you gain 3lbs in 3 days, or 5lbs in a week call your Health Care Provider.  Do not eat or drink foods containing more than 2000mg of salt (sodium) in your diet every day.  Avoid too much fluid intake.  Call your Health Care Provider if you have any swelling or increased swelling in your feet, ankles, and/or stomach.  Take all of your medication as directed.  If you become dizzy call your Health Care Provider.        SECONDARY DISCHARGE DIAGNOSES  Diagnosis: Chronic atrial fibrillation  Assessment and Plan of Treatment: Atrial fibrillation is the most common heart rhythm problem.  The condition puts you at risk for has stroke and heart attack  It helps if you control your blood pressure, not drink more than 1-2 alcohol drinks per day, cut down on caffeine, getting treatment for over active thyroid gland, and get regular exercise  Call your doctor if you feel your heart racing or beating unusually, chest tightness or pain, lightheaded, faint, shortness of breath especially with exercise  It is important to take your heart medication as prescribed      Diagnosis: Wound of right foot  Assessment and Plan of Treatment: Right foot wound - cleanse with saline daily, apply iodosorb to lateral foot wound and cover with allevn foam.  Daily dressing changes with betadine to bilateral heels.  [facility] at [address] [place] [address].  Follow up with your podiatrist upon discharge.    Diagnosis: HTN (hypertension)  Assessment and Plan of Treatment: Low salt diet  Activity as tolerated.  Take all medication as prescribed.  Follow up with your medical doctor for routine blood pressure monitoring at your next visit.  Notify your doctor if you have any of the following symptoms:   Dizziness, Lightheadedness, Blurry vision, Headache, Chest pain, Shortness of breath      Diagnosis: Dementia  Assessment and Plan of Treatment: Fall precautions  Aspiration precautions    Diagnosis: DM2 (diabetes mellitus, type 2)  Assessment and Plan of Treatment: Make sure you get your HgA1c checked every three months.  If you take oral diabetes medications, check your blood glucose two times a day.  If you take insulin, check your blood glucose before meals and at bedtime.  It's important not to skip any meals.  Keep a log of your blood glucose results and always take it with you to your doctor appointments.  Keep a list of your current medications including injectables and over the counter medications and bring this medication list with you to all your doctor appointments.  If you have not seen your ophthalmologist this year call for appointment.  Check your feet daily for redness, sores, or openings. Do not self treat. If no improvement in two days call your primary care physician for an appointment.  Low blood sugar (hypoglycemia) is a blood sugar below 70mg/dl. Check your blood sugar if you feel signs/symptoms of hypoglycemia. If your blood sugar is below 70 take 15 grams of carbohydrates (ex 4 oz of apple juice, 3-4 glucose tablets, or 4-6 oz of regular soda) wait 15 minutes and repeat blood sugar to make sure it comes up above 70.  If your blood sugar is above 70 and you are due for a meal, have a meal.  If you are not due for a meal have a snack.  This snack helps keeps your blood sugar at a safe range.

## 2022-08-26 NOTE — DISCHARGE NOTE PROVIDER - HOSPITAL COURSE
89yoM, hard of hearing w/ hx of dementia (baseline AAOx1), chronic Afib (not on AC, only ASA) s/p watchman, HFpEF s/p AICD, s/p TAVR, HTN, HLD, DM2, BPH, SCC, spinal stenosis, presenting from [facility] senior living after episode of hypoxia to O2 sat 70s and lethargy. Imaging with pulm edema and b/l pleural effusions. Likely 2/2 ADHF, improving w/ IV diuresis. Cardiology following.       Acute on chronic combined systolic and diastolic congestive heart failure.   Hx of HFpEF, recent admission earlier in month for CHF exacerbation. Recently on Bumex 0.5mg PO M/W/F/Sat at home. Presented this admission for episode of hypoxia to 70s at assisted living facility. CXR showing evidence of pulm edema and b/l pleural effusions. hsTrop 113 (previously 93->75), serum pro-BNP 5631. EKG similar to prior. Last TTE (8/9): EF 50%; global hypokinesis of LV; severe concentric LVH; decreased RV function. EKG   - strict I/Os, daily weights, fluid restriction  - c/w Bumex 1mg IVP daily-- planned to transition to PO w/ Bumex 0.5mg daily but given clinical presentation worsening, resumed 1mg IVP daily, dose given this AM  - f/u ongoing Cardiology recs  - monitor electrolytes/renal function -- stable/WNL for now.    Hypoxia  / Acute respiratory failure.   Pt presented after episode of hypoxia to 70s on RA at assisted living facility. However, has been sating well on RA during transport by EMS and in ED.  - Unclear etiology, likely related to ADHF  - was on 2L O2NC after recent admission earlier in Aug 2022 for CHF exacerbation  - continue to monitor respiratory status, currently sating well on RA  - plan for ADHF as above.    Chronic atrial fibrillation.   Hx of chronic afib s/p watchman; rate controlled  - CHADSVASC score 5; not on AC, likely 2/2 frequent falls  - EKG (8/21): Afib, RBBB, HR 78, QTc 522; similar to previous EKG  - c/w home metoprolol.    Wound of right foot.   Pt with lateral R foot wound; per recent assessment by PCP/Dana on 8/18/22, 2cm x 2cm wound on lateral right foot with "drainage and redness" and was ordered for Augmentin (8/18-8/25).  - possibly sustained from recent fall and poor wound healing  - c/w Augmentin for now to complete 7-day course as ordered by PCP/Dana (last day 8/25 = today)  - c/w wound care.    Frequent falls.   Hx of frequent falls, last fall appears to be earlier in the month that prompted recent admission. Ambulates via wheelchair at [facility].  - likely etiology of multiple ecchymoses   - PT eval  - fall precautions.    Dementia.   Hx of Dementia, believed to be vascular dementia per outpt records.   - Baseline AAOx1, appears to be at baseline  - c/w home memantine and donepezil  - fall/aspiration precautions.    HTN (hypertension).   - c/w home metoprolol succinate.    HLD (hyperlipidemia).   - c/w home statin equivalent.    BPH (benign prostatic hyperplasia).   - c/w home finasteride.    DM2 (diabetes mellitus, type 2).   Hx of DM2, not on meds at home. HbA1c (8/7): 6.2%  - monitor BG on BMPs  - CC diet.    GOC: MOLST and HCP documents from [facility] in physical chart reviewed; pt is DNR/DNI. 89yoM, hard of hearing w/ hx of dementia (baseline AAOx1), chronic Afib (not on AC, only ASA) s/p watchman, HFpEF s/p AICD, s/p TAVR, HTN, HLD, DM2, BPH, SCC, spinal stenosis, presenting from [facility] senior living after episode of hypoxia to O2 sat 70s and lethargy. Imaging with pulm edema and b/l pleural effusions. Likely 2/2 ADHF, improved w/ IV diuresis. Cardiology following.       Acute on chronic combined systolic and diastolic congestive heart failure.   Hx of HFpEF, recent admission earlier in month for CHF exacerbation. Recently on Bumex 0.5mg PO M/W/F/Sat at home. Presented this admission for episode of hypoxia to 70s at assisted living facility. CXR showing evidence of pulm edema and b/l pleural effusions. hsTrop 113 (previously 93->75), serum pro-BNP 5631. EKG similar to prior. Last TTE (8/9): EF 50%; global hypokinesis of LV; severe concentric LVH; decreased RV function. EKG   - strict I/Os, daily weights, fluid restriction  - patient given Bumex 1mg IVP daily - transitioned to Bumex 0.5mg oral daily  - Cardiology following  - monitored electrolytes/renal function -- stable/WNL for now.    Hypoxia  / Acute respiratory failure.   Pt presented after episode of hypoxia to 70s on RA at assisted living facility. However, has been sating well on RA during transport by EMS and in ED.  - Unclear etiology, likely related to ADHF  - was on 2L O2NC after recent admission earlier in Aug 2022 for CHF exacerbation  - continue to monitor respiratory status, currently sating well on RA  - plan for ADHF as above.    Chronic atrial fibrillation.   Hx of chronic afib s/p watchman; rate controlled  - CHADSVASC score 5; not on AC, likely 2/2 frequent falls  - EKG (8/21): Afib, RBBB, HR 78, QTc 522; similar to previous EKG  - c/w home metoprolol.    Wound of right foot.   Pt with lateral R foot wound; per recent assessment by PCP/Dana on 8/18/22, 2cm x 2cm wound on lateral right foot with "drainage and redness" and was ordered for Augmentin (8/18-8/25).  - possibly sustained from recent fall and poor wound healing  - Augmentin 7-day course completed  - c/w wound care.    Frequent falls.   Hx of frequent falls, last fall appears to be earlier in the month that prompted recent admission. Ambulates via wheelchair at [facility].  - likely etiology of multiple ecchymoses   - PT eval  - fall precautions.    Dementia.   Hx of Dementia, believed to be vascular dementia per outpt records.   - Baseline AAOx1, appears to be at baseline  - c/w home memantine and donepezil  - fall/aspiration precautions.    HTN (hypertension).   - c/w home metoprolol succinate.    HLD (hyperlipidemia).   - c/w home statin equivalent.    BPH (benign prostatic hyperplasia).   - c/w home finasteride.    DM2 (diabetes mellitus, type 2).   Hx of DM2, not on meds at home. HbA1c (8/7): 6.2%  - monitor BG on BMPs  - CC diet.    GOC: MOLST and HCP documents from [facility] in physical chart reviewed; pt is DNR/DNI.    Patient is medically cleared for discharge, by Dr. Stapleton, with PCP, Cardiology, and Podiatry follow up. 89yoM, hard of hearing w/ hx of dementia (baseline AAOx1), chronic Afib (not on AC, only ASA) s/p watchman, HFpEF s/p AICD, s/p TAVR, HTN, HLD, DM2, BPH, SCC, spinal stenosis, presenting from [facility] senior living after episode of hypoxia to O2 sat 70s and lethargy. Imaging with pulm edema and b/l pleural effusions. Likely 2/2 ADHF, improved w/ IV diuresis. Cardiology following.       Acute on chronic combined systolic and diastolic congestive heart failure.   Hx of HFpEF, recent admission earlier in month for CHF exacerbation. Recently on Bumex 0.5mg PO M/W/F/Sat at home. Presented this admission for episode of hypoxia to 70s at assisted living facility. CXR showing evidence of pulm edema and b/l pleural effusions. hsTrop 113 (previously 93->75), serum pro-BNP 5631. EKG similar to prior. Last TTE (8/9): EF 50%; global hypokinesis of LV; severe concentric LVH; decreased RV function. EKG   - strict I/Os, daily weights, fluid restriction  - patient given Bumex 1mg IVP daily - transitioned to Bumex 0.5mg oral daily  - Cardiology following  - monitored electrolytes/renal function -- stable/WNL for now.    Hypoxia  / Acute respiratory failure.   Pt presented after episode of hypoxia to 70s on RA at assisted living facility. However, has been sating well on RA during transport by EMS and in ED.  - Unclear etiology, likely related to ADHF  - was on 2L O2NC after recent admission earlier in Aug 2022 for CHF exacerbation  - continue to monitor respiratory status, currently sating well on RA  - plan for ADHF as above.    Chronic atrial fibrillation.   Hx of chronic afib s/p watchman; rate controlled  - CHADSVASC score 5; not on AC, likely 2/2 frequent falls  - EKG (8/21): Afib, RBBB, HR 78, QTc 522; similar to previous EKG  - c/w home metoprolol.    Wound of right foot.   Pt with lateral R foot wound; per recent assessment by PCP/Dana on 8/18/22, 2cm x 2cm wound on lateral right foot with "drainage and redness" and was ordered for Augmentin (8/18-8/25).  - possibly sustained from recent fall and poor wound healing  - Augmentin 7-day course completed  - c/w wound care.    Frequent falls.   Hx of frequent falls, last fall appears to be earlier in the month that prompted recent admission. Ambulates via wheelchair at [facility].  - likely etiology of multiple ecchymoses   - PT eval  - fall precautions.    Dementia.   Hx of Dementia, believed to be vascular dementia per outpt records.   - Baseline AAOx1, appears to be at baseline  - c/w home memantine and donepezil  - fall/aspiration precautions.    HTN (hypertension).   - c/w home metoprolol succinate.    HLD (hyperlipidemia).   - c/w home statin equivalent.    BPH (benign prostatic hyperplasia).   - c/w home finasteride.    DM2 (diabetes mellitus, type 2).   Hx of DM2, not on meds at home. HbA1c (8/7): 6.2%  - monitor BG on BMPs  - CC diet.    GOC: MOLST and HCP documents from [facility] in physical chart reviewed; pt is DNR/DNI.    Patient is medically cleared for discharge, by Dr. Stapleton, with PCP, Cardiology, Wound Care, and Podiatry follow up.

## 2022-08-26 NOTE — PROGRESS NOTE ADULT - SUBJECTIVE AND OBJECTIVE BOX
DATE OF SERVICE: 08-26-22 @ 13:45    Patient is a 89y old  Male who presents with a chief complaint of hypoxia, lethargy (26 Aug 2022 12:50)      INTERVAL HISTORY: In no acute distress.     REVIEW OF SYSTEMS: Limited contributor  CONSTITUTIONAL: No weakness  EYES/ENT: No visual changes;  No throat pain   NECK: No pain or stiffness  RESPIRATORY: No cough, wheezing; No shortness of breath  CARDIOVASCULAR: No chest pain or palpitations  GASTROINTESTINAL: No abdominal  pain. No nausea, vomiting, or hematemesis  GENITOURINARY: No dysuria, frequency or hematuria  NEUROLOGICAL: No stroke like symptoms  SKIN: No rashes    	  MEDICATIONS:  buMETAnide 0.5 milliGRAM(s) Oral daily  metoprolol succinate ER 25 milliGRAM(s) Oral daily        PHYSICAL EXAM:  T(C): 36.3 (08-26-22 @ 11:44), Max: 36.6 (08-26-22 @ 04:21)  HR: 93 (08-26-22 @ 11:44) (60 - 93)  BP: 110/67 (08-26-22 @ 11:44) (103/66 - 114/71)  RR: 18 (08-26-22 @ 11:44) (18 - 18)  SpO2: 93% (08-26-22 @ 11:44) (93% - 98%)  Wt(kg): --  I&O's Summary    25 Aug 2022 07:01  -  26 Aug 2022 07:00  --------------------------------------------------------  IN: 480 mL / OUT: 0 mL / NET: 480 mL          Appearance: In no distress	  HEENT:    PERRL, EOMI	  Cardiovascular:  S1 S2, No JVD  Respiratory: Lungs clear to auscultation	  Gastrointestinal:  Soft, Non-tender, + BS	  Vascularature:  No edema of LE  Psychiatric: Appropriate affect   Neuro: no acute focal deficits           08-26    142  |  108  |  22  ----------------------------<  101<H>  3.7   |  23  |  0.83    Ca    8.8      26 Aug 2022 06:04  Phos  3.2     08-26  Mg     2.1     08-26          Labs personally reviewed      ASSESSMENT/PLAN: 	      89M hard of hearing w/ hx of dementia (baseline AAOx1), chronic Afib (not on AC, only ASA) s/p watchman, HFpEF s/p AICD, s/p TAVR, HTN, HLD, DM2, BPH, SCC, spinal stenosis, presenting from [facility] senior living after episode of hypoxia to O2 sat 70s and lethargy. Unable to obtain meaningful history from pt given dementia. At time of encounter, pt awake and alert, but only minimally conversive and not appropriately answering most questions. Attempted to call [facility] in [place] for collateral information, but night staff there was unclear of events. Per ED documentation, pt with unknown duration of low O2 sat with increased lethargy per [facility] staff and pt was sating 96-97% on RA during transport by EMS. Of note, pt had recent hospitalization (8/6-8/12) for fall and CHF exacerbation. Reviewed recent outpt PCP/[facility] records on [employer]; pt noted to have been on 2L O2NC at [facility] since recent admission and was to start trial off O2 with goal O2 sat 92-94%. Pt also undergoing 7-day course of Augmentin (8/18-8/25) for R foot wound/cellulitis due to "drainage and redness."      Problem/Plan -1  Problem: Acute diastolic HF  Plan:  - Follows with Dr. Trevor Schneider for OP Cardiology  - CXR shows pulmonary edema wit B/L pleural effusions  - Echo from 8/2022 shows mild mitral stenosis, Severe concentric left ventricular hypertrophy, Global hypokinesis of the left ventricle,  Severe reversible diastolic dysfunction (Stage III),  Normal right ventricular size with decrease right  ventricular function, borderline pulmonary pressures.  - Patient with preserved EF 50%, ICD in place   - BNP 5631 (down from previous admission)  - Takes Bumex 0.5mg PO three times per week  - Strict I&Os, daily weights  - appears well diuresed. Transitioned to Bumex 0.5mg PO daily.     Problem/Plan -2  Problem: AF  Plan:  - s/p Watchman, not on AC  - c/w Metoprolol    Problem/Plan -3  Problem: HTN  Plan:  - c/w Toprol XL 25mg PO daily    Problem/Plan -4  Problem: CAD  Plan:  - Denies chest pain although limited historian  - Troponin elevated likely in the setting of ADHF exacerbation  - EKG with no ischemic changes noted  - c/w CIERRA White-RACHAEL Vazquez DO [facility]  Cardiovascular Medicine  [address] [place] [address], [address]  Office: [phone]  Cell: [phone] DATE OF SERVICE: 08-26-22 @ 13:45    Patient is a 89y old  Male who presents with a chief complaint of hypoxia, lethargy (26 Aug 2022 12:50)      INTERVAL HISTORY: In no acute distress.     REVIEW OF SYSTEMS: Limited contributor  CONSTITUTIONAL: No weakness  EYES/ENT: No visual changes;  No throat pain   NECK: No pain or stiffness  RESPIRATORY: No cough, wheezing; No shortness of breath  CARDIOVASCULAR: No chest pain or palpitations  GASTROINTESTINAL: No abdominal  pain. No nausea, vomiting, or hematemesis  GENITOURINARY: No dysuria, frequency or hematuria  NEUROLOGICAL: No stroke like symptoms  SKIN: No rashes    Tele reviewed: AF 60s  	  MEDICATIONS:  buMETAnide 0.5 milliGRAM(s) Oral daily  metoprolol succinate ER 25 milliGRAM(s) Oral daily        PHYSICAL EXAM:  T(C): 36.3 (08-26-22 @ 11:44), Max: 36.6 (08-26-22 @ 04:21)  HR: 93 (08-26-22 @ 11:44) (60 - 93)  BP: 110/67 (08-26-22 @ 11:44) (103/66 - 114/71)  RR: 18 (08-26-22 @ 11:44) (18 - 18)  SpO2: 93% (08-26-22 @ 11:44) (93% - 98%)  Wt(kg): --  I&O's Summary    25 Aug 2022 07:01  -  26 Aug 2022 07:00  --------------------------------------------------------  IN: 480 mL / OUT: 0 mL / NET: 480 mL          Appearance: In no distress	  HEENT:    PERRL, EOMI	  Cardiovascular:  S1 S2, No JVD  Respiratory: Lungs clear to auscultation	  Gastrointestinal:  Soft, Non-tender, + BS	  Vascularature:  No edema of LE  Psychiatric: Appropriate affect   Neuro: no acute focal deficits           08-26    142  |  108  |  22  ----------------------------<  101<H>  3.7   |  23  |  0.83    Ca    8.8      26 Aug 2022 06:04  Phos  3.2     08-26  Mg     2.1     08-26          Labs personally reviewed      ASSESSMENT/PLAN: 	      89M hard of hearing w/ hx of dementia (baseline AAOx1), chronic Afib (not on AC, only ASA) s/p watchman, HFpEF s/p AICD, s/p TAVR, HTN, HLD, DM2, BPH, SCC, spinal stenosis, presenting from [facility] senior living after episode of hypoxia to O2 sat 70s and lethargy. Unable to obtain meaningful history from pt given dementia. At time of encounter, pt awake and alert, but only minimally conversive and not appropriately answering most questions. Attempted to call [facility] in [place] for collateral information, but night staff there was unclear of events. Per ED documentation, pt with unknown duration of low O2 sat with increased lethargy per [facility] staff and pt was sating 96-97% on RA during transport by EMS. Of note, pt had recent hospitalization (8/6-8/12) for fall and CHF exacerbation. Reviewed recent outpt PCP/[facility] records on [employer]; pt noted to have been on 2L O2NC at [facility] since recent admission and was to start trial off O2 with goal O2 sat 92-94%. Pt also undergoing 7-day course of Augmentin (8/18-8/25) for R foot wound/cellulitis due to "drainage and redness."      Problem/Plan -1  Problem: Acute diastolic HF  Plan:  - Follows with Dr. Trevor Schneider for OP Cardiology  - CXR shows pulmonary edema wit B/L pleural effusions  - Echo from 8/2022 shows mild mitral stenosis, Severe concentric left ventricular hypertrophy, Global hypokinesis of the left ventricle,  Severe reversible diastolic dysfunction (Stage III),  Normal right ventricular size with decrease right  ventricular function, borderline pulmonary pressures.  - Patient with preserved EF 50%, ICD in place   - BNP 5631 (down from previous admission)  - Takes Bumex 0.5mg PO three times per week  - Strict I&Os, daily weights  - appears well diuresed. Transitioned to Bumex 0.5mg PO daily.     Problem/Plan -2  Problem: AF  Plan:  - s/p Watchman, not on AC  - c/w Metoprolol    Problem/Plan -3  Problem: HTN  Plan:  - c/w Toprol XL 25mg PO daily    Problem/Plan -4  Problem: CAD  Plan:  - Denies chest pain although limited historian  - Troponin elevated likely in the setting of ADHF exacerbation  - EKG with no ischemic changes noted  - c/w CIERRA White-NP   Clarence Vazquez DO [facility]  Cardiovascular Medicine  [address] [place] [address], [address]  Office: [phone]  Cell: [phone]

## 2022-08-26 NOTE — DISCHARGE NOTE PROVIDER - NSDCFUADDAPPT_GEN_ALL_CORE_FT
APPTS ARE READY TO BE MADE: [X] YES    Best Family or Patient Contact (if needed): Daughter at [phone] or [phone]    Additional Information about above appointments (if needed):    1: Cardiology within 2 weeks of discharge (to establish care if want to change provider to Dr. Vazquez)  2:   3:     Other comments or requests:    APPTS ARE READY TO BE MADE: [X] YES    Best Family or Patient Contact (if needed): Daughter at [phone] or [phone]    Additional Information about above appointments (if needed):    1: Cardiology within 2 weeks of discharge (to establish care if want to change provider to Dr. Vazquez)  2: PCP  3:     Other comments or requests:    APPTS ARE READY TO BE MADE: [X] YES    Best Family or Patient Contact (if needed): Daughter at [phone] or [phone]    Additional Information about above appointments (if needed):    1: Cardiology within 2 weeks of discharge (to establish care if want to change provider to Dr. Vazquez)  2: PCP  3:     Other comments or requests:   Patient was provided with follow up request details and was advised to call to schedule follow up within specified time frame.    Follow up with your cardiologist within 1-2 weeks of discharge - please call to make an appointment.    Follow up with your primary medical doctor within one week of discharge - please call to make an appointment.    [facility] [phone] within one week of discharge - please call to make an appointment.    Follow up with your podiatrist upon discharge - please call to make an appointment.          APPTS ARE READY TO BE MADE: [X] YES    Best Family or Patient Contact (if needed): Daughter at [phone] or [phone]    Additional Information about above appointments (if needed):    1: Cardiology within 2 weeks of discharge (to establish care if want to change provider to Dr. Vazquez)  2: PCP  3: Wound care center    Other comments or requests:   Patient was provided with follow up request details and was advised to call to schedule follow up within specified time frame.

## 2022-08-26 NOTE — PROGRESS NOTE ADULT - SUBJECTIVE AND OBJECTIVE BOX
Patient is a 89y old  Male who presents with a chief complaint of hypoxia, lethargy /ADHF (26 Aug 2022 11:52)       INTERVAL HPI/OVERNIGHT EVENTS:  Patient seen and evaluated at bedside.  Pt is resting comfortable in NAD.    Allergies    No Known Allergies    Intolerances        Vital Signs Last 24 Hrs  T(C): 36.3 (26 Aug 2022 11:44), Max: 36.6 (26 Aug 2022 04:21)  T(F): 97.3 (26 Aug 2022 11:44), Max: 97.8 (26 Aug 2022 04:21)  HR: 93 (26 Aug 2022 11:44) (60 - 93)  BP: 110/67 (26 Aug 2022 11:44) (103/66 - 114/71)  BP(mean): --  RR: 18 (26 Aug 2022 11:44) (18 - 18)  SpO2: 93% (26 Aug 2022 11:44) (93% - 98%)    Parameters below as of 26 Aug 2022 11:44  Patient On (Oxygen Delivery Method): room air        LABS:    08-26    142  |  108  |  22  ----------------------------<  101<H>  3.7   |  23  |  0.83    Ca    8.8      26 Aug 2022 06:04  Phos  3.2     08-26  Mg     2.1     08-26          CAPILLARY BLOOD GLUCOSE          Lower Extremity Physical Exam:  Vasular: DP/PT 0/4, B/L, CFT <3 seconds B/L, Temperature gradient WNL, B/L.   Neuro: unable to assess.  Skin: bilateral heel deep tissue injuries full thickness with no signs of infection secondary to pressure present on admission, right lateral midfoot ulcer to subQ secondary to pressure present on admission with no signs of infection

## 2022-08-26 NOTE — PROGRESS NOTE ADULT - SUBJECTIVE AND OBJECTIVE BOX
Christy Byers M.D.  Division of Hospital Medicine  Available on TEAMS    Patient is a 89y old  Male who presents with a chief complaint of hypoxia, lethargy (26 Aug 2022 12:50)    SUBJECTIVE / OVERNIGHT EVENTS: Patient seen and examined. No acute overnight events, no subjective complaints. Plan to initiate PO diuretic today and monitor in preparation for likely discharge this weekend back to [facility] Updated patient's daughter on this plan and she is in agreement -- she will be visiting the patient at [facility] today.     OBJECTIVE:  Vital Signs Last 24 Hrs  T(F): 97.3 (26 Aug 2022 11:44), Max: 97.8 (26 Aug 2022 04:21)  HR: 93 (26 Aug 2022 11:44) (60 - 93)  BP: 110/67 (26 Aug 2022 11:44) (103/66 - 114/71)  RR: 18 (26 Aug 2022 11:44) (18 - 18)  SpO2: 93% (26 Aug 2022 11:44) (93% - 98%)    Parameters below as of 26 Aug 2022 11:44  Patient On (Oxygen Delivery Method): room air    I&O's Summary  25 Aug 2022 07:01  -  26 Aug 2022 07:00  --------------------------------------------------------  IN: 480 mL / OUT: 0 mL / NET: 480 mL    Physical Examination:  GEN: elderly man, laying in bed in NAD  PSYCH: A&Ox1, hard of hearing, mood and affect appear calm  SKIN: B/L heel wounds, no e/o rash  NEURO: no focal neurologic deficits appreciated  NECK: supple, no e/o elevated JVP  RESPI: no accessory muscle use, B/L air entry, CTAB   CARDIO: irregular rate/rhythm, trace LE edema B/L  ABD: soft, NT, ND  EXT: patient able to move all extremities spontaneously  VASC: peripheral pulses palpated    Labs:  08-26  142  |  108  |  22  ----------------------------<  101<H>  3.7   |  23  |  0.83    Ca    8.8      26 Aug 2022 06:04  Phos  3.2     08-26  Mg     2.1     08-26    Consultant(s) Notes Reviewed: Cardiology  Care Discussed with Consultants/Other Providers: PJ Hendrix    MEDICATIONS  (STANDING):  ascorbic acid 500 milliGRAM(s) Oral daily  aspirin enteric coated 81 milliGRAM(s) Oral daily  atorvastatin 10 milliGRAM(s) Oral at bedtime  buMETAnide 0.5 milliGRAM(s) Oral daily  cadexomer iodine 0.9% Gel 1 Application(s) Topical daily  chlorhexidine 2% Cloths 1 Application(s) Topical daily  cyanocobalamin 1000 MICROGram(s) Oral daily  donepezil 5 milliGRAM(s) Oral at bedtime  enoxaparin Injectable 40 milliGRAM(s) SubCutaneous every 24 hours  finasteride 5 milliGRAM(s) Oral daily  melatonin 3 milliGRAM(s) Oral at bedtime  memantine 10 milliGRAM(s) Oral two times a day  metoprolol succinate ER 25 milliGRAM(s) Oral daily  mirtazapine 7.5 milliGRAM(s) Oral at bedtime  multivitamin 1 Tablet(s) Oral daily  pantoprazole    Tablet 40 milliGRAM(s) Oral before breakfast  polyethylene glycol 3350 17 Gram(s) Oral daily  QUEtiapine 25 milliGRAM(s) Oral at bedtime  thiamine 100 milliGRAM(s) Oral daily  zinc sulfate 220 milliGRAM(s) Oral daily    MEDICATIONS  (PRN):  acetaminophen     Tablet .. 650 milliGRAM(s) Oral every 6 hours PRN Temp greater or equal to 38C (100.4F), Mild Pain (1 - 3)

## 2022-08-27 NOTE — PROGRESS NOTE ADULT - SUBJECTIVE AND OBJECTIVE BOX
Patient is a 89y old  Male who presents with a chief complaint of hypoxia, lethargy (26 Aug 2022 13:45)      SUBJECTIVE / OVERNIGHT EVENTS:   No overnight events noted   Tele reviewed:  A fib in the 60's HR with PVC's       ADDITIONAL REVIEW OF SYSTEMS: no complaints presented     MEDICATIONS  (STANDING):  ascorbic acid 500 milliGRAM(s) Oral daily  aspirin enteric coated 81 milliGRAM(s) Oral daily  atorvastatin 10 milliGRAM(s) Oral at bedtime  buMETAnide 0.5 milliGRAM(s) Oral daily  cadexomer iodine 0.9% Gel 1 Application(s) Topical daily  chlorhexidine 2% Cloths 1 Application(s) Topical daily  cyanocobalamin 1000 MICROGram(s) Oral daily  donepezil 5 milliGRAM(s) Oral at bedtime  enoxaparin Injectable 40 milliGRAM(s) SubCutaneous every 24 hours  finasteride 5 milliGRAM(s) Oral daily  melatonin 3 milliGRAM(s) Oral at bedtime  memantine 10 milliGRAM(s) Oral two times a day  metoprolol succinate ER 25 milliGRAM(s) Oral daily  mirtazapine 7.5 milliGRAM(s) Oral at bedtime  multivitamin 1 Tablet(s) Oral daily  pantoprazole    Tablet 40 milliGRAM(s) Oral before breakfast  polyethylene glycol 3350 17 Gram(s) Oral daily  QUEtiapine 25 milliGRAM(s) Oral at bedtime  thiamine 100 milliGRAM(s) Oral daily  zinc sulfate 220 milliGRAM(s) Oral daily    MEDICATIONS  (PRN):  acetaminophen     Tablet .. 650 milliGRAM(s) Oral every 6 hours PRN Temp greater or equal to 38C (100.4F), Mild Pain (1 - 3)      CAPILLARY BLOOD GLUCOSE        I&O's Summary    26 Aug 2022 07:01  -  27 Aug 2022 07:00  --------------------------------------------------------  IN: 1030 mL / OUT: 0 mL / NET: 1030 mL        PHYSICAL EXAM:  Vital Signs Last 24 Hrs  T(C): 36.3 (27 Aug 2022 04:09), Max: 36.7 (26 Aug 2022 20:39)  T(F): 97.4 (27 Aug 2022 04:09), Max: 98 (26 Aug 2022 20:39)  HR: 74 (27 Aug 2022 04:09) (60 - 93)  BP: 101/65 (27 Aug 2022 04:09) (98/60 - 114/71)  BP(mean): --  RR: 18 (27 Aug 2022 04:09) (18 - 18)  SpO2: 94% (27 Aug 2022 04:09) (93% - 97%)    Parameters below as of 27 Aug 2022 04:09  Patient On (Oxygen Delivery Method): room air        PHYSICAL EXAM:  GEN: elderly man, laying in bed in NAD  PSYCH: A&Ox1, hard of hearing, mood and affect appear calm  SKIN: B/L heel wounds, no e/o rash  NEURO: no focal neurologic deficits appreciated  NECK: supple, no e/o elevated JVP  RESPI: no accessory muscle use, B/L air entry, CTAB   CARDIO: irregular rate/rhythm, trace LE edema B/L  ABD: soft, NT, ND  EXT: patient able to move all extremities spontaneously  VASC: peripheral pulses palpated      LABS:    08-27    140  |  106  |  22  ----------------------------<  112<H>  4.1   |  23  |  0.80    Ca    8.9      27 Aug 2022 06:25  Phos  3.2     08-26  Mg     2.1     08-27                  RADIOLOGY & ADDITIONAL TESTS:    Imaging Personally Reviewed:    Electrocardiogram Personally Reviewed:    COORDINATION OF CARE:  Care Discussed with Consultants/Other Providers [Y/N]:  Prior or Outpatient Records Reviewed [Y/N]:     Patient is a 89y old  Male who presents with a chief complaint of hypoxia, lethargy (26 Aug 2022 13:45)      SUBJECTIVE / OVERNIGHT EVENTS:   No overnight events noted   Tele reviewed:  A fib in the 60's HR with PVC's       ADDITIONAL REVIEW OF SYSTEMS: unable to obtain     MEDICATIONS  (STANDING):  ascorbic acid 500 milliGRAM(s) Oral daily  aspirin enteric coated 81 milliGRAM(s) Oral daily  atorvastatin 10 milliGRAM(s) Oral at bedtime  buMETAnide 0.5 milliGRAM(s) Oral daily  cadexomer iodine 0.9% Gel 1 Application(s) Topical daily  chlorhexidine 2% Cloths 1 Application(s) Topical daily  cyanocobalamin 1000 MICROGram(s) Oral daily  donepezil 5 milliGRAM(s) Oral at bedtime  enoxaparin Injectable 40 milliGRAM(s) SubCutaneous every 24 hours  finasteride 5 milliGRAM(s) Oral daily  melatonin 3 milliGRAM(s) Oral at bedtime  memantine 10 milliGRAM(s) Oral two times a day  metoprolol succinate ER 25 milliGRAM(s) Oral daily  mirtazapine 7.5 milliGRAM(s) Oral at bedtime  multivitamin 1 Tablet(s) Oral daily  pantoprazole    Tablet 40 milliGRAM(s) Oral before breakfast  polyethylene glycol 3350 17 Gram(s) Oral daily  QUEtiapine 25 milliGRAM(s) Oral at bedtime  thiamine 100 milliGRAM(s) Oral daily  zinc sulfate 220 milliGRAM(s) Oral daily    MEDICATIONS  (PRN):  acetaminophen     Tablet .. 650 milliGRAM(s) Oral every 6 hours PRN Temp greater or equal to 38C (100.4F), Mild Pain (1 - 3)      CAPILLARY BLOOD GLUCOSE        I&O's Summary    26 Aug 2022 07:01  -  27 Aug 2022 07:00  --------------------------------------------------------  IN: 1030 mL / OUT: 0 mL / NET: 1030 mL        PHYSICAL EXAM:  Vital Signs Last 24 Hrs  T(C): 36.3 (27 Aug 2022 04:09), Max: 36.7 (26 Aug 2022 20:39)  T(F): 97.4 (27 Aug 2022 04:09), Max: 98 (26 Aug 2022 20:39)  HR: 74 (27 Aug 2022 04:09) (60 - 93)  BP: 101/65 (27 Aug 2022 04:09) (98/60 - 114/71)  BP(mean): --  RR: 18 (27 Aug 2022 04:09) (18 - 18)  SpO2: 94% (27 Aug 2022 04:09) (93% - 97%)    Parameters below as of 27 Aug 2022 04:09  Patient On (Oxygen Delivery Method): room air        PHYSICAL EXAM:  GEN: elderly man, laying in bed in NAD  PSYCH: awake , no conversing much ( no change as per nurse) hard of hearing, appear calm  SKIN: B/L heel wounds, no e/o rash  NEURO: no focal neurologic deficits appreciated  NECK: supple, no e/o elevated JVP  RESPI: no accessory muscle use, B/L air entry, CTAB   CARDIO: irregular rate/rhythm, trace LE edema B/L  ABD: soft, NT, ND        LABS:    08-27    140  |  106  |  22  ----------------------------<  112<H>  4.1   |  23  |  0.80    Ca    8.9      27 Aug 2022 06:25  Phos  3.2     08-26  Mg     2.1     08-27                  RADIOLOGY & ADDITIONAL TESTS:    Imaging Personally Reviewed:    Electrocardiogram Personally Reviewed:    COORDINATION OF CARE:  Care Discussed with Consultants/Other Providers [Y/N]:  Prior or Outpatient Records Reviewed [Y/N]:

## 2022-08-27 NOTE — PROGRESS NOTE ADULT - NSPROGADDITIONALINFOA_GEN_ALL_CORE
Discussed with ACP     Plan is to dc back to the facility once ready , most likely on 8/27   Pt is to follow up with PCP, Cardio ,  PT  and Podiatry as stated above.  Patient also will need WOUND CARE outpt         [facility]   Available via TEAM   [phone]

## 2022-08-28 NOTE — PROGRESS NOTE ADULT - NUTRITIONAL ASSESSMENT
This patient has been assessed with a concern for Malnutrition and has been determined to have a diagnosis/diagnoses of Severe protein-calorie malnutrition.    This patient is being managed with:   Diet Minced and Moist-  1000mL Fluid Restriction ([id number])  Low Sodium  Supplement Feeding Modality:  Oral  Glucerna Shake Cans or Servings Per Day:  2       Frequency:  Daily  Entered: Aug 24 2022  8:31AM    
This patient has been assessed with a concern for Malnutrition and has been determined to have a diagnosis/diagnoses of Severe protein-calorie malnutrition.    This patient is being managed with:   Diet Minced and Moist-  1000mL Fluid Restriction ([id number])  Low Sodium  Supplement Feeding Modality:  Oral  Glucerna Shake Cans or Servings Per Day:  2       Frequency:  Daily  Entered: Aug 24 2022  8:31AM    
This patient has been assessed with a concern for Malnutrition and has been determined to have a diagnosis/diagnoses of Severe protein-calorie malnutrition.    This patient is being managed with:   Diet Minced and Moist-  1000mL Fluid Restriction ([id number])  Low Sodium  Supplement Feeding Modality:  Oral  Glucerna Shake Cans or Servings Per Day:  2       Frequency:  Daily  Entered: Aug 24 2022  8:31AM    
This patient has been assessed with a concern for Malnutrition and has been determined to have a diagnosis/diagnoses of Severe protein-calorie malnutrition.    This patient is being managed with:   Diet Minced and Moist-  1000mL Fluid Restriction ([id number])  Low Sodium  Supplement Feeding Modality:  Oral  Glucerna Shake Cans or Servings Per Day:  2       Frequency:  Daily  Entered: Aug 24 2022  8:31AM    
This patient has been assessed with a concern for Malnutrition and has been determined to have a diagnosis/diagnoses of Severe protein-calorie malnutrition.    This patient is being managed with:   Diet Minced and Moist-  1000mL Fluid Restriction ([id number])  Low Sodium  Supplement Feeding Modality:  Oral  Glucerna Shake Cans or Servings Per Day:  2       Frequency:  Daily  Entered: Aug 24 2022  8:31AM

## 2022-08-28 NOTE — PROGRESS NOTE ADULT - PROBLEM SELECTOR PLAN 6
Hx of Dementia, believed to be vascular dementia per outpt records.   - Baseline AAOx1, appears to be at baseline  - c/w home memantine and donepezil  - fall/aspiration precautions

## 2022-08-28 NOTE — PROGRESS NOTE ADULT - PROBLEM SELECTOR PLAN 8
- c/w home statin equivalent

## 2022-08-28 NOTE — PROGRESS NOTE ADULT - PROBLEM SELECTOR PLAN 5
Hx of frequent falls, last fall appears to be earlier in the month that prompted recent admission. Ambulates via wheelchair at [facility].  - likely etiology of multiple ecchymoses   - PT eval----> "Home PT; Return to A/L facility"   - fall precautions
Hx of frequent falls, last fall appears to be earlier in the month that prompted recent admission. Ambulates via wheelchair at [facility].  - likely etiology of multiple ecchymoses   - PT eval  - fall precautions
Hx of frequent falls, last fall appears to be earlier in the month that prompted recent admission. Ambulates via wheelchair at [facility].  - likely etiology of multiple ecchymoses   - PT eval----> "Home PT; Return to A/L facility"   - fall precautions
Hx of frequent falls, last fall appears to be earlier in the month that prompted recent admission. Ambulates via wheelchair at [facility].  - likely etiology of multiple ecchymoses   - PT eval  - fall precautions

## 2022-08-28 NOTE — PROGRESS NOTE ADULT - PROBLEM SELECTOR PROBLEM 4
Wound of right foot

## 2022-08-28 NOTE — PROGRESS NOTE ADULT - PROBLEM SELECTOR PLAN 1
Hx of HFpEF, recent admission earlier in month for CHF exacerbation. Recently on Bumex 0.5mg PO M/W/F/Sat at home. Presented this admission for episode of hypoxia to 70s at assisted living facility. CXR showing evidence of pulm edema and b/l pleural effusions. hsTrop 113 (previously 93->75), serum pro-BNP 5631. EKG similar to prior. Last TTE (8/9): EF 50%; global hypokinesis of LV; severe concentric LVH; decreased RV function. EKG   - strict I/Os, daily weights, fluid restriction  - c/w Bumex 1mg IVP daily-- will transition to PO tomorrow w/ Bumex 0.5mg daily and monitor response prior to plan for D/C, likely 8/25  - f/u Cardiology recs
Hx of HFpEF, recent admission earlier in month for CHF exacerbation. Recently on Bumex 0.5mg PO M/W/F/Sat at home. Presented this admission for episode of hypoxia to 70s at assisted living facility. CXR showing evidence of pulm edema and b/l pleural effusions. hsTrop 113 (previously 93->75), serum pro-BNP 5631. EKG similar to prior. Last TTE (8/9): EF 50%; global hypokinesis of LV; severe concentric LVH; decreased RV function. EKG   - strict I/Os, daily weights, fluid restriction  - c/w Bumex 1mg IVP daily-- planned to transition to PO w/ Bumex 0.5mg daily but given clinical presentation worsening, resumed 1mg IVP daily, dose given this AM  - f/u ongoing Cardiology recs  - monitor electrolytes/renal function -- stable/WNL for now
Hx of HFpEF, recent admission earlier in month for CHF exacerbation. Recently on Bumex 0.5mg PO M/W/F/Sat at home. Presented this admission for episode of hypoxia to 70s at assisted living facility. CXR showing evidence of pulm edema and b/l pleural effusions. hsTrop 113 (previously 93->75), serum pro-BNP 5631. EKG similar to prior. Last TTE (8/9): EF 50%; global hypokinesis of LV; severe concentric LVH; decreased RV function. EKG   - strict I/Os, daily weights, fluid restriction  - c/w Bumex 1mg IVP daily-- planned to transition to PO w/ Bumex 0.5mg daily but given clinical presentation this AM, resumed 1mg IVP daily   - f/u ongoing Cardiology recs
Hx of HFpEF, recent admission earlier in month for CHF exacerbation. Recently on Bumex 0.5mg PO M/W/F/Sat at home. Presented this admission for episode of hypoxia to 70s at assisted living facility. CXR showing evidence of pulm edema and b/l pleural effusions. hsTrop 113 (previously 93->75), serum pro-BNP 5631. EKG similar to prior. Last TTE (8/9): EF 50%; global hypokinesis of LV; severe concentric LVH; decreased RV function. EKG   - strict I/Os, daily weights, fluid restriction  -S/P Bumex 1mg IVP daily-->  PO w/ Bumex 0.5mg daily on 8/26  - f/u ongoing Cardiology recs  - monitor electrolytes/renal function ---> currently stable  plan to DC back to the facility/ COVID PCR neg on 8/26
Hx of HFpEF, recent admission earlier in month for CHF exacerbation. Recently on Bumex 0.5mg PO M/W/F/Sat at home. Presented this admission for episode of hypoxia to 70s at assisted living facility. CXR showing evidence of pulm edema and b/l pleural effusions. hsTrop 113 (previously 93->75), serum pro-BNP 5631. EKG similar to prior. Last TTE (8/9): EF 50%; global hypokinesis of LV; severe concentric LVH; decreased RV function. EKG   - strict I/Os, daily weights, fluid restriction  - c/w Bumex 1mg IVP daily-- transitioning to PO w/ Bumex 0.5mg daily today, 8/26  - f/u ongoing Cardiology recs  - monitor electrolytes/renal function -- stable/WNL for now
Hx of HFpEF, recent admission earlier in month for CHF exacerbation. Recently on Bumex 0.5mg PO M/W/F/Sat at home. Presented this admission for episode of hypoxia to 70s at assisted living facility. CXR showing evidence of pulm edema and b/l pleural effusions. hsTrop 113 (previously 93->75), serum pro-BNP 5631. EKG similar to prior. Last TTE (8/9): EF 50%; global hypokinesis of LV; severe concentric LVH; decreased RV function. EKG   - strict I/Os, daily weights, fluid restriction  -S/P Bumex 1mg IVP daily-->  PO w/ Bumex 0.5mg daily on 8/26  - f/u ongoing Cardiology recs  - monitor electrolytes/renal function ---> currently stable  plan to DC back to the facility/ COVID PCR neg on 8/26

## 2022-08-28 NOTE — CHART NOTE - NSCHARTNOTEFT_GEN_A_CORE
Request from Dr. Stapleton to facilitate patient discharge.  Medication reconciliation reviewed, revised, and resolved with Dr. Stapleton, who has medically cleared patient for discharge.  Please refer to discharge note for detailed hospital course.

## 2022-08-28 NOTE — DISCHARGE NOTE NURSING/CASE MANAGEMENT/SOCIAL WORK - NSDCVIVACCINE_GEN_ALL_CORE_FT
influenza, injectable, quadrivalent, preservative free; 10-Oct-2019 12:36; Ashley Figueroa (RN); [employer]; [id number] (Exp. Date: 30-Jun-2020); IntraMuscular; Deltoid Right.; 0.5 milliLiter(s); VIS (VIS Published: 15-Aug-2019, VIS Presented: 10-Oct-2019);   Tdap; 30-Jun-2019 18:38; Edith Almaraz (RN); [employer]; [id number] (Exp. Date: 04-Apr-2021); IntraMuscular; Deltoid Right.; 0.5 milliLiter(s); VIS (VIS Published: 09-May-2013, VIS Presented: 30-Jun-2019);   Tdap; 07-Oct-2019 06:56; Negar Burns (RN); [employer]; [id number] (Exp. Date: 08-Aug-2020); IntraMuscular; Deltoid Right.; 0.5 milliLiter(s); VIS (VIS Published: 09-May-2013, VIS Presented: 07-Oct-2019);   Tdap; 14-Jun-2021 20:02; Paige Gore (RN); [employer]; [id number] (Exp. Date: 11-Sep-2022); IntraMuscular; Deltoid Right.; 0.5 milliLiter(s); VIS (VIS Published: 09-May-2013, VIS Presented: 14-Jun-2021);

## 2022-08-28 NOTE — PROGRESS NOTE ADULT - PROBLEM SELECTOR PLAN 9
- c/w home finasteride

## 2022-08-28 NOTE — PROGRESS NOTE ADULT - REASON FOR ADMISSION
hypoxia, lethargy

## 2022-08-28 NOTE — PROGRESS NOTE ADULT - SUBJECTIVE AND OBJECTIVE BOX
Patient is a 89y old  Male who presents with a chief complaint of hypoxia, lethargy (27 Aug 2022 10:11)      SUBJECTIVE / OVERNIGHT EVENTS:  No issues reported   Tele reviewed: V paced 60's-80's no overnight events       ADDITIONAL REVIEW OF SYSTEMS: unable to obtain     MEDICATIONS  (STANDING):  ascorbic acid 500 milliGRAM(s) Oral daily  aspirin enteric coated 81 milliGRAM(s) Oral daily  atorvastatin 10 milliGRAM(s) Oral at bedtime  buMETAnide 0.5 milliGRAM(s) Oral daily  cadexomer iodine 0.9% Gel 1 Application(s) Topical daily  chlorhexidine 2% Cloths 1 Application(s) Topical daily  cyanocobalamin 1000 MICROGram(s) Oral daily  donepezil 5 milliGRAM(s) Oral at bedtime  enoxaparin Injectable 40 milliGRAM(s) SubCutaneous every 24 hours  finasteride 5 milliGRAM(s) Oral daily  melatonin 3 milliGRAM(s) Oral at bedtime  memantine 10 milliGRAM(s) Oral two times a day  metoprolol succinate ER 25 milliGRAM(s) Oral daily  mirtazapine 7.5 milliGRAM(s) Oral at bedtime  multivitamin 1 Tablet(s) Oral daily  pantoprazole    Tablet 40 milliGRAM(s) Oral before breakfast  polyethylene glycol 3350 17 Gram(s) Oral daily  QUEtiapine 25 milliGRAM(s) Oral at bedtime  thiamine 100 milliGRAM(s) Oral daily  zinc sulfate 220 milliGRAM(s) Oral daily    MEDICATIONS  (PRN):  acetaminophen     Tablet .. 650 milliGRAM(s) Oral every 6 hours PRN Temp greater or equal to 38C (100.4F), Mild Pain (1 - 3)      CAPILLARY BLOOD GLUCOSE        I&O's Summary    27 Aug 2022 07:01  -  28 Aug 2022 07:00  --------------------------------------------------------  IN: 360 mL / OUT: 0 mL / NET: 360 mL    28 Aug 2022 07:01  -  28 Aug 2022 11:47  --------------------------------------------------------  IN: 60 mL / OUT: 0 mL / NET: 60 mL        PHYSICAL EXAM:  Vital Signs Last 24 Hrs  T(C): 36.6 (28 Aug 2022 10:56), Max: 36.8 (27 Aug 2022 20:10)  T(F): 97.8 (28 Aug 2022 10:56), Max: 98.2 (27 Aug 2022 20:10)  HR: 64 (28 Aug 2022 10:56) (62 - 85)  BP: 100/59 (28 Aug 2022 10:56) (100/59 - 115/77)  BP(mean): --  RR: 18 (28 Aug 2022 10:56) (17 - 18)  SpO2: 98% (28 Aug 2022 10:56) (92% - 98%)    Parameters below as of 28 Aug 2022 10:56  Patient On (Oxygen Delivery Method): room air        PHYSICAL EXAM:  GEN: elderly man, laying in bed in NAD  PSYCH: awake , no conversing much ( no change as per nurse) hard of hearing, appear calm  SKIN: B/L heel wounds, no e/o rash  NEURO: no focal neurologic deficits appreciated  NECK: supple, no e/o elevated JVP  RESPI: no accessory muscle use, B/L air entry, CTAB   CARDIO: irregular rate/rhythm, trace LE edema B/L  ABD: soft, NT, ND    LABS:    08-27    140  |  106  |  22  ----------------------------<  112<H>  4.1   |  23  |  0.80    Ca    8.9      27 Aug 2022 06:25  Mg     2.1     08-27                  RADIOLOGY & ADDITIONAL TESTS:    Imaging Personally Reviewed:    Electrocardiogram Personally Reviewed:    COORDINATION OF CARE:  Care Discussed with Consultants/Other Providers [Y/N]:  Prior or Outpatient Records Reviewed [Y/N]:

## 2022-08-28 NOTE — PROGRESS NOTE ADULT - ASSESSMENT
89 y/o male pt with bilateral heel DTIs, right foot ulcer  - pt seen and evaluated  - heel DTIs stable with no signs of infection  - right lateral midfoot wound to subQ with no signs of infection  - rec daily dressing changes with betadine to heels and iodosorb to right midfoot wound and alleyvn foam  - wound care orders placed  - rec z flow boots in bed at all times   - follow up as outpatient at wound care center at [address] upon discharge 
89yoM, hard of hearing w/ hx of dementia (baseline AAOx1), chronic Afib (not on AC, only ASA) s/p watchman, HFpEF s/p AICD, s/p TAVR, HTN, HLD, DM2, BPH, SCC, spinal stenosis, presenting from [facility] after episode of hypoxia to O2 sat 70s and lethargy. Imaging with pulm edema and b/l pleural effusions. Likely 2/2 ADHF, improving w/ IV diuresis. Cardiology following. 
89yoM, hard of hearing w/ hx of dementia (baseline AAOx1), chronic Afib (not on AC, only ASA) s/p watchman, HFpEF s/p AICD, s/p TAVR, HTN, HLD, DM2, BPH, SCC, spinal stenosis, presenting from [facility] after episode of hypoxia to O2 sat 70s and lethargy. Imaging with pulm edema and b/l pleural effusions. Likely 2/2 ADHF, improving w/ IV diuresis. Cardiology following. 
89yoM, hard of hearing w/ hx of dementia (baseline AAOx1), chronic Afib (not on AC, only ASA) s/p watchman, HFpEF s/p AICD, s/p TAVR, HTN, HLD, DM2, BPH, SCC, spinal stenosis, presenting from [facility] after episode of hypoxia to O2 sat 70s and lethargy. Imaging with pulm edema and b/l pleural effusions. Likely 2/2 ADHF, improving w/ IV diuresis---> now stable on oral diuretics. Cardiology following. 
89yoM, hard of hearing w/ hx of dementia (baseline AAOx1), chronic Afib (not on AC, only ASA) s/p watchman, HFpEF s/p AICD, s/p TAVR, HTN, HLD, DM2, BPH, SCC, spinal stenosis, presenting from [facility] after episode of hypoxia to O2 sat 70s and lethargy. Imaging with pulm edema and b/l pleural effusions. Likely 2/2 ADHF, improving w/ IV diuresis---> now stable on oral diuretics. Cardiology following. 
89yoM, hard of hearing w/ hx of dementia (baseline AAOx1), chronic Afib (not on AC, only ASA) s/p watchman, HFpEF s/p AICD, s/p TAVR, HTN, HLD, DM2, BPH, SCC, spinal stenosis, presenting from [facility] after episode of hypoxia to O2 sat 70s and lethargy. Imaging with pulm edema and b/l pleural effusions. Likely 2/2 ADHF, improving w/ IV diuresis. Cardiology following. 
89yoM, hard of hearing w/ hx of dementia (baseline AAOx1), chronic Afib (not on AC, only ASA) s/p watchman, HFpEF s/p AICD, s/p TAVR, HTN, HLD, DM2, BPH, SCC, spinal stenosis, presenting from [facility] after episode of hypoxia to O2 sat 70s and lethargy. Imaging with pulm edema and b/l pleural effusions. Likely 2/2 ADHF, improving w/ IV diuresis. Cardiology following.

## 2022-08-28 NOTE — PROGRESS NOTE ADULT - PROBLEM SELECTOR PROBLEM 1
Acute on chronic combined systolic and diastolic congestive heart failure
fair balance

## 2022-08-28 NOTE — PROGRESS NOTE ADULT - PROBLEM SELECTOR PLAN 10
Hx of DM2, not on meds at home. HbA1c (8/7): 6.2%  - monitor BG on BMPs  - CC diet

## 2022-08-28 NOTE — PROGRESS NOTE ADULT - PROBLEM SELECTOR PLAN 11
- DVT ppx: Lovenox  - Diet: DASH/CC, 1L fluid restriction for now  - Dispo: back to [facility], if fluid status remains stable, on 8/27  - GOC: MOLST and HCP documents from [facility] in physical chart reviewed; pt is DNR/DNI
- DVT ppx: Lovenox  - Diet: DASH/CC, 1L fluid restriction for now  - Dispo: pending medical optimization  - GOC: [facility]OL[facility] and HCP documents from [facility] in physical chart reviewed; pt is DNR/DNI
- DVT ppx: Lovenox  - Diet: DASH/CC, 1L fluid restriction for now  - Dispo: back to [facility],---> awaiting on  update   pt is DNR/DNI
- DVT ppx: Lovenox  - Diet: DASH/CC, 1L fluid restriction for now  - Dispo: pending medical optimization  - GOC: MOLST and HCP documents from [facility] in physical chart reviewed; pt is DNR/DNI
- DVT ppx: Lovenox  - Diet: DASH/CC, 1L fluid restriction for now  - Dispo: pending medical optimization  - GOC: MOLST and HCP documents from [facility] in physical chart reviewed; pt is DNR/DNI
- DVT ppx: Lovenox  - Diet: DASH/CC, 1L fluid restriction for now  - Dispo: back to [facility],---> awaiting on  update   pt is DNR/DNI

## 2022-08-28 NOTE — PROGRESS NOTE ADULT - NSPROGADDITIONALINFOA_GEN_ALL_CORE
Discussed with ACP     Plan is to dc back to the facility once ready , most likely on 8/28, DC time 45mns   Pt is to follow up with PCP, Cardio ,  PT  and Podiatry as stated above.  Patient also will need WOUND CARE outpt         [facility]   Available via TEAM   [phone] Discussed with ACP     Plan is to dc back to the facility once ready , most likely on 8/28, DC time 45mns   Pt is to follow up with PCP, Cardio ,  PT  and Podiatry as stated above.  Patient also will need WOUND CARE outpt   I spoke with his daughter , Evangelina [phone] and informed her of the Dc and she is in agreement         [facility]   Available via TEAM   [phone]

## 2022-08-28 NOTE — PROGRESS NOTE ADULT - PROBLEM SELECTOR PLAN 4
Pt with lateral R foot wound; per recent assessment by PCP/Dana on 8/18/22, 2cm x 2cm wound on lateral right foot with "drainage and redness" and was ordered for Augmentin (8/18-8/25).  - possibly sustained from recent fall and poor wound healing  - s/p Augmentin to complete 7-day course as ordered by PCP/Dana (last day 8/25)  - c/w wound care---> Oupt WOUND care   Pt was seen by Podiatry team with the following rec :  " - rec daily dressing changes with betadine to heels and iodosorb to right midfoot wound and alleyvn foam" , rec z flow boots in bed at all times ,  follow up as outpatient at wound care center at [facility] upon discharge "
Pt with lateral R foot wound; per recent assessment by PCP/Dana on 8/18/22, 2cm x 2cm wound on lateral right foot with "drainage and redness" and was ordered for Augmentin (8/18-8/25).  - possibly sustained from recent fall and poor wound healing  - s/p Augmentin to complete 7-day course as ordered by PCP/Dana (last day 8/25)  - c/w wound care---> Oupt WOUND care   Pt was seen by Podiatry team with the following rec :  " - rec daily dressing changes with betadine to heels and iodosorb to right midfoot wound and alleyvn foam" , rec z flow boots in bed at all times ,  follow up as outpatient at wound care center at [facility] upon discharge "
Pt with lateral R foot wound; per recent assessment by PCP/Dana on 8/18/22, 2cm x 2cm wound on lateral right foot with "drainage and redness" and was ordered for Augmentin (8/18-8/25).  - possibly sustained from recent fall and poor wound healing  - c/w Augmentin for now to complete 7-day course as ordered by PCP/Dana (last day 8/25 = today)  - c/w wound care
Pt with lateral R foot wound; per recent assessment by PCP/Dana on 8/18/22, 2cm x 2cm wound on lateral right foot with "drainage and redness" and was ordered for Augmentin (8/18-8/25).  - possibly sustained from recent fall and poor wound healing  - s/p Augmentin to complete 7-day course as ordered by PCP/Dana (last day 8/25)  - c/w wound care
Pt with lateral R foot wound; per recent assessment by PCP/Dana on 8/18/22, 2cm x 2cm wound on lateral right foot with "drainage and redness" and was ordered for Augmentin (8/18-8/25).  - possibly sustained from recent fall and poor wound healing  - c/w Augmentin for now to complete 7-day course as ordered by PCP/Dana  - c/w wound care
Pt with lateral R foot wound; per recent assessment by PCP/Dana on 8/18/22, 2cm x 2cm wound on lateral right foot with "drainage and redness" and was ordered for Augmentin (8/18-8/25).  - possibly sustained from recent fall and poor wound healing  - c/w Augmentin for now to complete 7-day course as ordered by PCP/Dana (last day 8/25)  - c/w wound care

## 2022-08-28 NOTE — PROGRESS NOTE ADULT - SUBJECTIVE AND OBJECTIVE BOX
DATE OF SERVICE: 08-28-22 @ 20:49    Patient is a 89y old  Male who presents with a chief complaint of hypoxia, lethargy (28 Aug 2022 11:47)      INTERVAL HISTORY: feels ok    MEDICATIONS:  buMETAnide 0.5 milliGRAM(s) Oral daily  metoprolol succinate ER 25 milliGRAM(s) Oral daily        PHYSICAL EXAM:  T(C): 36.6 (08-28-22 @ 10:56), Max: 36.6 (08-28-22 @ 10:56)  HR: 64 (08-28-22 @ 10:56) (64 - 84)  BP: 100/59 (08-28-22 @ 10:56) (100/59 - 109/68)  RR: 18 (08-28-22 @ 10:56) (17 - 18)  SpO2: 98% (08-28-22 @ 10:56) (97% - 98%)  Wt(kg): --  I&O's Summary    27 Aug 2022 07:01  -  28 Aug 2022 07:00  --------------------------------------------------------  IN: 360 mL / OUT: 0 mL / NET: 360 mL    28 Aug 2022 07:01  -  28 Aug 2022 20:49  --------------------------------------------------------  IN: 60 mL / OUT: 0 mL / NET: 60 mL          Appearance: In no distress	  HEENT:    PERRL, EOMI	  Cardiovascular:  S1 S2, No JVD  Respiratory: Lungs clear to auscultation	  Gastrointestinal:  Soft, Non-tender, + BS	  Vascularature:  No edema of LE  Psychiatric: Appropriate affect   Neuro: no acute focal deficits           08-27    140  |  106  |  22  ----------------------------<  112<H>  4.1   |  23  |  0.80    Ca    8.9      27 Aug 2022 06:25  Mg     2.1     08-27          Labs personally reviewed      ASSESSMENT/PLAN: 	      89M hard of hearing w/ hx of dementia (baseline AAOx1), chronic Afib (not on AC, only ASA) s/p watchman, HFpEF s/p AICD, s/p TAVR, HTN, HLD, DM2, BPH, SCC, spinal stenosis, presenting from [facility] senior living after episode of hypoxia to O2 sat 70s and lethargy. Unable to obtain meaningful history from pt given dementia. At time of encounter, pt awake and alert, but only minimally conversive and not appropriately answering most questions. Attempted to call [facility] in [place] for collateral information, but night staff there was unclear of events. Per ED documentation, pt with unknown duration of low O2 sat with increased lethargy per [facility] staff and pt was sating 96-97% on RA during transport by EMS. Of note, pt had recent hospitalization (8/6-8/12) for fall and CHF exacerbation. Reviewed recent outpt PCP/Dana records on [employer]; pt noted to have been on 2L O2NC at [facility] since recent admission and was to start trial off O2 with goal O2 sat 92-94%. Pt also undergoing 7-day course of Augmentin (8/18-8/25) for R foot wound/cellulitis due to "drainage and redness."      Problem/Plan -1  Problem: Acute diastolic HF  Plan:  - Follows with Dr. Trevor Schneider for OP Cardiology  - CXR shows pulmonary edema wit B/L pleural effusions  - Echo from 8/2022 shows mild mitral stenosis, Severe concentric left ventricular hypertrophy, Global hypokinesis of the left ventricle,  Severe reversible diastolic dysfunction (Stage III),  Normal right ventricular size with decrease right  ventricular function, borderline pulmonary pressures.  - Patient with preserved EF 50%, ICD in place   - BNP 5631 (down from previous admission)  - Takes Bumex 0.5mg PO three times per week  - Strict I&Os, daily weights  - appears well diuresed. Transitioned to Bumex 0.5mg PO daily.     Problem/Plan -2  Problem: AF  Plan:  - s/p Watchman, not on AC  - c/w Metoprolol    Problem/Plan -3  Problem: HTN  Plan:  - c/w Toprol XL 25mg PO daily    Problem/Plan -4  Problem: CAD  Plan:  - Denies chest pain although limited historian  - Troponin elevated likely in the setting of ADHF exacerbation  - EKG with no ischemic changes noted  - c/w STEPHANIE Vazquez DO [facility]  Cardiovascular Medicine  [address] [place] [address], [address]  Office: [phone]  Cell: [phone]

## 2022-08-28 NOTE — PROGRESS NOTE ADULT - PROVIDER SPECIALTY LIST ADULT
Cardiology
Cardiology
Hospitalist
Hospitalist
Podiatry
Cardiology
Hospitalist

## 2022-08-28 NOTE — DISCHARGE NOTE NURSING/CASE MANAGEMENT/SOCIAL WORK - PATIENT PORTAL LINK FT
You can access the FollowMyHealth Patient Portal offered by [facility] by registering at the following website: [email or web address]. By joining [email or web address]’s FollowMyHealth portal, you will also be able to view your health information using other applications (apps) compatible with our system.

## 2022-08-28 NOTE — DISCHARGE NOTE NURSING/CASE MANAGEMENT/SOCIAL WORK - NSDCFUADDAPPT_GEN_ALL_CORE_FT
Follow up with your cardiologist within 1-2 weeks of discharge - please call to make an appointment.    Follow up with your primary medical doctor within one week of discharge - please call to make an appointment.    Follow up at the Wound Care Center [phone] within one week of discharge - please call to make an appointment.    Follow up with your podiatrist upon discharge - please call to make an appointment.          APPTS ARE READY TO BE MADE: [X] YES    Best Family or Patient Contact (if needed): Daughter at [phone] or [phone]    Additional Information about above appointments (if needed):    1: Cardiology within 2 weeks of discharge (to establish care if want to change provider to Dr. Vazquez)  2: PCP  3: Wound care center    Other comments or requests:   Patient was provided with follow up request details and was advised to call to schedule follow up within specified time frame.

## 2022-08-28 NOTE — PROGRESS NOTE ADULT - PROBLEM SELECTOR PLAN 3
Hx of chronic afib s/p watchman; rate controlled  - CHADSVASC score 5; not on AC, likely 2/2 frequent falls  - EKG (8/21): Afib, RBBB, HR 78, QTc 522; similar to previous EKG  - c/w home metoprolol

## 2022-08-28 NOTE — DISCHARGE NOTE NURSING/CASE MANAGEMENT/SOCIAL WORK - NSDCPEFALRISK_GEN_ALL_CORE
For information on Fall & Injury Prevention, visit: [email or web address] OR  [email or web address] OR  https://www.cdc.gov/steadi/patient.html

## 2022-08-28 NOTE — PROGRESS NOTE ADULT - PROBLEM SELECTOR PLAN 7
- c/w home metoprolol succinate  - BP is noted to be soft but stable ---> cont to monitor
- c/w home metoprolol succinate
- c/w home metoprolol succinate  - BP is noted to be soft but stable ---> cont to monitor

## 2022-09-05 PROBLEM — E11.9 DM2 (DIABETES MELLITUS, TYPE 2): Status: ACTIVE | Noted: 2022-01-01

## 2022-09-05 PROBLEM — K21.9 GERD (GASTROESOPHAGEAL REFLUX DISEASE): Status: ACTIVE | Noted: 2019-05-14

## 2022-09-05 PROBLEM — E78.5 HYPERLIPIDEMIA: Status: ACTIVE | Noted: 2019-05-14

## 2022-09-05 NOTE — HISTORY OF PRESENT ILLNESS
[With Patient/Caregiver] : , with patient/caregiver [Reviewed no changes] : Reviewed, no changes [I will adhere to the patient's wishes.] : I will adhere to the patient's wishes. [FreeTextEntry1] : Mr. Julien is a 89yo male with vascular dementia with BD, HF EF 50% and stage III diastolic dysfunction, Afib on aspirin due to recurrent falls, CAD s/p PCI, DM2, hx of watchman and TAVR, HLD, spinal stenosis, HTN. \par \par Patient is a resident of [facility]. He is seen today after hospitalization for hypoxia s/t acute decompensated heart failure.  Today, patient appears to be comfortable; when asked states her feels "good".\par \par HOSPITAL COURSE REVIEW: 8/21-8/28\par -hospital imaging and labwork reviewed\par          -labwork stable, normal cr/renal function, normal electrolytes, no leukocytosis. HB stable at 12.6 \par          -CT abd/pelvis showed moderate bilateral pleural effusions, slightly increased since 5/3/2022. Mild disproportionate infiltration of the                     subcutaneous soft tissues of the right flank area, corresponding to physical exam finding of  ecchymosis.No intra-abdominal hematoma.\par          -CT head negative for acute events\par -sent to ER from [facility] after episode of hypoxia with O2 sat in the  70s and lethargy\par -Imaging with pulm edema and b/l pleural effusions likely 2/2 ADHF, improved w/ IV diuresis\par -Acute on chronic combined systolic and diastolic congestive heart failure\par -Hx of HFpEF, recent admission earlier in month for CHF exacerbation\par -Recently on Bumex 0.5mg PO M/W/F/Sat at home\par -hsTrop 113 (previously 93->75), serum pro-BNP 5631.\par -EKG similar to prior\par -Last TTE (8/9): EF 50%; global hypokinesis of LV; severe concentric LVH; decreased RV function. EKG\par - patient given Bumex 1mg IVP daily - transitioned to Bumex 0.5mg oral daily\par -discharged on 2 L NC\par \par Lateral right foot wound:\par -Pt with lateral R foot wound  2cm x 2cm wound on lateral right foot with "drainage and redness" and was ordered\par for Augmentin (8/18-8/25).\par - Augmentin 7-day course completed\par -wound care following\par \par Dementia:\par -was seeing neurology in past 2020 with memory loss and depression\par -requires assistance with all ADLs and IADLs\par -CT head 8/21:\par      -Sulci and ventricles are prominent consistent with cerebral volume loss. Nonspecific hypoattenuation in the white matter likely represents         advanced chronic microvascular changes. There are redemonstrated bilateral chronic infarcts involving the frontal, parietal, temporal, and \par       occipital lobes, thalamus/basal ganglia, and cerebellar hemispheres.\par      -Atherosclerotic calcifications at the skull base.\par      -No intracranial hemorrhage, mass effect, or midline shift.\par -on donepezil 5 mg qd\par -on memantine 10 mg bid\par -on mirtazipine 7.5 mg qhs\par -on quetiapine 12.5 mg qd\par \par Functional status:\par -hx of frequent falls\par -uses wheelchair \par \par CAD s/p AICD / Afib s/p watchman (not on AC) / HTN/HLD:\par Cardiologist Dr. Schneider\par -echo 5/2022 and repeat Aug 2022, stable: infiltrative cardiomyopathy, severe conc LVH, LVEF 50%, diffuse hypokinesis, mild mitral stenosis, TAVR with normal fxn, mild/mod pHTN; stage III diastolic dysfunction\par -discharged on bumex 0.5 mg daily (change from MWF)\par -on asa 81mg\par -on metoprolol 25mg qd\par -on pravastatin 20 mg qd\par \par BPH:\par -On finesteride\par \par hospitalization may 2022 with acute heart failure adjustment in bumex 0.5mg TIW and fluid restriction, also had hematuria\par hospitalization Aug 2022 as above, CHF exacerbation, cellulitis. [AdvancecareDate] : 09/22 [FreeTextEntry4] : HCP:Padmini Julien [phone]\par alternate HCPs: Simone Julien [phone]; Alexia Julien [phone]; Evangelina Velazquez [phone]\par MOLST in chart: DNR/DNI

## 2022-09-05 NOTE — REASON FOR VISIT
[Post Hospitalization] : a post hospitalization visit [Formal Caregiver] : formal caregiver [FreeTextEntry1] : hypoxia, lethargy [FreeTextEntry3] : [facility] staff

## 2022-09-05 NOTE — ASSESSMENT
[FreeTextEntry1] : -follow up in 3 months, earlier PRN\par no recent FLP, TFTs, vitamin D, B12; do with next blood draw\par \par Trish Washington, OSVALDO-BC

## 2022-09-05 NOTE — PHYSICAL EXAM
[Alert] : alert [Normal Voice/Communication] : normal voice/communication [Sclera] : the sclera and conjunctiva were normal [No Oral Pallor] : no oral pallor [No Oral Cyanosis] : no oral cyanosis [Normal Appearance] : the appearance of the neck was normal [No Respiratory Distress] : no respiratory distress [No Acc Muscle Use] : no accessory muscle use [Respiration, Rhythm And Depth] : normal respiratory rhythm and effort [Normal S1, S2] : normal S1 and S2 [Heart Rate And Rhythm] : heart rate was normal and rhythm regular [Edema] : edema was not present [Bowel Sounds] : normal bowel sounds [Abdomen Tenderness] : non-tender [Abdomen Soft] : soft [No CVA Tenderness] : no CVA  tenderness [No Clubbing, Cyanosis] : no clubbing or cyanosis of the fingernails [Involuntary Movements] : no involuntary movements were seen [Motor Tone] : muscle strength and tone were normal [No Focal Deficits] : no focal deficits [EOMI] : extraocular movements were intact [PERRL] : pupils were equal in size, round, and reactive to light [Normal Oral Mucosa] : normal oral mucosa [Normal Outer Ear/Nose] : the ears and nose were normal in appearance [Auscultation Breath Sounds / Voice Sounds] : lungs were clear to auscultation bilaterally [Pedal Pulses Normal] : the pedal pulses are present [Cervical Lymph Nodes Enlarged Posterior Bilaterally] : posterior cervical [Cervical Lymph Nodes Enlarged Anterior Bilaterally] : anterior cervical, supraclavicular [JVD] : there was jugular-venous distention [Normal Gait] : abnormal gait [de-identified] : appears comfortable on 2L NC, 92% at rest.  [de-identified] : systolic murmur [de-identified] : wheelchair [de-identified] : no edema; healing right lateral foot wound [de-identified] : advanced dementia

## 2022-09-05 NOTE — REVIEW OF SYSTEMS
[Heart Rate Is Slow] : the heart rate was not slow [Chest Pain] : no chest pain [Shortness Of Breath] : no shortness of breath [Abdominal Pain] : no abdominal pain [FreeTextEntry1] : ROS limited due to dementia

## 2022-09-26 PROBLEM — F01.50 VASCULAR DEMENTIA: Status: ACTIVE | Noted: 2019-09-09

## 2022-09-26 PROBLEM — Z99.81 ON SUPPLEMENTAL OXYGEN THERAPY: Status: ACTIVE | Noted: 2022-01-01

## 2022-09-26 NOTE — PHYSICAL EXAM
[JVD] : there was jugular-venous distention [Normal Gait] : abnormal gait [de-identified] : Mild crackles at B/L bases. no rhonchi. appears comfortable on 2L NC, 92% at rest.  [de-identified] : systolic murmur [de-identified] : no edema; small well healed scabbed over lesions on right calf, no erythema or drainage.  [de-identified] : advanced dementia

## 2022-09-26 NOTE — HISTORY OF PRESENT ILLNESS
[FreeTextEntry1] : Mr. Julien is a 89yo male with vascular dementia with BD, HF EF 50% and stage III diastolic dysfunction, Afib on aspirin due to recurrent falls, CAD s/p PCI, DM2, hx of watchman and TAVR, HLD, spinal stenosis, HTN. \par \par Patient is a resident of [facility] in the life guidance unit. He is seen today after hospitalization for fall. this is the second hospitalization since June for fall. \par \par HOSPITAL COURSE REVIEW: 8/6-8/12\par hospital imaging and labwork reviewed.\par no fxr or trauma.\par labwork stable, normal cr/renal function, normal electrolytes, no leukocytosis. HB stable at 12.1 \par Patient found to be in acute chf exacerbation with bnp>4K requiring IV bumex. CXR with b/l pleural effusions. interval echo shows mitral stenosis, TAVR, stage III diastolic dysfunction, and EF 50%.  infiltrative cardiomyopathy, severe conc LVH, severe hypokinesis. unchanged from echo in May 2022. Pt was hypoxic during hospitalization and was started in 2L oxygen with improvement in o2 sat at 92%. \par He was also found to have right mid foot cellulitis tx with iv zosyn and transitioned to PO abx. afebrile. Trops were elevated then downtrending. \par \par TODAY, patient is seen in [facility] facility. he is sitting in wheelchair (uses at baseline), has supplemental oxygen in place. he answers questions but does not narrate. he is awake, alert and oriented only to person. He denies any pain or complaints. he puts his head down momentarily on table. no signs of acute distress, appears comfortable, breathing comfortably. he is unable to provide history due to cognitive impairment/dementia. call will be placed to Alexia Howard. \par \par dementia:\par was seeing neurology in past 2020 with memory loss and depression\par needed help with with ADL;s then\par \par functional status:\par hx of falls\par uses wheelchair \par \par CAD s/p AICD / Afib s/p watchman (not on AC) / HTN:\par Cardiologist Dr. Schneider\par \par echo 5/2022 and repeat Aug 2022, stable: infiltrative cardiomyopathy, severe conc LVH, LVEF 50%, diffuse hypokinesis\par mild mitral stenosis, TAVR with normal fxn, mild/mod pHTN; stage III diastolic dysfunction.\par \par hospitalization may 2022 with acute heart failure adjustment in bumex 0.5mg TIW and fluid restriction, also had hematuria\par hospitalization Aug 2022 as above, CHF exacerbation, cellulitis.\par \par labs from 5/2022:\par EGFR 79 with SCr0.90\par anemia of 11.5 stable\par a1c 6.4%-- not on meds [Any fall with injury in past year] : Patient reported fall with injury in the past year [Completely Dependent] : Completely dependent. [Wheelchair] : wheelchair [Oxygen Equipment] : oxygen equipment

## 2022-09-26 NOTE — REVIEW OF SYSTEMS
[Heart Rate Is Slow] : the heart rate was not slow [Chest Pain] : no chest pain [Shortness Of Breath] : no shortness of breath [Abdominal Pain] : no abdominal pain [FreeTextEntry1] : ROS limited due to cogimp/dementia

## 2022-09-26 NOTE — ASSESSMENT
[FreeTextEntry1] : 89yo male with sig cardiac history CHF EF 50% with severe diastolic dysfunction grade III, s/p TAVR, CAD s/p PCI, advanced dementia dependent in all ADLs and IADLs.\par \par Patient is seen today for second hospitalization for acute chf exacerbation in 3 months requiring IV diuresis and now newly needing oxygen from hospital d/c. Xray showed pulmonary edema and left pleural effusion which both improved on repeat interval xray after diuresis. \par \par today patient offers no acute complaints but is not a reliable historian due to cognitive impairment. he is at risk jared progression of disease and rehospitalization. given frequency of exacerbations, bumex increased from mwf to mwfsat, gently increase to ensure bp can handle as patient has severe dementia, need to ensure keeping up with PO hydration needs. \par \par Acute on chronic CHF:\par f/u in 1 week\par BMP check \par BP check\par bumex dosing\par will re-evaluate oxygen - trial off O2 next week. goal 92-94%\par oxygen concentrator will arrive to [facility] tomorrow, confirmed with [facility] staff\par \par Right medial foot cellulitis and wound:\par 2x2cm lesion with drainage. will extend oral abx course and wound-care referral \par [facility] staff made aware of wound \par \par Called to discussed above with AmadeorAlexia [phone]. no answer x 2 and no VM box available. \par also called patient's son, Simone [phone], no answer\par

## 2022-10-11 PROBLEM — I35.0 AORTIC STENOSIS, SEVERE: Status: ACTIVE | Noted: 2019-05-14

## 2022-10-11 PROBLEM — E87.1 HYPONATREMIA: Status: ACTIVE | Noted: 2019-10-15

## 2022-10-11 PROBLEM — F32.A DEPRESSION: Status: ACTIVE | Noted: 2019-11-29

## 2022-10-11 PROBLEM — D64.9 ANEMIA: Status: ACTIVE | Noted: 2019-05-14

## 2022-10-11 NOTE — ASSESSMENT
[FreeTextEntry1] : 89yo male seen today requested by assisted staff after fall. Patient does not have any signs of acute injury. he apparently "lowered himself to floor" while trying to get out of wheelchair. seen in dining area enjoying apple sauce, able to feed himself. \par \par Frequent falls:\par continue with constant supervision and redirection given history of advanced dementia \par wheelchair\par no signs of acute injury, does not require ems tx\par BMP, CBC ordered \par will lower BB dose to allow for more liberal BP and HR to reduce fall risk \par \par HTN:\par Decrease BB from 25mg to 12.5mg \par HR 59 and  systolic, may help reduce dizziness and reduce risk of fall \par \par Afib:\par rate controlled, in fact lower than needed at hr 59\par will reduce BB dose as above\par s/p pm\par \par Anemia:\par re-check CBC , script provided to assisted\par \par Hyponatremia\par h/o hypoNa\par recheck BMP\par encourage small frequent meals \par \par vascular dementia with behavioral disturbances:\par MMSE 14/30 7/2020 with Neurology\par assistance with ADL's and IADL's\par -not really able to follow commands - ? combination of severely [place] and dementia\par -c/w mirtaz and seroquel \par c/w aricept and namenda\par \par chf:\par ef 50% \par appears euvolemic\par labs ordered\par on 2l oxygen since hospitalization, attempted to wean off but O2 saturation decreases to <88% \par patient is poor historian due to cognitive impairment, cannot rely on his ability to tell staff if he is experiencing difficulty breathing. favor keeping oxygen on, will continue to re-assess \par \par \par 
Statement Selected

## 2022-10-11 NOTE — PHYSICAL EXAM
[JVD] : there was jugular-venous distention [Normal Gait] : abnormal gait [de-identified] : Mild crackles at B/L bases. no rhonchi. appears comfortable on 2L NC, 92% at rest.  [de-identified] : systolic murmur [de-identified] : advanced dementia

## 2022-10-11 NOTE — HISTORY OF PRESENT ILLNESS
[FreeTextEntry1] : Mr. Julien is a 89yo male with vascular dementia with BD, HF EF 50% and stage III diastolic dysfunction, Afib on aspirin due to recurrent falls, CAD s/p PCI, DM2, hx of watchman and TAVR, HLD, spinal stenosis, HTN. \par \par Resident of [facility] in the life guidance unit. \par \par Seen today after group home staff reported a fall. \par Details: patient was getting out of wheelchair and "lowered himself to floor" question posed whether patient needs ED transfer. \par Pt did not hit head, no LOC, no complaints of preceding symptoms \par event witness by [facility] staff, no injuries reported, no complaints of pain\par when pt interviewed, states with a strong loud voice "I feel lousy" \par seen eating apple sauce at table without issues \par vital signs normal \par no signs of injury, hospitalization not required\par \par functional status:\par hx of falls\par uses wheelchair \par \par CAD s/p AICD / Afib s/p watchman (not on AC) / HTN:\par Cardiologist Dr. Schneider\par echo 5/2022 and repeat Aug 2022, stable: infiltrative cardiomyopathy, severe conc LVH, LVEF 50%, diffuse hypokinesis\par mild mitral stenosis, TAVR with normal fxn, mild/mod pHTN; stage III diastolic dysfunction.\par on O2 therapy since d/c from hospital\par \par vascular dementia with behavioral disturbances:\par MMSE 14/30 7/2020 with Neurology\par assistance with ADL's and IADL's\par -not really able to follow commands - ? combination of severely [place] and dementia\par mirtaz and seroquel \par  aricept and namenda\par \par CHF:\par hospitalized Aug 2022, b/l pleural effusions and acute exacerbation\par appears euvolemic today\par interval echo shows mitral stenosis, TAVR, stage III diastolic dysfunction, and EF 50%. infiltrative cardiomyopathy, severe conc LVH, severe hypokinesis. unchanged from echo in May 2022. \par since return from hospital, patient has been on 2L oxygen supplementation, appears that this contributes to improved quality of life and O2 saturation on 2L is 90-94%\par \par \par

## 2022-11-01 PROBLEM — L03.115 CELLULITIS OF FOOT, RIGHT: Status: ACTIVE | Noted: 2022-01-01

## 2022-11-01 PROBLEM — I48.91 ATRIAL FIBRILLATION: Status: ACTIVE | Noted: 2022-01-01

## 2022-11-01 PROBLEM — I10 HYPERTENSION: Status: ACTIVE | Noted: 2019-05-14

## 2022-11-01 PROBLEM — I25.10 CAD S/P PERCUTANEOUS CORONARY ANGIOPLASTY: Status: ACTIVE | Noted: 2022-01-01

## 2022-11-01 PROBLEM — Z71.89 ACP (ADVANCE CARE PLANNING): Status: ACTIVE | Noted: 2022-01-01

## 2022-11-01 NOTE — REVIEW OF SYSTEMS
[Heart Rate Is Slow] : the heart rate was not slow [Chest Pain] : no chest pain [Shortness Of Breath] : no shortness of breath [Abdominal Pain] : no abdominal pain [FreeTextEntry1] : ROS limited due to severe dementia

## 2022-11-01 NOTE — ASSESSMENT
[FreeTextEntry1] : -tried calling HCPs (all children) with no answer and no ability to leave VM because either full or not set up \par \par -follow up in 3 months, earlier PRN\par \par OSVALDO Younger-BC

## 2022-11-01 NOTE — HISTORY OF PRESENT ILLNESS
[With Patient/Caregiver] : , with patient/caregiver [Reviewed no changes] : Reviewed, no changes [I will adhere to the patient's wishes.] : I will adhere to the patient's wishes. [Any fall with injury in past year] : Patient reported fall with injury in the past year [Completely Dependent] : Completely dependent. [Wheelchair] : wheelchair [Medical reason not done] : Medical reason not done [FreeTextEntry1] : Mr. Julien is a 89yo male with vascular dementia with BD, HF EF 50% and stage III diastolic dysfunction, Afib on aspirin due to recurrent falls, CAD s/p PCI, DM2, hx of watchman and TAVR, HLD, spinal stenosis and  HTN who presents for follow up visit. Additional information obtained from [facility] staff. [facility]. \par \par systolic and diastolic CHF:\par -last hospitalization was from 8/21-8/28 for hypoxia d/t CHF exacerbation \par -Last TTE (8/9): EF 50%; global hypokinesis of LV; severe concentric LVH; decreased RV function. EKG\par -currently on bumex 0.5 mg qd\par -discharged on 2 L NC\par \par Lateral right foot wound:\par -was treated for cellulitis in 8/2022 with augmentin \par -still with foot wound; appears to be smaller than last visit; healing\par -wound care following\par \par Dementia:\par -was seeing neurology in past 2020 with memory loss and depression\par -requires assistance with all ADLs and IADLs\par -CT head 8/21:\par      -Sulci and ventricles are prominent consistent with cerebral volume loss. Nonspecific hypoattenuation in the white matter likely represents         advanced chronic microvascular changes. There are redemonstrated bilateral chronic infarcts involving the frontal, parietal, temporal, and \par       occipital lobes, thalamus/basal ganglia, and cerebellar hemispheres.\par      -Atherosclerotic calcifications at the skull base.\par      -No intracranial hemorrhage, mass effect, or midline shift.\par -on donepezil 5 mg qd\par -on memantine 10 mg bid\par -on mirtazipine 7.5 mg qhs\par -on quetiapine 12.5 mg qd\par \par Functional status:\par -hx of frequent falls\par -uses wheelchair \par \par CAD s/p AICD / Afib s/p watchman (not on AC) / HTN/HLD:\par -Cardiologist Dr. Schneider\par -echo 5/2022 and repeat Aug 2022, stable: infiltrative cardiomyopathy, severe conc LVH, LVEF 50%, diffuse hypokinesis, mild mitral stenosis, TAVR with normal fxn, mild/mod pHTN; stage III diastolic dysfunction\par -discharged from hospital in August, 2022 on bumex 0.5 mg daily (changed from [facility])\par -on asa 81mg\par -on metoprolol 25mg qd\par -on pravastatin 20 mg qd\par \par BPH:\par -On finasteride\par \par hospitalization may 2022 with acute heart failure adjustment in bumex 0.5mg TIW and fluid restriction, also had hematuria\par hospitalization Aug 2022 as above, CHF exacerbation, cellulitis. [de-identified] : severe dementia [AdvancecareDate] : 10/22 [FreeTextEntry4] : HCP:Padmini Julien [phone]\par alternate HCPs: Simone Julien [phone]; Alexia Julien [phone]; Evangelina Velazquez [phone]\par MOLST in chart: DNR/DNI

## 2022-11-01 NOTE — PHYSICAL EXAM
[Sclera] : the sclera and conjunctiva were normal [EOMI] : extraocular movements were intact [PERRL] : pupils were equal in size, round, and reactive to light [Normal Oral Mucosa] : normal oral mucosa [No Oral Pallor] : no oral pallor [No Oral Cyanosis] : no oral cyanosis [Normal Outer Ear/Nose] : the ears and nose were normal in appearance [Normal Appearance] : the appearance of the neck was normal [No Respiratory Distress] : no respiratory distress [No Acc Muscle Use] : no accessory muscle use [Respiration, Rhythm And Depth] : normal respiratory rhythm and effort [Normal S1, S2] : normal S1 and S2 [Heart Rate And Rhythm] : heart rate was normal and rhythm regular [Edema] : edema was not present [Pedal Pulses Normal] : the pedal pulses are present [Bowel Sounds] : normal bowel sounds [Abdomen Soft] : soft [Abdomen Tenderness] : non-tender [Cervical Lymph Nodes Enlarged Posterior Bilaterally] : posterior cervical [Cervical Lymph Nodes Enlarged Anterior Bilaterally] : anterior cervical, supraclavicular [No CVA Tenderness] : no CVA  tenderness [No Clubbing, Cyanosis] : no clubbing or cyanosis of the fingernails [Involuntary Movements] : no involuntary movements were seen [Motor Tone] : muscle strength and tone were normal [No Focal Deficits] : no focal deficits [JVD] : there was jugular-venous distention [Normal Gait] : abnormal gait [de-identified] : sleeping intermittently during assessment; saw patient later that day alert and feeding himself [de-identified] : mild b/l crackles at bases, appears comfortable on 2L NC, 94% at rest.  [de-identified] : systolic murmur [de-identified] : wheelchair [de-identified] : no edema; healing right lateral foot wound [de-identified] : advanced dementia

## 2022-11-18 NOTE — ED PROVIDER NOTE - NSFOLLOWUPINSTRUCTIONS_ED_ALL_ED_FT
You were seen today after falling off bed and hitting your head.  Please find your results attached to this document.  It is important for you to follow-up with your primary care doctor for further management.  Please continue all your home medications as prescribed and instructed.    It is important for you to return to the emergency department if you have any vomiting, visual changes, neurologic deficits such as weakness or loss of sensation, or any other concerns.

## 2022-11-18 NOTE — ED ADULT NURSE REASSESSMENT NOTE - NS ED NURSE REASSESS COMMENT FT1
First encounter with the pt, pt received from the previous shift with complaints of fall last night. Pt is pending EMS transfer to nursing home. Pt is a/ox1-2 and verbal. Breathing is even and unlabored on room air. Skin is warm and dry. Pt has strong pulses palpated bilaterally. Pt denies chest pain, n/v and sob. No acute distress noted. Will continue to monitor the pt

## 2022-11-18 NOTE — ED ADULT NURSE NOTE - NSIMPLEMENTINTERV_GEN_ALL_ED
Implemented All Fall with Harm Risk Interventions:  [place] to call system. Call bell, personal items and telephone within reach. Instruct patient to call for assistance. Room bathroom lighting operational. Non-slip footwear when patient is off stretcher. Physically safe environment: no spills, clutter or unnecessary equipment. Stretcher in lowest position, wheels locked, appropriate side rails in place. Provide visual cue, wrist band, yellow gown, etc. Monitor gait and stability. Monitor for mental status changes and reorient to person, place, and time. Review medications for side effects contributing to fall risk. Reinforce activity limits and safety measures with patient and family. Provide visual clues: red socks.

## 2022-11-18 NOTE — ED PROVIDER NOTE - CLINICAL SUMMARY MEDICAL DECISION MAKING FREE TEXT BOX
89-year-old male patient who presents from [facility] after falling off bed and hitting his head.  Unknown loss of consciousness.  Not on any AC.  Will assess with blood work, x-rays, CT of the head.  Will reassess and most likely discharge back to [facility]. 89-year-old male patient who presents from [facility] after falling off bed and hitting his head.  Unknown loss of consciousness.  Not on any AC.  Will assess with blood work, x-rays, CT of the head.  Will reassess and hopefully discharge back to [facility] presuming non-actionable imaging and clinical course.  --BMM

## 2022-11-18 NOTE — ED PROVIDER NOTE - OBJECTIVE STATEMENT
89-year-old male patient coming from nursing home with past medical history of dementia, chronic A. fib only on aspirin, HIV status post AICD, status post TAVR, high blood pressure, diabetes who presents to the emergency department from [facility] after falling from bed.  Endorsing positive head trauma.  Unknown LOC.  No other complaints such as chest pain, shortness of breath, vomiting or any other concerns.

## 2022-11-18 NOTE — ED ADULT NURSE NOTE - OBJECTIVE STATEMENT
Pt sent from [facility] s/p unwitnessed fall today. Pt is awake, confused, A+O x1 at baseline. Pt has PMHx of Dementia. As per EMS, pt states he rolled out of bed, hit left side of head, sustained skin tear to LUE. Skin tear noted to left forearm approximately 1.5cm diameter, not actively bleeding. No hematoma, deformities, or lac noted to head.     Pt also noted with ecchymosis to B/L UE, B/L LE. He is [place]. Dependent edema noted RLE. Pt denies any HA, dizziness, CP, or SOB at this time.

## 2022-12-12 NOTE — ED PROVIDER NOTE - PROGRESS NOTE DETAILS
Michael Jacques DO (PGY1)  CT results and UA correlate with cystitis. Plan for abx treatment with 1g ceftriaxone. Michael Jacques DO (PGY1)  CT results and UA correlate with cystitis. Plan for abx treatment with 1g ceftriaxone. Repeat trop pending. Plan to admit.

## 2022-12-12 NOTE — ED PROVIDER NOTE - CLINICAL SUMMARY MEDICAL DECISION MAKING FREE TEXT BOX
89-year-old male patient coming from nursing home with past medical history of dementia, chronic A. fib only on aspirin, HIV status post AICD, status post TAVR, high blood pressure, diabetes who presents to the emergency department from [facility] living after an unwitnessed fall in the common area.     Patient alert to only person on exam.  Not endorsing any specific pain in the body.  On full physical exam no andrew signs, ecchymosis, spinal tenderness step-off or crepitus noted.  Patient has range of motion of extremities.  Plan for pan scan with CT head CT cervical spine CT chest and abdomen and pelvis.  Ruling out for any fractures or intra-abdominal injuries.  Plan for EKG and troponin, UA/UCx   Disposition pending results

## 2022-12-12 NOTE — ED ADULT NURSE NOTE - OBJECTIVE STATEMENT
89 y.o. M A&)x1 at baseline (oriented to person)  PMHx of CHF (on baseline 2 L NC at home), dementia, DM type 2, Dysphagia, depression, Squamous Cell carcinoma, presenting to ED via EMS from [facility] s/p unwitnessed fall. 89 y.o. M A&)x1 at baseline (oriented to person)  PMHx of CHF (on baseline 2 L NC at home), dementia, DM type 2, Dysphagia, depression, Squamous Cell carcinoma, presenting to ED via EMS from [facility] s/p unwitnessed fall. EMS states that pt was found in supine position, and showed afib on their 12 lead en route. States that pt was slightly combative en route and verbally responsive. Upon arrival, pt is calm and cooperative, following staff commands, but does not respond verbally. Upon assessment, pt alert, grossly neurologically intact, and well appearing. Pupils PERRLA size 5 and no drainage noted. Airway patent, chest rise symmetrical with no advantageous L/S, and pt is eupneic. Skin is warm, dry, and normal for race. No cyanosis noted to lips or fingernails. Mucous membranes moist. Negative clubbed fingernails. Radial pulses equal and +2 bilaterally. Abd soft, nontender, nondistended. ROM intact and strength +3 in all four extremities. No edema noted to bilateral legs or arms. Pt resting in stretcher in position of comfort. Stretcher locked and lowered within eyesight of nurse's station. Placed on CM and 2L NC tank at 1000 PSI aqnd full tank on stand-by.

## 2022-12-12 NOTE — ED PROVIDER NOTE - PHYSICAL EXAMINATION
Physical Exam:    Gen: NAD, AOx3, non-toxic appearing, able to ambulate without assistance  Head: NCAT  HEENT: EOMI, PEERLA, normal conjunctiva, tongue midline, oral mucosa moist  Lung: CTAB, no respiratory distress, no wheezes/rhonchi/rales B/L, speaking in full sentences  CV: RRR, no murmurs, rubs or gallops  Abd: soft, NT, ND, no guarding, no rigidity, no rebound tenderness, no CVA tenderness   MSK: no visible deformities, ROM normal in UE/LE, no back pain, no midline spinal tenderness, no step off or crepitus   Neuro: No focal sensory or motor deficits  Skin: Warm, well perfused, no rash, no leg swelling, no signs of ecchymosis,   Psych: normal affect, calm Physical Exam:    Gen: NAD, alert to person only   Head: NCAT  HEENT: EOMI, PEERLA,   Lung: CTAB, no respiratory distress, no wheezes/rhonchi/rales   CV: RRR, no murmurs, rubs or gallops  Abd: soft, NT, ND, no guarding, no rigidity, no rebound tenderness, no CVA tenderness   MSK: no visible deformities, ROM normal in UE/LE, no back pain, no midline spinal tenderness, no step off or crepitus   Neuro: No focal sensory or motor deficits  Skin: Warm, well perfused, no rash, no leg swelling, no signs of ecchymosis

## 2022-12-12 NOTE — ED PROVIDER NOTE - ATTENDING CONTRIBUTION TO CARE
I, Katerina Sidhu, performed a history and physical exam of the patient and discussed their management with the resident, student, and/or fellow. I reviewed the note and agree with the documented findings and plan of care. I was present and available for all procedures.    ---    89M with dementia presents for unwitnessed fall at nursing home. Pt was found on the ground in the common area. Unable to provide a history due to dementia. Labs and CT done sig for very elevated troponin in the 90s and UA with +nitrites and LE c/w infection. Pt will be admitted for further eval w troponin and abx for infection.

## 2022-12-12 NOTE — ED PROVIDER NOTE - OBJECTIVE STATEMENT
89-year-old male patient coming from nursing home with past medical history of dementia, chronic A. fib only on aspirin, HIV status post AICD, status post TAVR, high blood pressure, diabetes who presents to the emergency department from [facility] living after an unwitnessed fall in the [facility] area. Patient brought in by EMS who states that the patient is at their baseline according to the [facility].  Patient is responsive to person only.  Patient is endorsing any specific pain but moans when moved around.  Denying any chest pain shortness of breath abdominal pain.

## 2022-12-12 NOTE — ED PROVIDER NOTE - NS ED ROS FT
CONSTITUTIONAL: No fevers  EYES: no visual changes, no eye pain  EARS: no ear drainage, no ear pain, no change in hearing  NOSE: no nasal congestion  MOUTH/THROAT: no sore throat  CV: No chest pain, no palpitations  RESP: No SOB, no cough  GI: No n/v/d, no abd pain  : no dysuria, no hematuria, no flank pain  MSK: no back pain, no extremity pain  SKIN: no rashes  NEURO: no headache, no focal weakness, no decreased sensation/parasthesias

## 2022-12-13 NOTE — PHYSICAL THERAPY INITIAL EVALUATION ADULT - ADDITIONAL COMMENTS
Pt poor historian, previous level of function per last PT note (admit Aug 2022). Pt lives at [facility]. Since recent hospitalization, has not been ambulatory, is mostly in a wheelchair at the A/L facility. Staff transfers pt onto toilet in the bathroom with 2 person assist. As per last PT note during last hospitalization, pt required max assist for bed mobility supine<->sit & sit<->stand. Pt was d/c'd to [facility] with PT services.

## 2022-12-13 NOTE — H&P ADULT - HISTORY OF PRESENT ILLNESS
89yoM, hard of hearing w/ hx of dementia (baseline AAOx1), chronic Afib (not on AC, only ASA) s/p watchman, HFpEF s/p AICD, s/p TAVR, HTN, HLD, DM2, BPH, SCC, spinal stenosis, from [facility] facility, p/w unwitnessed fall. patient is poor historian given underlying neurocognitive decline, history mainly gathered from chart. reportedly, patient was found on floor, on his back, by facility staff; staff grew concerned, so contacted ems to have patient brought to [facility] er for further evaluation. 89yoM, hard of hearing w/ hx of dementia (baseline AAOx1), chronic Afib (not on AC, only ASA) s/p watchman, cad s/p pci, HFpEF s/p AICD, s/p TAVR, HTN, HLD, DM2, BPH, SCC, spinal stenosis, from [facility] facility, p/w unwitnessed fall. patient is poor historian given underlying neurocognitive decline, history mainly gathered from chart. reportedly, patient was found on floor, on his back, by facility staff; staff grew concerned, so contacted ems to have patient brought to [facility] er for further evaluation.

## 2022-12-13 NOTE — H&P ADULT - PROBLEM SELECTOR PLAN 1
afebrile, hds, no leukocytosis  ua w nitrites + bacteriuria, le + pyuria, rbcs + hematuria  imaging shows bladder wall thickening   s/p 1 g ceftriaxone in er  follow up urine cultures  monitor for fever and changes in white count  continue empirical ceftriaxone; adjust according to final c+s  antipyretics, antiemetics, analgesics as needed

## 2022-12-13 NOTE — PHYSICAL THERAPY INITIAL EVALUATION ADULT - LEVEL OF INDEPENDENCE: SIT/STAND, REHAB EVAL
Sit<>Stand held 2/2 lethargy. Per previous PT note, pt max Ax1 & transferring with staff at [facility] with max Ax2/unable to perform

## 2022-12-13 NOTE — H&P ADULT - PROBLEM SELECTOR PLAN 5
believed to be vascular dementia per outpt records.    Baseline AAOx1, appears to be at baseline   c/w home memantine and donepezil and mirtazapine and quetiapine and vitamins/minerals and bowel regimen   fall/aspiration precautions.

## 2022-12-13 NOTE — PHYSICAL THERAPY INITIAL EVALUATION ADULT - PERTINENT HX OF CURRENT PROBLEM, REHAB EVAL
89yoM, hard of hearing w/ hx of dementia (baseline AAOx1), chronic Afib (not on AC, only ASA) s/p watchman, cad s/p pci, HFpEF s/p AICD, s/p TAVR, HTN, HLD, DM2, BPH, SCC, spinal stenosis, presenting from [facility] after a fall, found to have uti, admitted to medicine for further mgmt.

## 2022-12-13 NOTE — PHYSICAL THERAPY INITIAL EVALUATION ADULT - NSPTDISCHREC_GEN_A_CORE
Return to [facility] with prior level of assistance. Pt appears to be at functional baseline./Long Term Care/SNF

## 2022-12-13 NOTE — ED ADULT NURSE REASSESSMENT NOTE - NS ED NURSE REASSESS COMMENT FT1
spoke with pharmacist Nick will send down cut tablet of 25mg quetiapine, ok to scan original packaging and give half a tablet of 25mg quetiapine.

## 2022-12-13 NOTE — H&P ADULT - PROBLEM SELECTOR PLAN 3
Hx of s/p tavr, HFpEF s/p aicd,    recent admission a few months ago for CHF exacerbation.  imaging showing bl mod pl eff a/w compressive atelectasis, similar to prior imaging studies  EKG similar to prior.   trop 93->100  Last TTE (8/9): EF 50%; global hypokinesis of LV; severe concentric LVH; decreased RV function. EKG   obtain bnp, tte  strict I/Os, daily weights, salt + fluid restriction  monitor electrolytes/renal function   PO w/ Bumex 0.5mg daily + metoprolol xl 25 Hx of s/p tavr, HFpEF s/p aicd, cad s/p pci    recent admission a few months ago for CHF exacerbation.  imaging showing bl mod pl eff a/w compressive atelectasis, similar to prior imaging studies  EKG similar to prior.   trop 93->100  Last TTE (8/9): EF 50%; global hypokinesis of LV; severe concentric LVH; decreased RV function. EKG   obtain bnp, tte  strict I/Os, daily weights, salt + fluid restriction  monitor electrolytes/renal function   PO w/ Bumex 0.5mg daily + metoprolol xl 25 Hx of s/p tavr, HFpEF s/p aicd, cad s/p pci    recent admission a few months ago for CHF exacerbation; currently, appears to be compensated  imaging showing bl mod pl eff a/w compressive atelectasis, similar to prior imaging studies  EKG similar to prior.   trop 93->100  Last TTE (8/9): EF 50%; global hypokinesis of LV; severe concentric LVH; decreased RV function. EKG   obtain bnp, tte  strict I/Os, daily weights, salt + fluid restriction  monitor electrolytes/renal function   PO w/ Bumex 0.5mg daily + metoprolol xl 25

## 2022-12-13 NOTE — H&P ADULT - ASSESSMENT
89yoM, hard of hearing w/ hx of dementia (baseline AAOx1), chronic Afib (not on AC, only ASA) s/p watchman, HFpEF s/p AICD, s/p TAVR, HTN, HLD, DM2, BPH, SCC, spinal stenosis, presenting from [facility] senior living after a fall, found to have uti, admitted to medicine for further mgmt.  89yoM, hard of hearing w/ hx of dementia (baseline AAOx1), chronic Afib (not on AC, only ASA) s/p watchman, cad s/p pci, HFpEF s/p AICD, s/p TAVR, HTN, HLD, DM2, BPH, SCC, spinal stenosis, presenting from [facility] after a fall, found to have uti, admitted to medicine for further mgmt.

## 2022-12-13 NOTE — H&P ADULT - PROBLEM SELECTOR PLAN 4
Hx of chronic afib s/p watchman; rate controlled  - CHADSVASC score 5; not on AC 2/2 frequent falls  - EKG similar to previous EKG w Afib, RBBB,  - c/w home metoprolol.

## 2022-12-13 NOTE — H&P ADULT - NSHPPHYSICALEXAM_GEN_ALL_CORE
T(C): 36.4 (12-13-22 @ 02:51), Max: 36.7 (12-13-22 @ 00:10)  HR: 77 (12-13-22 @ 02:51) (77 - 99)  BP: 120/69 (12-13-22 @ 02:51) (113/81 - 132/88)  RR: 16 (12-13-22 @ 02:51) (16 - 18)  SpO2: 98% (12-13-22 @ 02:51) (95% - 98%)  GENERAL: NAD, lying in bed comfortably  EYES: EOMI, PERRLA; conjunctiva and sclera clear  ENMT: dry oral mucosa, no pharyngeal injection or exudates   NECK: Supple, no palpable masses; no JVD  RESPIRATORY: Normal respiratory effort; lungs w bilateral crackles and decreased breath sounds over bibiasilar regions to auscultation bilaterally  CARDIOVASCULAR: Regular rate and rhythm, normal S1 and S2, no murmur/rub/gallop; No lower extremity edema; Peripheral pulses are 2+ bilaterally; aicd in place  ABDOMEN: Nontender to palpation, normoactive bowel sounds, no rebound/guarding   MUSCULOSKELETAL: no joint swelling or tenderness to palpation  PSYCH: A+O x1; affect appropriate  NEUROLOGY: CN 2-12 are intact and symmetric; no gross motor or sensory deficits   SKIN: No rashes; no palpable lesions

## 2022-12-13 NOTE — H&P ADULT - PROBLEM SELECTOR PLAN 2
Hx of frequent falls, last fall a few months ago. Ambulates via wheelchair at [facility].   - PT eval  - fall precautions. Hx of frequent falls, last fall a few months ago. [facility]  trauma work up shows healing 4th/5th rib fractures  pain control as needed   - PT eval + sw/cm for dispo  - fall precautions.

## 2022-12-13 NOTE — H&P ADULT - NSHPADDITIONALINFOADULT_GEN_ALL_CORE
DVT ppx: Lovenox  Diet: DASH/CC, 1L fluid restriction for now  activity as tolerated, bed rest for now  DNR/DNI

## 2022-12-14 NOTE — PROGRESS NOTE ADULT - PROBLEM SELECTOR PLAN 4
Hx of chronic afib s/p watchman; rate controlled  - CHADSVASC score 5; not on AC 2/2 frequent falls  - EKG similar to previous EKG w Afib, RBBB,  - c/w home metoprolol

## 2022-12-14 NOTE — PROGRESS NOTE ADULT - PROBLEM SELECTOR PLAN 3
Hx of s/p tavr, HFpEF s/p aicd, cad s/p pci    recent admission a few months ago for CHF exacerbation; currently, appears to be compensated  imaging showing bl mod pl eff a/w compressive atelectasis, similar to prior imaging studies  EKG similar to prior.   TTE reviewed  strict I/Os, daily weights, salt + fluid restriction  monitor electrolytes/renal function   PO w/ Bumex 0.5mg daily + metoprolol xl 25

## 2022-12-14 NOTE — PROGRESS NOTE ADULT - PROBLEM SELECTOR PLAN 2
Hx of frequent falls, last fall a few months ago. [facility]  trauma work up shows healing 4th/5th rib fractures  pain control as needed   - PT eval- rec dc back to LTC  - fall precautions. No

## 2022-12-14 NOTE — PROGRESS NOTE ADULT - PROBLEM SELECTOR PLAN 8
Hx of DM2, not on meds at home. HbA1c (8/7): 6.2%   monitor BG on CC diet.    Dispo: dc back to [facility] tomorrow after 3rd dose of CXT. D/w CM.

## 2022-12-14 NOTE — CONSULT NOTE ADULT - SUBJECTIVE AND OBJECTIVE BOX
DATE OF SERVICE: 12-14-22 @ 10:22    CHIEF COMPLAINT:Patient is a 89y old  Male who presents with a chief complaint of fall (13 Dec 2022 03:21)      HISTORY OF PRESENT ILLNESS:HPI:  89yoM, hard of hearing w/ hx of dementia (baseline AAOx1), chronic Afib (not on AC, only ASA) s/p watchman, cad s/p pci, HFpEF s/p AICD, s/p TAVR, HTN, HLD, DM2, BPH, SCC, spinal stenosis, from [facility], p/w unwitnessed fall. patient is poor historian given underlying neurocognitive decline, history mainly gathered from chart. reportedly, patient was found on floor, on his back, by facility staff; staff grew concerned, so contacted ems to have patient brought to [facility] er for further evaluation. (13 Dec 2022 03:21)      PAST MEDICAL & SURGICAL HISTORY:  HTN (hypertension)      HLD (hyperlipidemia)      DM (diabetes mellitus)      BPH (benign prostatic hypertrophy)      Insomnia      RBBB      Chronic atrial fibrillation      Dementia      Unsteady gait      S/P shoulder surgery      S/P knee surgery              MEDICATIONS:  aspirin enteric coated 81 milliGRAM(s) Oral daily  buMETAnide 0.5 milliGRAM(s) Oral daily  enoxaparin Injectable 40 milliGRAM(s) SubCutaneous every 24 hours  metoprolol succinate ER 25 milliGRAM(s) Oral daily    cefTRIAXone   IVPB 1000 milliGRAM(s) IV Intermittent every 24 hours      acetaminophen     Tablet .. 650 milliGRAM(s) Oral every 6 hours PRN  donepezil 5 milliGRAM(s) Oral at bedtime  melatonin 3 milliGRAM(s) Oral at bedtime PRN  memantine 10 milliGRAM(s) Oral two times a day  mirtazapine 7.5 milliGRAM(s) Oral at bedtime  ondansetron Injectable 4 milliGRAM(s) IV Push every 8 hours PRN  QUEtiapine 12.5 milliGRAM(s) Oral at bedtime    aluminum hydroxide/magnesium hydroxide/simethicone Suspension 30 milliLiter(s) Oral every 4 hours PRN  pantoprazole    Tablet 40 milliGRAM(s) Oral before breakfast  polyethylene glycol 3350 17 Gram(s) Oral daily    atorvastatin 10 milliGRAM(s) Oral at bedtime  dextrose 50% Injectable 25 Gram(s) IV Push once  dextrose 50% Injectable 12.5 Gram(s) IV Push once  dextrose 50% Injectable 25 Gram(s) IV Push once  dextrose Oral Gel 15 Gram(s) Oral once PRN  finasteride 5 milliGRAM(s) Oral daily  glucagon  Injectable 1 milliGRAM(s) IntraMuscular once  insulin lispro (ADMELOG) corrective regimen sliding scale   SubCutaneous three times a day before meals  insulin lispro (ADMELOG) corrective regimen sliding scale   SubCutaneous at bedtime    dextrose 5%. 1000 milliLiter(s) IV Continuous <Continuous>  dextrose 5%. 1000 milliLiter(s) IV Continuous <Continuous>      FAMILY HISTORY:  Family history of diabetes mellitus        Non-contributory    SOCIAL HISTORY:    [ ] Tobacco  [ ] Drugs  [ ] Alcohol    Allergies    No Known Allergies    Intolerances    	    REVIEW OF SYSTEMS:  CONSTITUTIONAL: No fever  EYES: No eye pain, visual disturbances, or discharge  ENMT:  No difficulty hearing, tinnitus  NECK: No pain or stiffness  RESPIRATORY: No cough, wheezing,  CARDIOVASCULAR: No chest pain, palpitations, passing out, dizziness, or leg swelling  GASTROINTESTINAL:  No nausea, vomiting, diarrhea or constipation. No melena.  GENITOURINARY: No dysuria, hematuria  NEUROLOGICAL: No stroke like symptoms  SKIN: No burning or lesions   ENDOCRINE: No heat or cold intolerance  MUSCULOSKELETAL: No joint pain or swelling  PSYCHIATRIC: No  anxiety, mood swings  HEME/LYMPH: No bleeding gums  ALLERGY AND IMMUNOLOGIC: No hives or eczema	    All other ROS negative    PHYSICAL EXAM:  T(C): 36.3 (12-14-22 @ 08:45), Max: 36.7 (12-13-22 @ 20:56)  HR: 62 (12-14-22 @ 08:45) (58 - 84)  BP: 126/77 (12-14-22 @ 08:45) (101/68 - 126/77)  RR: 18 (12-14-22 @ 08:45) (18 - 18)  SpO2: 96% (12-14-22 @ 09:01) (95% - 100%)  Wt(kg): --  I&O's Summary      Appearance: Normal	  HEENT:   Normal oral mucosa, EOMI	  Cardiovascular:  S1 S2, No JVD,    Respiratory: Lungs clear to auscultation	  Psychiatry: Alert  Gastrointestinal:  Soft, Non-tender, + BS	  Skin: No rashes   Neurologic: Non-focal  Extremities:  No edema  Vascular: Peripheral pulses palpable    	    	  	  CARDIAC MARKERS:  Labs personally reviewed by me                                  10.7   4.05  )-----------( 125      ( 13 Dec 2022 07:17 )             35.5     12-13    141  |  109<H>  |  18  ----------------------------<  107<H>  3.7   |  20<L>  |  0.78    Ca    8.5      13 Dec 2022 07:17    TPro  6.8  /  Alb  3.4  /  TBili  0.6  /  DBili  x   /  AST  23  /  ALT  11  /  AlkPhos  75  12-13          Radiology: Personally reviewed by me -   < from: CT Chest w/ IV Cont (12.12.22 @ 23:01) >  IMPRESSION:    Bilateral moderate pleural effusions with compressive atelectasis.    Bladder wall thickening disproportionate to underdistention which may   reflect cystitis. Correlate with urinalysis.    Healing right anterior fourth and possibly fifth rib fractures.      < end of copied text >      Assessment /Plan:   89yoM, hard of hearing w/ hx of dementia (baseline AAOx1), chronic Afib (not on AC, only ASA) s/p watchman, cad s/p pci, HFpEF s/p AICD, s/p TAVR, HTN, HLD, DM2, BPH, SCC, spinal stenosis, from [facility] living facility, p/w unwitnessed fall. patient is poor historian given underlying neurocognitive decline, history mainly gathered from chart. reportedly, patient was found on floor, on his back, by facility staff; staff grew concerned, so contacted ems to have patient brought to [facility] er for further evaluation. (13 Dec 2022 03:21)    Problem/Plan -1  Problem: Chronic HFpEF   Plan:   TTE on 12/13 shows no change from previous in August 2022. Preserved EF, no changes in wall motion   12/14: BNP 12/13 6989, pt euvolemic on exam, CTA, no LE edema  C/w PO 0.5 mg Bumex once daily    Problem/Plan -2  Problem: AF  Plan:  - s/p Watchman, not on AC  - c/w Metoprolol    Problem/Plan -3  Problem: HTN  Plan:  - c/w Toprol XL 25mg PO daily    Problem/Plan -4  Problem: CAD  Plan:  - Denies chest pain although limited historian  - EKG with no ischemic changes noted  - c/w ASA    Problem/Plan-5:   Problem: Cystitis   Plan:  - Continue ceftriaxone  - F/U urine cultures  - Trend CBC  - Monitor urine output and bladder scan if concern for retention    Problem/Plan -6:   Problem: Fall  Plan:  - CTH and C-Spine without evidence for fracture or hemorrhage  - CT C/A/P with evidence of healing fractures but no new traumatic injuries. Incidentally found to have bilateral pleural effusions  - PT evaluation  - OOB to chair        Differential diagnosis and plan of care discussed with patient after the evaluation. Counseling on diet, nutritional counseling, weight management, exercise and medication compliance was done.   Advanced care planning/advanced directives discussed with patient/family. DNR status including forceful chest compressions to attempt to restart the heart, ventilator support/artificial breathing, electric shock, artificial nutrition, health care proxy, Molst form all discussed with pt. Pt wishes to consider. More than fifteen minutes spent on discussing advanced directives.     CIPRIANO Araiza DO [facility]  Cardiovascular Medicine  [address] [place] [address], [address]  Office [phone]  Available via call or text on Microsoft Teams  DATE OF SERVICE: 12-14-22 @ 10:22    CHIEF COMPLAINT:Patient is a 89y old  Male who presents with a chief complaint of fall (13 Dec 2022 03:21)      HISTORY OF PRESENT ILLNESS:HPI:  89yoM, hard of hearing w/ hx of dementia (baseline AAOx1), chronic Afib (not on AC, only ASA) s/p watchman, cad s/p pci, HFpEF s/p AICD, s/p TAVR, HTN, HLD, DM2, BPH, SCC, spinal stenosis, from [facility], p/w unwitnessed fall. patient is poor historian given underlying neurocognitive decline, history mainly gathered from chart. reportedly, patient was found on floor, on his back, by facility staff; staff grew concerned, so contacted ems to have patient brought to [facility] er for further evaluation. (13 Dec 2022 03:21)      PAST MEDICAL & SURGICAL HISTORY:  HTN (hypertension)      HLD (hyperlipidemia)      DM (diabetes mellitus)      BPH (benign prostatic hypertrophy)      Insomnia      RBBB      Chronic atrial fibrillation      Dementia      Unsteady gait      S/P shoulder surgery      S/P knee surgery              MEDICATIONS:  aspirin enteric coated 81 milliGRAM(s) Oral daily  buMETAnide 0.5 milliGRAM(s) Oral daily  enoxaparin Injectable 40 milliGRAM(s) SubCutaneous every 24 hours  metoprolol succinate ER 25 milliGRAM(s) Oral daily    cefTRIAXone   IVPB 1000 milliGRAM(s) IV Intermittent every 24 hours      acetaminophen     Tablet .. 650 milliGRAM(s) Oral every 6 hours PRN  donepezil 5 milliGRAM(s) Oral at bedtime  melatonin 3 milliGRAM(s) Oral at bedtime PRN  memantine 10 milliGRAM(s) Oral two times a day  mirtazapine 7.5 milliGRAM(s) Oral at bedtime  ondansetron Injectable 4 milliGRAM(s) IV Push every 8 hours PRN  QUEtiapine 12.5 milliGRAM(s) Oral at bedtime    aluminum hydroxide/magnesium hydroxide/simethicone Suspension 30 milliLiter(s) Oral every 4 hours PRN  pantoprazole    Tablet 40 milliGRAM(s) Oral before breakfast  polyethylene glycol 3350 17 Gram(s) Oral daily    atorvastatin 10 milliGRAM(s) Oral at bedtime  dextrose 50% Injectable 25 Gram(s) IV Push once  dextrose 50% Injectable 12.5 Gram(s) IV Push once  dextrose 50% Injectable 25 Gram(s) IV Push once  dextrose Oral Gel 15 Gram(s) Oral once PRN  finasteride 5 milliGRAM(s) Oral daily  glucagon  Injectable 1 milliGRAM(s) IntraMuscular once  insulin lispro (ADMELOG) corrective regimen sliding scale   SubCutaneous three times a day before meals  insulin lispro (ADMELOG) corrective regimen sliding scale   SubCutaneous at bedtime    dextrose 5%. 1000 milliLiter(s) IV Continuous <Continuous>  dextrose 5%. 1000 milliLiter(s) IV Continuous <Continuous>      FAMILY HISTORY:  Family history of diabetes mellitus        Non-contributory    SOCIAL HISTORY:    [ ] Tobacco  [ ] Drugs  [ ] Alcohol    Allergies    No Known Allergies    Intolerances    	    REVIEW OF SYSTEMS:  CONSTITUTIONAL: No fever  EYES: No eye pain, visual disturbances, or discharge  ENMT:  No difficulty hearing, tinnitus  NECK: No pain or stiffness  RESPIRATORY: No cough, wheezing,  CARDIOVASCULAR: No chest pain, palpitations, passing out, dizziness, or leg swelling  GASTROINTESTINAL:  No nausea, vomiting, diarrhea or constipation. No melena.  GENITOURINARY: No dysuria, hematuria  NEUROLOGICAL: No stroke like symptoms  SKIN: No burning or lesions   ENDOCRINE: No heat or cold intolerance  MUSCULOSKELETAL: No joint pain or swelling  PSYCHIATRIC: No  anxiety, mood swings  HEME/LYMPH: No bleeding gums  ALLERGY AND IMMUNOLOGIC: No hives or eczema	    All other ROS negative    PHYSICAL EXAM:  T(C): 36.3 (12-14-22 @ 08:45), Max: 36.7 (12-13-22 @ 20:56)  HR: 62 (12-14-22 @ 08:45) (58 - 84)  BP: 126/77 (12-14-22 @ 08:45) (101/68 - 126/77)  RR: 18 (12-14-22 @ 08:45) (18 - 18)  SpO2: 96% (12-14-22 @ 09:01) (95% - 100%)  Wt(kg): --  I&O's Summary      Appearance: Normal	  HEENT:   Normal oral mucosa, EOMI	  Cardiovascular:  S1 S2, No JVD,    Respiratory: Lungs clear to auscultation	  Psychiatry: Alert  Gastrointestinal:  Soft, Non-tender, + BS	  Skin: No rashes   Neurologic: Non-focal  Extremities:  No edema  Vascular: Peripheral pulses palpable    	    	  	  CARDIAC MARKERS:  Labs personally reviewed by me                                  10.7   4.05  )-----------( 125      ( 13 Dec 2022 07:17 )             35.5     12-13    141  |  109<H>  |  18  ----------------------------<  107<H>  3.7   |  20<L>  |  0.78    Ca    8.5      13 Dec 2022 07:17    TPro  6.8  /  Alb  3.4  /  TBili  0.6  /  DBili  x   /  AST  23  /  ALT  11  /  AlkPhos  75  12-13          Radiology: Personally reviewed by me -   < from: CT Chest w/ IV Cont (12.12.22 @ 23:01) >  IMPRESSION:    Bilateral moderate pleural effusions with compressive atelectasis.    Bladder wall thickening disproportionate to underdistention which may   reflect cystitis. Correlate with urinalysis.    Healing right anterior fourth and possibly fifth rib fractures.      < end of copied text >      Assessment /Plan:   89yoM, hard of hearing w/ hx of dementia (baseline AAOx1), chronic Afib (not on AC, only ASA) s/p watchman, cad s/p pci, HFpEF s/p AICD, s/p TAVR, HTN, HLD, DM2, BPH, SCC, spinal stenosis, from [facility] facility, p/w unwitnessed fall. patient is poor historian given underlying neurocognitive decline, history mainly gathered from chart. reportedly, patient was found on floor, on his back, by facility staff; staff grew concerned, so contacted ems to have patient brought to [facility] er for further evaluation. (13 Dec 2022 03:21)    Pt saw Dr Vazquez in office on Monday for routine follow up. Fell later in day    Problem/Plan -1  Problem: Chronic HFpEF   Plan:   TTE on 12/13 shows no change from previous in August 2022. Preserved EF, no changes in wall motion   12/14: BNP 12/13 6989, pt euvolemic on exam, CTA, no LE edema  C/w PO 0.5 mg Bumex once daily    Problem/Plan -2  Problem: AF  Plan:  - s/p Watchman, not on AC  - c/w Metoprolol    Problem/Plan -3  Problem: HTN  Plan:  - c/w Toprol XL 25mg PO daily    Problem/Plan -4  Problem: CAD  Plan:  - Denies chest pain although limited historian  - EKG with no ischemic changes noted  - c/w ASA            Differential diagnosis and plan of care discussed with patient after the evaluation. Counseling on diet, nutritional counseling, weight management, exercise and medication compliance was done.   Advanced care planning/advanced directives discussed with patient/family. DNR status including forceful chest compressions to attempt to restart the heart, ventilator support/artificial breathing, electric shock, artificial nutrition, health care proxy, Molst form all discussed with pt. Pt wishes to consider. More than fifteen minutes spent on discussing advanced directives.     CIPRIANO Araiza DO [facility]  Cardiovascular Medicine  [address] [place] [address], [address]  Office [phone]  Available via call or text on Microsoft Teams

## 2022-12-14 NOTE — PROGRESS NOTE ADULT - NUTRITIONAL ASSESSMENT
This patient has been assessed with a concern for Malnutrition and has been determined to have a diagnosis/diagnoses of Severe protein-calorie malnutrition.    This patient is being managed with:   Diet Minced and Moist-  1000mL Fluid Restriction ([id number])  Low Sodium  Supplement Feeding Modality:  Oral  Glucerna Shake Cans or Servings Per Day:  2       Frequency:  Daily  Entered: Aug 24 2022  8:31AM

## 2022-12-14 NOTE — PROGRESS NOTE ADULT - PROBLEM SELECTOR PLAN 1
afebrile, no leukocytosis.   s/p continue empiric ceftriaxone IV daily (day 2)  follow up urine cultures

## 2022-12-15 NOTE — DISCHARGE NOTE NURSING/CASE MANAGEMENT/SOCIAL WORK - PATIENT PORTAL LINK FT
You can access the FollowMyHealth Patient Portal offered by [facility] by registering at the following website: [email or web address]. By joining [facility]’s FollowMyHealth portal, you will also be able to view your health information using other applications (apps) compatible with our system.

## 2022-12-15 NOTE — DISCHARGE NOTE PROVIDER - NSDCACTIVITY_GEN_ALL_CORE
Do not drive or operate machinery/Do not make important decisions/Driving allowed Do not drive or operate machinery/Do not make important decisions

## 2022-12-15 NOTE — DISCHARGE NOTE PROVIDER - NSDCMRMEDTOKEN_GEN_ALL_CORE_FT
aspirin 81 mg oral delayed release tablet: 1 tab(s) orally once a day  bumetanide 0.5 mg oral tablet: 1 tab(s) orally once a day  cyanocobalamin 1000 mcg oral tablet: 1 tab(s) orally once a day  Daily Pedro oral tablet: 1 tab(s) orally once a day  donepezil 5 mg oral tablet: 1 tab(s) orally once a day (at bedtime)  finasteride 5 mg oral tablet: 1 tab(s) orally once a day  melatonin 3 mg oral tablet: 1 tab(s) orally once a day (at bedtime)  memantine 10 mg oral tablet: 1 tab(s) orally 2 times a day  metoprolol succinate 25 mg oral tablet, extended release: 1 tab(s) orally once a day  MiraLax oral powder for reconstitution: 1 packet(s) orally once a day  mirtazapine 7.5 mg oral tablet: 1 tab(s) orally once a day (at bedtime)  pantoprazole 40 mg oral delayed release tablet: 1 tab(s) orally once a day (before a meal)  Pravachol 20 mg oral tablet: 1 tab(s) orally once a day (at bedtime)  SEROquel 25 mg oral tablet: 0.5 tab(s) orally once a day (at bedtime)   thiamine 100 mg oral tablet: 1 tab(s) orally once a day  zinc sulfate 220 mg oral capsule: 1 cap(s) orally once a day

## 2022-12-15 NOTE — DISCHARGE NOTE NURSING/CASE MANAGEMENT/SOCIAL WORK - NSDCPEFALRISK_GEN_ALL_CORE
For information on Fall & Injury Prevention, visit: [email or web address] OR  [email or web address] OR  https://www.cdc.gov/steadi/patient.html

## 2022-12-15 NOTE — DISCHARGE NOTE PROVIDER - PROVIDER TOKENS
PROVIDER:[TOKEN:[[id number]],FOLLOWUP:[1 week]],PROVIDER:[TOKEN:[[id number]],FOLLOWUP:[1 week]]

## 2022-12-15 NOTE — DISCHARGE NOTE PROVIDER - NSDCCAREPROVSEEN_GEN_ALL_CORE_FT
[de-identified] : \par \par Ms. YUSUF CLEVELAND is a 54 year old female, with hx of HTN, hypercholesterolemia, anxiety, presents for follow up visit\par \par She c/o pain in the RUQ with pain going to the right back for the past month. Says sitting makes the pain worse\par Denies any association with food, denies nausea, vomiting, diarrhea, constipation\par Of note she had cholecystectomy Aug 2020\par \par  Jae Zuniga 26-Dec-2019 23:41

## 2022-12-15 NOTE — DISCHARGE NOTE PROVIDER - HOSPITAL COURSE
89yoM, hard of hearing w/ hx of dementia (baseline AAOx1), chronic Afib (not on AC, only ASA) s/p watchman, cad s/p pci, HFpEF s/p AICD, s/p TAVR, HTN, HLD, DM2, BPH, SCC, spinal stenosis, presenting from [facility] living after a fall, found to have uti, admitted to medicine for further mgmt.      Nutritional Assessment:  · Nutritional Assessment	This patient has been assessed with a concern for Malnutrition and has been determined to have a diagnosis/diagnoses of Severe protein-calorie malnutrition.    This patient is being managed with:   Diet Minced and Moist-  1000mL Fluid Restriction ([id number])  Low Sodium  Supplement Feeding Modality:  Oral  Glucerna Shake Cans or Servings Per Day:  2       Frequency:  Daily  Entered: Aug 24 2022  8:31AM     Problem/Plan - 1:  ·  Problem: Acute UTI.   ·  Plan: afebrile, no leukocytosis.   s/p continue empiric ceftriaxone IV daily (day 2)  follow up urine cultures.     Problem/Plan - 2:  ·  Problem: Falls.   ·  Plan: Hx of frequent falls, last fall a few months ago. Ambulates via wheelchair at [facility].  trauma work up shows healing 4th/5th rib fractures  pain control as needed   - PT eval- rec dc back to LTC  - fall precautions.     Problem/Plan - 3:  ·  Problem: Elevated troponin.   ·  Plan: Hx of s/p tavr, HFpEF s/p aicd, cad s/p pci    recent admission a few months ago for CHF exacerbation; currently, appears to be compensated  imaging showing bl mod pl eff a/w compressive atelectasis, similar to prior imaging studies  EKG similar to prior.   TTE reviewed  strict I/Os, daily weights, salt + fluid restriction  monitor electrolytes/renal function   PO w/ Bumex 0.5mg daily + metoprolol xl 25.     Problem/Plan - 4:  ·  Problem: Chronic atrial fibrillation.   ·  Plan: Hx of chronic afib s/p watchman; rate controlled  - CHADSVASC score 5; not on AC 2/2 frequent falls  - EKG similar to previous EKG w Afib, RBBB,  - c/w home metoprolol.     Problem/Plan - 5:  ·  Problem: HTN (hypertension).   ·  Plan: c/w home metoprolol succinate + bumex.     Problem/Plan - 6:  ·  Problem: HLD (hyperlipidemia).   ·  Plan: c/w home statin equivalent (prava 20 to atorva 10) and asa.     Problem/Plan - 7:  ·  Problem: BPH (benign prostatic hyperplasia).   ·  Plan: c/w home finasteride.     Problem/Plan - 8:  ·  Problem: DM (diabetes mellitus).   ·  Plan: Hx of DM2, not on meds at home. HbA1c (8/7): 6.2%   monitor BG on CC diet.    Dispo: [facility]    89yoM, hard of hearing w/ hx of dementia (baseline AAOx1), chronic Afib (not on AC, only ASA) s/p watchman, cad s/p pci, HFpEF s/p AICD, s/p TAVR, HTN, HLD, DM2, BPH, SCC, spinal stenosis, presenting from [facility] living after a fall, found to have uti, admitted to medicine for further mgmt.      Nutritional Assessment:  · Nutritional Assessment	This patient has been assessed with a concern for Malnutrition and has been determined to have a diagnosis/diagnoses of Severe protein-calorie malnutrition.    This patient is being managed with:   Diet Minced and Moist-  1000mL Fluid Restriction ([id number])  Low Sodium  Supplement Feeding Modality:  Oral  Glucerna Shake Cans or Servings Per Day:  2       Frequency:  Daily  Entered: Aug 24 2022  8:31AM     Problem/Plan - 1:  ·  Problem: Acute UTI secondary to E.coli  ·  Plan: afebrile, no leukocytosis.   s/p ceftriaxone IV daily (completed day 3)     Problem/Plan - 2:  ·  Problem: Falls.   ·  Plan: Hx of frequent falls, last fall a few months ago. Ambulates via wheelchair at [facility].  trauma work up shows healing 4th/5th rib fractures  pain control as needed   - PT eval- rec dc back to LTC  - fall precautions.     Problem/Plan - 3:  ·  Problem: Elevated troponin.   ·  Plan: Hx of s/p tavr, HFpEF s/p aicd, cad s/p pci    recent admission a few months ago for CHF exacerbation; currently, appears to be compensated  imaging showing bl mod pl eff a/w compressive atelectasis, similar to prior imaging studies  EKG similar to prior.   TTE reviewed  strict I/Os, daily weights, salt + fluid restriction  monitor electrolytes/renal function   PO w/ Bumex 0.5mg daily + metoprolol xl 25.     Problem/Plan - 4:  ·  Problem: Chronic atrial fibrillation.   ·  Plan: Hx of chronic afib s/p watchman; rate controlled  - CHADSVASC score 5; not on AC 2/2 frequent falls  - EKG similar to previous EKG w Afib, RBBB,  - c/w home metoprolol.     Problem/Plan - 5:  ·  Problem: HTN (hypertension).   ·  Plan: c/w home metoprolol succinate + bumex.     Problem/Plan - 6:  ·  Problem: HLD (hyperlipidemia).   ·  Plan: c/w home statin equivalent (prava 20 to atorva 10) and asa.     Problem/Plan - 7:  ·  Problem: BPH (benign prostatic hyperplasia).   ·  Plan: c/w home finasteride.     Problem/Plan - 8:  ·  Problem: DM (diabetes mellitus).   ·  Plan: Hx of DM2, not on meds at home. HbA1c (8/7): 6.2%   monitor BG on CC diet.    Dispo: dc back to [facility]TC

## 2022-12-15 NOTE — DISCHARGE NOTE PROVIDER - NSDCCPCAREPLAN_GEN_ALL_CORE_FT
PRINCIPAL DISCHARGE DIAGNOSIS  Diagnosis: Acute UTI  Assessment and Plan of Treatment: HOME CARE INSTRUCTIONS  f you were prescribed antibiotics, take them exactly as your caregiver instructs you. Finish the medication even if you feel better after you have only taken some of the medication.  Drink enough water and fluids to keep your urine clear or pale yellow.  Avoid caffeine, tea, and carbonated beverages. They tend to irritate your bladder.  Empty your bladder often. Avoid holding urine for long periods of time.  Empty your bladder before and after sexual intercourse.  After a bowel movement, women should cleanse from front to back. Use each tissue only once.  SEEK MEDICAL CARE IF:  You have back pain.  You develop a fever.  Your symptoms do not begin to resolve within 3 days.  SEEK IMMEDIATE MEDICAL CARE IF:  You have severe back pain or lower abdominal pain.  You develop chills.  You have nausea or vomiting.  You have continued burning or discomfort with urination.        SECONDARY DISCHARGE DIAGNOSES  Diagnosis: Falls  Assessment and Plan of Treatment: Fall precaution  Physical therapy

## 2022-12-15 NOTE — DISCHARGE NOTE PROVIDER - NSDCFUADDAPPT_GEN_ALL_CORE_FT
APPTS ARE READY TO BE MADE: [ X] YES    Best Family or Patient Contact (if needed):    Additional Information about above appointments (if needed):    1: Dr. Vazquez  2: Dr. Purdy  3:     Other comments or requests:

## 2022-12-15 NOTE — DISCHARGE NOTE NURSING/CASE MANAGEMENT/SOCIAL WORK - NSDCVIVACCINE_GEN_ALL_CORE_FT
influenza, injectable, quadrivalent, preservative free; 10-Oct-2019 12:36; Ashley Figueroa (RN); [employer]; [id number] (Exp. Date: 30-Jun-2020); IntraMuscular; Deltoid Right.; 0.5 milliLiter(s); VIS (VIS Published: 15-Aug-2019, VIS Presented: 10-Oct-2019);   Tdap; 30-Jun-2019 18:38; Edith Almaraz (RN); [employer]; [id number] (Exp. Date: 04-Apr-2021); IntraMuscular; Deltoid Right.; 0.5 milliLiter(s); VIS (VIS Published: 09-May-2013, VIS Presented: 30-Jun-2019);   Tdap; 07-Oct-2019 06:56; Negar Burns (RN); [employer]; [id number] (Exp. Date: 08-Aug-2020); IntraMuscular; Deltoid Right.; 0.5 milliLiter(s); VIS (VIS Published: 09-May-2013, VIS Presented: 07-Oct-2019);   Tdap; 14-Jun-2021 20:02; Paige Gore (RN); [employer]; [id number] (Exp. Date: 11-Sep-2022); IntraMuscular; Deltoid Right.; 0.5 milliLiter(s); VIS (VIS Published: 09-May-2013, VIS Presented: 14-Jun-2021);

## 2022-12-15 NOTE — DISCHARGE NOTE PROVIDER - CARE PROVIDER_API CALL
Tarah Purdy (MD)  Geriatric Medicine; Internal Medicine  [address], [address]  [place] [address]  Phone: [phone]  Fax: [phone]  Follow Up Time: 1 week    Clarence Vazquez (DO)  Cardiovascular Disease; Internal Medicine; Nuclear Cardiology  [address], [address]  [place] [address]  Phone: [phone]  Fax: [phone]  Follow Up Time: 1 week

## 2022-12-17 NOTE — CHART NOTE - NSCHARTNOTEFT_GEN_A_CORE
Discharge Note:    Requested by Dr. Zuniga to facilitate d/c to [facility].  V/s, labs, diagnostics reviewed, pt stable to d/c now.  Medication reconciliation reviewed, revised, and resolved with Dr. Zuniga who medically cleared w/ f/u as directed.   Please refer to d/c note for hospital details.    Sarah Mancia, RACHAEL-c  [id number]
Left 2 messages for patient in regards to follow up care with callback information.
Patient seen and examined in [facility] in the ED this morning.    On exam, patient found sleeping, but arousable to my voice. Unable to obtain ROS due to dementia.    On exam, pupils are reactive. Mucous membranes are dry. Lungs are CTA b/l anteriorly. There is no cardiac murmur. There is no LE edema. There is no abdominal pain on palpation and no bladder distension.    A/P:    1. Cystitis  - Continue ceftriaxone  - F/U urine cultures  - Trend CBC  - Monitor urine output and bladder scan if concern for retention    2. Fall  - CTH and C-Spine without evidence for fracture or hemorrhage  - CT C/A/P with evidence of healing fractures but no new traumatic injuries. Incidentally found to have bilateral pleural effusions  - PT evaluation  - OOB to chair    3. Pleural Effusions, Acute on Chronic HFpEF with elevated BNP  - Continue bumex 0.5 mg PO daily  - Not requiring O2 support at this time  - Continue metoprolol    4. Dementia  - Continue remeron and aricept    5. T2DM  - Continue admelog sliding scale  - Diabetic diet    6. VTE PPx  - Lovenox 40 mg SQ daily    DNR/DNI
Left message for patient in regards to follow up care with callback information.
Pt seen at bedside (A&Ox1) and screaming for help and attempting to get oob. Discussed with Dr. Zuniga and Haldol 0.5mg IVP x1 ordered. Will continue to monitor.    Sarah Mancia, RACHAEL-c  [phone]

## 2023-01-01 ENCOUNTER — INPATIENT (INPATIENT)
Facility: HOSPITAL | Age: 88
LOS: 4 days | DRG: 291 | End: 2023-01-19
Attending: INTERNAL MEDICINE | Admitting: STUDENT IN AN ORGANIZED HEALTH CARE EDUCATION/TRAINING PROGRAM
Payer: MEDICARE

## 2023-01-01 VITALS
HEART RATE: 74 BPM | OXYGEN SATURATION: 96 % | RESPIRATION RATE: 12 BRPM | SYSTOLIC BLOOD PRESSURE: 114 MMHG | DIASTOLIC BLOOD PRESSURE: 62 MMHG | TEMPERATURE: 98.6 F

## 2023-01-01 VITALS
HEIGHT: 66 IN | RESPIRATION RATE: 20 BRPM | HEART RATE: 72 BPM | TEMPERATURE: 98 F | SYSTOLIC BLOOD PRESSURE: 143 MMHG | DIASTOLIC BLOOD PRESSURE: 100 MMHG | OXYGEN SATURATION: 97 %

## 2023-01-01 VITALS
HEART RATE: 80 BPM | OXYGEN SATURATION: 96 % | DIASTOLIC BLOOD PRESSURE: 73 MMHG | RESPIRATION RATE: 18 BRPM | SYSTOLIC BLOOD PRESSURE: 117 MMHG | TEMPERATURE: 98 F | WEIGHT: 120.81 LBS

## 2023-01-01 DIAGNOSIS — I48.20 CHRONIC ATRIAL FIBRILLATION, UNSPECIFIED: ICD-10-CM

## 2023-01-01 DIAGNOSIS — G93.41 METABOLIC ENCEPHALOPATHY: ICD-10-CM

## 2023-01-01 DIAGNOSIS — I25.10 ATHEROSCLEROTIC HEART DISEASE OF NATIVE CORONARY ARTERY WITHOUT ANGINA PECTORIS: ICD-10-CM

## 2023-01-01 DIAGNOSIS — B33.8 OTHER SPECIFIED VIRAL DISEASES: ICD-10-CM

## 2023-01-01 DIAGNOSIS — Z29.9 ENCOUNTER FOR PROPHYLACTIC MEASURES, UNSPECIFIED: ICD-10-CM

## 2023-01-01 DIAGNOSIS — E11.9 TYPE 2 DIABETES MELLITUS WITHOUT COMPLICATIONS: ICD-10-CM

## 2023-01-01 DIAGNOSIS — Z98.89 OTHER SPECIFIED POSTPROCEDURAL STATES: Chronic | ICD-10-CM

## 2023-01-01 DIAGNOSIS — Z71.89 OTHER SPECIFIED COUNSELING: ICD-10-CM

## 2023-01-01 DIAGNOSIS — I50.33 ACUTE ON CHRONIC DIASTOLIC (CONGESTIVE) HEART FAILURE: ICD-10-CM

## 2023-01-01 DIAGNOSIS — K92.1 MELENA: ICD-10-CM

## 2023-01-01 DIAGNOSIS — R13.10 DYSPHAGIA, UNSPECIFIED: ICD-10-CM

## 2023-01-01 DIAGNOSIS — E87.0 HYPEROSMOLALITY AND HYPERNATREMIA: ICD-10-CM

## 2023-01-01 DIAGNOSIS — I50.9 HEART FAILURE, UNSPECIFIED: ICD-10-CM

## 2023-01-01 DIAGNOSIS — R53.2 FUNCTIONAL QUADRIPLEGIA: ICD-10-CM

## 2023-01-01 DIAGNOSIS — F03.90 UNSPECIFIED DEMENTIA, UNSPECIFIED SEVERITY, WITHOUT BEHAVIORAL DISTURBANCE, PSYCHOTIC DISTURBANCE, MOOD DISTURBANCE, AND ANXIETY: ICD-10-CM

## 2023-01-01 LAB
ALBUMIN SERPL ELPH-MCNC: 3.8 G/DL — SIGNIFICANT CHANGE UP (ref 3.3–5)
ALP SERPL-CCNC: 79 U/L — SIGNIFICANT CHANGE UP (ref 40–120)
ALT FLD-CCNC: 18 U/L — SIGNIFICANT CHANGE UP (ref 10–45)
ANION GAP SERPL CALC-SCNC: 12 MMOL/L — SIGNIFICANT CHANGE UP (ref 5–17)
ANION GAP SERPL CALC-SCNC: 13 MMOL/L — SIGNIFICANT CHANGE UP (ref 5–17)
ANION GAP SERPL CALC-SCNC: 14 MMOL/L — SIGNIFICANT CHANGE UP (ref 5–17)
ANION GAP SERPL CALC-SCNC: 17 MMOL/L — SIGNIFICANT CHANGE UP (ref 5–17)
ANION GAP SERPL CALC-SCNC: 18 MMOL/L — HIGH (ref 5–17)
ANISOCYTOSIS BLD QL: SLIGHT — SIGNIFICANT CHANGE UP
APPEARANCE UR: ABNORMAL
APPEARANCE UR: CLEAR — SIGNIFICANT CHANGE UP
APTT BLD: 27.5 SEC — SIGNIFICANT CHANGE UP (ref 27.5–35.5)
AST SERPL-CCNC: 46 U/L — HIGH (ref 10–40)
BACTERIA # UR AUTO: ABNORMAL
BACTERIA # UR AUTO: NEGATIVE — SIGNIFICANT CHANGE UP
BASE EXCESS BLDV CALC-SCNC: 2.3 MMOL/L — SIGNIFICANT CHANGE UP (ref -2–3)
BASOPHILS # BLD AUTO: 0.02 K/UL — SIGNIFICANT CHANGE UP (ref 0–0.2)
BASOPHILS # BLD AUTO: 0.02 K/UL — SIGNIFICANT CHANGE UP (ref 0–0.2)
BASOPHILS # BLD AUTO: 0.04 K/UL — SIGNIFICANT CHANGE UP (ref 0–0.2)
BASOPHILS NFR BLD AUTO: 0.5 % — SIGNIFICANT CHANGE UP (ref 0–2)
BASOPHILS NFR BLD AUTO: 0.5 % — SIGNIFICANT CHANGE UP (ref 0–2)
BASOPHILS NFR BLD AUTO: 0.9 % — SIGNIFICANT CHANGE UP (ref 0–2)
BILIRUB SERPL-MCNC: 1.5 MG/DL — HIGH (ref 0.2–1.2)
BILIRUB UR-MCNC: NEGATIVE — SIGNIFICANT CHANGE UP
BILIRUB UR-MCNC: NEGATIVE — SIGNIFICANT CHANGE UP
BUN SERPL-MCNC: 24 MG/DL — HIGH (ref 7–23)
BUN SERPL-MCNC: 25 MG/DL — HIGH (ref 7–23)
BUN SERPL-MCNC: 26 MG/DL — HIGH (ref 7–23)
BUN SERPL-MCNC: 26 MG/DL — HIGH (ref 7–23)
BUN SERPL-MCNC: 27 MG/DL — HIGH (ref 7–23)
BURR CELLS BLD QL SMEAR: PRESENT — SIGNIFICANT CHANGE UP
CA-I SERPL-SCNC: 1.1 MMOL/L — LOW (ref 1.15–1.33)
CALCIUM SERPL-MCNC: 8.5 MG/DL — SIGNIFICANT CHANGE UP (ref 8.4–10.5)
CALCIUM SERPL-MCNC: 8.5 MG/DL — SIGNIFICANT CHANGE UP (ref 8.4–10.5)
CALCIUM SERPL-MCNC: 8.8 MG/DL — SIGNIFICANT CHANGE UP (ref 8.4–10.5)
CALCIUM SERPL-MCNC: 8.8 MG/DL — SIGNIFICANT CHANGE UP (ref 8.4–10.5)
CALCIUM SERPL-MCNC: 9 MG/DL — SIGNIFICANT CHANGE UP (ref 8.4–10.5)
CALCIUM SERPL-MCNC: 9 MG/DL — SIGNIFICANT CHANGE UP (ref 8.4–10.5)
CALCIUM SERPL-MCNC: 9.1 MG/DL — SIGNIFICANT CHANGE UP (ref 8.4–10.5)
CHLORIDE BLDV-SCNC: 107 MMOL/L — SIGNIFICANT CHANGE UP (ref 96–108)
CHLORIDE SERPL-SCNC: 105 MMOL/L — SIGNIFICANT CHANGE UP (ref 96–108)
CHLORIDE SERPL-SCNC: 106 MMOL/L — SIGNIFICANT CHANGE UP (ref 96–108)
CHLORIDE SERPL-SCNC: 107 MMOL/L — SIGNIFICANT CHANGE UP (ref 96–108)
CHLORIDE SERPL-SCNC: 109 MMOL/L — HIGH (ref 96–108)
CHLORIDE SERPL-SCNC: 111 MMOL/L — HIGH (ref 96–108)
CO2 BLDV-SCNC: 29 MMOL/L — HIGH (ref 22–26)
CO2 SERPL-SCNC: 18 MMOL/L — LOW (ref 22–31)
CO2 SERPL-SCNC: 22 MMOL/L — SIGNIFICANT CHANGE UP (ref 22–31)
CO2 SERPL-SCNC: 25 MMOL/L — SIGNIFICANT CHANGE UP (ref 22–31)
CO2 SERPL-SCNC: 26 MMOL/L — SIGNIFICANT CHANGE UP (ref 22–31)
CO2 SERPL-SCNC: 28 MMOL/L — SIGNIFICANT CHANGE UP (ref 22–31)
COLOR SPEC: ABNORMAL
COLOR SPEC: SIGNIFICANT CHANGE UP
CREAT SERPL-MCNC: 0.63 MG/DL — SIGNIFICANT CHANGE UP (ref 0.5–1.3)
CREAT SERPL-MCNC: 0.68 MG/DL — SIGNIFICANT CHANGE UP (ref 0.5–1.3)
CREAT SERPL-MCNC: 0.7 MG/DL — SIGNIFICANT CHANGE UP (ref 0.5–1.3)
CREAT SERPL-MCNC: 0.71 MG/DL — SIGNIFICANT CHANGE UP (ref 0.5–1.3)
CREAT SERPL-MCNC: 0.75 MG/DL — SIGNIFICANT CHANGE UP (ref 0.5–1.3)
CREAT SERPL-MCNC: 0.79 MG/DL — SIGNIFICANT CHANGE UP (ref 0.5–1.3)
CREAT SERPL-MCNC: 0.82 MG/DL — SIGNIFICANT CHANGE UP (ref 0.5–1.3)
CULTURE RESULTS: NO GROWTH — SIGNIFICANT CHANGE UP
DACRYOCYTES BLD QL SMEAR: SLIGHT — SIGNIFICANT CHANGE UP
DIFF PNL FLD: ABNORMAL
DIFF PNL FLD: ABNORMAL
EGFR: 81 ML/MIN/1.73M2 — SIGNIFICANT CHANGE UP
EGFR: 82 ML/MIN/1.73M2 — SIGNIFICANT CHANGE UP
EGFR: 83 ML/MIN/1.73M2 — SIGNIFICANT CHANGE UP
EGFR: 84 ML/MIN/1.73M2 — SIGNIFICANT CHANGE UP
EGFR: 85 ML/MIN/1.73M2 — SIGNIFICANT CHANGE UP
EGFR: 86 ML/MIN/1.73M2 — SIGNIFICANT CHANGE UP
EGFR: 88 ML/MIN/1.73M2 — SIGNIFICANT CHANGE UP
ELLIPTOCYTES BLD QL SMEAR: SLIGHT — SIGNIFICANT CHANGE UP
EOSINOPHIL # BLD AUTO: 0.01 K/UL — SIGNIFICANT CHANGE UP (ref 0–0.5)
EOSINOPHIL # BLD AUTO: 0.02 K/UL — SIGNIFICANT CHANGE UP (ref 0–0.5)
EOSINOPHIL # BLD AUTO: 0.04 K/UL — SIGNIFICANT CHANGE UP (ref 0–0.5)
EOSINOPHIL NFR BLD AUTO: 0.2 % — SIGNIFICANT CHANGE UP (ref 0–6)
EOSINOPHIL NFR BLD AUTO: 0.5 % — SIGNIFICANT CHANGE UP (ref 0–6)
EOSINOPHIL NFR BLD AUTO: 0.9 % — SIGNIFICANT CHANGE UP (ref 0–6)
EPI CELLS # UR: 0 /HPF — SIGNIFICANT CHANGE UP
EPI CELLS # UR: 2 /HPF — SIGNIFICANT CHANGE UP
GAS PNL BLDV: 142 MMOL/L — SIGNIFICANT CHANGE UP (ref 136–145)
GAS PNL BLDV: SIGNIFICANT CHANGE UP
GAS PNL BLDV: SIGNIFICANT CHANGE UP
GLUCOSE BLDV-MCNC: 156 MG/DL — HIGH (ref 70–99)
GLUCOSE SERPL-MCNC: 102 MG/DL — HIGH (ref 70–99)
GLUCOSE SERPL-MCNC: 109 MG/DL — HIGH (ref 70–99)
GLUCOSE SERPL-MCNC: 121 MG/DL — HIGH (ref 70–99)
GLUCOSE SERPL-MCNC: 154 MG/DL — HIGH (ref 70–99)
GLUCOSE SERPL-MCNC: 69 MG/DL — LOW (ref 70–99)
GLUCOSE SERPL-MCNC: 75 MG/DL — SIGNIFICANT CHANGE UP (ref 70–99)
GLUCOSE SERPL-MCNC: 89 MG/DL — SIGNIFICANT CHANGE UP (ref 70–99)
GLUCOSE UR QL: NEGATIVE — SIGNIFICANT CHANGE UP
GLUCOSE UR QL: NEGATIVE — SIGNIFICANT CHANGE UP
HCO3 BLDV-SCNC: 27 MMOL/L — SIGNIFICANT CHANGE UP (ref 22–29)
HCT VFR BLD CALC: 36 % — LOW (ref 39–50)
HCT VFR BLD CALC: 37.4 % — LOW (ref 39–50)
HCT VFR BLD CALC: 39.6 % — SIGNIFICANT CHANGE UP (ref 39–50)
HCT VFR BLD CALC: 39.9 % — SIGNIFICANT CHANGE UP (ref 39–50)
HCT VFR BLD CALC: 40.3 % — SIGNIFICANT CHANGE UP (ref 39–50)
HCT VFR BLD CALC: 41 % — SIGNIFICANT CHANGE UP (ref 39–50)
HCT VFR BLDA CALC: 36 % — LOW (ref 39–51)
HGB BLD CALC-MCNC: 12 G/DL — LOW (ref 12.6–17.4)
HGB BLD-MCNC: 11.3 G/DL — LOW (ref 13–17)
HGB BLD-MCNC: 11.6 G/DL — LOW (ref 13–17)
HGB BLD-MCNC: 12.3 G/DL — LOW (ref 13–17)
HGB BLD-MCNC: 12.5 G/DL — LOW (ref 13–17)
HYALINE CASTS # UR AUTO: 0 /LPF — SIGNIFICANT CHANGE UP (ref 0–2)
HYALINE CASTS # UR AUTO: 1 /LPF — SIGNIFICANT CHANGE UP (ref 0–2)
IMM GRANULOCYTES NFR BLD AUTO: 0.2 % — SIGNIFICANT CHANGE UP (ref 0–0.9)
IMM GRANULOCYTES NFR BLD AUTO: 0.5 % — SIGNIFICANT CHANGE UP (ref 0–0.9)
INR BLD: 1.35 RATIO — HIGH (ref 0.88–1.16)
KETONES UR-MCNC: ABNORMAL
KETONES UR-MCNC: NEGATIVE — SIGNIFICANT CHANGE UP
LACTATE BLDV-MCNC: 2.1 MMOL/L — HIGH (ref 0.5–2)
LACTATE SERPL-SCNC: 1.6 MMOL/L — SIGNIFICANT CHANGE UP (ref 0.5–2)
LACTATE SERPL-SCNC: 2.1 MMOL/L — HIGH (ref 0.5–2)
LEUKOCYTE ESTERASE UR-ACNC: NEGATIVE — SIGNIFICANT CHANGE UP
LEUKOCYTE ESTERASE UR-ACNC: NEGATIVE — SIGNIFICANT CHANGE UP
LYMPHOCYTES # BLD AUTO: 0.04 K/UL — LOW (ref 1–3.3)
LYMPHOCYTES # BLD AUTO: 0.35 K/UL — LOW (ref 1–3.3)
LYMPHOCYTES # BLD AUTO: 0.47 K/UL — LOW (ref 1–3.3)
LYMPHOCYTES # BLD AUTO: 0.9 % — LOW (ref 13–44)
LYMPHOCYTES # BLD AUTO: 10.8 % — LOW (ref 13–44)
LYMPHOCYTES # BLD AUTO: 7.9 % — LOW (ref 13–44)
MACROCYTES BLD QL: SLIGHT — SIGNIFICANT CHANGE UP
MAGNESIUM SERPL-MCNC: 2 MG/DL — SIGNIFICANT CHANGE UP (ref 1.6–2.6)
MAGNESIUM SERPL-MCNC: 2.1 MG/DL — SIGNIFICANT CHANGE UP (ref 1.6–2.6)
MANUAL SMEAR VERIFICATION: SIGNIFICANT CHANGE UP
MCHC RBC-ENTMCNC: 27.7 PG — SIGNIFICANT CHANGE UP (ref 27–34)
MCHC RBC-ENTMCNC: 27.9 PG — SIGNIFICANT CHANGE UP (ref 27–34)
MCHC RBC-ENTMCNC: 28 PG — SIGNIFICANT CHANGE UP (ref 27–34)
MCHC RBC-ENTMCNC: 30.5 GM/DL — LOW (ref 32–36)
MCHC RBC-ENTMCNC: 31 GM/DL — LOW (ref 32–36)
MCHC RBC-ENTMCNC: 31 GM/DL — LOW (ref 32–36)
MCHC RBC-ENTMCNC: 31.1 GM/DL — LOW (ref 32–36)
MCHC RBC-ENTMCNC: 31.3 GM/DL — LOW (ref 32–36)
MCHC RBC-ENTMCNC: 31.4 GM/DL — LOW (ref 32–36)
MCV RBC AUTO: 88.9 FL — SIGNIFICANT CHANGE UP (ref 80–100)
MCV RBC AUTO: 89.1 FL — SIGNIFICANT CHANGE UP (ref 80–100)
MCV RBC AUTO: 89.8 FL — SIGNIFICANT CHANGE UP (ref 80–100)
MCV RBC AUTO: 89.9 FL — SIGNIFICANT CHANGE UP (ref 80–100)
MCV RBC AUTO: 90.2 FL — SIGNIFICANT CHANGE UP (ref 80–100)
MCV RBC AUTO: 90.7 FL — SIGNIFICANT CHANGE UP (ref 80–100)
MICROCYTES BLD QL: SLIGHT — SIGNIFICANT CHANGE UP
MONOCYTES # BLD AUTO: 0.33 K/UL — SIGNIFICANT CHANGE UP (ref 0–0.9)
MONOCYTES # BLD AUTO: 0.38 K/UL — SIGNIFICANT CHANGE UP (ref 0–0.9)
MONOCYTES # BLD AUTO: 0.41 K/UL — SIGNIFICANT CHANGE UP (ref 0–0.9)
MONOCYTES NFR BLD AUTO: 7.6 % — SIGNIFICANT CHANGE UP (ref 2–14)
MONOCYTES NFR BLD AUTO: 8.7 % — SIGNIFICANT CHANGE UP (ref 2–14)
MONOCYTES NFR BLD AUTO: 9.2 % — SIGNIFICANT CHANGE UP (ref 2–14)
NEUTROPHILS # BLD AUTO: 3.51 K/UL — SIGNIFICANT CHANGE UP (ref 1.8–7.4)
NEUTROPHILS # BLD AUTO: 3.63 K/UL — SIGNIFICANT CHANGE UP (ref 1.8–7.4)
NEUTROPHILS # BLD AUTO: 3.83 K/UL — SIGNIFICANT CHANGE UP (ref 1.8–7.4)
NEUTROPHILS NFR BLD AUTO: 80.4 % — HIGH (ref 43–77)
NEUTROPHILS NFR BLD AUTO: 81.7 % — HIGH (ref 43–77)
NEUTROPHILS NFR BLD AUTO: 86.9 % — HIGH (ref 43–77)
NITRITE UR-MCNC: NEGATIVE — SIGNIFICANT CHANGE UP
NITRITE UR-MCNC: NEGATIVE — SIGNIFICANT CHANGE UP
NRBC # BLD: 0 /100 WBCS — SIGNIFICANT CHANGE UP (ref 0–0)
NT-PROBNP SERPL-SCNC: HIGH PG/ML (ref 0–300)
OB PNL STL: NEGATIVE — SIGNIFICANT CHANGE UP
PCO2 BLDV: 43 MMHG — SIGNIFICANT CHANGE UP (ref 42–55)
PH BLDV: 7.41 — SIGNIFICANT CHANGE UP (ref 7.32–7.43)
PH UR: 5.5 — SIGNIFICANT CHANGE UP (ref 5–8)
PH UR: 6.5 — SIGNIFICANT CHANGE UP (ref 5–8)
PHOSPHATE SERPL-MCNC: 3.3 MG/DL — SIGNIFICANT CHANGE UP (ref 2.5–4.5)
PHOSPHATE SERPL-MCNC: 3.3 MG/DL — SIGNIFICANT CHANGE UP (ref 2.5–4.5)
PLAT MORPH BLD: NORMAL — SIGNIFICANT CHANGE UP
PLATELET # BLD AUTO: 120 K/UL — LOW (ref 150–400)
PLATELET # BLD AUTO: 127 K/UL — LOW (ref 150–400)
PLATELET # BLD AUTO: 130 K/UL — LOW (ref 150–400)
PLATELET # BLD AUTO: 142 K/UL — LOW (ref 150–400)
PLATELET # BLD AUTO: 144 K/UL — LOW (ref 150–400)
PLATELET # BLD AUTO: 162 K/UL — SIGNIFICANT CHANGE UP (ref 150–400)
PO2 BLDV: 35 MMHG — SIGNIFICANT CHANGE UP (ref 25–45)
POLYCHROMASIA BLD QL SMEAR: SLIGHT — SIGNIFICANT CHANGE UP
POTASSIUM BLDV-SCNC: 4.1 MMOL/L — SIGNIFICANT CHANGE UP (ref 3.5–5.1)
POTASSIUM SERPL-MCNC: 3.1 MMOL/L — LOW (ref 3.5–5.3)
POTASSIUM SERPL-MCNC: 3.3 MMOL/L — LOW (ref 3.5–5.3)
POTASSIUM SERPL-MCNC: 3.4 MMOL/L — LOW (ref 3.5–5.3)
POTASSIUM SERPL-MCNC: 3.5 MMOL/L — SIGNIFICANT CHANGE UP (ref 3.5–5.3)
POTASSIUM SERPL-MCNC: 3.7 MMOL/L — SIGNIFICANT CHANGE UP (ref 3.5–5.3)
POTASSIUM SERPL-MCNC: 3.8 MMOL/L — SIGNIFICANT CHANGE UP (ref 3.5–5.3)
POTASSIUM SERPL-MCNC: 4.1 MMOL/L — SIGNIFICANT CHANGE UP (ref 3.5–5.3)
POTASSIUM SERPL-SCNC: 3.1 MMOL/L — LOW (ref 3.5–5.3)
POTASSIUM SERPL-SCNC: 3.3 MMOL/L — LOW (ref 3.5–5.3)
POTASSIUM SERPL-SCNC: 3.4 MMOL/L — LOW (ref 3.5–5.3)
POTASSIUM SERPL-SCNC: 3.5 MMOL/L — SIGNIFICANT CHANGE UP (ref 3.5–5.3)
POTASSIUM SERPL-SCNC: 3.7 MMOL/L — SIGNIFICANT CHANGE UP (ref 3.5–5.3)
POTASSIUM SERPL-SCNC: 3.8 MMOL/L — SIGNIFICANT CHANGE UP (ref 3.5–5.3)
POTASSIUM SERPL-SCNC: 4.1 MMOL/L — SIGNIFICANT CHANGE UP (ref 3.5–5.3)
PROT SERPL-MCNC: 7.6 G/DL — SIGNIFICANT CHANGE UP (ref 6–8.3)
PROT UR-MCNC: ABNORMAL
PROT UR-MCNC: NEGATIVE — SIGNIFICANT CHANGE UP
PROTHROM AB SERPL-ACNC: 15.6 SEC — HIGH (ref 10.5–13.4)
RAPID RVP RESULT: DETECTED
RBC # BLD: 4.05 M/UL — LOW (ref 4.2–5.8)
RBC # BLD: 4.16 M/UL — LOW (ref 4.2–5.8)
RBC # BLD: 4.41 M/UL — SIGNIFICANT CHANGE UP (ref 4.2–5.8)
RBC # BLD: 4.47 M/UL — SIGNIFICANT CHANGE UP (ref 4.2–5.8)
RBC # BLD: 4.48 M/UL — SIGNIFICANT CHANGE UP (ref 4.2–5.8)
RBC # BLD: 4.52 M/UL — SIGNIFICANT CHANGE UP (ref 4.2–5.8)
RBC # FLD: 16.7 % — HIGH (ref 10.3–14.5)
RBC # FLD: 16.7 % — HIGH (ref 10.3–14.5)
RBC # FLD: 16.8 % — HIGH (ref 10.3–14.5)
RBC # FLD: 16.9 % — HIGH (ref 10.3–14.5)
RBC # FLD: 17 % — HIGH (ref 10.3–14.5)
RBC # FLD: 17.2 % — HIGH (ref 10.3–14.5)
RBC BLD AUTO: ABNORMAL
RBC CASTS # UR COMP ASSIST: 94 /HPF — HIGH (ref 0–4)
RBC CASTS # UR COMP ASSIST: >50 /HPF — SIGNIFICANT CHANGE UP (ref 0–4)
RSV RNA SPEC QL NAA+PROBE: DETECTED
SAO2 % BLDV: 48.5 % — LOW (ref 67–88)
SARS-COV-2 RNA SPEC QL NAA+PROBE: SIGNIFICANT CHANGE UP
SCHISTOCYTES BLD QL AUTO: SLIGHT — SIGNIFICANT CHANGE UP
SODIUM SERPL-SCNC: 144 MMOL/L — SIGNIFICANT CHANGE UP (ref 135–145)
SODIUM SERPL-SCNC: 144 MMOL/L — SIGNIFICANT CHANGE UP (ref 135–145)
SODIUM SERPL-SCNC: 145 MMOL/L — SIGNIFICANT CHANGE UP (ref 135–145)
SODIUM SERPL-SCNC: 145 MMOL/L — SIGNIFICANT CHANGE UP (ref 135–145)
SODIUM SERPL-SCNC: 146 MMOL/L — HIGH (ref 135–145)
SODIUM SERPL-SCNC: 147 MMOL/L — HIGH (ref 135–145)
SODIUM SERPL-SCNC: 148 MMOL/L — HIGH (ref 135–145)
SP GR SPEC: 1.01 — LOW (ref 1.01–1.02)
SP GR SPEC: 1.02 — SIGNIFICANT CHANGE UP (ref 1.01–1.02)
SPECIMEN SOURCE: SIGNIFICANT CHANGE UP
TROPONIN T, HIGH SENSITIVITY RESULT: 127 NG/L — HIGH (ref 0–51)
TROPONIN T, HIGH SENSITIVITY RESULT: 133 NG/L — HIGH (ref 0–51)
UROBILINOGEN FLD QL: ABNORMAL
UROBILINOGEN FLD QL: ABNORMAL
VARIANT LYMPHS # BLD: 1.7 % — SIGNIFICANT CHANGE UP (ref 0–6)
WBC # BLD: 3.26 K/UL — LOW (ref 3.8–10.5)
WBC # BLD: 4.36 K/UL — SIGNIFICANT CHANGE UP (ref 3.8–10.5)
WBC # BLD: 4.41 K/UL — SIGNIFICANT CHANGE UP (ref 3.8–10.5)
WBC # BLD: 4.44 K/UL — SIGNIFICANT CHANGE UP (ref 3.8–10.5)
WBC # BLD: 4.71 K/UL — SIGNIFICANT CHANGE UP (ref 3.8–10.5)
WBC # BLD: 5.19 K/UL — SIGNIFICANT CHANGE UP (ref 3.8–10.5)
WBC # FLD AUTO: 3.26 K/UL — LOW (ref 3.8–10.5)
WBC # FLD AUTO: 4.36 K/UL — SIGNIFICANT CHANGE UP (ref 3.8–10.5)
WBC # FLD AUTO: 4.41 K/UL — SIGNIFICANT CHANGE UP (ref 3.8–10.5)
WBC # FLD AUTO: 4.44 K/UL — SIGNIFICANT CHANGE UP (ref 3.8–10.5)
WBC # FLD AUTO: 4.71 K/UL — SIGNIFICANT CHANGE UP (ref 3.8–10.5)
WBC # FLD AUTO: 5.19 K/UL — SIGNIFICANT CHANGE UP (ref 3.8–10.5)
WBC UR QL: 0 /HPF — SIGNIFICANT CHANGE UP (ref 0–5)
WBC UR QL: 11 /HPF — HIGH (ref 0–5)

## 2023-01-01 PROCEDURE — 71260 CT THORAX DX C+: CPT | Mod: MA

## 2023-01-01 PROCEDURE — 81001 URINALYSIS AUTO W/SCOPE: CPT

## 2023-01-01 PROCEDURE — 84484 ASSAY OF TROPONIN QUANT: CPT

## 2023-01-01 PROCEDURE — 99285 EMERGENCY DEPT VISIT HI MDM: CPT

## 2023-01-01 PROCEDURE — 85025 COMPLETE CBC W/AUTO DIFF WBC: CPT

## 2023-01-01 PROCEDURE — 85730 THROMBOPLASTIN TIME PARTIAL: CPT

## 2023-01-01 PROCEDURE — 36415 COLL VENOUS BLD VENIPUNCTURE: CPT

## 2023-01-01 PROCEDURE — 80048 BASIC METABOLIC PNL TOTAL CA: CPT

## 2023-01-01 PROCEDURE — 72125 CT NECK SPINE W/O DYE: CPT | Mod: MA

## 2023-01-01 PROCEDURE — 85610 PROTHROMBIN TIME: CPT

## 2023-01-01 PROCEDURE — 80053 COMPREHEN METABOLIC PANEL: CPT

## 2023-01-01 PROCEDURE — 74177 CT ABD & PELVIS W/CONTRAST: CPT | Mod: MA

## 2023-01-01 PROCEDURE — 12345: CPT | Mod: NC

## 2023-01-01 PROCEDURE — 99232 SBSQ HOSP IP/OBS MODERATE 35: CPT

## 2023-01-01 PROCEDURE — 87640 STAPH A DNA AMP PROBE: CPT

## 2023-01-01 PROCEDURE — 93010 ELECTROCARDIOGRAM REPORT: CPT

## 2023-01-01 PROCEDURE — 87040 BLOOD CULTURE FOR BACTERIA: CPT

## 2023-01-01 PROCEDURE — 99223 1ST HOSP IP/OBS HIGH 75: CPT

## 2023-01-01 PROCEDURE — 99231 SBSQ HOSP IP/OBS SF/LOW 25: CPT

## 2023-01-01 PROCEDURE — 71045 X-RAY EXAM CHEST 1 VIEW: CPT

## 2023-01-01 PROCEDURE — 87086 URINE CULTURE/COLONY COUNT: CPT

## 2023-01-01 PROCEDURE — 85027 COMPLETE CBC AUTOMATED: CPT

## 2023-01-01 PROCEDURE — 85014 HEMATOCRIT: CPT

## 2023-01-01 PROCEDURE — 0225U NFCT DS DNA&RNA 21 SARSCOV2: CPT

## 2023-01-01 PROCEDURE — 99222 1ST HOSP IP/OBS MODERATE 55: CPT | Mod: GC

## 2023-01-01 PROCEDURE — 83735 ASSAY OF MAGNESIUM: CPT

## 2023-01-01 PROCEDURE — 82962 GLUCOSE BLOOD TEST: CPT

## 2023-01-01 PROCEDURE — 87637 SARSCOV2&INF A&B&RSV AMP PRB: CPT

## 2023-01-01 PROCEDURE — 92610 EVALUATE SWALLOWING FUNCTION: CPT

## 2023-01-01 PROCEDURE — 93306 TTE W/DOPPLER COMPLETE: CPT

## 2023-01-01 PROCEDURE — 97161 PT EVAL LOW COMPLEX 20 MIN: CPT

## 2023-01-01 PROCEDURE — 96374 THER/PROPH/DIAG INJ IV PUSH: CPT

## 2023-01-01 PROCEDURE — 83605 ASSAY OF LACTIC ACID: CPT

## 2023-01-01 PROCEDURE — 82435 ASSAY OF BLOOD CHLORIDE: CPT

## 2023-01-01 PROCEDURE — 71045 X-RAY EXAM CHEST 1 VIEW: CPT | Mod: 26

## 2023-01-01 PROCEDURE — 84132 ASSAY OF SERUM POTASSIUM: CPT

## 2023-01-01 PROCEDURE — 82803 BLOOD GASES ANY COMBINATION: CPT

## 2023-01-01 PROCEDURE — 83880 ASSAY OF NATRIURETIC PEPTIDE: CPT

## 2023-01-01 PROCEDURE — 87641 MR-STAPH DNA AMP PROBE: CPT

## 2023-01-01 PROCEDURE — 84100 ASSAY OF PHOSPHORUS: CPT

## 2023-01-01 PROCEDURE — 87186 SC STD MICRODIL/AGAR DIL: CPT

## 2023-01-01 PROCEDURE — 70450 CT HEAD/BRAIN W/O DYE: CPT | Mod: MA

## 2023-01-01 PROCEDURE — 82272 OCCULT BLD FECES 1-3 TESTS: CPT

## 2023-01-01 PROCEDURE — 85018 HEMOGLOBIN: CPT

## 2023-01-01 PROCEDURE — 93005 ELECTROCARDIOGRAM TRACING: CPT

## 2023-01-01 PROCEDURE — 82947 ASSAY GLUCOSE BLOOD QUANT: CPT

## 2023-01-01 PROCEDURE — 82330 ASSAY OF CALCIUM: CPT

## 2023-01-01 PROCEDURE — 84295 ASSAY OF SERUM SODIUM: CPT

## 2023-01-01 PROCEDURE — 83036 HEMOGLOBIN GLYCOSYLATED A1C: CPT

## 2023-01-01 RX ORDER — POTASSIUM CHLORIDE 20 MEQ
10 PACKET (EA) ORAL
Refills: 0 | Status: COMPLETED | OUTPATIENT
Start: 2023-01-01 | End: 2023-01-01

## 2023-01-01 RX ORDER — POTASSIUM CHLORIDE 20 MEQ
10 PACKET (EA) ORAL
Refills: 0 | Status: DISCONTINUED | OUTPATIENT
Start: 2023-01-01 | End: 2023-01-01

## 2023-01-01 RX ORDER — SODIUM CHLORIDE 9 MG/ML
1000 INJECTION INTRAMUSCULAR; INTRAVENOUS; SUBCUTANEOUS
Refills: 0 | Status: DISCONTINUED | OUTPATIENT
Start: 2023-01-01 | End: 2023-01-01

## 2023-01-01 RX ORDER — DONEPEZIL HYDROCHLORIDE 10 MG/1
5 TABLET, FILM COATED ORAL AT BEDTIME
Refills: 0 | Status: DISCONTINUED | OUTPATIENT
Start: 2023-01-01 | End: 2023-01-01

## 2023-01-01 RX ORDER — FUROSEMIDE 40 MG
20 TABLET ORAL ONCE
Refills: 0 | Status: COMPLETED | OUTPATIENT
Start: 2023-01-01 | End: 2023-01-01

## 2023-01-01 RX ORDER — FINASTERIDE 5 MG/1
5 TABLET, FILM COATED ORAL DAILY
Refills: 0 | Status: DISCONTINUED | OUTPATIENT
Start: 2023-01-01 | End: 2023-01-01

## 2023-01-01 RX ORDER — FUROSEMIDE 40 MG
40 TABLET ORAL ONCE
Refills: 0 | Status: DISCONTINUED | OUTPATIENT
Start: 2023-01-01 | End: 2023-01-01

## 2023-01-01 RX ORDER — BUMETANIDE 0.25 MG/ML
1 INJECTION INTRAMUSCULAR; INTRAVENOUS DAILY
Refills: 0 | Status: DISCONTINUED | OUTPATIENT
Start: 2023-01-01 | End: 2023-01-01

## 2023-01-01 RX ORDER — ACETAMINOPHEN 500 MG
650 TABLET ORAL EVERY 6 HOURS
Refills: 0 | Status: DISCONTINUED | OUTPATIENT
Start: 2023-01-01 | End: 2023-01-01

## 2023-01-01 RX ORDER — POTASSIUM CHLORIDE 20 MEQ
40 PACKET (EA) ORAL EVERY 4 HOURS
Refills: 0 | Status: DISCONTINUED | OUTPATIENT
Start: 2023-01-01 | End: 2023-01-01

## 2023-01-01 RX ORDER — POLYETHYLENE GLYCOL 3350 17 G/17G
1 POWDER, FOR SOLUTION ORAL
Qty: 0 | Refills: 0 | DISCHARGE

## 2023-01-01 RX ORDER — ASPIRIN/CALCIUM CARB/MAGNESIUM 324 MG
81 TABLET ORAL DAILY
Refills: 0 | Status: DISCONTINUED | OUTPATIENT
Start: 2023-01-01 | End: 2023-01-01

## 2023-01-01 RX ORDER — BUMETANIDE 0.25 MG/ML
0.5 INJECTION INTRAMUSCULAR; INTRAVENOUS DAILY
Refills: 0 | Status: DISCONTINUED | OUTPATIENT
Start: 2023-01-01 | End: 2023-01-01

## 2023-01-01 RX ORDER — BUMETANIDE 0.25 MG/ML
1 INJECTION INTRAMUSCULAR; INTRAVENOUS ONCE
Refills: 0 | Status: DISCONTINUED | OUTPATIENT
Start: 2023-01-01 | End: 2023-01-01

## 2023-01-01 RX ORDER — QUETIAPINE FUMARATE 200 MG/1
25 TABLET, FILM COATED ORAL AT BEDTIME
Refills: 0 | Status: DISCONTINUED | OUTPATIENT
Start: 2023-01-01 | End: 2023-01-01

## 2023-01-01 RX ORDER — PANTOPRAZOLE SODIUM 20 MG/1
40 TABLET, DELAYED RELEASE ORAL
Refills: 0 | Status: DISCONTINUED | OUTPATIENT
Start: 2023-01-01 | End: 2023-01-01

## 2023-01-01 RX ORDER — MIRTAZAPINE 45 MG/1
7.5 TABLET, ORALLY DISINTEGRATING ORAL AT BEDTIME
Refills: 0 | Status: DISCONTINUED | OUTPATIENT
Start: 2023-01-01 | End: 2023-01-01

## 2023-01-01 RX ORDER — METOPROLOL TARTRATE 50 MG
5 TABLET ORAL EVERY 6 HOURS
Refills: 0 | Status: DISCONTINUED | OUTPATIENT
Start: 2023-01-01 | End: 2023-01-01

## 2023-01-01 RX ORDER — ATORVASTATIN CALCIUM 80 MG/1
10 TABLET, FILM COATED ORAL AT BEDTIME
Refills: 0 | Status: DISCONTINUED | OUTPATIENT
Start: 2023-01-01 | End: 2023-01-01

## 2023-01-01 RX ORDER — SODIUM CHLORIDE 9 MG/ML
500 INJECTION, SOLUTION INTRAVENOUS
Refills: 0 | Status: DISCONTINUED | OUTPATIENT
Start: 2023-01-01 | End: 2023-01-01

## 2023-01-01 RX ORDER — THIAMINE MONONITRATE (VIT B1) 100 MG
100 TABLET ORAL DAILY
Refills: 0 | Status: DISCONTINUED | OUTPATIENT
Start: 2023-01-01 | End: 2023-01-01

## 2023-01-01 RX ORDER — DEXTROSE MONOHYDRATE, SODIUM CHLORIDE, AND POTASSIUM CHLORIDE 50; .745; 4.5 G/1000ML; G/1000ML; G/1000ML
1000 INJECTION, SOLUTION INTRAVENOUS
Refills: 0 | Status: DISCONTINUED | OUTPATIENT
Start: 2023-01-01 | End: 2023-01-01

## 2023-01-01 RX ORDER — NUT.TX.GLUC.INTOLER,LAC-FR,SOY
LIQUID (ML) ORAL
Qty: 180 | Refills: 0 | Status: ACTIVE | COMMUNITY
Start: 2023-01-01 | End: 1900-01-01

## 2023-01-01 RX ORDER — ENOXAPARIN SODIUM 100 MG/ML
40 INJECTION SUBCUTANEOUS EVERY 24 HOURS
Refills: 0 | Status: DISCONTINUED | OUTPATIENT
Start: 2023-01-01 | End: 2023-01-01

## 2023-01-01 RX ADMIN — SODIUM CHLORIDE 50 MILLILITER(S): 9 INJECTION INTRAMUSCULAR; INTRAVENOUS; SUBCUTANEOUS at 20:21

## 2023-01-01 RX ADMIN — Medication 5 MILLIGRAM(S): at 11:01

## 2023-01-01 RX ADMIN — SODIUM CHLORIDE 50 MILLILITER(S): 9 INJECTION INTRAMUSCULAR; INTRAVENOUS; SUBCUTANEOUS at 15:04

## 2023-01-01 RX ADMIN — Medication 1 MILLIGRAM(S): at 22:50

## 2023-01-01 RX ADMIN — DEXTROSE MONOHYDRATE, SODIUM CHLORIDE, AND POTASSIUM CHLORIDE 50 MILLILITER(S): 50; .745; 4.5 INJECTION, SOLUTION INTRAVENOUS at 01:05

## 2023-01-01 RX ADMIN — Medication 5 MILLIGRAM(S): at 20:54

## 2023-01-01 RX ADMIN — Medication 5 MILLIGRAM(S): at 05:58

## 2023-01-01 RX ADMIN — ENOXAPARIN SODIUM 40 MILLIGRAM(S): 100 INJECTION SUBCUTANEOUS at 22:59

## 2023-01-01 RX ADMIN — SODIUM CHLORIDE 50 MILLILITER(S): 9 INJECTION, SOLUTION INTRAVENOUS at 15:05

## 2023-01-01 RX ADMIN — Medication 5 MILLIGRAM(S): at 02:12

## 2023-01-01 RX ADMIN — Medication 100 MILLIEQUIVALENT(S): at 12:21

## 2023-01-01 RX ADMIN — BUMETANIDE 0.5 MILLIGRAM(S): 0.25 INJECTION INTRAMUSCULAR; INTRAVENOUS at 11:09

## 2023-01-01 RX ADMIN — Medication 50 MILLIEQUIVALENT(S): at 07:32

## 2023-01-01 RX ADMIN — DEXTROSE MONOHYDRATE, SODIUM CHLORIDE, AND POTASSIUM CHLORIDE 50 MILLILITER(S): 50; .745; 4.5 INJECTION, SOLUTION INTRAVENOUS at 11:10

## 2023-01-01 RX ADMIN — Medication 50 MILLIEQUIVALENT(S): at 05:35

## 2023-01-01 RX ADMIN — Medication 5 MILLIGRAM(S): at 01:06

## 2023-01-01 RX ADMIN — Medication 5 MILLIGRAM(S): at 20:21

## 2023-01-01 RX ADMIN — Medication 5 MILLIGRAM(S): at 02:34

## 2023-01-01 RX ADMIN — Medication 5 MILLIGRAM(S): at 17:14

## 2023-01-01 RX ADMIN — ENOXAPARIN SODIUM 40 MILLIGRAM(S): 100 INJECTION SUBCUTANEOUS at 22:50

## 2023-01-01 RX ADMIN — ENOXAPARIN SODIUM 40 MILLIGRAM(S): 100 INJECTION SUBCUTANEOUS at 01:04

## 2023-01-01 RX ADMIN — Medication 50 MILLIEQUIVALENT(S): at 06:20

## 2023-01-01 RX ADMIN — Medication 20 MILLIGRAM(S): at 00:07

## 2023-01-01 RX ADMIN — Medication 5 MILLIGRAM(S): at 13:04

## 2023-01-01 RX ADMIN — DEXTROSE MONOHYDRATE, SODIUM CHLORIDE, AND POTASSIUM CHLORIDE 50 MILLILITER(S): 50; .745; 4.5 INJECTION, SOLUTION INTRAVENOUS at 07:42

## 2023-01-01 RX ADMIN — Medication 100 MILLIEQUIVALENT(S): at 13:25

## 2023-01-01 RX ADMIN — ENOXAPARIN SODIUM 40 MILLIGRAM(S): 100 INJECTION SUBCUTANEOUS at 21:17

## 2023-01-01 RX ADMIN — Medication 100 MILLIEQUIVALENT(S): at 11:09

## 2023-01-01 RX ADMIN — Medication 5 MILLIGRAM(S): at 08:19

## 2023-01-01 RX ADMIN — BUMETANIDE 0.5 MILLIGRAM(S): 0.25 INJECTION INTRAMUSCULAR; INTRAVENOUS at 05:21

## 2023-01-10 PROBLEM — E46 PROTEIN MALNUTRITION: Status: ACTIVE | Noted: 2023-01-01

## 2023-01-10 PROBLEM — I50.9 CONGESTIVE HEART FAILURE: Status: ACTIVE | Noted: 2022-01-01

## 2023-01-10 PROBLEM — Z86.79 HISTORY OF ORTHOSTATIC HYPOTENSION: Status: ACTIVE | Noted: 2022-01-01

## 2023-01-10 PROBLEM — F03.918 DEMENTIA WITH BEHAVIORAL DISTURBANCE: Status: ACTIVE | Noted: 2022-01-01

## 2023-01-10 PROBLEM — R29.6 FREQUENT FALLS: Status: ACTIVE | Noted: 2019-09-09

## 2023-01-10 NOTE — PHYSICAL EXAM
[JVD] : there was jugular-venous distention [Normal Gait] : abnormal gait [de-identified] : initially sleeping, arouses to verbal stimulation, remains awke during encounter but does not engage in conversation [de-identified] : no wheezes, rales or rhonchi. appears comfortable on 2L NC, 95-96% at rest. mild crackles at B/L base but no signs of acute vol overload  [de-identified] : systolic murmur [de-identified] : advanced dementia

## 2023-01-10 NOTE — REVIEW OF SYSTEMS
[FreeTextEntry1] : limited ROS due to dementia; pt does not grimace during exam or exhibit other signs of acute pain or distress

## 2023-01-10 NOTE — ASSESSMENT
[FreeTextEntry1] : 89yo male with advanced dementia and frequent falls, recent hospitalization s/p fall and tx for UTI. Pt appears well. afebrile, non-toxic appearing. \par \par frequent falls:\par c/w enhanced supervision in [facility] unit \par fall precautions\par multifactorial with autonomic dysfunction and muscle atrophy, deconditioning, balance and gait disorder\par \par Orthostatic hypotension:\par likely contributing to frequent falls\par compression stockings\par [facility] encouragement for fluid intake, discussed with wellness staff\par start ensure supplementation \par fall precautions \par not on overt anti-hypertensives, but is on bumex for dCHF. benefits of c/w this therapy outweighs risks at this time. pt is at risk for acute decompensated heart failure if bumex is reduced or stopped \par \par Protein malnutrition:\par ensure high protein supplementation\par discussed with wellness staff to ensure assist with all meals and hydration\par \par dCHF:\par no signs of acute vol overload \par careful diuresis given poor PO intake but benefits outweigh risks at this time- tolerating BUmex 0.5mg daily,mild crackles in bases on this exam but no signs of acute volume overload. noo increase in diuretics as BP unlikely to tolerate safely; O2 saturation wnl and patient appears comfortable \par \par dementia with behavioral disturbances:\par no reports from [facility] staff about uncontrolled BD \par patient appears comfortable, no distress \par is eating meals with prompting per nursing staff \par would benefit from ensure supplementation to supplement caloric needs \par on memantine and aricept, unsure how much clinical benefit but pt is tolerating without issues, will continue for now \par \par \par \par \par \par

## 2023-01-10 NOTE — HISTORY OF PRESENT ILLNESS
[FreeTextEntry1] : Mr. Julien is a 89yo male with advanced vascular dementia with BD, HF EF 50% and stage III diastolic dysfunction, Afib only on aspirin due to recurrent falls, CAD s/p PCI, DM2, hx of watchman and TAVR, HLD, spinal stenosis, HTN. \par \par Resident of [facility] in the life guidance unit\par \par Seen today for post-hospital visit:\par [facility] 12/12 - 12/15/2022\par cc: fall\par Hospital Course	\par 89yoM, hard of hearing w/ hx of dementia (baseline AAOx1), chronic Afib (not on\par AC, only ASA) s/p watchman, cad s/p pci, HFpEF s/p AICD, s/p TAVR, HTN, HLD,\par DM2, BPH, SCC, spinal stenosis, presenting from [facility] living after a\par fall, found to have uti, completed abx course \par \par #h/o Rib fx:\par s/p prior fall several months ago\par interval imaging fro recent hospitalization shows fx are healing without complication\par patient denies any acute pain, although very limited historian due to advanced dementia\par no signs of acute respiratory distress \par \par #vascular dementia with BD\par advanced\par dependent in all ADLS and IADLs \par resides in memory care unit\par on donepezil and memantine\par mirtazepine for insomnia, Seroqeul for BD, tolerating well \par \par #functional status:\par hx of frequent falls despite hospital bed with guard rails and enhanced supervision in memory unit in [facility] \par uses wheelchair \par \par #DM2:\par AL1 12/2022 6.5%\par acceptable for age; not on hypoglycemics, risks >>benefits \par \par #CAD s/p AICD / Afib s/p watchman (not on AC) / HTN:\par Cardiologist Dr. Schneider\par Chronic HFpEF TTE on 12/13/22 shows no change from previous in August 2022. Preserved EF, no\par changes in wall motion\par 12/14: BNP 12/13 6989, pt euvolemic on exam, CTA, no LE edema\par C/w PO 0.5 mg Bumex once daily\par echo 5/2022 and repeat Aug 2022, stable: infiltrative cardiomyopathy, severe conc LVH, LVEF 50%, diffuse hypokinesis\par mild mitral stenosis, TAVR with normal fxn, mild/mod pHTN; stage III diastolic dysfunction. pleural effusions a/w compressive atelectasis\par on O2 therapy since d/c from hospital for sx relief

## 2023-01-14 NOTE — ED PROVIDER NOTE - OBJECTIVE STATEMENT
89 yo M w/ history of HFpEF s/p AICD and TAVR, chronic A fib, CAD, HTN, HLD, T2DM, and squamous cell carcinoma presents from [facility] assisted living facility after 3 days of poor PO intake, confusion, and weakness.  Patient is nodding off and unable to provide HPI.  Collateral from [facility] showed that patient was refusing to take food, but denies any history of recent falls, fevers, nausea/vomiting, or other symptoms.  RSV, flu, and COVID are currently prevalent at [facility].  Collateral from daughter-in-law (Mitali Julien) shows the patient has had 2-3 hospitalizations over the past 6 months for pneumonia, falls, and UTI.  Collateral from daughter ([phone])  significant for recent admission with evaluation by Dr. Clarence Vazquez (cardiology). [facility], who reported that patient was lethargic.

## 2023-01-14 NOTE — ED PROVIDER NOTE - PHYSICAL EXAMINATION
GENERAL: no acute distress, mesomorphic body habitus  HEENT: dry mucus membrane, b/l eye exudates, atraumatic, normocephalic, vision grossly intact, EOMI, no conjunctivitis or discharge, hearing grossly intact, no nasal discharge or epistaxis  CV: regular rate, normal rhythm, normal S1/S2, no murmurs/rubs, no cyanosis  PULM: diffuse crackles b/l upper/lower lung fields, occasional desaturation to 90% on RA, normal work of breathing  GI: soft/non-tender/nondistended abdomen, no guarding or rebound tenderness, no palpable masses  NEURO: nodding off with fluctuating levels of consciousness  MSK: no joint tenderness/swelling/erythema, ranging all extremities with no appreciable loss of ROM  EXT: no peripheral edema, calf tenderness, redness or swelling  SKIN: warm, dry, and intact, no rashes  PSYCH: appropriate mood and affect

## 2023-01-14 NOTE — ED ADULT NURSE NOTE - NS ED NURSE TRANSPORT WITH
Cardiac Monitor/Defib/ACLS/Rescue Kit/O2/BVM EDT, transport/Cardiac Monitor/Defib/ACLS/Rescue Kit/O2/BVM

## 2023-01-14 NOTE — ED PROVIDER NOTE - ATTENDING APP SHARED VISIT CONTRIBUTION OF CARE
RGUJRAL 89-year-old male history listed presents from assisted living for decreased p.o. intake and generalized weakness for 3 days.  Patient is a limited historian responsive to painful stimuli appears mildly tachypneic improved w supplemental O2.  Collateral obtained from family and AL.  On exam patient is somnolent but arousable to voice and stimuli.  Chest with bilateral coarse breath sounds and basilar rales.  Abdomen is soft non tender. check labs cultures x-ray chest to evaluate for infection dehydration ACS heart failure.

## 2023-01-14 NOTE — ED PROVIDER NOTE - PROGRESS NOTE DETAILS
Kathy PGY1: BNP 90436, troponin 133, and BUN of 26. Spoke with Dr. Clarence Vazquez (cardiologist from prior admission) regarding patient who only recommended lasix and admission to hospitalist for further evaluation. H will see the patient in the morning. Kathy PGY1: Admission updated to droplet precaution because of RSV positive.

## 2023-01-14 NOTE — ED ADULT NURSE NOTE - OBJECTIVE STATEMENT
89 y/o male, BIB EMS from assisted living for not eating/drinking x 2 days. Patient awake, non verbal/refusing to speak-unsure of baseline, responds to painful stimuli-screams 'stop'.

## 2023-01-14 NOTE — ED PROVIDER NOTE - CLINICAL SUMMARY MEDICAL DECISION MAKING FREE TEXT BOX
89 yo M w/ history of HFpEF s/p AICD and TAVR, chronic A fib, CAD, HTN, HLD, T2DM, and squamous cell carcinoma presents from [facility] assisted living facility after 3 days of poor PO intake, confusion, and weakness. Physical exam is remarkable for dry mucus membrane, b/l eye exudate, diffuse crackles b/l upper/lower lung fields,. Concern for CHF exacerbation considering physical exam findings. Also concern for electrolyte abnormalities, UTI, and normal progression of dementia. Plan for basics, BNP, ACS work-up, VBG, CXR, and UA/U culture. Dispo likely admission.

## 2023-01-15 NOTE — PROGRESS NOTE ADULT - PROBLEM SELECTOR PLAN 8
DVT ppx: Lovenox  Diet: Dysphagia screen prior to initiation  Dispo: Pending clinical improvement likely back to [facility]  GOC: DNR/DNI MOLST in chart  Pending S/S eval

## 2023-01-15 NOTE — H&P ADULT - PROBLEM SELECTOR PLAN 1
- Noted to be lethargic w/ poor PO intake, weakness and confusion w/ sick contacts and positive RSV on admission  - Likely i/s/o RSV infection  - Symptomatic management w/ supplemental O2 as needed, wean as tolerated for goal SpO2 >95%  - Isolation precautions

## 2023-01-15 NOTE — H&P ADULT - HISTORY OF PRESENT ILLNESS
Pt is a 89yo M w/ PMH of HTN, HLD, DM2, CAD, HFpEF s/p AICD, AS s/p TAVR, chronic Afib not on AC, and squamous cell Ca pw poor PO intake and increasing lethargy at [facility] assisted living facility.     Pt unable to provide hx as he is A&Ox0 on encounter. History provided through chart review and daughter (Evangelina). Pt is a 89yo M w/ PMH of HTN, HLD, DM2, CAD, HFpEF s/p AICD, AS s/p TAVR, chronic Afib not on AC, and squamous cell Ca pw poor PO intake and increasing lethargy at [facility] assisted living facility.     Pt unable to provide hx as he is A&Ox0 on encounter. History provided through chart review and daughter (Evangelina).    Mr. Julien has been refusing PO intake over the past three days with increased confusion, weakness and lethargy. Otherwise no reported F/C, falls, N/V or cough. Of note, RSV, flu and COVID have been prevalent at [facility] where the patient resides.     In the ED pt was noted to have an elevated troponin, lactate and BNP of 13K and CXR c/f trace L pleff and RSV positive. He was administered Lasix 20mg IVP and cultures sent.

## 2023-01-15 NOTE — ED ADULT NURSE REASSESSMENT NOTE - NS ED NURSE REASSESS COMMENT FT1
Patient more awake and alert, will not answer questions but is telling myself and staff he wants juice, he's cold wants a blanket, etc. 02 sat's WNL on RA, respirations even and unlabored, pending admission bed placement.

## 2023-01-15 NOTE — PATIENT PROFILE ADULT - FUNCTIONAL ASSESSMENT - BASIC MOBILITY 6.
1-calculated by average/Not able to assess (calculate score using [facility] averaging method)

## 2023-01-15 NOTE — H&P ADULT - PROBLEM SELECTOR PLAN 8
DVT ppx: Lovenox  Diet: Dysphagia screen prior to initiation  Dispo: Pending clinical improvement likely back to [facility]  GOC: DNR/DNI MOLST in chart

## 2023-01-15 NOTE — PATIENT PROFILE ADULT - FALL HARM RISK - HARM RISK INTERVENTIONS
Assistance with ambulation/Assistance OOB with selected safe patient handling equipment/Communicate Risk of Fall with Harm to all staff/Discuss with provider need for PT consult/Monitor gait and stability/Reinforce activity limits and safety measures with patient and family/Tailored Fall Risk Interventions/Visual Cue: Yellow wristband and red socks/Bed in lowest position, wheels locked, appropriate side rails in place/Call bell, personal items and telephone in reach/Instruct patient to call for assistance before getting out of bed or chair/Non-slip footwear when patient is out of bed/[place] to call system/Physically safe environment - no spills, clutter or unnecessary equipment/Purposeful Proactive Rounding/Room/bathroom lighting operational, light cord in reach

## 2023-01-15 NOTE — H&P ADULT - PROBLEM SELECTOR PLAN 3
- TTE from 12/22 reviewed w/ no acute process and chambers similar to prior TTE  - Will hold on further TTE on this admission  - Presenting w/ BNP 13K (baseline ~5K) w/ trace pleff and elevated trop (likely demand) c/f AdHF  - Appears slightly VOL to euvolemic on exam  - Will increase home Bumex to 1mg QD  - Monitor I/O q4hrs and titrate Bumex for net neg to even  - Daily weight  - Unsure if supposed to be on Toprol 25mg as he was prescribed on prior admission but not listed as medication on med list from [facility], would clarify in AM and if should be on, would continue  - Tele monitoring

## 2023-01-15 NOTE — H&P ADULT - NSHPPHYSICALEXAM_GEN_ALL_CORE
Vital Signs Last 24 Hrs  T(C): 36.4 (14 Jan 2023 19:53), Max: 36.4 (14 Jan 2023 19:53)  T(F): 97.6 (14 Jan 2023 19:53), Max: 97.6 (14 Jan 2023 19:53)  HR: 65 (15 Theron 2023 04:15) (65 - 80)  BP: 100/66 (15 Theron 2023 04:15) (100/66 - 143/100)  BP(mean): 75 (15 Theron 2023 04:15) (73 - 88)  RR: 18 (15 Theron 2023 04:15) (18 - 27)  SpO2: 99% (15 Theron 2023 04:15) (93% - 99%)    CONSTITUTIONAL: Well-groomed, in no apparent distress  EYES: No conjunctival or scleral injection, non-icteric;   ENMT: No external nasal lesions; Dry mm  NECK: Trachea midline without palpable neck mass; thyroid not enlarged and non-tender  RESPIRATORY: Breathing comfortably; no dullness to percussion; lungs CTA without wheeze/rhonchi/rales  CARDIOVASCULAR: +S1S2, RRR, no M/G/R; pedal pulses full and symmetric; no lower extremity edema  GASTROINTESTINAL: No palpable masses or tenderness, +BS throughout, no rebound/guarding; no hepatosplenomegaly; no hernia palpated  LYMPHATIC: No cervical LAD or tenderness  SKIN: No rashes or ulcers noted  NEUROLOGIC: CN II-XII intact; sensation intact in LEs b/l to light touch  PSYCHIATRIC: A&Ox0; inappropriate insight and judgment

## 2023-01-15 NOTE — H&P ADULT - ASSESSMENT
89yo M w/ PMH of HTN, HLD, DM2, CAD, HFpEF s/p AICD, AS s/p TAVR, chronic Afib not on AC, and squamous cell Ca pw poor PO intake and increasing lethargy at [facility] assisted living facility likely i/s/o RSV infection w/ component of AdHF

## 2023-01-15 NOTE — PROGRESS NOTE ADULT - PROBLEM SELECTOR PLAN 3
- TTE from 12/22 reviewed w/ no acute process and chambers similar to prior TTE  - Will hold on further TTE on this admission  - Presenting w/ BNP 13K (baseline ~5K) w/ trace pleff and elevated trop (likely demand) c/f AdHF  - Appears slightly VOL to euvolemic on exam  - unsure if patient able to take PO; will have S/S eval; for now will give bumex 0.5mg IV; can consider increasing dose if needed and if BP allows (BP currently on lower side)   - Monitor I/O q4hrs and titrate Bumex for net neg to even  - Daily weight  - Unsure if supposed to be on Toprol 25mg as he was prescribed on prior admission but not listed as medication on med list from [facility], monitor for now   - Tele monitoring

## 2023-01-15 NOTE — H&P ADULT - NSHPLABSRESULTS_GEN_ALL_CORE
LABS:                      11.6   4.44  )-----------( 127      ( 2023 21:42 )             37.4         144  |  107  |  26<H>  ----------------------------<  154<H>  3.5   |  25  |  0.82    Ca    9.0      2023 21:42    TPro  7.6  /  Alb  3.8  /  TBili  1.5<H>  /  DBili  x   /  AST  46<H>  /  ALT  18  /  AlkPhos  79      LIVER FUNCTIONS - ( 2023 21:42 )  Alb: 3.8 g/dL / Pro: 7.6 g/dL / ALK PHOS: 79 U/L / ALT: 18 U/L / AST: 46 U/L / GGT: x           PT/INR - ( 2023 21:42 )   PT: 15.6 sec;   INR: 1.35 ratio     PTT - ( 2023 21:42 )  PTT:27.5 sec    Urinalysis Basic - ( 15 Theron 2023 01:49 )  Color: Light Yellow / Appearance: Clear / S.008 / pH: x  Gluc: x / Ketone: Negative  / Bili: Negative / Urobili: 2 mg/dL   Blood: x / Protein: Negative / Nitrite: Negative   Leuk Esterase: Negative / RBC: 94 /hpf / WBC 0 /HPF   Sq Epi: x / Non Sq Epi: 0 /hpf / Bacteria: Negative    IMAGING:  Xray Chest 1 View AP/PA (23 @ 21:02)  IMPRESSION:  - Small left and trace right pleural effusions.  ******PRELIMINARY REPORT******      [X] Imaging personally reviewed by me- No consolidation noted on CXR  [X] ECG personally reviewed by me- Afib w/ rate in 85

## 2023-01-15 NOTE — PROGRESS NOTE ADULT - SUBJECTIVE AND OBJECTIVE BOX
[facility] of [facility] Medicine  Jamal Hilario MD  Available via MS Teams    SUBJECTIVE / OVERNIGHT EVENTS: Patient sleeping in bed. Not providing any history. Currently on 2L NC. Saturating well. No other acute events.     ADDITIONAL REVIEW OF SYSTEMS: Unable to obtain complete ROS due to patient being nonverbal on exam.     MEDICATIONS  (STANDING):  aspirin enteric coated 81 milliGRAM(s) Oral daily  atorvastatin 10 milliGRAM(s) Oral at bedtime  buMETAnide Injectable 0.5 milliGRAM(s) IV Push daily  donepezil 5 milliGRAM(s) Oral at bedtime  enoxaparin Injectable 40 milliGRAM(s) SubCutaneous every 24 hours  finasteride 5 milliGRAM(s) Oral daily  multivitamin 1 Tablet(s) Oral daily  pantoprazole    Tablet 40 milliGRAM(s) Oral before breakfast  potassium chloride  10 mEq/100 mL IVPB 10 milliEquivalent(s) IV Intermittent every 1 hour  thiamine 100 milliGRAM(s) Oral daily    MEDICATIONS  (PRN):  acetaminophen     Tablet .. 650 milliGRAM(s) Oral every 6 hours PRN Temp greater or equal to 38C (100.4F), Mild Pain (1 - 3)    PHYSICAL EXAM:  Vital Signs Last 24 Hrs  T(C): 36.8 (15 Theron 2023 06:23), Max: 36.8 (15 Theron 2023 06:23)  T(F): 98.3 (15 Theron 2023 06:23), Max: 98.3 (15 Theron 2023 06:23)  HR: 80 (15 Theron 2023 06:23) (65 - 80)  BP: 108/59 (15 Theron 2023 06:23) (100/66 - 143/100)  BP(mean): 70 (15 Theron 2023 06:23) (70 - 88)  RR: 18 (15 Theron 2023 06:23) (18 - 27)  SpO2: 97% (15 Theron 2023 06:23) (93% - 99%)    Parameters below as of 15 Theron 2023 07:25  Patient On (Oxygen Delivery Method): nasal cannula  O2 Flow (L/min): 3    CONSTITUTIONAL: NAD, well-developed   EYES: unable to assess due to patient keeping eyes closed   ENMT: Moist oral mucosa, no pharyngeal injection or exudates   NECK: Supple   RESPIRATORY: Normal respiratory effort; crackles present to auscultation bilaterally   CARDIOVASCULAR: Regular rate and rhythm, normal S1 and S2, no murmur   ABDOMEN: Nontender to palpation, normoactive bowel sounds   MUSCULOSKELETAL: no clubbing or cyanosis of digits; no joint swelling or tenderness to palpation  PSYCH: unable to assess due to patient being nonverbal on exam  NEUROLOGY: unable to assess due to patient being nonverbal on exam  SKIN: No rashes     LABS:                        11.3   3.26  )-----------( 120      ( 15 Theron 2023 06:44 )             36.0     -15    144  |  106  |  24<H>  ----------------------------<  121<H>  3.1<L>   |  25  |  0.70    Ca    8.5      15 Theron 2023 06:44  Phos  3.3     01-15  Mg     2.0     01-15    TPro  7.6  /  Alb  3.8  /  TBili  1.5<H>  /  DBili  x   /  AST  46<H>  /  ALT  18  /  AlkPhos  79      PT/INR - ( 2023 21:42 )   PT: 15.6 sec;   INR: 1.35 ratio         PTT - ( 2023 21:42 )  PTT:27.5 sec      Urinalysis Basic - ( 15 Theron 2023 01:49 )    Color: Light Yellow / Appearance: Clear / S.008 / pH: x  Gluc: x / Ketone: Negative  / Bili: Negative / Urobili: 2 mg/dL   Blood: x / Protein: Negative / Nitrite: Negative   Leuk Esterase: Negative / RBC: 94 /hpf / WBC 0 /HPF   Sq Epi: x / Non Sq Epi: 0 /hpf / Bacteria: Negative        SARS-CoV-2: NotDetec (2023 21:41)  COVID-19 PCR: NotDetec (26 Aug 2022 09:06)  COVID-19 PCR: NotDetec (24 Aug 2022 14:01)  SARS-CoV-2: NotDetec (21 Aug 2022 21:39)  SARS-CoV-2: NotDetec (06 Aug 2022 19:21)      RADIOLOGY & ADDITIONAL TESTS:  New Imaging Personally Reviewed Today:  New Electrocardiogram Personally Reviewed Today:  Other Results Reviewed Today:   Prior or Outpatient Records Reviewed Today with Summary:    COORDINATION OF CARE:  Consultant Communication and Details of Discussion (where applicable):

## 2023-01-15 NOTE — PROGRESS NOTE ADULT - PROBLEM SELECTOR PLAN 4
- Not on AC, only ASA  - currently rate controlled; consider adding toprol if noted to have uncontrolled HR

## 2023-01-15 NOTE — CONSULT NOTE ADULT - SUBJECTIVE AND OBJECTIVE BOX
DATE OF SERVICE: 01-15-23 @ 10:31    CHIEF COMPLAINT:Patient is a 89y old  Male who presents with a chief complaint of RSV (15 Theron 2023 10:29)      HISTORY OF PRESENT ILLNESS:HPI:  89 yo M w/ history of HFpEF s/p AICD and TAVR, chronic A fib, CAD, HTN, HLD, T2DM, and squamous cell carcinoma presents from [facility] assisted living facility after 3 days of poor PO intake, confusion, and weakness.  Patient is nodding off and unable to provide HPI.  Collateral from [facility] showed that patient was refusing to take food, but denies any history of recent falls, fevers, nausea/vomiting, or other symptoms.  RSV, flu, and COVID are currently prevalent at [facility].  Collateral from daughter-in-law (Mitali Julien) shows the patient has had 2-3 hospitalizations over the past 6 months for pneumonia, falls, and UTI.  Collateral from daughter ([phone])  significant for recent admission with evaluation by Dr. Clarence Vazquez (cardiology). [facility], who reported that patient was lethargic.      PAST MEDICAL & SURGICAL HISTORY:  HTN (hypertension)      HLD (hyperlipidemia)      DM (diabetes mellitus)      BPH (benign prostatic hypertrophy)      Insomnia      RBBB      Chronic atrial fibrillation      Dementia      Unsteady gait      S/P shoulder surgery      S/P knee surgery              MEDICATIONS:  aspirin enteric coated 81 milliGRAM(s) Oral daily  buMETAnide Injectable 0.5 milliGRAM(s) IV Push daily  enoxaparin Injectable 40 milliGRAM(s) SubCutaneous every 24 hours        acetaminophen     Tablet .. 650 milliGRAM(s) Oral every 6 hours PRN  donepezil 5 milliGRAM(s) Oral at bedtime    pantoprazole    Tablet 40 milliGRAM(s) Oral before breakfast    atorvastatin 10 milliGRAM(s) Oral at bedtime  finasteride 5 milliGRAM(s) Oral daily    multivitamin 1 Tablet(s) Oral daily  potassium chloride  10 mEq/100 mL IVPB 10 milliEquivalent(s) IV Intermittent every 1 hour  thiamine 100 milliGRAM(s) Oral daily      FAMILY HISTORY:  Family history of diabetes mellitus        Non-contributory    SOCIAL HISTORY:    [ ] Tobacco  [ ] Drugs  [ ] Alcohol    Allergies    No Known Allergies    Intolerances    	    REVIEW OF SYSTEMS: Unable to participate in ROS d/t baseline cognitive impairment  CONSTITUTIONAL: No fever  EYES: No eye pain, visual disturbances, or discharge  ENMT:  No difficulty hearing, tinnitus  NECK: No pain or stiffness  RESPIRATORY: No cough, wheezing,  CARDIOVASCULAR: No chest pain, palpitations, passing out, dizziness, or leg swelling  GASTROINTESTINAL:  No nausea, vomiting, diarrhea or constipation. No melena.  GENITOURINARY: No dysuria, hematuria  NEUROLOGICAL: No stroke like symptoms  SKIN: No burning or lesions   ENDOCRINE: No heat or cold intolerance  MUSCULOSKELETAL: No joint pain or swelling  PSYCHIATRIC: No  anxiety, mood swings  HEME/LYMPH: No bleeding gums  ALLERGY AND IMMUNOLOGIC: No hives or eczema	    All other ROS negative    PHYSICAL EXAM:  T(C): 36.8 (01-15-23 @ 06:23), Max: 36.8 (01-15-23 @ 06:23)  HR: 80 (01-15-23 @ 06:23) (65 - 80)  BP: 108/59 (01-15-23 @ 06:23) (100/66 - 143/100)  RR: 18 (01-15-23 @ 06:23) (18 - 27)  SpO2: 97% (01-15-23 @ 06:23) (93% - 99%)  Wt(kg): --  I&O's Summary      Appearance: Normal	  HEENT:   Normal oral mucosa, EOMI	  Cardiovascular:  S1 S2, No JVD,    Respiratory: Lungs clear to auscultation	  Psychiatry: Alert  Gastrointestinal:  Soft, Non-tender, + BS	  Skin: No rashes   Neurologic: Non-focal  Extremities:  No edema  Vascular: Peripheral pulses palpable    	    	  	  CARDIAC MARKERS:  Labs personally reviewed by me                                  11.3   3.26  )-----------( 120      ( 15 Theron 2023 06:44 )             36.0     01-15    144  |  106  |  24<H>  ----------------------------<  121<H>  3.1<L>   |  25  |  0.70    Ca    8.5      15 Theron 2023 06:44  Phos  3.3     01-15  Mg     2.0     01-15    TPro  7.6  /  Alb  3.8  /  TBili  1.5<H>  /  DBili  x   /  AST  46<H>  /  ALT  18  /  AlkPhos  79  01-14          EKG: Personally reviewed by me - AF  Radiology: Personally reviewed by me -     < from: Xray Chest 1 View AP/PA (01.14.23 @ 21:02) >  Small left and trace right pleural effusions.    < end of copied text >  < from: Transthoracic Echocardiogram (12.13.22 @ 14:21) >  Fractional short: 20 %  EF (Visual Estimate): NA %  ------------------------------------------------------------------------  Observations:  Mitral Valve: Mitral annular calcification and calcified  mitral leaflets with normal diastolic opening. Mild mitral  regurgitation.  Mean transmitral valve gradient equals 2 mm  Hg.  Aortic Valve/Aorta: Transcatheteraortic valve replacement.  This is an incomplete evaluation of TAVR since the patient  refused to finish the exam. No aortic valve regurgitation  seen.  Mild aortic root dilatation  (Ao: 4.2 cm at the sinuses of  Valsalva).  Left Atrium: Normal left atrium.  Left Ventricle: Mild global left ventricular systolic  dysfunction. Normal left ventricular internal dimensions  and wall thicknesses.  Right Heart: Normal right atrium. Normal right ventricular  size and function. Normal tricuspid valve. Moderate  tricuspid regurgitation. Normal pulmonic valve. Mild  pulmonic regurgitation.  Pericardium/Pleura: No pericardial effusion seen.  Left pleural effusion.  Hemodynamic: Estimated right atrial pressure is 8 mm Hg.  Estimated right ventricular systolic pressure equals 54 mm  Hg, assuming right atrial pressure equals 8 mm Hg,  consistent with moderate pulmonary hypertension.  ------------------------------------------------------------------------  Conclusions:  1. Mitral annular calcification and calcified mitral  leaflets with normal diastolic opening. Mild mitral  regurgitation.  2. Transcatheter aortic valve replacement. This is an  incomplete evaluation of TAVR since the patient refused to  finish the exam. No aortic valve regurgitationseen.  3. Normal right atrium.Normal right ventricular size and  function.  4. Normal tricuspid valve. Moderate tricuspid  regurgitation.Estimated pulmonary artery systolic pressure  equals 54  mm Hg, assuming right atrial pressure equals 8  mm Hg, consistent with moderate pulmonary pressures.  5. No pericardial effusion seen.  6. Left pleural effusion.  *** Compared with echocardiogram of 8/9/2022, unable to  compare completely due to the current study being  incomplete. Overall, LV and RV function appear similar.    < end of copied text >  < from: Transthoracic Echocardiogram (08.09.22 @ 11:44) >  EF (Visual Estimate): 50 %  Doppler Peak Velocity (m/sec): MV=1.4 AoV=1.3  ------------------------------------------------------------------------  Observations:  Mitral Valve: Mitral annular calcification and calcified  mitral leaflets with decreased diastolic opening.  Mild-moderate mitral regurgitation. Peak mitral valve  gradient equals 8 mm Hg, mean transmitral valve gradient  equals 3 mm Hg, consistent with mild mitral stenosis.  Aortic Valve/Aorta: Transcatheter aortic valve replacement.  Peak transaortic valve gradient equals 7 mm Hg, mean  transaortic valve gradient equals 4 mm Hg, whichis  probably normal in the presence of a transcatheter aortic  valve replacement. Peak left ventricular outflow tract  gradient equals 1 mm Hg, mean gradient is equal to 1 mm Hg,  LVOT velocity time integral equals 11 cm.  Aortic Root: 2.7 cm.  LVOT diameter: 1.8 cm.  Left Atrium: Normal left atrium.  LA volume index = 28  cc/m2.  Left Ventricle: Global hypokinesis of the left ventricle.  Severe concentric left ventricular hypertrophy. Severe  reversible diastolic dysfunction (Stage III).  Right Heart: Normal right atrium. Normal right ventricular  size with decrease right ventricular function.  Normal  tricuspid valve. Normal pulmonic valve.  Pericardium/Pleura: Normal pericardium with no pericardial  effusion.  Hemodynamic: Estimated right atrial pressure is 8 mm Hg.  Estimated right ventricular systolic pressure equals 39 mm  Hg, assuming right atrial pressure equals 8 mm Hg,  consistent with borderline pulmonary hypertension.  ------------------------------------------------------------------------  Conclusions:  1. Mitral annular calcification and calcified mitral  leaflets with decreased diastolic opening. Mild-moderate  mitral regurgitation. Peak mitral valve gradient equals 8  mm Hg, mean transmitral valve gradient equals 3 mmHg,  consistent with mild mitral stenosis.  2. Transcatheter aortic valve replacement. Peak transaortic  valve gradient equals 7 mm Hg, mean transaortic valve  gradient equals 4 mm Hg, which is probably normal in the  presence of a transcatheter aortic valve replacement.  3. Severe concentric left ventricular hypertrophy.  4. Global hypokinesis of the left ventricle.  5. Strain imaging was performed with a HR of 67 bpm and a  BP of 122/77 mm Hg. Global L. Strain= -7.6%.  6. Severe reversible diastolic dysfunction (Stage III).  7. Normal right ventricular size with decrease right  ventricular function.  8. Estimated pulmonary artery systolic pressure equals 39  mm Hg, assuming right atrial pressure equals 8 mm Hg,  consistent with borderline pulmonary pressures.  *** Compared with echocardiogram of 5/3/2022, no  significant changes noted.    < from: Cardiac Cath Lab - Adult (06.16.14 @ 16:21) >  --  Intervention on right PDA: drug-eluting stent.    < end of copied text >        Assessment /Plan:     89yo M w/ PMH of HTN, HLD, DM2, CAD, HFpEF s/p AICD, AS s/p TAVR, chronic Afib not on AC, and squamous cell Ca pw poor PO intake and increasing lethargy at [facility] assisted living facility likely i/s/o RSV infection w/ component of AdHF.       Problem/Plan -1  Problem: Chronic HFpEF   Plan:   - TTE on 12/13 shows no change from previous in August 2022. Preserved EF, no changes in wall motion   - CXR shows small left and trace right pleural effusions  - proBNP up [id number] from [id number] in 11/22  - Agree with diuresis with IV Bumex  - Strict I&Os, daily weights  - Monitor lytes closely and replete PRN    Problem/Plan -2  Problem: AF  Plan:  - s/p Watchman, not on AC  - c/w Metoprolol    Problem/Plan -3  Problem: HTN  Plan:  - c/w Toprol XL 25mg PO daily    Problem/Plan -4  Problem: CAD  Plan:  - Denies chest pain although limited historian  - EKG with no ischemic changes noted  - c/w ASA        Differential diagnosis and plan of care discussed with patient after the evaluation. Counseling on diet, nutritional counseling, weight management, exercise and medication compliance was done.   Advanced care planning/advanced directives discussed with patient/family. DNR status including forceful chest compressions to attempt to restart the heart, ventilator support/artificial breathing, electric shock, artificial nutrition, health care proxy, Molst form all discussed with pt. Pt wishes to consider. More than fifteen minutes spent on discussing advanced directives.     Cyn Dodson [facility]  Clarence Vazquez DO [facility]  Cardiovascular Medicine  [address] [place] [address] Dr, [address]  Available for call or text via [employer]  Office [phone]   DATE OF SERVICE: 01-15-23 @ 10:31    CHIEF COMPLAINT:Patient is a 89y old  Male who presents with a chief complaint of RSV (15 Theron 2023 10:29)      HISTORY OF PRESENT ILLNESS:HPI:  89 yo M w/ history of HFpEF s/p AICD and TAVR, chronic A fib, CAD, HTN, HLD, T2DM, and squamous cell carcinoma presents from [facility] assisted living facility after 3 days of poor PO intake, confusion, and weakness.  Patient is nodding off and unable to provide HPI.  Collateral from [place] showed that patient was refusing to take food, but denies any history of recent falls, fevers, nausea/vomiting, or other symptoms.  RSV, flu, and COVID are currently prevalent at [facility].  Collateral from daughter-in-law (Mitali Julien) shows the patient has had 2-3 hospitalizations over the past 6 months for pneumonia, falls, and UTI.  Collateral from daughter ([phone])  significant for recent admission with evaluation by Dr. Clarence Vazquez (cardiology). [facility], who reported that patient was lethargic.      PAST MEDICAL & SURGICAL HISTORY:  HTN (hypertension)      HLD (hyperlipidemia)      DM (diabetes mellitus)      BPH (benign prostatic hypertrophy)      Insomnia      RBBB      Chronic atrial fibrillation      Dementia      Unsteady gait      S/P shoulder surgery      S/P knee surgery              MEDICATIONS:  aspirin enteric coated 81 milliGRAM(s) Oral daily  buMETAnide Injectable 0.5 milliGRAM(s) IV Push daily  enoxaparin Injectable 40 milliGRAM(s) SubCutaneous every 24 hours        acetaminophen     Tablet .. 650 milliGRAM(s) Oral every 6 hours PRN  donepezil 5 milliGRAM(s) Oral at bedtime    pantoprazole    Tablet 40 milliGRAM(s) Oral before breakfast    atorvastatin 10 milliGRAM(s) Oral at bedtime  finasteride 5 milliGRAM(s) Oral daily    multivitamin 1 Tablet(s) Oral daily  potassium chloride  10 mEq/100 mL IVPB 10 milliEquivalent(s) IV Intermittent every 1 hour  thiamine 100 milliGRAM(s) Oral daily      FAMILY HISTORY:  Family history of diabetes mellitus        Non-contributory    SOCIAL HISTORY:    [ ] Tobacco  [ ] Drugs  [ ] Alcohol    Allergies    No Known Allergies    Intolerances    	    REVIEW OF SYSTEMS: Unable to participate in ROS d/t baseline cognitive impairment  CONSTITUTIONAL: No fever  EYES: No eye pain, visual disturbances, or discharge  ENMT:  No difficulty hearing, tinnitus  NECK: No pain or stiffness  RESPIRATORY: No cough, wheezing,  CARDIOVASCULAR: No chest pain, palpitations, passing out, dizziness, or leg swelling  GASTROINTESTINAL:  No nausea, vomiting, diarrhea or constipation. No melena.  GENITOURINARY: No dysuria, hematuria  NEUROLOGICAL: No stroke like symptoms  SKIN: No burning or lesions   ENDOCRINE: No heat or cold intolerance  MUSCULOSKELETAL: No joint pain or swelling  PSYCHIATRIC: No  anxiety, mood swings  HEME/LYMPH: No bleeding gums  ALLERGY AND IMMUNOLOGIC: No hives or eczema	    All other ROS negative    PHYSICAL EXAM:  T(C): 36.8 (01-15-23 @ 06:23), Max: 36.8 (01-15-23 @ 06:23)  HR: 80 (01-15-23 @ 06:23) (65 - 80)  BP: 108/59 (01-15-23 @ 06:23) (100/66 - 143/100)  RR: 18 (01-15-23 @ 06:23) (18 - 27)  SpO2: 97% (01-15-23 @ 06:23) (93% - 99%)  Wt(kg): --  I&O's Summary      Appearance: Normal	  HEENT:   Normal oral mucosa, EOMI	  Cardiovascular:  S1 S2, No JVD,    Respiratory: Lungs clear to auscultation	  Psychiatry: Alert  Gastrointestinal:  Soft, Non-tender, + BS	  Skin: No rashes   Neurologic: Non-focal  Extremities:  No edema  Vascular: Peripheral pulses palpable    	    	  	  CARDIAC MARKERS:  Labs personally reviewed by me                                  11.3   3.26  )-----------( 120      ( 15 Theron 2023 06:44 )             36.0     01-15    144  |  106  |  24<H>  ----------------------------<  121<H>  3.1<L>   |  25  |  0.70    Ca    8.5      15 Theron 2023 06:44  Phos  3.3     01-15  Mg     2.0     01-15    TPro  7.6  /  Alb  3.8  /  TBili  1.5<H>  /  DBili  x   /  AST  46<H>  /  ALT  18  /  AlkPhos  79  01-14          EKG: Personally reviewed by me - AF  Radiology: Personally reviewed by me -     < from: Xray Chest 1 View AP/PA (01.14.23 @ 21:02) >  Small left and trace right pleural effusions.    < end of copied text >  < from: Transthoracic Echocardiogram (12.13.22 @ 14:21) >  Fractional short: 20 %  EF (Visual Estimate): NA %  ------------------------------------------------------------------------  Observations:  Mitral Valve: Mitral annular calcification and calcified  mitral leaflets with normal diastolic opening. Mild mitral  regurgitation.  Mean transmitral valve gradient equals 2 mm  Hg.  Aortic Valve/Aorta: Transcatheteraortic valve replacement.  This is an incomplete evaluation of TAVR since the patient  refused to finish the exam. No aortic valve regurgitation  seen.  Mild aortic root dilatation  (Ao: 4.2 cm at the sinuses of  Valsalva).  Left Atrium: Normal left atrium.  Left Ventricle: Mild global left ventricular systolic  dysfunction. Normal left ventricular internal dimensions  and wall thicknesses.  Right Heart: Normal right atrium. Normal right ventricular  size and function. Normal tricuspid valve. Moderate  tricuspid regurgitation. Normal pulmonic valve. Mild  pulmonic regurgitation.  Pericardium/Pleura: No pericardial effusion seen.  Left pleural effusion.  Hemodynamic: Estimated right atrial pressure is 8 mm Hg.  Estimated right ventricular systolic pressure equals 54 mm  Hg, assuming right atrial pressure equals 8 mm Hg,  consistent with moderate pulmonary hypertension.  ------------------------------------------------------------------------  Conclusions:  1. Mitral annular calcification and calcified mitral  leaflets with normal diastolic opening. Mild mitral  regurgitation.  2. Transcatheter aortic valve replacement. This is an  incomplete evaluation of TAVR since the patient refused to  finish the exam. No aortic valve regurgitationseen.  3. Normal right atrium.Normal right ventricular size and  function.  4. Normal tricuspid valve. Moderate tricuspid  regurgitation.Estimated pulmonary artery systolic pressure  equals 54  mm Hg, assuming right atrial pressure equals 8  mm Hg, consistent with moderate pulmonary pressures.  5. No pericardial effusion seen.  6. Left pleural effusion.  *** Compared with echocardiogram of 8/9/2022, unable to  compare completely due to the current study being  incomplete. Overall, LV and RV function appear similar.    < end of copied text >  < from: Transthoracic Echocardiogram (08.09.22 @ 11:44) >  EF (Visual Estimate): 50 %  Doppler Peak Velocity (m/sec): MV=1.4 AoV=1.3  ------------------------------------------------------------------------  Observations:  Mitral Valve: Mitral annular calcification and calcified  mitral leaflets with decreased diastolic opening.  Mild-moderate mitral regurgitation. Peak mitral valve  gradient equals 8 mm Hg, mean transmitral valve gradient  equals 3 mm Hg, consistent with mild mitral stenosis.  Aortic Valve/Aorta: Transcatheter aortic valve replacement.  Peak transaortic valve gradient equals 7 mm Hg, mean  transaortic valve gradient equals 4 mm Hg, whichis  probably normal in the presence of a transcatheter aortic  valve replacement. Peak left ventricular outflow tract  gradient equals 1 mm Hg, mean gradient is equal to 1 mm Hg,  LVOT velocity time integral equals 11 cm.  Aortic Root: 2.7 cm.  LVOT diameter: 1.8 cm.  Left Atrium: Normal left atrium.  LA volume index = 28  cc/m2.  Left Ventricle: Global hypokinesis of the left ventricle.  Severe concentric left ventricular hypertrophy. Severe  reversible diastolic dysfunction (Stage III).  Right Heart: Normal right atrium. Normal right ventricular  size with decrease right ventricular function.  Normal  tricuspid valve. Normal pulmonic valve.  Pericardium/Pleura: Normal pericardium with no pericardial  effusion.  Hemodynamic: Estimated right atrial pressure is 8 mm Hg.  Estimated right ventricular systolic pressure equals 39 mm  Hg, assuming right atrial pressure equals 8 mm Hg,  consistent with borderline pulmonary hypertension.  ------------------------------------------------------------------------  Conclusions:  1. Mitral annular calcification and calcified mitral  leaflets with decreased diastolic opening. Mild-moderate  mitral regurgitation. Peak mitral valve gradient equals 8  mm Hg, mean transmitral valve gradient equals 3 mmHg,  consistent with mild mitral stenosis.  2. Transcatheter aortic valve replacement. Peak transaortic  valve gradient equals 7 mm Hg, mean transaortic valve  gradient equals 4 mm Hg, which is probably normal in the  presence of a transcatheter aortic valve replacement.  3. Severe concentric left ventricular hypertrophy.  4. Global hypokinesis of the left ventricle.  5. Strain imaging was performed with a HR of 67 bpm and a  BP of 122/77 mm Hg. Global L. Strain= -7.6%.  6. Severe reversible diastolic dysfunction (Stage III).  7. Normal right ventricular size with decrease right  ventricular function.  8. Estimated pulmonary artery systolic pressure equals 39  mm Hg, assuming right atrial pressure equals 8 mm Hg,  consistent with borderline pulmonary pressures.  *** Compared with echocardiogram of 5/3/2022, no  significant changes noted.    < from: Cardiac Cath Lab - Adult (06.16.14 @ 16:21) >  --  Intervention on right PDA: drug-eluting stent.    < end of copied text >        Assessment /Plan:     89yo M w/ PMH of HTN, HLD, DM2, CAD, HFpEF s/p AICD, AS s/p TAVR, chronic Afib not on AC, and squamous cell Ca pw poor PO intake and increasing lethargy at [facility] assisted living facility likely i/s/o RSV infection w/ component of AdHF.       Problem/Plan -1  Problem: Chronic HFpEF   Plan:   - TTE on 12/13/22 shows no change from previous in August 2022. Preserved EF, no changes in wall motion   - CXR shows small left and trace right pleural effusions  - proBNP up at [id number] from 6989 in 11/22  - Agree with diuresis with IV Bumex 0.5mg IVP and will assess daily  - Strict I&Os, daily weights  - Monitor lytes closely and replete PRN    Problem/Plan -2  Problem: AF  Plan:  - s/p Watchman, not on AC  - Restart Toprol if patient becomes tachy    Problem/Plan -3  Problem: HTN  Plan:  - Previously on Toprol but BP wnl. Will cont to hold and trend.     Problem/Plan -4  Problem: CAD  Plan:  - Denies chest pain although limited historian  - EKG with no ischemic changes noted  - c/w ASA        Differential diagnosis and plan of care discussed with patient after the evaluation. Counseling on diet, nutritional counseling, weight management, exercise and medication compliance was done.   Advanced care planning/advanced directives discussed with patient/family. DNR status including forceful chest compressions to attempt to restart the heart, ventilator support/artificial breathing, electric shock, artificial nutrition, health care proxy, Molst form all discussed with pt. Pt wishes to consider. More than fifteen minutes spent on discussing advanced directives.     Cyn Dodson [facility]  Clarence Vazquez DO [facility]  Cardiovascular Medicine  [address], [address]  Available for call or text via [employer]  Office [phone]   DATE OF SERVICE: 01-15-23 @ 10:31    CHIEF COMPLAINT:Patient is a 89y old  Male who presents with a chief complaint of RSV (15 Theron 2023 10:29)      HISTORY OF PRESENT ILLNESS:HPI:  89 yo M w/ history of HFpEF s/p AICD and TAVR, chronic A fib, CAD, HTN, HLD, T2DM, and squamous cell carcinoma presents from [facility] assisted living facility after 3 days of poor PO intake, confusion, and weakness.  Patient is nodding off and unable to provide HPI.  Collateral from [facility] showed that patient was refusing to take food, but denies any history of recent falls, fevers, nausea/vomiting, or other symptoms.  RSV, flu, and COVID are currently prevalent at [facility].  Collateral from daughter-in-law (Mitali Julien) shows the patient has had 2-3 hospitalizations over the past 6 months for pneumonia, falls, and UTI.  Collateral from daughter ([phone])  significant for recent admission with evaluation by Dr. Clarence Vazquez (cardiology). [facility], who reported that patient was lethargic.      PAST MEDICAL & SURGICAL HISTORY:  HTN (hypertension)      HLD (hyperlipidemia)      DM (diabetes mellitus)      BPH (benign prostatic hypertrophy)      Insomnia      RBBB      Chronic atrial fibrillation      Dementia      Unsteady gait      S/P shoulder surgery      S/P knee surgery              MEDICATIONS:  aspirin enteric coated 81 milliGRAM(s) Oral daily  buMETAnide Injectable 0.5 milliGRAM(s) IV Push daily  enoxaparin Injectable 40 milliGRAM(s) SubCutaneous every 24 hours        acetaminophen     Tablet .. 650 milliGRAM(s) Oral every 6 hours PRN  donepezil 5 milliGRAM(s) Oral at bedtime    pantoprazole    Tablet 40 milliGRAM(s) Oral before breakfast    atorvastatin 10 milliGRAM(s) Oral at bedtime  finasteride 5 milliGRAM(s) Oral daily    multivitamin 1 Tablet(s) Oral daily  potassium chloride  10 mEq/100 mL IVPB 10 milliEquivalent(s) IV Intermittent every 1 hour  thiamine 100 milliGRAM(s) Oral daily      FAMILY HISTORY:  Family history of diabetes mellitus        Non-contributory    SOCIAL HISTORY:    [ ] Tobacco  [ ] Drugs  [ ] Alcohol    Allergies    No Known Allergies    Intolerances    	    REVIEW OF SYSTEMS: Unable to participate in ROS d/t baseline cognitive impairment  CONSTITUTIONAL: No fever  EYES: No eye pain, visual disturbances, or discharge  ENMT:  No difficulty hearing, tinnitus  NECK: No pain or stiffness  RESPIRATORY: No cough, wheezing,  CARDIOVASCULAR: No chest pain, palpitations, passing out, dizziness, or leg swelling  GASTROINTESTINAL:  No nausea, vomiting, diarrhea or constipation. No melena.  GENITOURINARY: No dysuria, hematuria  NEUROLOGICAL: No stroke like symptoms  SKIN: No burning or lesions   ENDOCRINE: No heat or cold intolerance  MUSCULOSKELETAL: No joint pain or swelling  PSYCHIATRIC: No  anxiety, mood swings  HEME/LYMPH: No bleeding gums  ALLERGY AND IMMUNOLOGIC: No hives or eczema	    All other ROS negative    PHYSICAL EXAM:  T(C): 36.8 (01-15-23 @ 06:23), Max: 36.8 (01-15-23 @ 06:23)  HR: 80 (01-15-23 @ 06:23) (65 - 80)  BP: 108/59 (01-15-23 @ 06:23) (100/66 - 143/100)  RR: 18 (01-15-23 @ 06:23) (18 - 27)  SpO2: 97% (01-15-23 @ 06:23) (93% - 99%)  Wt(kg): --  I&O's Summary      Appearance: Normal	  HEENT:   Normal oral mucosa, EOMI	  Cardiovascular:  S1 S2, No JVD,    Respiratory: Lungs clear to auscultation	  Psychiatry: Alert  Gastrointestinal:  Soft, Non-tender, + BS	  Skin: No rashes   Neurologic: Non-focal  Extremities:  No edema  Vascular: Peripheral pulses palpable    	    	  	  CARDIAC MARKERS:  Labs personally reviewed by me                                  11.3   3.26  )-----------( 120      ( 15 Theron 2023 06:44 )             36.0     01-15    144  |  106  |  24<H>  ----------------------------<  121<H>  3.1<L>   |  25  |  0.70    Ca    8.5      15 Theron 2023 06:44  Phos  3.3     01-15  Mg     2.0     01-15    TPro  7.6  /  Alb  3.8  /  TBili  1.5<H>  /  DBili  x   /  AST  46<H>  /  ALT  18  /  AlkPhos  79  01-14          EKG: Personally reviewed by me - AF  Radiology: Personally reviewed by me -     < from: Xray Chest 1 View AP/PA (01.14.23 @ 21:02) >  Small left and trace right pleural effusions.    < end of copied text >  < from: Transthoracic Echocardiogram (12.13.22 @ 14:21) >  Fractional short: 20 %  EF (Visual Estimate): NA %  ------------------------------------------------------------------------  Observations:  Mitral Valve: Mitral annular calcification and calcified  mitral leaflets with normal diastolic opening. Mild mitral  regurgitation.  Mean transmitral valve gradient equals 2 mm  Hg.  Aortic Valve/Aorta: Transcatheteraortic valve replacement.  This is an incomplete evaluation of TAVR since the patient  refused to finish the exam. No aortic valve regurgitation  seen.  Mild aortic root dilatation  (Ao: 4.2 cm at the sinuses of  Valsalva).  Left Atrium: Normal left atrium.  Left Ventricle: Mild global left ventricular systolic  dysfunction. Normal left ventricular internal dimensions  and wall thicknesses.  Right Heart: Normal right atrium. Normal right ventricular  size and function. Normal tricuspid valve. Moderate  tricuspid regurgitation. Normal pulmonic valve. Mild  pulmonic regurgitation.  Pericardium/Pleura: No pericardial effusion seen.  Left pleural effusion.  Hemodynamic: Estimated right atrial pressure is 8 mm Hg.  Estimated right ventricular systolic pressure equals 54 mm  Hg, assuming right atrial pressure equals 8 mm Hg,  consistent with moderate pulmonary hypertension.  ------------------------------------------------------------------------  Conclusions:  1. Mitral annular calcification and calcified mitral  leaflets with normal diastolic opening. Mild mitral  regurgitation.  2. Transcatheter aortic valve replacement. This is an  incomplete evaluation of TAVR since the patient refused to  finish the exam. No aortic valve regurgitationseen.  3. Normal right atrium.Normal right ventricular size and  function.  4. Normal tricuspid valve. Moderate tricuspid  regurgitation.Estimated pulmonary artery systolic pressure  equals 54  mm Hg, assuming right atrial pressure equals 8  mm Hg, consistent with moderate pulmonary pressures.  5. No pericardial effusion seen.  6. Left pleural effusion.  *** Compared with echocardiogram of 8/9/2022, unable to  compare completely due to the current study being  incomplete. Overall, LV and RV function appear similar.    < end of copied text >  < from: Transthoracic Echocardiogram (08.09.22 @ 11:44) >  EF (Visual Estimate): 50 %  Doppler Peak Velocity (m/sec): MV=1.4 AoV=1.3  ------------------------------------------------------------------------  Observations:  Mitral Valve: Mitral annular calcification and calcified  mitral leaflets with decreased diastolic opening.  Mild-moderate mitral regurgitation. Peak mitral valve  gradient equals 8 mm Hg, mean transmitral valve gradient  equals 3 mm Hg, consistent with mild mitral stenosis.  Aortic Valve/Aorta: Transcatheter aortic valve replacement.  Peak transaortic valve gradient equals 7 mm Hg, mean  transaortic valve gradient equals 4 mm Hg, whichis  probably normal in the presence of a transcatheter aortic  valve replacement. Peak left ventricular outflow tract  gradient equals 1 mm Hg, mean gradient is equal to 1 mm Hg,  LVOT velocity time integral equals 11 cm.  Aortic Root: 2.7 cm.  LVOT diameter: 1.8 cm.  Left Atrium: Normal left atrium.  LA volume index = 28  cc/m2.  Left Ventricle: Global hypokinesis of the left ventricle.  Severe concentric left ventricular hypertrophy. Severe  reversible diastolic dysfunction (Stage III).  Right Heart: Normal right atrium. Normal right ventricular  size with decrease right ventricular function.  Normal  tricuspid valve. Normal pulmonic valve.  Pericardium/Pleura: Normal pericardium with no pericardial  effusion.  Hemodynamic: Estimated right atrial pressure is 8 mm Hg.  Estimated right ventricular systolic pressure equals 39 mm  Hg, assuming right atrial pressure equals 8 mm Hg,  consistent with borderline pulmonary hypertension.  ------------------------------------------------------------------------  Conclusions:  1. Mitral annular calcification and calcified mitral  leaflets with decreased diastolic opening. Mild-moderate  mitral regurgitation. Peak mitral valve gradient equals 8  mm Hg, mean transmitral valve gradient equals 3 mmHg,  consistent with mild mitral stenosis.  2. Transcatheter aortic valve replacement. Peak transaortic  valve gradient equals 7 mm Hg, mean transaortic valve  gradient equals 4 mm Hg, which is probably normal in the  presence of a transcatheter aortic valve replacement.  3. Severe concentric left ventricular hypertrophy.  4. Global hypokinesis of the left ventricle.  5. Strain imaging was performed with a HR of 67 bpm and a  BP of 122/77 mm Hg. Global L. Strain= -7.6%.  6. Severe reversible diastolic dysfunction (Stage III).  7. Normal right ventricular size with decrease right  ventricular function.  8. Estimated pulmonary artery systolic pressure equals 39  mm Hg, assuming right atrial pressure equals 8 mm Hg,  consistent with borderline pulmonary pressures.  *** Compared with echocardiogram of 5/3/2022, no  significant changes noted.    < from: Cardiac Cath Lab - Adult (06.16.14 @ 16:21) >  --  Intervention on right PDA: drug-eluting stent.    < end of copied text >        Assessment /Plan:     89yo M w/ PMH of HTN, HLD, DM2, CAD, HFpEF s/p AICD, AS s/p TAVR, chronic Afib not on AC, and squamous cell Ca pw poor PO intake and increasing lethargy at [facility] assisted living facility likely i/s/o RSV infection w/ component of AdHF.       Problem/Plan -1  Problem: Chronic HFpEF   Plan:   - TTE on 12/13/22 shows no change from previous in August 2022. Preserved EF, no changes in wall motion   - CXR shows small left and trace right pleural effusions  - proBNP up at 740[address] from 6989 in 11/22  - Agree with diuresis with IV Bumex 0.5mg IVP and will assess daily  - Strict I&Os, daily weights  - Monitor lytes closely and replete PRN    Problem/Plan -2  Problem: AF  Plan:  - s/p Watchman, not on AC  - Restart Toprol if patient becomes tachy    Problem/Plan -3  Problem: HTN  Plan:  - Previously on Toprol but BP wnl. Will cont to hold and trend.     Problem/Plan -4  Problem: CAD  Plan:  - Denies chest pain although limited historian  - EKG with no ischemic changes noted  - c/w ASA        Differential diagnosis and plan of care discussed with patient after the evaluation. Counseling on diet, nutritional counseling, weight management, exercise and medication compliance was done.   Advanced care planning/advanced directives discussed with patient/family. DNR status including forceful chest compressions to attempt to restart the heart, ventilator support/artificial breathing, electric shock, artificial nutrition, health care proxy, Molst form all discussed with pt. DNR. More than fifteen minutes spent on discussing advanced directives.     Cyn Dodson [facility]-BC  Clarence Vazquez DO [facility]  Cardiovascular Medicine  [address] [place] [address] Dr, [address]  Available for call or text via [employer] TE[employer]  Office [phone]

## 2023-01-16 NOTE — PROGRESS NOTE ADULT - PROBLEM SELECTOR PLAN 5
- c/w ASA if able to tolerate PO Blood tinged stool reported by RN however guaiac negative  No further intervention at this time

## 2023-01-16 NOTE — CHART NOTE - NSCHARTNOTEFT_GEN_A_CORE
JADIEL RDZ    Notified by RN patient is Afib at HR >120's on cardiac monitor. Noticed that pt is NPO, not on home BB.      Interventions taken     Start Lopressor 5mg iv q6 with holding parameters  D/C'd iv NS maintenance fluid due to                     Heidi Reese ANP-BC  Spectralink #[id number]

## 2023-01-16 NOTE — PROGRESS NOTE ADULT - PROBLEM SELECTOR PLAN 4
- Not on AC, only ASA  - currently rate controlled; consider adding toprol if noted to have uncontrolled HR Hold diuretics as above  Start gentle hypotonic saline as patient not taking PO (avoiding D5, diabetic)

## 2023-01-16 NOTE — PROVIDER CONTACT NOTE (OTHER) - BACKGROUND
Pt admitted on 1/14/2023 for acute CHF and + RSV
Pt admitted on 1/14/2023 for acute CHF and + RSV
Pt admitted on 1/14/2023 for Acute on CHF with +RSV

## 2023-01-16 NOTE — PROGRESS NOTE ADULT - PROBLEM SELECTOR PLAN 3
- TTE from 12/22 reviewed w/ no acute process and chambers similar to prior TTE  - Will hold on further TTE on this admission  - Presenting w/ BNP 13K (baseline ~5K) w/ trace pleff and elevated trop (likely demand) c/f AdHF  - Appears slightly VOL to euvolemic on exam  - unsure if patient able to take PO; will have S/S eval; for now will give bumex 0.5mg IV; can consider increasing dose if needed and if BP allows (BP currently on lower side)   - Monitor I/O q4hrs and titrate Bumex for net neg to even  - Daily weight  - Unsure if supposed to be on Toprol 25mg as he was prescribed on prior admission but not listed as medication on med list from [facility], monitor for now   - Tele monitoring Presented with elevated BNP, trace pleural effusion and elevated troponin  On Bumex 0.5 mg IVP daily  D/C diuretic as patient appears dry, is NPO and has worsening hypernatremia

## 2023-01-16 NOTE — PROGRESS NOTE ADULT - SUBJECTIVE AND OBJECTIVE BOX
DATE OF SERVICE: 01-16-23 @ 10:58    Patient is a 89y old  Male who presents with a chief complaint of RSV (15 Theron 2023 10:30)      INTERVAL HISTORY: In no acute distress.     REVIEW OF SYSTEMS: Unable to participate in ROS d/t baseline dementia  CONSTITUTIONAL: No weakness  EYES/ENT: No visual changes;  No throat pain   NECK: No pain or stiffness  RESPIRATORY: No cough, wheezing; No shortness of breath  CARDIOVASCULAR: No chest pain or palpitations  GASTROINTESTINAL: No abdominal  pain. No nausea, vomiting, or hematemesis  GENITOURINARY: No dysuria, frequency or hematuria  NEUROLOGICAL: No stroke like symptoms  SKIN: No rashes    TELEMETRY Personally reviewed: AF/V-Paced    	  MEDICATIONS:  buMETAnide Injectable 0.5 milliGRAM(s) IV Push daily        PHYSICAL EXAM:  T(C): 36.9 (01-16-23 @ 04:49), Max: 36.9 (01-16-23 @ 04:49)  HR: 86 (01-16-23 @ 04:49) (72 - 86)  BP: 105/71 (01-16-23 @ 04:49) (105/71 - 127/83)  RR: 19 (01-16-23 @ 04:49) (19 - 20)  SpO2: 99% (01-16-23 @ 04:49) (97% - 99%)  Wt(kg): --  I&O's Summary    15 Theron 2023 07:01  -  16 Jan 2023 07:00  --------------------------------------------------------  IN: 400 mL / OUT: 0 mL / NET: 400 mL          Appearance: In no distress	  HEENT:    PERRL, EOMI	  Cardiovascular:  S1 S2, + JVD  Respiratory: crackles  Gastrointestinal:  Soft, Non-tender, + BS	  Vascularature:  No edema of LE  Psychiatric: Appropriate affect   Neuro: no acute focal deficits                               12.5   4.41  )-----------( 130      ( 16 Jan 2023 04:16 )             39.9     01-16    145  |  106  |  25<H>  ----------------------------<  109<H>  3.3<L>   |  25  |  0.75    Ca    8.8      16 Jan 2023 04:16  Phos  3.3     01-16  Mg     2.1     01-16    TPro  7.6  /  Alb  3.8  /  TBili  1.5<H>  /  DBili  x   /  AST  46<H>  /  ALT  18  /  AlkPhos  79  01-14        Labs personally reviewed      ASSESSMENT/PLAN: 	    89yo M w/ PMH of HTN, HLD, DM2, CAD, HFpEF s/p AICD, AS s/p TAVR, chronic Afib not on AC, and squamous cell Ca pw poor PO intake and increasing lethargy at [facility] assisted living facility likely i/s/o RSV infection w/ component of AdHF.       Problem/Plan -1  Problem: Chronic HFpEF   Plan:   - TTE on 12/13/22 shows no change from previous in August 2022. Preserved EF, no changes in wall motion   - CXR shows small left and trace right pleural effusions  - proBNP up at 57723 from 6989 in 11/22  - Agree with diuresis with IV Bumex 0.5mg IVP and will assess daily  - Strict I&Os, daily weights  - Monitor lytes closely and replete PRN    Problem/Plan -2  Problem: AF  Plan:  - s/p Watchman, not on AC  - Restart Toprol if patient becomes tachy    Problem/Plan -3  Problem: HTN  Plan:  - Previously on Toprol but BP wnl. Will cont to hold and trend.     Problem/Plan -4  Problem: CAD  Plan:  - Denies chest pain although limited historian  - EKG with no ischemic changes noted  - c/w ASA           Cyn Dodson, AG-NP   Clarence Vazquez DO [facility]  Cardiovascular Medicine  [address], [address]  Available through call or text on [employer] TEAM[employer]  Office: [phone]   DATE OF SERVICE: 01-16-23 @ 10:58    Patient is a 89y old  Male who presents with a chief complaint of RSV (15 Theron 2023 10:30)      INTERVAL HISTORY: In no acute distress.     REVIEW OF SYSTEMS: Unable to participate in ROS d/t baseline dementia  CONSTITUTIONAL: No weakness  EYES/ENT: No visual changes;  No throat pain   NECK: No pain or stiffness  RESPIRATORY: No cough, wheezing; No shortness of breath  CARDIOVASCULAR: No chest pain or palpitations  GASTROINTESTINAL: No abdominal  pain. No nausea, vomiting, or hematemesis  GENITOURINARY: No dysuria, frequency or hematuria  NEUROLOGICAL: No stroke like symptoms  SKIN: No rashes    TELEMETRY Personally reviewed: AF/V-Paced    	  MEDICATIONS:  buMETAnide Injectable 0.5 milliGRAM(s) IV Push daily        PHYSICAL EXAM:  T(C): 36.9 (01-16-23 @ 04:49), Max: 36.9 (01-16-23 @ 04:49)  HR: 86 (01-16-23 @ 04:49) (72 - 86)  BP: 105/71 (01-16-23 @ 04:49) (105/71 - 127/83)  RR: 19 (01-16-23 @ 04:49) (19 - 20)  SpO2: 99% (01-16-23 @ 04:49) (97% - 99%)  Wt(kg): --  I&O's Summary    15 Theron 2023 07:01  -  16 Jan 2023 07:00  --------------------------------------------------------  IN: 400 mL / OUT: 0 mL / NET: 400 mL          Appearance: In no distress	  HEENT:    PERRL, EOMI	  Cardiovascular:  S1 S2, + JVD  Respiratory: crackles  Gastrointestinal:  Soft, Non-tender, + BS	  Vascularature:  No edema of LE  Psychiatric: Appropriate affect   Neuro: no acute focal deficits                               12.5   4.41  )-----------( 130      ( 16 Jan 2023 04:16 )             39.9     01-16    145  |  106  |  25<H>  ----------------------------<  109<H>  3.3<L>   |  25  |  0.75    Ca    8.8      16 Jan 2023 04:16  Phos  3.3     01-16  Mg     2.1     01-16    TPro  7.6  /  Alb  3.8  /  TBili  1.5<H>  /  DBili  x   /  AST  46<H>  /  ALT  18  /  AlkPhos  79  01-14        Labs personally reviewed      ASSESSMENT/PLAN: 	    89yo M w/ PMH of HTN, HLD, DM2, CAD, HFpEF s/p AICD, AS s/p TAVR, chronic Afib not on AC, and squamous cell Ca pw poor PO intake and increasing lethargy at [facility] assisted living facility likely i/s/o RSV infection w/ component of AdHF.       Problem/Plan -1  Problem: Chronic HFpEF   Plan:   - TTE on 12/13/22 shows no change from previous in August 2022. Preserved EF, no changes in wall motion   - CXR shows small left and trace right pleural effusions  - proBNP up at 356[address] from 6989 in 11/22  - Agree with diuresis with IV Bumex 0.5mg IVP and will assess daily  - Strict I&Os, daily weights  - Monitor lytes closely and replete PRN    Problem/Plan -2  Problem: AF  Plan:  - s/p Watchman, not on AC  - Restart Toprol if patient becomes tachy    Problem/Plan -3  Problem: HTN  Plan:  - Previously on Toprol but BP wnl. Will cont to hold and trend.     Problem/Plan -4  Problem: CAD  Plan:  - Denies chest pain although limited historian  - EKG with no ischemic changes noted  - c/w ASA           Cyn Dodson, AG-NP   Clarence Vazquez DO [facility]  Cardiovascular Medicine  [address], [address]  Available through call or text on [employer] TEAM[employer]  Office: [phone]

## 2023-01-16 NOTE — CONSULT NOTE ADULT - ATTENDING COMMENTS
Agree w above. 89 yo w multiple co-morbidities noted to be more lethargic and confused at assisted living facility. GI called for clots noted in abram. Stool brown on MICH. Blood tinged urine with fresh blood in penile meatus. Low suspicion for GI bleed. Hb stable. Rec hematuria work up. No further GI intervention needed at this time. Please call if with evidence of active/ongoing GI bleed.

## 2023-01-16 NOTE — CONSULT NOTE ADULT - SUBJECTIVE AND OBJECTIVE BOX
HPI:  Pt is a 89yo M w/ PMH of HTN, HLD, DM2, CAD, HFpEF s/p AICD, AS s/p TAVR, chronic Afib not on AC, and squamous cell Ca pw poor PO intake and increasing lethargy at [facility] assisted living facility.   Pt unable to provide hx as he is A&Ox0 on encounter. History provided through chart review and daughter (Evangelina).  Mr. Julien has been refusing PO intake over the past three days with increased confusion, weakness and lethargy. Otherwise no reported F/C, falls, N/V or cough. Of note, RSV, flu and COVID have been prevalent at [facility] where the patient resides.   In the ED pt was noted to have an elevated troponin, lactate and BNP of 13K and CXR c/f trace L pleff and RSV positive. He was administered Lasix 20mg IVP and cultures sent.    Pt w/ report blood clots per rectum, seen on abram. No prior episodes this admission. Hgb stable. No reported melena per nursing.    Allergies:  No Known Allergies    Home Medications:    Hospital Medications:  acetaminophen     Tablet .. 650 milliGRAM(s) Oral every 6 hours PRN  aspirin enteric coated 81 milliGRAM(s) Oral daily  atorvastatin 10 milliGRAM(s) Oral at bedtime  donepezil 5 milliGRAM(s) Oral at bedtime  enoxaparin Injectable 40 milliGRAM(s) SubCutaneous every 24 hours  finasteride 5 milliGRAM(s) Oral daily  multivitamin 1 Tablet(s) Oral daily  pantoprazole    Tablet 40 milliGRAM(s) Oral before breakfast  sodium chloride 0.45%. 500 milliLiter(s) IV Continuous <Continuous>  thiamine 100 milliGRAM(s) Oral daily      PMHX/PSHX:  HTN (hypertension)    HLD (hyperlipidemia)    DM (diabetes mellitus)    BPH (benign prostatic hypertrophy)    Insomnia    RBBB    Chronic atrial fibrillation    Dementia    Unsteady gait    S/P shoulder surgery    S/P knee surgery        Family history:  Family history of stroke    Family history of diabetes mellitus      Social History:   Tob: Denies  EtOH: Denies  Illicit Drugs: Denies    ROS:   unable to obtain, AAox0    PHYSICAL EXAM:     GENERAL:  No acute distress  HEENT:  NCAT, no scleral icterus   CHEST:  no respiratory distress  HEART:  Regular rate and rhythm  ABDOMEN:  Soft, non-tender, non-distended   EXTREMITIES: No edema  SKIN:  No rash/erythema/ecchymoses/petechiae/wounds/abscess/warm/dry  NEURO:  Alert and oriented x 0  MICH; brown stool    Vital Signs:  Vital Signs Last 24 Hrs  T(C): 36.5 (2023 11:45), Max: 36.9 (2023 04:49)  T(F): 97.7 (2023 11:45), Max: 98.5 (2023 04:49)  HR: 89 (2023 12:23) (72 - 89)  BP: 104/66 (2023 12:23) (104/66 - 116/73)  BP(mean): --  RR: 19 (2023 12:23) (19 - 19)  SpO2: 96% (2023 12:23) (96% - 99%)    Parameters below as of 2023 12:23  Patient On (Oxygen Delivery Method): nasal cannula      Daily     Daily Weight in k.5 (2023 07:14)    LABS:                        12.5   4.41  )-----------( 130      ( 2023 04:16 )             39.9     Mean Cell Volume: 89.1 fl (23 @ 04:16)        146<H>  |  105  |  24<H>  ----------------------------<  102<H>  4.1   |  28  |  0.79    Ca    8.8      2023 11:06  Phos  3.3       Mg     2.1         TPro  7.6  /  Alb  3.8  /  TBili  1.5<H>  /  DBili  x   /  AST  46<H>  /  ALT  18  /  AlkPhos  79  14    LIVER FUNCTIONS - ( 2023 21:42 )  Alb: 3.8 g/dL / Pro: 7.6 g/dL / ALK PHOS: 79 U/L / ALT: 18 U/L / AST: 46 U/L / GGT: x           PT/INR - ( 2023 21:42 )   PT: 15.6 sec;   INR: 1.35 ratio         PTT - ( 2023 21:42 )  PTT:27.5 sec  Urinalysis Basic - ( 15 Theron 2023 01:49 )    Color: Light Yellow / Appearance: Clear / S.008 / pH: x  Gluc: x / Ketone: Negative  / Bili: Negative / Urobili: 2 mg/dL   Blood: x / Protein: Negative / Nitrite: Negative   Leuk Esterase: Negative / RBC: 94 /hpf / WBC 0 /HPF   Sq Epi: x / Non Sq Epi: 0 /hpf / Bacteria: Negative                              12.5   4.41  )-----------( 130      ( 2023 04:16 )             39.9                         11.3   3.26  )-----------( 120      ( 15 Theron 2023 06:44 )             36.0                         11.6   4.44  )-----------( 127      ( 2023 21:42 )             37.4       Imaging:  reviewed

## 2023-01-16 NOTE — PROVIDER CONTACT NOTE (OTHER) - ACTION/TREATMENT ORDERED:
Draw morning lab (CBC and BMP) early and sent to lab STAT. Save bloody clot in specimen container for in case.
Lopressor IV push 5 mg ordered and given. Continue monitor VS.
Ativan IV 1mg ordered and given.

## 2023-01-16 NOTE — PROGRESS NOTE ADULT - PROBLEM SELECTOR PLAN 2
- Symptomatic management w/ supplemental O2 as needed, wean as tolerated for goal SpO2 >95%  - Isolation precautions  - f/u blood and urine cx Symptomatic management w/ supplemental O2 as needed  Isolation precautions  Blood and urine cxs NGTD

## 2023-01-16 NOTE — PROVIDER CONTACT NOTE (OTHER) - REASON
Pt confused and agitated
Bloody clot in stool noted; suspecting GI bleed
Per tele, Pt HR sustaining @ 130's

## 2023-01-16 NOTE — PROVIDER CONTACT NOTE (OTHER) - SITUATION
Bloody clot in stool noted; suspecting GI bleed
Pt confused and agitated. Pt scream out and keep stating that he need to go out.
Per tele, Pt HR sustaining @ 130's

## 2023-01-16 NOTE — PROGRESS NOTE ADULT - PROBLEM SELECTOR PLAN 8
DVT ppx: Lovenox  Diet: Dysphagia screen prior to initiation  Dispo: Pending clinical improvement likely back to [facility]  GOC: DNR/DNI MOLST in chart  Pending S/S eval Continue Donepezil

## 2023-01-16 NOTE — PROVIDER CONTACT NOTE (OTHER) - ASSESSMENT
Pt alert and orient x 1; only to his name. Pt denies any pain or discomfort. Failed to swallow even sip of water; maintained NPO. VS: /73, , T 99.1F, 98% on 3LNC.

## 2023-01-16 NOTE — PROGRESS NOTE ADULT - SUBJECTIVE AND OBJECTIVE BOX
Izaiah Joseph MD    Patient is a 89y old  Male who presents with a chief complaint of RSV (2023 10:58)        SUBJECTIVE / OVERNIGHT EVENTS: No new events. Patient non-verbal  TELEMETRY: AF/ 80-90's SpO2 90-97%      MEDICATIONS  (STANDING):  aspirin enteric coated 81 milliGRAM(s) Oral daily  atorvastatin 10 milliGRAM(s) Oral at bedtime  donepezil 5 milliGRAM(s) Oral at bedtime  enoxaparin Injectable 40 milliGRAM(s) SubCutaneous every 24 hours  finasteride 5 milliGRAM(s) Oral daily  multivitamin 1 Tablet(s) Oral daily  pantoprazole    Tablet 40 milliGRAM(s) Oral before breakfast  potassium chloride  10 mEq/50 mL IVPB 10 milliEquivalent(s) IV Intermittent every 1 hour  thiamine 100 milliGRAM(s) Oral daily    MEDICATIONS  (PRN):  acetaminophen     Tablet .. 650 milliGRAM(s) Oral every 6 hours PRN Temp greater or equal to 38C (100.4F), Mild Pain (1 - 3)      Vital Signs Last 24 Hrs  T(C): 36.5 (2023 11:45), Max: 36.9 (2023 04:49)  T(F): 97.7 (2023 11:45), Max: 98.5 (2023 04:49)  HR: 89 (2023 12:23) (72 - 89)  BP: 104/66 (2023 12:23) (104/66 - 116/73)  BP(mean): --  RR: 19 (2023 12:23) (19 - 19)  SpO2: 96% (2023 12:23) (96% - 99%)    Parameters below as of 2023 12:23  Patient On (Oxygen Delivery Method): nasal cannula      CAPILLARY BLOOD GLUCOSE        I&O's Summary    15 Theron 2023 07:01  -  2023 07:00  --------------------------------------------------------  IN: 400 mL / OUT: 0 mL / NET: 400 mL          PHYSICAL EXAM  GENERAL: NAD,   HEAD:  Atraumatic, normocephalic  CHEST/LUNG: Poor inspiratory effort; no wheezes  HEART: +S1+S2  ABDOMEN: Soft, nontender, nondistended; bowel sounds present  EXTREMITIES:  2+ peripheral pulses; no clubbing, cyanosis, or edema  PSYCH: AAOx0-1 (opens eyes to name)      LABS:                        12.5   4.41  )-----------( 130      ( 2023 04:16 )             39.9     -    146<H>  |  105  |  24<H>  ----------------------------<  102<H>  4.1   |  28  |  0.79    Ca    8.8      2023 11:06  Phos  3.3       Mg     2.1         TPro  7.6  /  Alb  3.8  /  TBili  1.5<H>  /  DBili  x   /  AST  46<H>  /  ALT  18  /  AlkPhos  79  -    PT/INR - ( 2023 21:42 )   PT: 15.6 sec;   INR: 1.35 ratio         PTT - ( 2023 21:42 )  PTT:27.5 sec      Urinalysis Basic - ( 15 Theron 2023 01:49 )    Color: Light Yellow / Appearance: Clear / S.008 / pH: x  Gluc: x / Ketone: Negative  / Bili: Negative / Urobili: 2 mg/dL   Blood: x / Protein: Negative / Nitrite: Negative   Leuk Esterase: Negative / RBC: 94 /hpf / WBC 0 /HPF   Sq Epi: x / Non Sq Epi: 0 /hpf / Bacteria: Negative        Culture - Urine (collected 15 Theron 2023 01:49)  Source: Clean Catch Clean Catch (Midstream)  Final Report (2023 09:58):    No growth    Culture - Blood (collected 2023 21:20)  Source: .Blood Blood-Peripheral  Preliminary Report (2023 07:01):    No growth to date.    Culture - Blood (collected 2023 21:14)  Source: .Blood Blood-Peripheral  Preliminary Report (2023 07:01):    No growth to date.        RADIOLOGY & ADDITIONAL TESTS:    Imaging Personally Reviewed:  Consultant(s) Notes Reviewed:    Care Discussed with Consultants/Other Providers:   Izaiah Joseph MD    Patient is a 89y old  Male who presents with a chief complaint of RSV (2023 10:58)        SUBJECTIVE / OVERNIGHT EVENTS: Blood tinged stool reported overnight by RN. Patient non-verbal   TELEMETRY: AF/ 80-90's SpO2 90-97%      MEDICATIONS  (STANDING):  aspirin enteric coated 81 milliGRAM(s) Oral daily  atorvastatin 10 milliGRAM(s) Oral at bedtime  donepezil 5 milliGRAM(s) Oral at bedtime  enoxaparin Injectable 40 milliGRAM(s) SubCutaneous every 24 hours  finasteride 5 milliGRAM(s) Oral daily  multivitamin 1 Tablet(s) Oral daily  pantoprazole    Tablet 40 milliGRAM(s) Oral before breakfast  potassium chloride  10 mEq/50 mL IVPB 10 milliEquivalent(s) IV Intermittent every 1 hour  thiamine 100 milliGRAM(s) Oral daily    MEDICATIONS  (PRN):  acetaminophen     Tablet .. 650 milliGRAM(s) Oral every 6 hours PRN Temp greater or equal to 38C (100.4F), Mild Pain (1 - 3)      Vital Signs Last 24 Hrs  T(C): 36.5 (2023 11:45), Max: 36.9 (2023 04:49)  T(F): 97.7 (2023 11:45), Max: 98.5 (2023 04:49)  HR: 89 (2023 12:23) (72 - 89)  BP: 104/66 (2023 12:23) (104/66 - 116/73)  BP(mean): --  RR: 19 (2023 12:23) (19 - 19)  SpO2: 96% (2023 12:23) (96% - 99%)    Parameters below as of 2023 12:23  Patient On (Oxygen Delivery Method): nasal cannula      CAPILLARY BLOOD GLUCOSE        I&O's Summary    15 Theron 2023 07:01  -  2023 07:00  --------------------------------------------------------  IN: 400 mL / OUT: 0 mL / NET: 400 mL          PHYSICAL EXAM  GENERAL: NAD,   HEAD:  Atraumatic, normocephalic  CHEST/LUNG: Poor inspiratory effort; no wheezes  HEART: +S1+S2  ABDOMEN: Soft, nontender, nondistended; bowel sounds present  EXTREMITIES:  2+ peripheral pulses; no clubbing, cyanosis, or edema  PSYCH: AAOx0-1 (opens eyes to name)      LABS:                        12.5   4.41  )-----------( 130      ( 2023 04:16 )             39.9         146<H>  |  105  |  24<H>  ----------------------------<  102<H>  4.1   |  28  |  0.79    Ca    8.8      2023 11:06  Phos  3.3       Mg     2.1         TPro  7.6  /  Alb  3.8  /  TBili  1.5<H>  /  DBili  x   /  AST  46<H>  /  ALT  18  /  AlkPhos  79      PT/INR - ( 2023 21:42 )   PT: 15.6 sec;   INR: 1.35 ratio         PTT - ( 2023 21:42 )  PTT:27.5 sec      Urinalysis Basic - ( 15 Theron 2023 01:49 )    Color: Light Yellow / Appearance: Clear / S.008 / pH: x  Gluc: x / Ketone: Negative  / Bili: Negative / Urobili: 2 mg/dL   Blood: x / Protein: Negative / Nitrite: Negative   Leuk Esterase: Negative / RBC: 94 /hpf / WBC 0 /HPF   Sq Epi: x / Non Sq Epi: 0 /hpf / Bacteria: Negative        Culture - Urine (collected 15 Theron 2023 01:49)  Source: Clean Catch Clean Catch (Midstream)  Final Report (2023 09:58):    No growth    Culture - Blood (collected 2023 21:20)  Source: .Blood Blood-Peripheral  Preliminary Report (2023 07:01):    No growth to date.    Culture - Blood (collected 2023 21:14)  Source: .Blood Blood-Peripheral  Preliminary Report (2023 07:01):    No growth to date.        RADIOLOGY & ADDITIONAL TESTS:    Imaging Personally Reviewed:  Consultant(s) Notes Reviewed:    Care Discussed with Consultants/Other Providers:

## 2023-01-16 NOTE — CHART NOTE - NSCHARTNOTEFT_GEN_A_CORE
JADIEL RDZ    Notified by RN patient with bloody tinged stool this morning. VSS.       Interventions taken     Stool for occult blood  trending H/H  may need GI consult  cont assess and monitor  f/u with am team.                    Heidi Reese ANP-BC  Spectralink #[id number] JADIEL RDZ    Notified by RN patient with bloody tinged stool this morning. VSS.       Interventions taken     Stool for occult blood  c/w PPI  trending H/H  may need GI consult  cont assess and monitor  f/u with am team.                    Heidi Reese ANP-BC  Spectralink #[id number]

## 2023-01-16 NOTE — PROVIDER CONTACT NOTE (OTHER) - ASSESSMENT
Pt alert and orient x 1; only to his name. Pt was on morning washing and noted bloody clot in stool. Denies any other pain or discomfort. Pt alert and orient x 1; only to his name. Pt was on morning washing and noted bloody clot in stool; dime size clots about 7-8. Denies any other pain or discomfort.

## 2023-01-17 NOTE — DIETITIAN INITIAL EVALUATION ADULT - OTHER INFO
Per RD note 08/23/22, daughter reported pt's UBW ~140 lbs.  Dosing wt: 120.1 lbs (01-16, bed)  Wt history per chart: 120.3 lbs (01-17), 131 lbs (08/23/22, RD obtained bedscale wt), 146.1 lbs (05-04), 140 lbs (09/26/21). Would indicate ~9% wt loss x 5 months. RD to continue to monitor weight trends as able/available.     Per chart, pt currently ordered for remeron, atorvastatin, a multivitamin, protonix, and thiamine in-house.

## 2023-01-17 NOTE — DIETITIAN INITIAL EVALUATION ADULT - ADD RECOMMEND
1) Recommend GOC discussion regarding means of nutrition provision. If EN warranted, would recommend Glucerna 1.2 @ 10 ml/hr, increase by 10 ml q 6 hours as tolerated to goal rate 55 ml/hr x 24 hours. Free water flushes per team. Would provide 1320 ml formula, 1584 kcal (29 kcal/kg), and 79 g pro (1.4 g pro/kg) based on dosing wt 54.5 kg (01-16).  If pleasure feeds warranted, recommend no therapeutic restrictions, texture per SLP/team.  2) Continue multivitamin as medically feasible to promote wound healing and aid in prevention of micronutrient deficiencies. Continue thiamine pending no medical contraindications.  3) Monitor GI tolerance, skin integrity, labs, weight, and bowel movement regularity.   4) Nutrition plan of care to be in line with medical GOC and pt/family wishes.  5) RD remains available upon request and will follow-up per protocol.  6) malnutrition alert placed in chart

## 2023-01-17 NOTE — SWALLOW BEDSIDE ASSESSMENT ADULT - SWALLOW EVAL: DIAGNOSIS
89 yo M w/ PMHx of HFpEF and T2DM BIBEMS from assisted living for lethargy and not eating/drinking x3 days. Pt found to be RSV+. Pt presents w/ a suspected pharyngeal dysphagia characterized by reduced hyolaryngeal excursion, suspected delayed onset of pharyngeal swallow, nonverbal indicators of odynophagia, and delayed wet cough following trials of ice chips. Pt grimacing and appears to be in pain when swallowing. Per RN, Pt has been intermittently lethargic. Pt is at high risk of aspiration at this time. Recommend NPO w/ alternative means of nutrition/hydration/medication at this time. Consider GOC conversation w/ family to determine provisions of nutrition. This service will continue to follow Pt and re-evaluate when appropriate.

## 2023-01-17 NOTE — SWALLOW BEDSIDE ASSESSMENT ADULT - SLP GENERAL OBSERVATIONS
Encountered Pt laying in bed on RA. Pt is A&Ox0, occasionally verbal, and able to follow simple commands w/ multimodal cues. +baseline cough. +xerostomia; dried secretions noted throughout oral cavity. SLP provided oral care prior to PO trials.

## 2023-01-17 NOTE — DIETITIAN INITIAL EVALUATION ADULT - REASON FOR ADMISSION
Heart failure    "89M hx HTN, HLD, DM type 2, CAD, HFpEF s/p AICD, AS s/p TAVR, chronic Afib not on AC, and squamous cell Ca p/w poor PO intake and increasing lethargy found to have RSV infection w/ component of AdHF"

## 2023-01-17 NOTE — PROGRESS NOTE ADULT - SUBJECTIVE AND OBJECTIVE BOX
DATE OF SERVICE: 01-17-23 @ 10:48    Patient is a 89y old  Male who presents with a chief complaint of RSV (16 Jan 2023 15:19)      INTERVAL HISTORY: In no acute distress.     REVIEW OF SYSTEMS: Unable to participate in ROS d/t baseline dementia  CONSTITUTIONAL: No weakness  EYES/ENT: No visual changes;  No throat pain   NECK: No pain or stiffness  RESPIRATORY: No cough, wheezing; No shortness of breath  CARDIOVASCULAR: No chest pain or palpitations  GASTROINTESTINAL: No abdominal  pain. No nausea, vomiting, or hematemesis  GENITOURINARY: No dysuria, frequency or hematuria  NEUROLOGICAL: No stroke like symptoms  SKIN: No rashes    TELEMETRY Personally reviewed: AF/V-Paced   	  MEDICATIONS:  metoprolol tartrate Injectable 5 milliGRAM(s) IV Push every 6 hours        PHYSICAL EXAM:  T(C): 36 (01-17-23 @ 05:47), Max: 37.3 (01-16-23 @ 20:20)  HR: 87 (01-17-23 @ 08:20) (81 - 128)  BP: 128/83 (01-17-23 @ 08:20) (104/66 - 128/83)  RR: 18 (01-17-23 @ 05:47) (18 - 19)  SpO2: 93% (01-17-23 @ 05:47) (93% - 98%)  Wt(kg): --  I&O's Summary    16 Jan 2023 07:01  -  17 Jan 2023 07:00  --------------------------------------------------------  IN: 600 mL / OUT: 300 mL / NET: 300 mL          Appearance: In no distress	  HEENT:    PERRL, EOMI	  Cardiovascular:  S1 S2, No JVD  Respiratory: Lungs clear to auscultation	  Gastrointestinal:  Soft, Non-tender, + BS	  Vascularature:  No edema of LE  Psychiatric: Appropriate affect   Neuro: no acute focal deficits                               12.5   5.19  )-----------( 142      ( 17 Jan 2023 08:43 )             41.0     01-17    145  |  106  |  27<H>  ----------------------------<  89  3.7   |  26  |  0.71    Ca    9.0      17 Jan 2023 08:43  Phos  3.3     01-16  Mg     2.1     01-16          Labs personally reviewed      ASSESSMENT/PLAN: 	    89yo M w/ PMH of HTN, HLD, DM2, CAD, HFpEF s/p AICD, AS s/p TAVR, chronic Afib not on AC, and squamous cell Ca pw poor PO intake and increasing lethargy at [facility] assisted living facility likely i/s/o RSV infection w/ component of AdHF.       Problem/Plan -1  Problem: Chronic HFpEF   Plan:   - TTE on 12/13/22 shows no change from previous in August 2022. Preserved EF, no changes in wall motion   - CXR shows small left and trace right pleural effusions  - proBNP up at [address] from 6989 in 11/22  - Strict I&Os, daily weights  - Monitor lytes closely and replete PRN  - s/p IV diuresis now appears euvolemic. Seen lying flat in bed, no hypoxia, lungs clear, minimal JVD  - Cont to monitor volume status daily. Will hold diuresis for now.    Problem/Plan -2  Problem: AF  Plan:  - s/p Watchman, not on AC  - AF with RVR overnight likely secondary to not taking BB  - c/w Metoprolol 5mg IVP q6hrs with hold parameters  - cont to monitor on tele    Problem/Plan -3  Problem: HTN  Plan:  - c/w Metoprolol 5mg IVP q6hrs with hold parameters    Problem/Plan -4  Problem: CAD  Plan:  - Denies chest pain although limited historian  - EKG with no ischemic changes noted  - c/w STEPHANIE Dodson, CIERRA-NP   Clarence Vazquez DO [facility]  Cardiovascular Medicine  [address] [place] [address], [address]  Available through call or text on [employer]  Office: [phone]   DATE OF SERVICE: 01-17-23 @ 10:48    Patient is a 89y old  Male who presents with a chief complaint of RSV (16 Jan 2023 15:19)      INTERVAL HISTORY: In no acute distress.     REVIEW OF SYSTEMS: Unable to participate in ROS d/t baseline dementia  CONSTITUTIONAL: No weakness  EYES/ENT: No visual changes;  No throat pain   NECK: No pain or stiffness  RESPIRATORY: No cough, wheezing; No shortness of breath  CARDIOVASCULAR: No chest pain or palpitations  GASTROINTESTINAL: No abdominal  pain. No nausea, vomiting, or hematemesis  GENITOURINARY: No dysuria, frequency or hematuria  NEUROLOGICAL: No stroke like symptoms  SKIN: No rashes    TELEMETRY Personally reviewed: AF/V-Paced   	  MEDICATIONS:  metoprolol tartrate Injectable 5 milliGRAM(s) IV Push every 6 hours        PHYSICAL EXAM:  T(C): 36 (01-17-23 @ 05:47), Max: 37.3 (01-16-23 @ 20:20)  HR: 87 (01-17-23 @ 08:20) (81 - 128)  BP: 128/83 (01-17-23 @ 08:20) (104/66 - 128/83)  RR: 18 (01-17-23 @ 05:47) (18 - 19)  SpO2: 93% (01-17-23 @ 05:47) (93% - 98%)  Wt(kg): --  I&O's Summary    16 Jan 2023 07:01  -  17 Jan 2023 07:00  --------------------------------------------------------  IN: 600 mL / OUT: 300 mL / NET: 300 mL          Appearance: In no distress	  HEENT:    PERRL, EOMI	  Cardiovascular:  S1 S2, No JVD  Respiratory: Lungs clear to auscultation	  Gastrointestinal:  Soft, Non-tender, + BS	  Vascularature:  No edema of LE  Psychiatric: Appropriate affect   Neuro: no acute focal deficits                               12.5   5.19  )-----------( 142      ( 17 Jan 2023 08:43 )             41.0     01-17    145  |  106  |  27<H>  ----------------------------<  89  3.7   |  26  |  0.71    Ca    9.0      17 Jan 2023 08:43  Phos  3.3     01-16  Mg     2.1     01-16          Labs personally reviewed      ASSESSMENT/PLAN: 	    89yo M w/ PMH of HTN, HLD, DM2, CAD, HFpEF s/p AICD, AS s/p TAVR, chronic Afib not on AC, and squamous cell Ca pw poor PO intake and increasing lethargy at [facility] assisted living facility likely i/s/o RSV infection w/ component of AdHF.       Problem/Plan -1  Problem: Chronic HFpEF   Plan:   - TTE on 12/13/22 shows no change from previous in August 2022. Preserved EF, no changes in wall motion   - CXR shows small left and trace right pleural effusions  - proBNP up at 08192 from 6989 in 11/22  - Strict I&Os, daily weights  - Monitor lytes closely and replete PRN  - s/p IV diuresis now appears euvolemic. Seen lying flat in bed, no hypoxia, lungs clear, minimal JVD  - Cont to monitor volume status daily. Will hold diuresis for now.    Problem/Plan -2  Problem: AF  Plan:  - s/p Watchman, not on AC  - AF with RVR overnight likely secondary to not taking BB  - c/w Metoprolol 5mg IVP q6hrs with hold parameters  - cont to monitor on tele    Problem/Plan -3  Problem: HTN  Plan:  - c/w Metoprolol 5mg IVP q6hrs with hold parameters    Problem/Plan -4  Problem: CAD  Plan:  - Denies chest pain although limited historian  - EKG with no ischemic changes noted  - c/w ASA       I will be covered by Dr Jace Parada 1/18 - 1/23. He can be reached at [phone]      Cyn Dodson, CIERRA-RACHAEL Vazquez DO [facility]  Cardiovascular Medicine  [address], [address]  Available through call or text on [employer] TE[employer]  Office: [phone]

## 2023-01-17 NOTE — PROGRESS NOTE ADULT - SUBJECTIVE AND OBJECTIVE BOX
Izaiah Joseph MD    Patient is a 89y old  Male who presents with a chief complaint of RSV (2023 10:48)        SUBJECTIVE / OVERNIGHT EVENTS: AF with RVR overnight- started on IV lopressor  TELEMETRY: AF/ , PVCs      MEDICATIONS  (STANDING):  aspirin enteric coated 81 milliGRAM(s) Oral daily  atorvastatin 10 milliGRAM(s) Oral at bedtime  donepezil 5 milliGRAM(s) Oral at bedtime  enoxaparin Injectable 40 milliGRAM(s) SubCutaneous every 24 hours  finasteride 5 milliGRAM(s) Oral daily  metoprolol tartrate Injectable 5 milliGRAM(s) IV Push every 6 hours  mirtazapine 7.5 milliGRAM(s) Oral at bedtime  multivitamin 1 Tablet(s) Oral daily  pantoprazole    Tablet 40 milliGRAM(s) Oral before breakfast  QUEtiapine 25 milliGRAM(s) Oral at bedtime  sodium chloride 0.45%. 500 milliLiter(s) (50 mL/Hr) IV Continuous <Continuous>  thiamine 100 milliGRAM(s) Oral daily    MEDICATIONS  (PRN):  acetaminophen     Tablet .. 650 milliGRAM(s) Oral every 6 hours PRN Temp greater or equal to 38C (100.4F), Mild Pain (1 - 3)      Vital Signs Last 24 Hrs  T(C): 36 (2023 05:47), Max: 37.3 (2023 20:20)  T(F): 96.8 (2023 05:47), Max: 99.1 (2023 20:20)  HR: 84 (2023 11:57) (81 - 128)  BP: 128/81 (2023 11:57) (117/71 - 128/83)  BP(mean): --  RR: 18 (2023 11:57) (18 - 18)  SpO2: 97% (2023 11:57) (93% - 98%)    Parameters below as of 2023 11:57  Patient On (Oxygen Delivery Method): room air      CAPILLARY BLOOD GLUCOSE        I&O's Summary    2023 07:01  -  2023 07:00  --------------------------------------------------------  IN: 600 mL / OUT: 300 mL / NET: 300 mL          PHYSICAL EXAM  GENERAL: NAD,   HEAD:  Atraumatic, normocephalic  MOUTH: mucous membranes dry  CHEST/LUNG: Poor inspiratory effort; no wheezes  HEART: +S1+S2  ABDOMEN: Soft, nontender, nondistended; bowel sounds present  EXTREMITIES:  2+ peripheral pulses; no clubbing, cyanosis, or edema  PSYCH: AAOx0-1 (opens eyes to name, smiles)      LABS:                        12.5   5.19  )-----------( 142      ( 2023 08:43 )             41.0     01-    145  |  106  |  27<H>  ----------------------------<  89  3.7   |  26  |  0.71    Ca    9.0      2023 08:43  Phos  3.3       Mg     2.1     -            Urinalysis Basic - ( 2023 19:09 )    Color: Orange / Appearance: Slightly Turbid / S.022 / pH: x  Gluc: x / Ketone: Moderate  / Bili: Negative / Urobili: 3 mg/dL   Blood: x / Protein: 30 mg/dL / Nitrite: Negative   Leuk Esterase: Negative / RBC: >50 /hpf / WBC 11 /HPF   Sq Epi: x / Non Sq Epi: 2 /hpf / Bacteria: Occasional        Culture - Urine (collected 15 Theron 2023 01:49)  Source: Clean Catch Clean Catch (Midstream)  Final Report (2023 09:58):    No growth    Culture - Blood (collected 2023 21:20)  Source: .Blood Blood-Peripheral  Preliminary Report (2023 07:01):    No growth to date.    Culture - Blood (collected 2023 21:14)  Source: .Blood Blood-Peripheral  Preliminary Report (2023 07:01):    No growth to date.        RADIOLOGY & ADDITIONAL TESTS:    Imaging Personally Reviewed:  Consultant(s) Notes Reviewed:    Care Discussed with Consultants/Other Providers:

## 2023-01-17 NOTE — SWALLOW BEDSIDE ASSESSMENT ADULT - ASR SWALLOW RECOMMEND DIAG
Pt is not appropriate for objective testing at this time; consider testing when Pt is medically optimized

## 2023-01-17 NOTE — SWALLOW BEDSIDE ASSESSMENT ADULT - MUCOSAL QUALITY
+xerostomia  +dried secretions throughout oral cavity; SLP provided oral care prior to PO trials Ear Star Wedge Flap Text: The defect edges were debeveled with a #15 blade scalpel.  Given the location of the defect and the proximity to free margins (helical rim) an ear star wedge flap was deemed most appropriate.  Using a sterile surgical marker, the appropriate flap was drawn incorporating the defect and placing the expected incisions between the helical rim and antihelix where possible.  The area thus outlined was incised through and through with a #15 scalpel blade.

## 2023-01-17 NOTE — DIETITIAN INITIAL EVALUATION ADULT - NSFNSNUTRCHEWSWALLOWFT_GEN_A_CORE
Pt seen by SLP 1/17, with recommendation for NPO w/ alternative means of nutrition/hydration/medication. Palliative team consulted for GOC.

## 2023-01-17 NOTE — PROGRESS NOTE ADULT - PROBLEM SELECTOR PLAN 3
Presented with elevated BNP, trace pleural effusion and elevated troponin  D/C'd diuretics as patient now euvolemic and NPO

## 2023-01-17 NOTE — DIETITIAN INITIAL EVALUATION ADULT - PERTINENT LABORATORY DATA
01-17    145  |  106  |  27<H>  ----------------------------<  89  3.7   |  26  |  0.71    Ca    9.0      17 Jan 2023 08:43  Phos  3.3     01-16  Mg     2.1     01-16    A1C with Estimated Average Glucose Result: 6.4 % (12-14-22 @ 13:25)  A1C with Estimated Average Glucose Result: 6.2 % (08-07-22 @ 12:22)  A1C with Estimated Average Glucose Result: 6.4 % (05-04-22 @ 11:27)

## 2023-01-17 NOTE — PROGRESS NOTE ADULT - PROBLEM SELECTOR PLAN 5
Blood tinged stool reported by RN however guaiac negative  No further intervention at this time per GI

## 2023-01-17 NOTE — DIETITIAN INITIAL EVALUATION ADULT - NSFNSGIIOFT_GEN_A_CORE
No nausea, vomiting, constipation, diarrhea noted per chart. Reports of blood clots per rectum, seen by GI, low concern for GI bleed. Last BM 01/16 per chart. Pt not currently on bowel regimen.

## 2023-01-17 NOTE — DIETITIAN INITIAL EVALUATION ADULT - PHYSCIAL ASSESSMENT
Visual nutrition-focused physical examination conducted as pt noted as confused, unable to provide proper verbal consent at this time. Visual findings noted.

## 2023-01-17 NOTE — DIETITIAN INITIAL EVALUATION ADULT - NSFNSADHERENCEPTAFT_GEN_A_CORE
Hx of DM2 noted per chart. No DM medications noted per chart. A1c 6.4% indicates good glycemic control vs inadequate PO intake.

## 2023-01-17 NOTE — SWALLOW BEDSIDE ASSESSMENT ADULT - NS ASR SWALLOW FINDINGS DISCUS
RN Mitali, JESE Dixon, and called daughter Evangelina via telephone (voicemail nondescript message left)/Nursing/Patient

## 2023-01-17 NOTE — DIETITIAN INITIAL EVALUATION ADULT - OTHER CALCULATIONS
Fluid needs deferred to team. Energy, protein needs based on dosing wt: 54.5 kg (01-16) with consideration for HF, advanced age, and Stage 1 pressure injuries.

## 2023-01-17 NOTE — DIETITIAN INITIAL EVALUATION ADULT - ORAL INTAKE PTA/DIET HISTORY
Per chart, pt with 3 days of poor PO intake PTA; from [facility]. Per chart, "Collateral from [facility] showed that patient was refusing to take food"  NKFA per chart/per daughter on previous RD note 08/23/22.

## 2023-01-17 NOTE — PROGRESS NOTE ADULT - PROBLEM SELECTOR PLAN 2
Symptomatic management w/ supplemental O2 as needed  Isolation precautions  Blood and urine cxs NGTD

## 2023-01-17 NOTE — SWALLOW BEDSIDE ASSESSMENT ADULT - SLP PERTINENT HISTORY OF CURRENT PROBLEM
89 yo M w/ PMHx of HFpEF s/p AICD and TAVR, chronic A fib, CAD, HTN, HLD, T2DM, and squamous cell carcinoma BIBEMS from assisted living for not eating/drinking x3 days. In the ED pt was noted to have an elevated troponin, lactate and BNP of 13K, and CXR c/f trace L pleff and RSV positive. Diuresis initiated for chronic HFpEF, however d/c'd due to worsening hypernatremia. GI consulted for blood clots per rectum and report low concern for GIB-no further GI workup indicated. Pt tachycardic overnight requiring lopressor.

## 2023-01-17 NOTE — DIETITIAN INITIAL EVALUATION ADULT - SIGNS/SYMPTOMS
pt with hx of HF, and several Stage 1 pressure injuries </=50% estimated energy needs >/= 5 days, moderate muscle loss, 9% wt loss x 5 months

## 2023-01-17 NOTE — DIETITIAN INITIAL EVALUATION ADULT - NSFNSPHYEXAMSKINFT_GEN_A_CORE
Pressure Injury 1: Left:,hip, Stage I  Pressure Injury 2: Right:,heel, Stage I  Pressure Injury 3: Left:,heel, Stage I  Pressure Injury 4: sacrum, Stage I

## 2023-01-17 NOTE — DIETITIAN INITIAL EVALUATION ADULT - REASON INDICATOR FOR ASSESSMENT
RD consult for Significant Decrease of Oral Intake >3 Days PTA & Decompensated heart failure, salt and fluid restriction.  Source: previous RD notes, medical record. Pt noted as confused at this time, unable to participate in interview. Chart reviewed, events noted.

## 2023-01-17 NOTE — SWALLOW BEDSIDE ASSESSMENT ADULT - COMMENTS
IMAGIN/14 CXR: IMPRESSION: Small left and trace right pleural effusions.    ***Pt is previously known to this service and was last seen 2021 w/ recommendations for regular/thin. Per chart review, Pt failed dysphagia screen, however, no screen noted in flowsheets.

## 2023-01-17 NOTE — DIETITIAN INITIAL EVALUATION ADULT - PERTINENT MEDS FT
MEDICATIONS  (STANDING):  aspirin enteric coated 81 milliGRAM(s) Oral daily  atorvastatin 10 milliGRAM(s) Oral at bedtime  donepezil 5 milliGRAM(s) Oral at bedtime  enoxaparin Injectable 40 milliGRAM(s) SubCutaneous every 24 hours  finasteride 5 milliGRAM(s) Oral daily  metoprolol tartrate Injectable 5 milliGRAM(s) IV Push every 6 hours  mirtazapine 7.5 milliGRAM(s) Oral at bedtime  multivitamin 1 Tablet(s) Oral daily  pantoprazole    Tablet 40 milliGRAM(s) Oral before breakfast  QUEtiapine 25 milliGRAM(s) Oral at bedtime  sodium chloride 0.45%. 500 milliLiter(s) (50 mL/Hr) IV Continuous <Continuous>  sodium chloride 0.9%. 1000 milliLiter(s) (50 mL/Hr) IV Continuous <Continuous>  thiamine 100 milliGRAM(s) Oral daily    MEDICATIONS  (PRN):  acetaminophen     Tablet .. 650 milliGRAM(s) Oral every 6 hours PRN Temp greater or equal to 38C (100.4F), Mild Pain (1 - 3)

## 2023-01-17 NOTE — DIETITIAN INITIAL EVALUATION ADULT - EDUCATION DIETARY MODIFICATIONS
Diet education not appropriate at this time with consideration for advanced age, and pt noted as confused, and NPO at this time. RD to provide education upon request/as appropriate./(0) unable to meet; needs instruction

## 2023-01-18 NOTE — PROGRESS NOTE ADULT - ASSESSMENT
89yo M w/ PMH of HTN, HLD, DM2, CAD, HFpEF s/p AICD, AS s/p TAVR, chronic Afib not on AC, and squamous cell Ca pw poor PO intake and increasing lethargy at [facility] assisted living facility likely i/s/o RSV infection w/ component of AdHF
89M hx HTN, HLD, DM type 2, CAD, HFpEF s/p AICD, AS s/p TAVR, chronic Afib not on AC, and squamous cell Ca p/w poor PO intake and increasing lethargy found to have RSV infection w/ component of AdHF

## 2023-01-18 NOTE — PROGRESS NOTE ADULT - SUBJECTIVE AND OBJECTIVE BOX
Cardiovascular Disease Progress Note  Date of service: 23 @ 14:01  Covering for Dr. Vazquez  Overnight events: No acute events overnight.  Pt is in no distress. Saturating well on RA. ROS unable to be obtained 2/2 dementia      Objective Findings:  T(C): 36.7 (23 @ 11:03), Max: 36.7 (23 @ 11:03)  HR: 83 (23 @ 11:03) (77 - 88)  BP: 115/70 (23 @ 11:03) (102/59 - 120/73)  RR: 18 (23 @ 11:03) (18 - 19)  SpO2: 97% (23 @ 11:03) (97% - 98%)  Wt(kg): --  Daily     Daily Weight in k.3 (2023 06:51)      Physical Exam:  Gen: NAD; Patient resting comfortably  HEENT: EOMI, Normocephalic/ atraumatic  CV: RRR, normal S1 + S2, no m/r/g  Lungs:  Normal respiratory effort; clear to auscultation bilaterally  Abd: soft, non-tender; bowel sounds present  Ext: No edema; warm and well perfused    Telemetry: V paced with Afib    Laboratory Data:                        12.3   4.36  )-----------( 144      ( 2023 08:57 )             39.6         148<H>  |  109<H>  |  26<H>  ----------------------------<  75  3.4<L>   |  22  |  0.68    Ca    8.5      2023 08:58  Mg     2.0                     Inpatient Medications:  MEDICATIONS  (STANDING):  aspirin enteric coated 81 milliGRAM(s) Oral daily  atorvastatin 10 milliGRAM(s) Oral at bedtime  donepezil 5 milliGRAM(s) Oral at bedtime  enoxaparin Injectable 40 milliGRAM(s) SubCutaneous every 24 hours  finasteride 5 milliGRAM(s) Oral daily  metoprolol tartrate Injectable 5 milliGRAM(s) IV Push every 6 hours  mirtazapine 7.5 milliGRAM(s) Oral at bedtime  multivitamin 1 Tablet(s) Oral daily  pantoprazole    Tablet 40 milliGRAM(s) Oral before breakfast  QUEtiapine 25 milliGRAM(s) Oral at bedtime  sodium chloride 0.45% with potassium chloride 20 mEq/L 1000 milliLiter(s) (50 mL/Hr) IV Continuous <Continuous>  thiamine 100 milliGRAM(s) Oral daily      Assessment:  89yo M w/ PMH of HTN, HLD, DM2, CAD, HFpEF s/p AICD, AS s/p TAVR, chronic Afib not on AC, and squamous cell Ca pw poor PO intake and increasing lethargy at [facility] living facility likely i/s/o RSV infection w/ component of AdHF.       Problem/Plan -1  Problem: Chronic HFpEF   Plan:   - TTE on 22 shows no change from previous in 2022. Preserved EF, no changes in wall motion   - CXR shows small left and trace right pleural effusions  - proBNP up at 11506 from 6989 in   - Strict I&Os, daily weights  - Monitor lytes closely and replete PRN  - s/p IV diuresis now appears euvolemic. Seen lying flat in bed, no hypoxia, lungs clear, minimal JVD  - Cont to monitor volume status daily. Will hold diuresis for now.    Problem/Plan -2  Problem: AF  Plan:  - s/p Watchman, not on AC  - c/w Metoprolol 5mg IVP q6hrs with hold parameters    Problem/Plan -3  Problem: HTN  Plan:  - c/w Metoprolol 5mg IVP q6hrs with hold parameters    Problem/Plan -4  Problem: CAD  Plan:  - EKG with no ischemic changes noted  - c/w ASA        #ACP (advance care planning)-  Advanced care planning was discussed. Will continue with current cardiac management.   Risks, benefits and alternatives of medical treatment and procedures were addressed 30 additional minutes spent addressing advance care plans.    Over 25 minutes spent on total encounter; more than 50% of the visit was spent counseling and/or coordinating care by the attending physician.      Bal Parada DO [facility]  Cardiovascular Disease  [phone]

## 2023-01-18 NOTE — CONSULT NOTE ADULT - SUBJECTIVE AND OBJECTIVE BOX
HPI:  Pt is a 89yo M w/ PMH of HTN, HLD, DM2, CAD, HFpEF s/p AICD, AS s/p TAVR, chronic Afib not on AC, and squamous cell Ca pw poor PO intake and increasing lethargy at [facility] assisted living facility.     Pt unable to provide hx as he is A&Ox0 on encounter. History provided through chart review and daughter (Evangelina).    Mr. Julien has been refusing PO intake over the past three days with increased confusion, weakness and lethargy. Otherwise no reported F/C, falls, N/V or cough. Of note, RSV, flu and COVID have been prevalent at [facility] where the patient resides.     In the ED pt was noted to have an elevated troponin, lactate and BNP of 13K and CXR c/f trace L pleff and RSV positive. He was administered Lasix 20mg IVP and cultures sent.   (15 Theron 2023 04:45)    PERTINENT PM/SXH:   HTN (hypertension)    HLD (hyperlipidemia)    DM (diabetes mellitus)    BPH (benign prostatic hypertrophy)    Insomnia    RBBB    Chronic atrial fibrillation    Dementia    Unsteady gait      S/P shoulder surgery    S/P knee surgery      FAMILY HISTORY:  Family history of diabetes mellitus      Family Hx substance abuse [ ]yes [ ]no  ITEMS NOT CHECKED ARE NOT PRESENT    SOCIAL HISTORY:   Significant other/partner[ ]  Children[ x]  Religious/Spirituality:  Substance hx:  [ ]   Tobacco hx:  [ ]   Alcohol hx: [ ]   Home Opioid hx:  [ ] I-Stop Reference No:x  Living Situation: [ ]Home  [ ]Long term care  [ ]Rehab [ x]Other  [facility]    ADVANCE DIRECTIVES:    DNR/MOLST  [x ]  Living Will  [ ]   DECISION MAKER(s):  [ x] Health Care Proxy(s)  [ ] Surrogate(s)  [ ] Guardian           Name(s): Phone Number(s):  Simone Wily and Alexia Julien   BASELINE (I)ADL(s) (prior to admission):  Patrick: [ ]Total  [ ] Moderate [ x]Dependent    Allergies    No Known Allergies    Intolerances    MEDICATIONS  (STANDING):  aspirin enteric coated 81 milliGRAM(s) Oral daily  atorvastatin 10 milliGRAM(s) Oral at bedtime  donepezil 5 milliGRAM(s) Oral at bedtime  enoxaparin Injectable 40 milliGRAM(s) SubCutaneous every 24 hours  finasteride 5 milliGRAM(s) Oral daily  metoprolol tartrate Injectable 5 milliGRAM(s) IV Push every 6 hours  mirtazapine 7.5 milliGRAM(s) Oral at bedtime  multivitamin 1 Tablet(s) Oral daily  pantoprazole    Tablet 40 milliGRAM(s) Oral before breakfast  QUEtiapine 25 milliGRAM(s) Oral at bedtime  sodium chloride 0.45% with potassium chloride 20 mEq/L 1000 milliLiter(s) (50 mL/Hr) IV Continuous <Continuous>  thiamine 100 milliGRAM(s) Oral daily    MEDICATIONS  (PRN):  acetaminophen     Tablet .. 650 milliGRAM(s) Oral every 6 hours PRN Temp greater or equal to 38C (100.4F), Mild Pain (1 - 3)    PRESENT SYMPTOMS: [x ]Unable to self-report  [ ] CPOT [ ] PAINADs [ ] RDOS  Source if other than patient:  [ ]Family   [ ]Team     Pain: [ ]yes [ ]no  QOL impact -   Location -                    Aggravating factors -  Quality -  Radiation -  Timing-  Severity (0-10 scale):  Minimal acceptable level (0-10 scale):     CPOT:    https://www.[facility].org/getattachment/upk75l07-7y9l-6p0x-4g0p-7731g2988k4j/Critical-Care-Pain-Observation-Tool-(CPOT)    PAIN AD Score: 0  http://geriatrictoolkit.[place].[email or web address]/painad.pdf (press ctrl +  left click to view)    Dyspnea:                           [ ]Mild [ ]Moderate [ ]Severe      RDOS:  0 to 2  minimal or no respiratory distress   3  mild distress  4 to 6 moderate distress  5  >7 severe distress  https://homecareinformation.net/handouts/hen/Respiratory_Distress_Observation_Scale.pdf (Ctrl +  left click to view)     Anxiety:                             [ ]Mild [ x]Moderate [ ]Severe  Fatigue:                             [ ]Mild [x ]Moderate [ ]Severe  Nausea:                             [ ]Mild [ ]Moderate [ ]Severe  Loss of appetite:              [ ]Mild [ ]Moderate [ x]Severe  Constipation:                    [ ]Mild [ ]Moderate [ ]Severe    PCSSQ [Palliative Care Spiritual Screening Question]   Severity (0-10):  3  Score of 4 or > indicate consideration of Chaplaincy referral.  Chaplaincy Referral: [ ] yes [ ] refused [ ] following  deferred xx    Caregiver [place]? : [ ] yes [ x] no  Social work referral [ ] Patient & Family Centered Care Referral [ ]     Anticipatory Grief Present?: [ ] yes [ x] no  Social work referral [ ]  Patient & Family Centered Care Referral [ ]       Other Symptoms:  [x ]All other review of systems negative     Palliative Performance Status Version 2:      30   %    http://[email or web address].org/files/news/palliative_performance_scale_[email or web address].pdf  PHYSICAL EXAM:  Vital Signs Last 24 Hrs  T(C): 36.7 (2023 11:03), Max: 36.7 (2023 11:03)  T(F): 98.1 (2023 11:03), Max: 98.1 (2023 11:03)  HR: 83 (2023 11:03) (77 - 88)  BP: 115/70 (2023 11:03) (102/59 - 128/81)  BP(mean): --  RR: 18 (2023 11:03) (18 - 19)  SpO2: 97% (2023 11:03) (97% - 98%)    Parameters below as of 2023 11:03  Patient On (Oxygen Delivery Method): nasal cannula  O2 Flow (L/min): 2   I&O's Summary    2023 07:01  -  2023 11:11  --------------------------------------------------------  IN: 0 mL / OUT: 0 mL / NET: 0 mL      GENERAL: [ ]Cachexia    [ ]Alert  [ ]Oriented x   [ x]Lethargic  [ ]Unarousable  [ ]Verbal  [ ]Non-Verbal  Behavioral:   [ ] Anxiety  [x ] Delirium [ ] Agitation [ ] Other  HEENT:  [ ]Normal   [ ]Dry mouth   [ ]ET Tube/Trach  [ ]Oral lesions  PULMONARY:   [ ]Clear [x ]Tachypnea  [ ]Audible excessive secretions   [ ]Rhonchi        [ ]Right [ ]Left [ ]Bilateral  [x ]Crackles        [ ]Right [ ]Left [ ]Bilateral  [ ]Wheezing     [ ]Right [ ]Left [ ]Bilateral  [ ]Diminished breath sounds [ ]right [ ]left [ ]bilateral  CARDIOVASCULAR:    [ ]Regular [x ]Irregular [ ]Tachy  [ ]Zain [ ]Murmur [ ]Other  GASTROINTESTINAL:  [ x]Soft  [ ]Distended   [ x]+BS  [ ]Non tender [ ]Tender  [ ]Other [ ]PEG [ ]OGT/ NGT  Last BM:  GENITOURINARY:  [ ]Normal x[ ] Incontinent   [ ]Oliguria/Anuria   [ ]Casey  MUSCULOSKELETAL:   [ ]Normal   [ x]Weakness  [ x]Bexd/Wheelchair bound [ ]Edema  NEUROLOGIC:   [ ]No focal deficits  [ ]Cognitive impairment  [ ]Dysphagia [ ]Dysarthria [ ]Paresis [ ]Other   SKIN:   [ ]Normal  [ ]Rash  [ ]Other  [ ]Pressure ulcer(s)       Present on admission [ ]y [ ]n    CRITICAL CARE:  [ ] Shock Present  [ ]Septic [ ]Cardiogenic [ ]Neurologic [ ]Hypovolemic  [ ]  Vasopressors [ ]  Inotropes   [ ]Respiratory failure present [ ]Mechanical ventilation [ ]Non-invasive ventilatory support [ ]High flow    [ ]Acute  [ ]Chronic [ ]Hypoxic  [ ]Hypercarbic [ ]Other  [ ]Other organ failure     LABS:                        12.3   4.36  )-----------( 144      ( 2023 08:57 )             39.6   01-18    148<H>  |  109<H>  |  26<H>  ----------------------------<  75  3.4<L>   |  22  |  0.68    Ca    8.5      2023 08:58  Mg     2.0     01-18        Urinalysis Basic - ( 2023 19:09 )    Color: Orange / Appearance: Slightly Turbid / S.022 / pH: x  Gluc: x / Ketone: Moderate  / Bili: Negative / Urobili: 3 mg/dL   Blood: x / Protein: 30 mg/dL / Nitrite: Negative   Leuk Esterase: Negative / RBC: >50 /hpf / WBC 11 /HPF   Sq Epi: x / Non Sq Epi: 2 /hpf / Bacteria: Occasional      RADIOLOGY & ADDITIONAL STUDIES:    < from: CT Chest w/ IV Cont (22 @ 23:01) >    ACC: [id number] EXAM:  CT ABDOMEN AND PELVIS IC                        ACC: [id number] EXAM:  CT CHEST IC                          *** ADDENDUM # 1 ***    Addendum: Apart from a pacemaker lead in the right ventricle, sclerotic   focus in the left iliacbone likely representing a bone island, and   degenerative changes of the spine with scoliosis and degenerative changes   of the sacroiliac joints and hips, I agree with the initial report   provided by Dr. Collins.    --- End of Report ---    *** END OF ADDENDUM # 1 ***      PROCEDURE DATE:  2022          INTERPRETATION:  CLINICAL INFORMATION: Status post unwitnessed fall.    COMPARISON: Chest CT 2021. CT abdomen/pelvis 2022.    CONTRAST/COMPLICATIONS:  IV Contrast: Omnipaque 350  90 cc administered   10 cc discarded  Oral Contrast: NONE  Complications: None reported at time of study completion    PROCEDURE:  CT of the Chest, Abdomen and Pelvis was performed.  Imaging was performed through the chest in the arterial phase followed by   imaging of the abdomen and pelvis in the portal venous phase.  Sagittal and coronal reformats were performed.    FINDINGS:  CHEST:  LUNGS AND LARGE AIRWAYS: Patent central airways. No pulmonary nodules.  PLEURA: Bilateral moderate right pleuraleffusions with compressive   atelectasis are without significant change as compared to 2022.  VESSELS: Atherosclerotic changes of the aorta and coronary arteries.  HEART: Cardiomegaly. No pericardial effusion. Status post TAVR. Mitral   annular calcifications. Watchman device is again noted.  MEDIASTINUM AND YONY: No lymphadenopathy.  CHEST WALL AND LOWER NECK: Right chest wall pacemaker device. Healing   right anterior fourth and possibly fifth rib fractures. A 1.8 x 1.1 cm   right supraclavicular lymph node, previously 2.1 x 1.1 cm at 2021.    ABDOMEN AND PELVIS:  LIVER: Within normal limits.  BILE DUCTS: Normal caliber.  GALLBLADDER: Within normal limits.  SPLEEN: Within normal limits.  PANCREAS: Within normal limits.  ADRENALS: Stable left adrenal gland thickening. Right adrenal gland is   normal.  KIDNEYS/URETERS: Symmetrically enhancing parenchyma. No hydronephrosis.    BLADDER: Posterior bladder diverticulum. Bladder wall thickening   disproportionate to underdistention. Previously demonstrated intraluminal   stones are not well visualized on this exam.  REPRODUCTIVE ORGANS: Prostate is enlarged.    BOWEL: Small hiatal hernia. Fecal distended rectal vault. Colonic   diverticulosis without diverticulitis. No bowel obstruction. Appendix is   normal.  PERITONEUM: No ascites.  VESSELS: Atherosclerotic changes.  RETROPERITONEUM/LYMPH NODES: No enlarged lymph nodes measuring greater   than 10 mm in short axis.  ABDOMINAL WALL: Tiny fat-containing umbilical hernia.  BONES: Degenerative changes. Severe S-shaped spinal curvature.    IMPRESSION:    Bilateral moderate pleural effusions with compressive atelectasis.    Bladder wall thickening disproportionate to underdistention which may   reflect cystitis. Correlate withurinalysis.    Healing right anterior fourth and possibly fifth rib fractures.    --- End of Report ---    ***Please see the addendum at the top of this report. It may contain   additional important information or changes.****       RASHIDA ROSS MD; Resident Radiologist  This document has been electronically signed.   RACIEL COLLINS MD; Attending Radiologist  This document has been electronically signed. Dec 12 2022 11:38PM  1st Addendum: ELENA DUNCAN DO; Attending Radiologist  The first addendum was electronically signed on: 2023 12:56PM.    < end of copied text >  < from: CT Abdomen and Pelvis w/ IV Cont (22 @ 23:01) >    ACC: [id number] EXAM:  CT ABDOMEN AND PELVIS IC                        ACC: [id number] EXAM:  CT CHEST IC                          *** ADDENDUM # 1 ***    Addendum: Apart from a pacemaker lead in the right ventricle, sclerotic   focus in the left iliacbone likely representing a bone island, and   degenerative changes of the spine with scoliosis and degenerative changes   of the sacroiliac joints and hips, I agree with the initial report   provided by Dr. Collins.    --- End of Report ---    *** END OF ADDENDUM # 1 ***      PROCEDURE DATE:  2022          INTERPRETATION:  CLINICAL INFORMATION: Status post unwitnessed fall.    COMPARISON: Chest CT 2021. CT abdomen/pelvis 2022.    CONTRAST/COMPLICATIONS:  IV Contrast: Omnipaque 350  90 cc administered   10 cc discarded  Oral Contrast: NONE  Complications: None reported at time of study completion    PROCEDURE:  CT of the Chest, Abdomen and Pelvis was performed.  Imaging was performed through the chest in the arterial phase followed by   imaging of the abdomen and pelvis in the portal venous phase.  Sagittal and coronal reformats were performed.    FINDINGS:  CHEST:  LUNGS AND LARGE AIRWAYS: Patent central airways. No pulmonary nodules.  PLEURA: Bilateral moderate right pleuraleffusions with compressive   atelectasis are without significant change as compared to 2022.  VESSELS: Atherosclerotic changes of the aorta and coronary arteries.  HEART: Cardiomegaly. No pericardial effusion. Status post TAVR. Mitral   annular calcifications. Watchman device is again noted.  MEDIASTINUM AND YONY: No lymphadenopathy.  CHEST WALL AND LOWER NECK: Right chest wall pacemaker device. Healing   right anterior fourth and possibly fifth rib fractures. A 1.8 x 1.1 cm   right supraclavicular lymph node, previously 2.1 x 1.1 cm at 2021.    ABDOMEN AND PELVIS:  LIVER: Within normal limits.  BILE DUCTS: Normal caliber.  GALLBLADDER: Within normal limits.  SPLEEN: Within normal limits.  PANCREAS: Within normal limits.  ADRENALS: Stable left adrenal gland thickening. Right adrenal gland is   normal.  KIDNEYS/URETERS: Symmetrically enhancing parenchyma. No hydronephrosis.    BLADDER: Posterior bladder diverticulum. Bladder wall thickening   disproportionate to underdistention. Previously demonstrated intraluminal   stones are not well visualized on this exam.  REPRODUCTIVE ORGANS: Prostate is enlarged.    BOWEL: Small hiatal hernia. Fecal distended rectal vault. Colonic   diverticulosis without diverticulitis. No bowel obstruction. Appendix is   normal.  PERITONEUM: No ascites.  VESSELS: Atherosclerotic changes.  RETROPERITONEUM/LYMPH NODES: No enlarged lymph nodes measuring greater   than 10 mm in short axis.  ABDOMINAL WALL: Tiny fat-containing umbilical hernia.  BONES: Degenerative changes. Severe S-shaped spinal curvature.    IMPRESSION:    Bilateral moderate pleural effusions with compressive atelectasis.    Bladder wall thickening disproportionate to underdistention which may   reflect cystitis. Correlate withurinalysis.    Healing right anterior fourth and possibly fifth rib fractures.    --- End of Report ---    ***Please see the addendum at the top of this report. It may contain   additional important information or changes.****       RASHIDA ROSS MD; Resident Radiologist  This document has been electronically signed.   RACIEL COLLINS MD; Attending Radiologist  This document has been electronically signed. Dec 12 2022 11:38PM  1st Addendum: ELENA DUNCAN DO; Attending Radiologist  The first addendum was electronically signed on: 2023 12:56PM.    < end of copied text >      PROTEIN CALORIE MALNUTRITION PRESENT: [ ]mild [x ]moderate [ ]severe [ ]underweight [ ]morbid obesity  https://www.andeal.org/vault/0740/web/files/ONC/Table_Clinical%20Characteristics%20to%20Document%20Malnutrition-White%20JV%20et%20al%2020.pdf    Height (cm): 167.6 (23 @ 19:53), 167.6 (22 @ 20:17), 167.6 (22 @ 03:48)  Weight (kg): 54.5 (23 @ 07:14), 75 (22 @ 20:17), 54.4 (22 @ 03:48)  BMI (kg/m2): 19.4 (23 @ 07:14), 26.7 (23 @ 19:53), 26.7 (22 @ 20:17)    [ ]PPSV2 < or = to 30% [ ]significant weight loss  [ x]poor nutritional intake  [ ]anasarca[ ]Artificial Nutrition      Other REFERRALS:  [ ]Hospice  [ ]Child Life  [ ]Social Work  [ x]Case management [ ]Holistic Therapy

## 2023-01-18 NOTE — CONSULT NOTE ADULT - PROBLEM SELECTOR RECOMMENDATION 9
PPS 20% needs assistance with all basic needs  Per daughter, Alexia Wily, patient is w/c bound and sleeps most of the day   Quality of life severely compromised

## 2023-01-18 NOTE — PROGRESS NOTE ADULT - PROBLEM SELECTOR PLAN 9
Not on home regimen  Given age deferring FS monitoring

## 2023-01-18 NOTE — PROGRESS NOTE ADULT - PROBLEM SELECTOR PLAN 10
DVT ppx: Lovenox  Diet: NPO  Dispo: Pending clinical course  GOC: DNR/DNI MOLST in chart  Palliative care eval requested
DVT ppx: Lovenox  Diet: NPO  Dispo: Pending clinical course  GOC: DNR/DNI MOLST in chart  Palliative care eval requested
DVT ppx: Lovenox  Diet: Failed dysphagia screen- awaiting S&S eval  Dispo: Pending clinical improvement  GOC: DNR/DNI MOLST in chart  Consider Palliative care eval pending clinical course

## 2023-01-18 NOTE — CONSULT NOTE ADULT - CONVERSATION DETAILS
Nutritional goals of care discussion initiated . Spoke with son Simone / HCP face to face for greater than 20 minutes with Dr. Joseph by phone.  Plan for now, continue to support patient with IV fluids and monitor/supplement  electrolytes accordingly.  Will reassess patient on Monday.  Additionally reviewed MOLST document, patient remains DNR/DNI, with no limitations on medical treatments.

## 2023-01-18 NOTE — CONSULT NOTE ADULT - PROBLEM SELECTOR RECOMMENDATION 5
Nutritional goals of care discussion initiated with Alexia/daughter/HCP  Simone Julien/son/HCP  : face to face meeting established for today.  Will f/u with results of discussion

## 2023-01-18 NOTE — CONSULT NOTE ADULT - ASSESSMENT
Pt is a 89yo M w/ PMH of HTN, HLD, DM2, CAD, HFpEF s/p AICD, AS s/p TAVR, chronic Afib not on AC, and squamous cell Ca pw poor PO intake and increasing lethargy at [facility] assisted living facility.     #Dementia  #CAD  #Blood clots. Brown stool on MICH. Blood tinged urine w/ clots on cot, blood on penile meatus. Low concern for GI bleed. Hgb stable    Recommendations  - trend CBC, keep active T&S, transfuse for Hgb<7  - recommend hematuria w/u  - no further GI work up indicated at this time    GI will sign off, reconsult as needed.    All recommendations are tentative until note is attested by attending.     Cassie Argueta, PGY5  Gastroenterology/Hepatology Fellow  Available on Microsoft Teams  [phone] (TuneIn Twitter Dashboard Short Range Pager)  [phone] (Long Range Pager)    After 5pm, please contact the on-call GI fellow. [phone]
  89 y/o Male , PMH as previously described, s/p AICD, AS s/p TAVR admitted from [facility] with poor po intake and increased lethargy.   Found to have RSV with AdHF exacerbation.    Palliative care called for goals of care.  KNown to this writer from previous admission (9/2021)

## 2023-01-18 NOTE — PROGRESS NOTE ADULT - PROBLEM SELECTOR PROBLEM 2
Respiratory syncytial virus (RSV) infection

## 2023-01-18 NOTE — PROGRESS NOTE ADULT - SUBJECTIVE AND OBJECTIVE BOX
Izaiah Joseph MD    Patient is a 89y old  Male who presents with a chief complaint of Heart failure    "89M hx HTN, HLD, DM type 2, CAD, HFpEF s/p AICD, AS s/p TAVR, chronic Afib not on AC, and squamous cell Ca p/w poor PO intake and increasing lethargy found to have RSV infection w/ component of AdHF" (2023 16:17)        SUBJECTIVE / OVERNIGHT EVENTS: No new events  TELEMETRY: AF/ 60-90's      MEDICATIONS  (STANDING):  aspirin enteric coated 81 milliGRAM(s) Oral daily  atorvastatin 10 milliGRAM(s) Oral at bedtime  donepezil 5 milliGRAM(s) Oral at bedtime  enoxaparin Injectable 40 milliGRAM(s) SubCutaneous every 24 hours  finasteride 5 milliGRAM(s) Oral daily  metoprolol tartrate Injectable 5 milliGRAM(s) IV Push every 6 hours  mirtazapine 7.5 milliGRAM(s) Oral at bedtime  multivitamin 1 Tablet(s) Oral daily  pantoprazole    Tablet 40 milliGRAM(s) Oral before breakfast  QUEtiapine 25 milliGRAM(s) Oral at bedtime  sodium chloride 0.45%. 500 milliLiter(s) (50 mL/Hr) IV Continuous <Continuous>  sodium chloride 0.9%. 1000 milliLiter(s) (50 mL/Hr) IV Continuous <Continuous>  thiamine 100 milliGRAM(s) Oral daily    MEDICATIONS  (PRN):  acetaminophen     Tablet .. 650 milliGRAM(s) Oral every 6 hours PRN Temp greater or equal to 38C (100.4F), Mild Pain (1 - 3)      Vital Signs Last 24 Hrs  T(C): 36.3 (2023 06:45), Max: 36.4 (2023 20:02)  T(F): 97.4 (2023 06:45), Max: 97.6 (2023 20:02)  HR: 77 (2023 07:51) (77 - 88)  BP: 102/59 (2023 07:51) (102/59 - 128/81)  BP(mean): --  RR: 18 (2023 06:45) (18 - 19)  SpO2: 97% (2023 06:45) (97% - 98%)    Parameters below as of 2023 06:45  Patient On (Oxygen Delivery Method): room air      CAPILLARY BLOOD GLUCOSE        I&O's Summary    2023 07:01  -  2023 10:38  --------------------------------------------------------  IN: 0 mL / OUT: 0 mL / NET: 0 mL          PHYSICAL EXAM  GENERAL: NAD,   HEAD:  Atraumatic, normocephalic  MOUTH: mucous membranes dry  CHEST/LUNG: Poor inspiratory effort; no wheezes  HEART: +S1+S2  ABDOMEN: Soft, nontender, nondistended; bowel sounds present  EXTREMITIES:  2+ peripheral pulses; no clubbing, cyanosis, or edema  PSYCH: AAOx0-1 responds to name, opens eyes and nods      LABS:                        12.3   4.36  )-----------( 144      ( 2023 08:57 )             39.6         148<H>  |  109<H>  |  26<H>  ----------------------------<  75  3.4<L>   |  22  |  0.68    Ca    8.5      2023 08:58  Mg     2.0                 Urinalysis Basic - ( 2023 19:09 )    Color: Orange / Appearance: Slightly Turbid / S.022 / pH: x  Gluc: x / Ketone: Moderate  / Bili: Negative / Urobili: 3 mg/dL   Blood: x / Protein: 30 mg/dL / Nitrite: Negative   Leuk Esterase: Negative / RBC: >50 /hpf / WBC 11 /HPF   Sq Epi: x / Non Sq Epi: 2 /hpf / Bacteria: Occasional          RADIOLOGY & ADDITIONAL TESTS:    Imaging Personally Reviewed:  Consultant(s) Notes Reviewed:    Care Discussed with Consultants/Other Providers:

## 2023-01-18 NOTE — PROGRESS NOTE ADULT - PROBLEM SELECTOR PROBLEM 3
Acute on chronic heart failure with preserved ejection fraction (HFpEF)

## 2023-01-18 NOTE — PROGRESS NOTE ADULT - PROBLEM SELECTOR PLAN 1
In setting of RSV infection  Failed bedside dysphagia screening  S&S eval- recommending NPO w/ alternative means of nutrition/hydration/medication
In setting of RSV infection  Failed bedside dysphagia screening  S&S eval- recommending NPO w/ alternative means of nutrition/hydration/medication
- Noted to be lethargic w/ poor PO intake, weakness and confusion w/ sick contacts and positive RSV on admission  - Likely i/s/o RSV infection  - Symptomatic management w/ supplemental O2 as needed, wean as tolerated for goal SpO2 >95%  - Isolation precautions  - f/u blood and urine cx
In setting of RSV infection  Failed bedside dysphagia screening- remains NPO

## 2023-01-18 NOTE — PROGRESS NOTE ADULT - PROBLEM/PLAN-5
Allergies:-  No Known Allergies      
DISPLAY PLAN FREE TEXT

## 2023-01-19 NOTE — DISCHARGE NOTE FOR THE EXPIRED PATIENT - HOSPITAL COURSE
89M hx HTN, HLD, DM type 2, CAD, HFpEF s/p AICD, AS s/p TAVR, chronic Afib not on AC, and squamous cell Ca p/w poor PO intake and increasing lethargy found to have RSV infection w/ component of AdHF    Problem/Plan - 1:  ·  Problem: Metabolic encephalopathy.   ·  Plan: In setting of RSV infection  Failed bedside dysphagia screening  S&S eval- recommending NPO w/ alternative means of nutrition/hydration/medication.     Problem/Plan - 2:  ·  Problem: Respiratory syncytial virus (RSV) infection.   ·  Plan: Symptomatic management w/ supplemental O2 as needed  Isolation precautions  Blood and urine cxs NGTD.     Problem/Plan - 3:  ·  Problem: Acute on chronic heart failure with preserved ejection fraction (HFpEF).   ·  Plan: Presented with elevated BNP, trace pleural effusion and elevated troponin  D/C'd diuretics as patient now euvolemic and NPO.     Problem/Plan - 4:  ·  Problem: Hypernatremia.   ·  Plan: Recurrent  Change IVF to 1/2 NS, continue given NPO status  Hypokalemia- replete.     Problem/Plan - 5:  ·  Problem: Blood in stool.   ·  Plan: Blood tinged stool reported by RN however guaiac negative  No further intervention at this time per GI.     Problem/Plan - 6:  ·  Problem: Chronic atrial fibrillation.   ·  Plan: Rate controlled on lopressor 5 mg IV q6  Not on AC.     Problem/Plan - 7:  ·  Problem: CAD (coronary artery disease).   ·  Plan: Resume ASA if tolerating oral meds.     Problem/Plan - 8:  ·  Problem: Dementia.   ·  Plan: Resume Donepezil if tolerating oral meds.     Problem/Plan - 9:  ·  Problem: Type 2 diabetes mellitus.   ·  Plan: Not on home regimen  Given age deferring FS monitoring.     Problem/Plan - 10:  ·  Problem: Prophylactic measure.   ·  Plan; DVT ppx: Lovenox  Diet: NPO  Dispo: Pending clinical course  GOC: DNR/DNI MOLST in chart  Palliative care eval requested.

## 2023-01-19 NOTE — CHART NOTE - NSCHARTNOTEFT_GEN_A_CORE
Patient seen and examined earlier this morning- was noted to have labored breathing and +JVD. Ordered Bumex 1 mg IVP x 1.  Was notified that when RN came to administer Bumex patient was no longer breathing. ACP was called and pronounced the patient. He was DNR/DNI. Family was at the bedside, ACP to complete death certificate.

## 2023-01-19 NOTE — PROGRESS NOTE ADULT - PROVIDER SPECIALTY LIST ADULT
Cardiology
Hospitalist
Cardiology
Internal Medicine

## 2023-01-19 NOTE — PROGRESS NOTE ADULT - SUBJECTIVE AND OBJECTIVE BOX
Cardiovascular Disease Progress Note  Date of service: 23 @ 07:37    Overnight events: No acute events overnight.  Pt is in no distress.   Otherwise review of systems negative    Objective Findings:  T(C): 36.4 (23 @ 04:51), Max: 36.7 (23 @ 11:03)  HR: 80 (23 @ 04:51) (74 - 86)  BP: 117/73 (23 @ 04:51) (102/59 - 132/76)  RR: 18 (23 @ 04:51) (18 - 18)  SpO2: 96% (23 @ 04:51) (95% - 97%)  Wt(kg): --  Daily     Daily Weight in k.8 (2023 04:51)      Physical Exam:  Gen: NAD; Patient resting comfortably  HEENT: EOMI, Normocephalic/ atraumatic  CV: RRR, normal S1 + S2, no m/r/g  Lungs:  Normal respiratory effort; clear to auscultation bilaterally  Abd: soft, non-tender; bowel sounds present  Ext: No edema; warm and well perfused    Telemetry: Afib    Laboratory Data:                        12.5   4.71  )-----------( 162      ( 2023 06:37 )             40.3         147<H>  |  111<H>  |  24<H>  ----------------------------<  69<L>  3.8   |  18<L>  |  0.63    Ca    9.1      2023 06:41  Mg     2.0                     Inpatient Medications:  MEDICATIONS  (STANDING):  aspirin enteric coated 81 milliGRAM(s) Oral daily  atorvastatin 10 milliGRAM(s) Oral at bedtime  donepezil 5 milliGRAM(s) Oral at bedtime  enoxaparin Injectable 40 milliGRAM(s) SubCutaneous every 24 hours  finasteride 5 milliGRAM(s) Oral daily  metoprolol tartrate Injectable 5 milliGRAM(s) IV Push every 6 hours  mirtazapine 7.5 milliGRAM(s) Oral at bedtime  multivitamin 1 Tablet(s) Oral daily  pantoprazole    Tablet 40 milliGRAM(s) Oral before breakfast  QUEtiapine 25 milliGRAM(s) Oral at bedtime  sodium chloride 0.45% with potassium chloride 20 mEq/L 1000 milliLiter(s) (50 mL/Hr) IV Continuous <Continuous>  thiamine 100 milliGRAM(s) Oral daily      Assessment:  89yo M w/ PMH of HTN, HLD, DM2, CAD, HFpEF s/p AICD, AS s/p TAVR, chronic Afib not on AC, and squamous cell Ca pw poor PO intake and increasing lethargy at [facility] living facility likely i/s/o RSV infection w/ component of AdHF.       Problem/Plan -1  Problem: Chronic HFpEF   Plan:   - TTE on 22 shows no change from previous in 2022. Preserved EF, no changes in wall motion   - CXR shows small left and trace right pleural effusions  - Strict I&Os, daily weights  - Monitor lytes closely and replete PRN  - s/p IV diuresis now appears euvolemic. Seen lying flat in bed, no hypoxia, lungs clear, minimal JVD  - Pt appear euvolemic on exam  - Cont to monitor volume status daily. Will hold diuresis for now.    Problem/Plan -2  Problem: AF  Plan:  - s/p Watchman, not on AC  - c/w Metoprolol 5mg IVP q6hrs with hold parameters    Problem/Plan -3  Problem: HTN  Plan:  - c/w Metoprolol 5mg IVP q6hrs with hold parameters    Problem/Plan -4  Problem: CAD  Plan:  - EKG with no ischemic changes noted  - c/w ASA          Plan of Care:          Over 25 minutes spent on total encounter; more than 50% of the visit was spent counseling and/or coordinating care by the attending physician.      Bal Parada,  Located within [facility]  Cardiovascular Disease  [phone]

## 2023-01-20 LAB
CULTURE RESULTS: SIGNIFICANT CHANGE UP
CULTURE RESULTS: SIGNIFICANT CHANGE UP
SPECIMEN SOURCE: SIGNIFICANT CHANGE UP
SPECIMEN SOURCE: SIGNIFICANT CHANGE UP

## 2023-05-15 NOTE — PATIENT PROFILE ADULT - NSASFALLWHENOCCURRED_GEN_A_NUR
Subjective:       Patient ID: Antonio Wise last six months

## 2023-06-28 NOTE — ED ADULT NURSE NOTE - SEPSIS REFERENCE DATA CRITERIA 2
Detail Level: Simple Plan: **Re-Check at next visit. See pic in attachments.** Abnormal Lactate: > 2

## 2023-07-16 NOTE — H&P ADULT - PROBLEM SELECTOR PLAN 10
DVT ppx: SCDs  Diet: Carb controlled  B12 1000mcg po daily  Tylenol PRN pain or fever  Thiamine supplement daily  Disposition: PT eval, back to [facility] when medically cleared
(4) excellent

## 2023-10-18 NOTE — PATIENT PROFILE ADULT - NSPROMUTPARTICIPCAREFT_GEN_A_NUR
Pre-Op Call IV Protocol     Have you ever had any difficulty with IV insertions in the past? Yes   If Yes, document difficult IV:  SB COMMENTS    Pre-Op Call Operative Patient Instructions     • Name/Date/Time person receiving instructions:      CONTACTS       PRE-OP CALL  • Please be aware there are 2 different locations.   o Pavilion: Parking is available in Parking Garage D on [address] [place] [address] & [facility].  We recommend entering from the Pedestrian walkway bridge located on the 2nd floor of Parking Garage D. The 2nd floor doors do not open until 5am. The Outpatient Pavilion main lobby entrance does not open until 6am.  is also available on the main entrance ground floor of the Outpatient Pavilion on [facility]. during the hours of 6am-2pm. Please check in with the  Desk right off the 2nd floor Pedestrian walkway entrance.  o Hospital:  Please arrive at Entrance F on [facility]. The doors at Entrance F do not open until 5am. Parking access is located across the street from Entrance F in Parking Lot E. If closer access is needed for drop off, your  can drop you off at the door by taking the ramp at the right at Entrance F. Please be aware that there is no parking at the drop off area. Check in with guest service at the  at Entrance F, and you will be given instructions from there to Hospital Surgery.  ? Two family members or friends who are over the age of 18 may accompany you. No children under the age of 18 are permitted for visiting.    ? When you go home, you will not be able to drive or take public transportation alone (ie.bus/taxi/uber). You will need an adult (age 18 or older) to take you home or travel with you.  ? If you have sedation/anesthesia, a responsible adult (age 18 or older) will need to be with you to get instructions for what you will need to do at home.  You will need a responsible adult to stay with you for 24 hours after the surgery.  ? If you do not  have sedation/anesthesia, we must be able to contact someone by phone to have them pick you up.  We will need to have their contact information when you check in.  Your surgery will be cancelled if these arrangements have not been made.  (list name of person accompanying patient home if known) - DISCHARGE PLANNING  ? Children under 18 years old MUST have a parent/guardian with them at all times for the duration of their surgery.  • If you become ill prior to surgery (ie. fever, vomiting), please notify your surgeon immediately.  Please bring and adhere to the following on the Day of Surgery:  ? Insurance Card and Referral (if required), 's license or photo id  ? Your medication list  ? Advance directives (living thacker, healthcare power of )  ? All information on your pacemaker of AICD, if appropriate  ? Any other forms, imaging studies (x-rays/MRI/CT) the doctor may have given to you  ? Any medical devices (ie, crutches, brace, sling)  ? If you use a Cpap machine and are staying overnight please bring it with you and know your settings.  ? If you use a rescue inhaler for asthma, please bring it in to the hospital.  ? Keep personal items to a minimum. Suggested items to bring include toothbrush and toiletries if spending the night after surgery.  ? Loose fitting clothing and walking shoes if applicable  ? For comfort measures, you may bring headphones/music device/magazines that can be given to family when you go into the operating room.  ? ON THE DAY OF SURGERY: Do not wear contact lenses, make-up, nail polish, jewelry, lotion, or deodorant.  ? Leave all valuables at home except what you will need or are instructed to bring.  ? For your convenience, [facility] is available to fill medications. Please bring a form of payment accepted at [facility] locations.  ? Additional reminders for Pediatric patients:   o Your child can wear their pajamas and bring a favorite toy/blanket/game with them.  o No  jewelry please.   o Formula for feeding after surgery or favorite sippy cup.   o Please bring diapers or a change of underpants for young children.    NPO/Eating Drinking restrictions: Please note: If these guidelines are not followed, your procedure will be delayed and possibly cancelled.     Arrival time:    PRE-OP CALL QUESTIONS  Note to RN: For cases starting on or after 3pm consult the anesthesia department for NPO status.   NPO/Eating Drinking Restrictions prior to arrival time: for Adults/Pediatrics     Non Diabetics: Acceptable clear Liquids for adult and pediatric patients 1 hour prior to arrival time:   • Water  • Clear Electrolyte-replenishing drinks such as Pedialyte, Gatorade, Powerade, or Propel   (NO yogurt or smoothies or energy drinks)  • Clear fruit juices, such as Apple juice without fiber (non-organic), cranberry juice and grape juice (NO pulp)  • 7-up/ Sprite  • Black coffee or tea (NO additives which includes milk, creamer, sweeteners, honey, lemon)   • Clear chicken or beef broth or bouillon. (NO homemade broth)   Diabetics: Acceptable clear Liquids for adult and pediatric patients 1 hours prior to arrival time:   • Any of the items listed above ONLY if they are sugar free clear drinks.   Unacceptable Clear Liquids for ALL adult and pediatric patients:   • Coffee or Tea with milk products or lemon  • Orange juice  • Alcohol   For Pediatric Patients Only: Breast Milk/Infant Formula and non-fat/protein meals (ie. cow/almond/soy milk) Restrictions:  • Breast Milk is allowed 2 hours prior to arrival. Infant formula and non-fat protein meals (ie. cow/almond/soy milk) is allowed up to 4 hours prior to arrival.  DO NOT ADD anything such as cereal to these or surgery may be delayed or canceled.     • Do NOT eat or consume any food or dairy after midnight. This includes candy and gum  • Do NOT smoke, drink alcohol, or use recreational drugs prior to your surgery.    Glp-1: CHECK MEDS TO HOLD FOR  ADDITIONAL DIETARY RESTRICTIONS FOR GLP-1.    Patient verbalized understanding? YES    TAKE the following medications the morning of surgery with a small sip of water: HOME MEDICATIONS    ARRIVAL TIME TEXT MESSAGING:  I do not know your arrival time yet.  We will be sending you a text with this information when we have it along with an electronic copy of the instructions I just gave you. (Reinforce with GLP-1 patients to follow verbal instructions provided for clear liquids not what is on the text message) Please confirm CONTACTS is the phone number you would like this text to be sent. When you receive the text, Please click the link to confirm you have received it.     Please call the location of surgery with questions:   Outpatient Pavilion:  Monday-Friday 5am-7pm: [phone],  After hours: [phone]    Hospital   Monday-Friday: 5am-7pm [phone], After hours: [phone]   inform son of plan

## 2024-06-14 NOTE — PHYSICAL THERAPY INITIAL EVALUATION ADULT - IMPAIRMENTS CONTRIBUTING TO GAIT DEVIATIONS, PT EVAL
impaired balance/cognition/decreased strength

## 2024-06-20 NOTE — DISCHARGE NOTE NURSING/CASE MANAGEMENT/SOCIAL WORK - NSDCPEFALRISK_GEN_ALL_CORE
For information on Fall & injury Prevention, visit [email or web address]

## 2024-10-08 NOTE — ED PROVIDER NOTE - CARE PLAN
Refill Routing Note   Medication(s) are not appropriate for processing by [facility] for the following reason(s):        Outside of protocol    [facility] action(s):  Route      Medication Therapy Plan: TDD 80 mg/day outside of protocol      Appointments  past 12m or future 3m with PCP    Date Provider   Last Visit   6/13/2024 Robbie Coombs, DO   Next Visit   10/17/2024 Robbie Coombs, DO   ED visits in past 90 days: 0        Note composed:12:19 PM 10/08/2024                   Principal Discharge DX:	Fall as cause of accidental injury in home as place of occurrence, initial encounter  Secondary Diagnosis:	Falls frequently

## 2024-12-07 NOTE — CONSULT NOTE ADULT - PROVIDER SPECIALTY LIST ADULT
Problem: PAIN - ADULT  Goal: Verbalizes/displays adequate comfort level or baseline comfort level  Description: Interventions:  - Encourage patient to monitor pain and request assistance  - Assess pain using appropriate pain scale  - Administer analgesics based on type and severity of pain and evaluate response  - Implement non-pharmacological measures as appropriate and evaluate response  - Consider cultural and social influences on pain and pain management  - Notify physician/advanced practitioner if interventions unsuccessful or patient reports new pain  Outcome: Progressing     Problem: INFECTION - ADULT  Goal: Absence or prevention of progression during hospitalization  Description: INTERVENTIONS:  - Assess and monitor for signs and symptoms of infection  - Monitor lab/diagnostic results  - Monitor all insertion sites, i.e. indwelling lines, tubes, and drains  - Monitor endotracheal if appropriate and nasal secretions for changes in amount and color  - [place] appropriate cooling/warming therapies per order  - Administer medications as ordered  - Instruct and encourage patient and family to use good hand hygiene technique  - Identify and instruct in appropriate isolation precautions for identified infection/condition  Outcome: Progressing  Goal: Absence of fever/infection during neutropenic period  Description: INTERVENTIONS:  - Monitor WBC    Outcome: Progressing     Problem: SAFETY ADULT  Goal: Patient will remain free of falls  Description: INTERVENTIONS:  - Educate patient/family on patient safety including physical limitations  - Instruct patient to call for assistance with activity   - Consult OT/PT to assist with strengthening/mobility   - Keep Call bell within reach  - Keep bed low and locked with side rails adjusted as appropriate  - Keep care items and personal belongings within reach  - Initiate and maintain comfort rounds  - Make Fall Risk Sign visible to staff  - Offer Toileting every 2 Hours, 
in advance of need  - Apply yellow socks and bracelet for high fall risk patients  - Consider moving patient to room near nurses station  Outcome: Progressing  Goal: Maintain or return to baseline ADL function  Description: INTERVENTIONS:  -  Assess patient's ability to carry out ADLs; assess patient's baseline for ADL function and identify physical deficits which impact ability to perform ADLs (bathing, care of mouth/teeth, toileting, grooming, dressing, etc.)  - Assess/evaluate cause of self-care deficits   - Assess range of motion  - Assess patient's mobility; develop plan if impaired  - Assess patient's need for assistive devices and provide as appropriate  - Encourage maximum independence but intervene and supervise when necessary  - Involve family in performance of ADLs  - Assess for home care needs following discharge   - Consider OT consult to assist with ADL evaluation and planning for discharge  - Provide patient education as appropriate  Outcome: Progressing  Goal: Maintains/Returns to pre admission functional level  Description: INTERVENTIONS:  - Perform AM-PAC 6 Click Basic Mobility/ Daily Activity assessment daily.  - Set and communicate daily mobility goal to care team and patient/family/caregiver.   - Collaborate with rehabilitation services on mobility goals if consulted  - Perform Range of Motion 3 times a day.  - Reposition patient every 2 hours.  - Dangle patient 3 times a day  - Stand patient 3 times a day  - Ambulate patient 3 times a day  - Out of bed to chair 3 times a day   - Out of bed for meals 3 times a day  - Out of bed for toileting  - Record patient progress and toleration of activity level   Outcome: Progressing     Problem: DISCHARGE PLANNING  Goal: Discharge to home or other facility with appropriate resources  Description: INTERVENTIONS:  - Identify barriers to discharge w/patient and caregiver  - Arrange for needed discharge resources and transportation as appropriate  - Identify 
discharge learning needs (meds, wound care, etc.)  - Arrange for interpretive services to assist at discharge as needed  - Refer to Case Management Department for coordinating discharge planning if the patient needs post-hospital services based on physician/advanced practitioner order or complex needs related to functional status, cognitive ability, or social support system  Outcome: Progressing     Problem: Knowledge Deficit  Goal: Patient/family/caregiver demonstrates understanding of disease process, treatment plan, medications, and discharge instructions  Description: Complete learning assessment and assess knowledge base.  Interventions:  - Provide teaching at level of understanding  - Provide teaching via preferred learning methods  Outcome: Progressing     
Cardiology
Urology

## 2024-12-21 NOTE — ED ADULT TRIAGE NOTE - DIRECT TO ROOM CARE INITIATED:
-- DO NOT REPLY / DO NOT REPLY ALL --  -- This inbox is not monitored. If this was sent to the wrong provider or department, reroute message to P [facility] Reroute pool. --  -- Message is from [employer] ([employer]) --    Medication: losartan (COZAAR) 100 MG tablet  passed protocol.   Last office visit date: 09.23.2024  Next appointment scheduled?: No;       Number of refills given: 2  __________________________________________    Medication: metFORMIN (GLUCOPHAGE-XR) 500 MG 24 hr tablet  passed protocol.   Last office visit date: 09.23.2024  Next appointment scheduled?: No;       Number of refills given: 2  __________________________________________    Medication: amLODIPine (NORVASC) 10 MG tablet  passed protocol.   Last office visit date: 09.23.2024  Next appointment scheduled?: No;       Number of refills given: 2  __________________________________________    Patient [employer] texted and rx was sent to pharmacy.         
Copied from CRM #[id number]. Topic: MW Medication/Rx - MW Rx Refill  >> Dec 21, 2024 12:19 PM Kathrine ALEJANDRO wrote:  Biju Garcia called to request a medication refill that is Out of medication or is critically low in meds during after hrs.      Medication is. listed on Med Management list. Selected 'Wrap Up CRM' and created new Refill Med Encounter after clicking 'Convert to Clinical Call'. Selected reason for call 'Refill Request'. Pended medication. Sent Med template and routed as high priority to [employer] CLINICAL MSG POOL.-- DO NOT REPLY / DO NOT REPLY ALL --  -- This inbox is not monitored. If this was sent to the wrong provider or department, reroute message to [facility] Reroute pool. --  -- Message is from [facility] ([facility]) --      Message Type:  Refill Medication   Refill request for Pended medication named: metFORMIN (GLUCOPHAGE-XR) 500 MG 24 hr tablet ,losartan (COZAAR) 100 MG tablet ,amLODIPine (NORVASC) 10 MG tablet   Preferred pharmacy verified, and selected.   [id number] - [facility] [address]    Is the patient OUT of Medication?  Yes and After Hours- route as high priority to [facility] CLINICAL MSG pool. Patient has been advised it will be addressed within 1 business day.    Message:                   
01-Aug-2022 16:45

## 2025-01-23 NOTE — PROGRESS NOTE ADULT - PROBLEM SELECTOR PLAN 2
Acute respiratory failure. Pt presented after episode of hypoxia to 70s on RA at assisted living facility. However, has been sating well on RA during transport by EMS and in ED.  - Unclear etiology, likely related to ADHF  - was on 2L O2NC after recent admission earlier in Aug 2022 for CHF exacerbation--> currently on RA   - continue to monitor respiratory status, currently sating well on RA  - plan for ADHF as above
Acute respiratory failure. Pt presented after episode of hypoxia to 70s on RA at assisted living facility. However, has been sating well on RA during transport by EMS and in ED.  - Unclear etiology, likely related to ADHF  - was on 2L O2NC after recent admission earlier in Aug 2022 for CHF exacerbation  - continue to monitor respiratory status, currently sating well on RA  - plan for ADHF as above
Acute respiratory failure. Pt presented after episode of hypoxia to 70s on RA at assisted living facility. However, has been sating well on RA during transport by EMS and in ED.  - Unclear etiology, likely related to ADHF  - was on 2L O2NC after recent admission earlier in Aug 2022 for CHF exacerbation  - continue to monitor respiratory status, currently sating well on RA  - plan for ADHF as above
Acute respiratory failure. Pt presented after episode of hypoxia to 70s on RA at assisted living facility. However, has been sating well on RA during transport by EMS and in ED.  - Unclear etiology, likely related to ADHF  - was on 2L O2NC after recent admission earlier in Aug 2022 for CHF exacerbation--> currently on RA   - continue to monitor respiratory status, currently sating well on RA  - plan for ADHF as above
Acute respiratory failure. Pt presented after episode of hypoxia to 70s on RA at assisted living facility. However, has been sating well on RA during transport by EMS and in ED.  - Unclear etiology, likely related to ADHF  - was on 2L O2NC after recent admission earlier in Aug 2022 for CHF exacerbation  - continue to monitor respiratory status, currently sating well on RA  - plan for ADHF as above
Acute respiratory failure. Pt presented after episode of hypoxia to 70s on RA at assisted living facility. However, has been sating well on RA during transport by EMS and in ED.  - Unclear etiology, likely related to ADHF  - was on 2L O2NC after recent admission earlier in Aug 2022 for CHF exacerbation  - continue to monitor respiratory status, currently sating well on RA  - plan for ADHF as above
No
